# Patient Record
Sex: FEMALE | Race: BLACK OR AFRICAN AMERICAN | NOT HISPANIC OR LATINO | Employment: OTHER | RURAL
[De-identification: names, ages, dates, MRNs, and addresses within clinical notes are randomized per-mention and may not be internally consistent; named-entity substitution may affect disease eponyms.]

---

## 2017-07-10 ENCOUNTER — HISTORICAL (OUTPATIENT)
Dept: ADMINISTRATIVE | Facility: HOSPITAL | Age: 62
End: 2017-07-10

## 2017-07-12 LAB
LAB AP GENERAL CAT - HISTORICAL: NORMAL
LAB AP INTERPRETATION/RESULT - HISTORICAL: NEGATIVE
LAB AP SPECIMEN ADEQUACY - HISTORICAL: NORMAL
LAB AP SPECIMEN SUBMITTED - HISTORICAL: NORMAL

## 2021-05-20 RX ORDER — OMEPRAZOLE 40 MG/1
40 CAPSULE, DELAYED RELEASE ORAL DAILY
COMMUNITY
End: 2021-11-01

## 2021-05-20 RX ORDER — MONTELUKAST SODIUM 10 MG/1
10 TABLET ORAL NIGHTLY
COMMUNITY
End: 2021-10-26

## 2021-05-20 RX ORDER — FUROSEMIDE 40 MG/1
40 TABLET ORAL DAILY
COMMUNITY
End: 2021-11-01

## 2021-05-20 RX ORDER — SPIRONOLACTONE 25 MG/1
25 TABLET ORAL DAILY
COMMUNITY
End: 2021-11-01

## 2021-05-20 RX ORDER — PITAVASTATIN CALCIUM 4.18 MG/1
1 TABLET, FILM COATED ORAL DAILY
COMMUNITY
End: 2022-02-02 | Stop reason: SDUPTHER

## 2021-05-20 RX ORDER — NIFEDIPINE 90 MG/1
90 TABLET, FILM COATED, EXTENDED RELEASE ORAL DAILY
COMMUNITY
End: 2022-02-02 | Stop reason: SDUPTHER

## 2021-05-20 RX ORDER — LOSARTAN POTASSIUM AND HYDROCHLOROTHIAZIDE 25; 100 MG/1; MG/1
1 TABLET ORAL DAILY
COMMUNITY
End: 2022-02-02 | Stop reason: SDUPTHER

## 2021-05-20 RX ORDER — HYDROCODONE BITARTRATE AND ACETAMINOPHEN 10; 325 MG/1; MG/1
1 TABLET ORAL EVERY 8 HOURS PRN
COMMUNITY
End: 2021-12-07 | Stop reason: SDUPTHER

## 2021-05-20 RX ORDER — EZETIMIBE 10 MG/1
10 TABLET ORAL DAILY
COMMUNITY
End: 2022-01-03

## 2021-05-24 ENCOUNTER — OFFICE VISIT (OUTPATIENT)
Dept: PAIN MEDICINE | Facility: CLINIC | Age: 66
End: 2021-05-24
Payer: MEDICARE

## 2021-05-24 VITALS
RESPIRATION RATE: 18 BRPM | WEIGHT: 192 LBS | HEART RATE: 68 BPM | SYSTOLIC BLOOD PRESSURE: 135 MMHG | BODY MASS INDEX: 34.02 KG/M2 | HEIGHT: 63 IN | DIASTOLIC BLOOD PRESSURE: 75 MMHG

## 2021-05-24 DIAGNOSIS — M25.551 HIP PAIN, RIGHT: Chronic | ICD-10-CM

## 2021-05-24 DIAGNOSIS — Z79.899 ENCOUNTER FOR LONG-TERM (CURRENT) USE OF OTHER MEDICATIONS: Primary | ICD-10-CM

## 2021-05-24 DIAGNOSIS — M47.816 SPONDYLOSIS WITHOUT MYELOPATHY OR RADICULOPATHY, LUMBAR REGION: Chronic | ICD-10-CM

## 2021-05-24 LAB
CTP QC/QA: YES
POC (AMP) AMPHETAMINE: NEGATIVE
POC (BAR) BARBITURATES: NEGATIVE
POC (BUP) BUPRENORPHINE: NEGATIVE
POC (BZO) BENZODIAZEPINES: NEGATIVE
POC (COC) COCAINE: NEGATIVE
POC (MDMA) METHYLENEDIOXYMETHAMPHETAMINE 3,4: NEGATIVE
POC (MET) METHAMPHETAMINE: NEGATIVE
POC (MOP) OPIATES: ABNORMAL
POC (MTD) METHADONE: NEGATIVE
POC (OXY) OXYCODONE: NEGATIVE
POC (PCP) PHENCYCLIDINE: NEGATIVE
POC (TCA) TRICYCLIC ANTIDEPRESSANTS: NEGATIVE
POC TEMPERATURE (URINE): 92
POC THC: NEGATIVE

## 2021-05-24 PROCEDURE — 99214 PR OFFICE/OUTPT VISIT, EST, LEVL IV, 30-39 MIN: ICD-10-PCS | Mod: S$PBB,,, | Performed by: PHYSICIAN ASSISTANT

## 2021-05-24 PROCEDURE — 99214 OFFICE O/P EST MOD 30 MIN: CPT | Mod: PBBFAC | Performed by: PHYSICIAN ASSISTANT

## 2021-05-24 PROCEDURE — 99214 OFFICE O/P EST MOD 30 MIN: CPT | Mod: S$PBB,,, | Performed by: PHYSICIAN ASSISTANT

## 2021-05-24 PROCEDURE — 80305 DRUG TEST PRSMV DIR OPT OBS: CPT | Mod: PBBFAC | Performed by: PHYSICIAN ASSISTANT

## 2021-05-24 RX ORDER — HYDROCODONE BITARTRATE AND ACETAMINOPHEN 10; 325 MG/1; MG/1
1 TABLET ORAL EVERY 8 HOURS
Qty: 90 TABLET | Refills: 0 | Status: SHIPPED | OUTPATIENT
Start: 2021-06-17 | End: 2021-07-17

## 2021-05-24 RX ORDER — ASPIRIN 81 MG/1
81 TABLET ORAL DAILY
COMMUNITY

## 2021-05-24 RX ORDER — HYDROCODONE BITARTRATE AND ACETAMINOPHEN 10; 325 MG/1; MG/1
1 TABLET ORAL EVERY 8 HOURS
Qty: 90 TABLET | Refills: 0 | Status: SHIPPED | OUTPATIENT
Start: 2021-08-13 | End: 2021-07-28 | Stop reason: SDUPTHER

## 2021-05-24 RX ORDER — HYDROCODONE BITARTRATE AND ACETAMINOPHEN 10; 325 MG/1; MG/1
1 TABLET ORAL EVERY 8 HOURS
Qty: 90 TABLET | Refills: 0 | Status: SHIPPED | OUTPATIENT
Start: 2021-07-16 | End: 2021-07-28 | Stop reason: SDUPTHER

## 2021-07-28 ENCOUNTER — OFFICE VISIT (OUTPATIENT)
Dept: PRIMARY CARE CLINIC | Facility: CLINIC | Age: 66
End: 2021-07-28
Payer: MEDICARE

## 2021-07-28 VITALS
BODY MASS INDEX: 34.78 KG/M2 | HEART RATE: 62 BPM | TEMPERATURE: 98 F | OXYGEN SATURATION: 98 % | WEIGHT: 189 LBS | HEIGHT: 62 IN | RESPIRATION RATE: 20 BRPM | DIASTOLIC BLOOD PRESSURE: 80 MMHG | SYSTOLIC BLOOD PRESSURE: 110 MMHG

## 2021-07-28 DIAGNOSIS — E11.9 TYPE 2 DIABETES MELLITUS WITHOUT COMPLICATION, WITHOUT LONG-TERM CURRENT USE OF INSULIN: ICD-10-CM

## 2021-07-28 DIAGNOSIS — I10 ESSENTIAL HYPERTENSION: ICD-10-CM

## 2021-07-28 DIAGNOSIS — Z12.31 SCREENING MAMMOGRAM, ENCOUNTER FOR: ICD-10-CM

## 2021-07-28 DIAGNOSIS — Z00.00 ENCOUNTER FOR SUBSEQUENT ANNUAL WELLNESS VISIT (AWV) IN MEDICARE PATIENT: Primary | ICD-10-CM

## 2021-07-28 PROCEDURE — 3008F BODY MASS INDEX DOCD: CPT | Mod: ,,, | Performed by: NURSE PRACTITIONER

## 2021-07-28 PROCEDURE — 1159F PR MEDICATION LIST DOCUMENTED IN MEDICAL RECORD: ICD-10-PCS | Mod: ,,, | Performed by: NURSE PRACTITIONER

## 2021-07-28 PROCEDURE — 3008F PR BODY MASS INDEX (BMI) DOCUMENTED: ICD-10-PCS | Mod: ,,, | Performed by: NURSE PRACTITIONER

## 2021-07-28 PROCEDURE — 1101F PR PT FALLS ASSESS DOC 0-1 FALLS W/OUT INJ PAST YR: ICD-10-PCS | Mod: ,,, | Performed by: NURSE PRACTITIONER

## 2021-07-28 PROCEDURE — 3288F PR FALLS RISK ASSESSMENT DOCUMENTED: ICD-10-PCS | Mod: ,,, | Performed by: NURSE PRACTITIONER

## 2021-07-28 PROCEDURE — 1160F RVW MEDS BY RX/DR IN RCRD: CPT | Mod: ,,, | Performed by: NURSE PRACTITIONER

## 2021-07-28 PROCEDURE — 1125F PR PAIN SEVERITY QUANTIFIED, PAIN PRESENT: ICD-10-PCS | Mod: ,,, | Performed by: NURSE PRACTITIONER

## 2021-07-28 PROCEDURE — G0439 PPPS, SUBSEQ VISIT: HCPCS | Mod: ,,, | Performed by: NURSE PRACTITIONER

## 2021-07-28 PROCEDURE — 1125F AMNT PAIN NOTED PAIN PRSNT: CPT | Mod: ,,, | Performed by: NURSE PRACTITIONER

## 2021-07-28 PROCEDURE — 1159F MED LIST DOCD IN RCRD: CPT | Mod: ,,, | Performed by: NURSE PRACTITIONER

## 2021-07-28 PROCEDURE — 1160F PR REVIEW ALL MEDS BY PRESCRIBER/CLIN PHARMACIST DOCUMENTED: ICD-10-PCS | Mod: ,,, | Performed by: NURSE PRACTITIONER

## 2021-07-28 PROCEDURE — G0439 PR MEDICARE ANNUAL WELLNESS SUBSEQUENT VISIT: ICD-10-PCS | Mod: ,,, | Performed by: NURSE PRACTITIONER

## 2021-07-28 PROCEDURE — 3288F FALL RISK ASSESSMENT DOCD: CPT | Mod: ,,, | Performed by: NURSE PRACTITIONER

## 2021-07-28 PROCEDURE — 1101F PT FALLS ASSESS-DOCD LE1/YR: CPT | Mod: ,,, | Performed by: NURSE PRACTITIONER

## 2021-07-28 RX ORDER — POTASSIUM CHLORIDE 20 MEQ/15ML
20 SOLUTION ORAL DAILY
COMMUNITY
Start: 2021-05-27 | End: 2021-10-11

## 2021-08-03 ENCOUNTER — TELEPHONE (OUTPATIENT)
Dept: PRIMARY CARE CLINIC | Facility: CLINIC | Age: 66
End: 2021-08-03

## 2021-08-04 ENCOUNTER — OFFICE VISIT (OUTPATIENT)
Dept: PRIMARY CARE CLINIC | Facility: CLINIC | Age: 66
End: 2021-08-04
Payer: MEDICARE

## 2021-08-04 VITALS
TEMPERATURE: 98 F | DIASTOLIC BLOOD PRESSURE: 79 MMHG | BODY MASS INDEX: 35.33 KG/M2 | WEIGHT: 192 LBS | OXYGEN SATURATION: 98 % | HEART RATE: 59 BPM | RESPIRATION RATE: 20 BRPM | SYSTOLIC BLOOD PRESSURE: 135 MMHG | HEIGHT: 62 IN

## 2021-08-04 DIAGNOSIS — I10 ESSENTIAL HYPERTENSION: Primary | ICD-10-CM

## 2021-08-04 DIAGNOSIS — E11.9 TYPE 2 DIABETES MELLITUS WITHOUT COMPLICATION, WITHOUT LONG-TERM CURRENT USE OF INSULIN: ICD-10-CM

## 2021-08-04 DIAGNOSIS — T14.8XXA SKIN EXCORIATION: ICD-10-CM

## 2021-08-04 DIAGNOSIS — E78.5 HYPERLIPIDEMIA, UNSPECIFIED HYPERLIPIDEMIA TYPE: ICD-10-CM

## 2021-08-04 LAB
ALBUMIN SERPL BCP-MCNC: 3.5 G/DL (ref 3.5–5)
ALBUMIN/GLOB SERPL: 0.9 {RATIO}
ALP SERPL-CCNC: 57 U/L (ref 55–142)
ALT SERPL W P-5'-P-CCNC: 23 U/L (ref 13–56)
ANION GAP SERPL CALCULATED.3IONS-SCNC: 9 MMOL/L (ref 7–16)
AST SERPL W P-5'-P-CCNC: 17 U/L (ref 15–37)
BASOPHILS # BLD AUTO: 0.06 K/UL (ref 0–0.2)
BASOPHILS NFR BLD AUTO: 1 % (ref 0–1)
BILIRUB SERPL-MCNC: 0.4 MG/DL (ref 0–1.2)
BUN SERPL-MCNC: 16 MG/DL (ref 7–18)
BUN/CREAT SERPL: 16 (ref 6–20)
CALCIUM SERPL-MCNC: 8.6 MG/DL (ref 8.5–10.1)
CHLORIDE SERPL-SCNC: 105 MMOL/L (ref 98–107)
CHOLEST SERPL-MCNC: 151 MG/DL (ref 0–200)
CHOLEST/HDLC SERPL: 4.1 {RATIO}
CO2 SERPL-SCNC: 27 MMOL/L (ref 21–32)
CREAT SERPL-MCNC: 1.01 MG/DL (ref 0.55–1.02)
DIFFERENTIAL METHOD BLD: ABNORMAL
EOSINOPHIL # BLD AUTO: 0.14 K/UL (ref 0–0.5)
EOSINOPHIL NFR BLD AUTO: 2.4 % (ref 1–4)
ERYTHROCYTE [DISTWIDTH] IN BLOOD BY AUTOMATED COUNT: 13.9 % (ref 11.5–14.5)
EST. AVERAGE GLUCOSE BLD GHB EST-MCNC: 197 MG/DL
GLOBULIN SER-MCNC: 4.1 G/DL (ref 2–4)
GLUCOSE SERPL-MCNC: 185 MG/DL (ref 74–106)
HBA1C MFR BLD HPLC: 8.5 % (ref 4.5–6.6)
HCT VFR BLD AUTO: 37.2 % (ref 38–47)
HDLC SERPL-MCNC: 37 MG/DL (ref 40–60)
HGB BLD-MCNC: 12 G/DL (ref 12–16)
IMM GRANULOCYTES # BLD AUTO: 0.03 K/UL (ref 0–0.04)
IMM GRANULOCYTES NFR BLD: 0.5 % (ref 0–0.4)
LDLC SERPL CALC-MCNC: 69 MG/DL
LDLC/HDLC SERPL: 1.9 {RATIO}
LYMPHOCYTES # BLD AUTO: 1.99 K/UL (ref 1–4.8)
LYMPHOCYTES NFR BLD AUTO: 34.8 % (ref 27–41)
MCH RBC QN AUTO: 27.1 PG (ref 27–31)
MCHC RBC AUTO-ENTMCNC: 32.3 G/DL (ref 32–36)
MCV RBC AUTO: 84.2 FL (ref 80–96)
MONOCYTES # BLD AUTO: 0.56 K/UL (ref 0–0.8)
MONOCYTES NFR BLD AUTO: 9.8 % (ref 2–6)
MPC BLD CALC-MCNC: 11.9 FL (ref 9.4–12.4)
NEUTROPHILS # BLD AUTO: 2.94 K/UL (ref 1.8–7.7)
NEUTROPHILS NFR BLD AUTO: 51.5 % (ref 53–65)
NONHDLC SERPL-MCNC: 114 MG/DL
NRBC # BLD AUTO: 0 X10E3/UL
NRBC, AUTO (.00): 0 %
PLATELET # BLD AUTO: 230 K/UL (ref 150–400)
POTASSIUM SERPL-SCNC: 3.4 MMOL/L (ref 3.5–5.1)
PROT SERPL-MCNC: 7.6 G/DL (ref 6.4–8.2)
RBC # BLD AUTO: 4.42 M/UL (ref 4.2–5.4)
SODIUM SERPL-SCNC: 138 MMOL/L (ref 136–145)
TRIGL SERPL-MCNC: 226 MG/DL (ref 35–150)
VLDLC SERPL-MCNC: 45 MG/DL
WBC # BLD AUTO: 5.72 K/UL (ref 4.5–11)

## 2021-08-04 PROCEDURE — 85025 COMPLETE CBC W/AUTO DIFF WBC: CPT | Mod: ,,, | Performed by: CLINICAL MEDICAL LABORATORY

## 2021-08-04 PROCEDURE — 80061 LIPID PANEL: ICD-10-PCS | Mod: ,,, | Performed by: CLINICAL MEDICAL LABORATORY

## 2021-08-04 PROCEDURE — 1160F RVW MEDS BY RX/DR IN RCRD: CPT | Mod: ,,, | Performed by: NURSE PRACTITIONER

## 2021-08-04 PROCEDURE — 80053 COMPREHENSIVE METABOLIC PANEL: ICD-10-PCS | Mod: ,,, | Performed by: CLINICAL MEDICAL LABORATORY

## 2021-08-04 PROCEDURE — 1159F MED LIST DOCD IN RCRD: CPT | Mod: ,,, | Performed by: NURSE PRACTITIONER

## 2021-08-04 PROCEDURE — 99204 PR OFFICE/OUTPT VISIT, NEW, LEVL IV, 45-59 MIN: ICD-10-PCS | Mod: ,,, | Performed by: NURSE PRACTITIONER

## 2021-08-04 PROCEDURE — 80061 LIPID PANEL: CPT | Mod: ,,, | Performed by: CLINICAL MEDICAL LABORATORY

## 2021-08-04 PROCEDURE — 3078F DIAST BP <80 MM HG: CPT | Mod: ,,, | Performed by: NURSE PRACTITIONER

## 2021-08-04 PROCEDURE — 83036 HEMOGLOBIN GLYCOSYLATED A1C: CPT | Mod: ,,, | Performed by: CLINICAL MEDICAL LABORATORY

## 2021-08-04 PROCEDURE — 3075F SYST BP GE 130 - 139MM HG: CPT | Mod: ,,, | Performed by: NURSE PRACTITIONER

## 2021-08-04 PROCEDURE — 1160F PR REVIEW ALL MEDS BY PRESCRIBER/CLIN PHARMACIST DOCUMENTED: ICD-10-PCS | Mod: ,,, | Performed by: NURSE PRACTITIONER

## 2021-08-04 PROCEDURE — 80053 COMPREHEN METABOLIC PANEL: CPT | Mod: ,,, | Performed by: CLINICAL MEDICAL LABORATORY

## 2021-08-04 PROCEDURE — 3078F PR MOST RECENT DIASTOLIC BLOOD PRESSURE < 80 MM HG: ICD-10-PCS | Mod: ,,, | Performed by: NURSE PRACTITIONER

## 2021-08-04 PROCEDURE — 3008F BODY MASS INDEX DOCD: CPT | Mod: ,,, | Performed by: NURSE PRACTITIONER

## 2021-08-04 PROCEDURE — 99204 OFFICE O/P NEW MOD 45 MIN: CPT | Mod: ,,, | Performed by: NURSE PRACTITIONER

## 2021-08-04 PROCEDURE — 83036 HEMOGLOBIN A1C: ICD-10-PCS | Mod: ,,, | Performed by: CLINICAL MEDICAL LABORATORY

## 2021-08-04 PROCEDURE — 85025 CBC WITH DIFFERENTIAL: ICD-10-PCS | Mod: ,,, | Performed by: CLINICAL MEDICAL LABORATORY

## 2021-08-04 PROCEDURE — 1159F PR MEDICATION LIST DOCUMENTED IN MEDICAL RECORD: ICD-10-PCS | Mod: ,,, | Performed by: NURSE PRACTITIONER

## 2021-08-04 PROCEDURE — 3075F PR MOST RECENT SYSTOLIC BLOOD PRESS GE 130-139MM HG: ICD-10-PCS | Mod: ,,, | Performed by: NURSE PRACTITIONER

## 2021-08-04 PROCEDURE — 3008F PR BODY MASS INDEX (BMI) DOCUMENTED: ICD-10-PCS | Mod: ,,, | Performed by: NURSE PRACTITIONER

## 2021-08-04 RX ORDER — MUPIROCIN 20 MG/G
OINTMENT TOPICAL
COMMUNITY
Start: 2021-02-24 | End: 2021-08-04 | Stop reason: SDUPTHER

## 2021-08-04 RX ORDER — METOPROLOL SUCCINATE 100 MG/1
TABLET, EXTENDED RELEASE ORAL
COMMUNITY
Start: 2021-07-21 | End: 2022-02-02

## 2021-08-04 RX ORDER — LANCING DEVICE/LANCETS
KIT MISCELLANEOUS
COMMUNITY
Start: 2021-03-14 | End: 2022-02-23

## 2021-08-04 RX ORDER — NIFEDIPINE 90 MG/1
TABLET, EXTENDED RELEASE ORAL
COMMUNITY
Start: 2021-07-22 | End: 2022-01-31

## 2021-08-04 RX ORDER — MUPIROCIN 20 MG/G
OINTMENT TOPICAL 2 TIMES DAILY
Qty: 1 TUBE | Refills: 1 | Status: SHIPPED | OUTPATIENT
Start: 2021-08-04 | End: 2022-01-31

## 2021-08-04 RX ORDER — LANCETS
EACH MISCELLANEOUS
COMMUNITY
Start: 2021-06-09 | End: 2021-10-26

## 2021-08-04 RX ORDER — SAXAGLIPTIN AND METFORMIN HYDROCHLORIDE 2.5; 1 MG/1; MG/1
TABLET, FILM COATED, EXTENDED RELEASE ORAL
COMMUNITY
Start: 2021-07-21 | End: 2022-02-02 | Stop reason: SDUPTHER

## 2021-08-05 ENCOUNTER — TELEPHONE (OUTPATIENT)
Dept: PRIMARY CARE CLINIC | Facility: CLINIC | Age: 66
End: 2021-08-05

## 2021-08-09 ENCOUNTER — TELEPHONE (OUTPATIENT)
Dept: PRIMARY CARE CLINIC | Facility: CLINIC | Age: 66
End: 2021-08-09

## 2021-08-10 ENCOUNTER — TELEPHONE (OUTPATIENT)
Dept: PRIMARY CARE CLINIC | Facility: CLINIC | Age: 66
End: 2021-08-10

## 2021-08-23 ENCOUNTER — OFFICE VISIT (OUTPATIENT)
Dept: PAIN MEDICINE | Facility: CLINIC | Age: 66
End: 2021-08-23
Payer: MEDICARE

## 2021-08-23 VITALS
BODY MASS INDEX: 34.78 KG/M2 | HEART RATE: 62 BPM | SYSTOLIC BLOOD PRESSURE: 149 MMHG | HEIGHT: 62 IN | DIASTOLIC BLOOD PRESSURE: 80 MMHG | WEIGHT: 189 LBS

## 2021-08-23 DIAGNOSIS — M47.816 SPONDYLOSIS WITHOUT MYELOPATHY OR RADICULOPATHY, LUMBAR REGION: ICD-10-CM

## 2021-08-23 DIAGNOSIS — M25.559 HIP PAIN: Chronic | ICD-10-CM

## 2021-08-23 DIAGNOSIS — Z79.899 ENCOUNTER FOR LONG-TERM (CURRENT) USE OF OTHER MEDICATIONS: Primary | ICD-10-CM

## 2021-08-23 DIAGNOSIS — M25.551 HIP PAIN, RIGHT: ICD-10-CM

## 2021-08-23 PROCEDURE — 3079F PR MOST RECENT DIASTOLIC BLOOD PRESSURE 80-89 MM HG: ICD-10-PCS | Mod: CPTII,,, | Performed by: PAIN MEDICINE

## 2021-08-23 PROCEDURE — 1101F PR PT FALLS ASSESS DOC 0-1 FALLS W/OUT INJ PAST YR: ICD-10-PCS | Mod: CPTII,,, | Performed by: PAIN MEDICINE

## 2021-08-23 PROCEDURE — 80305 DRUG TEST PRSMV DIR OPT OBS: CPT | Mod: PBBFAC | Performed by: PAIN MEDICINE

## 2021-08-23 PROCEDURE — 3052F HG A1C>EQUAL 8.0%<EQUAL 9.0%: CPT | Mod: CPTII,,, | Performed by: PAIN MEDICINE

## 2021-08-23 PROCEDURE — 99213 PR OFFICE/OUTPT VISIT, EST, LEVL III, 20-29 MIN: ICD-10-PCS | Mod: S$PBB,,, | Performed by: PAIN MEDICINE

## 2021-08-23 PROCEDURE — 3008F BODY MASS INDEX DOCD: CPT | Mod: CPTII,,, | Performed by: PAIN MEDICINE

## 2021-08-23 PROCEDURE — 3008F PR BODY MASS INDEX (BMI) DOCUMENTED: ICD-10-PCS | Mod: CPTII,,, | Performed by: PAIN MEDICINE

## 2021-08-23 PROCEDURE — 1125F PR PAIN SEVERITY QUANTIFIED, PAIN PRESENT: ICD-10-PCS | Mod: CPTII,,, | Performed by: PAIN MEDICINE

## 2021-08-23 PROCEDURE — 3288F PR FALLS RISK ASSESSMENT DOCUMENTED: ICD-10-PCS | Mod: CPTII,,, | Performed by: PAIN MEDICINE

## 2021-08-23 PROCEDURE — 1125F AMNT PAIN NOTED PAIN PRSNT: CPT | Mod: CPTII,,, | Performed by: PAIN MEDICINE

## 2021-08-23 PROCEDURE — 3052F PR MOST RECENT HEMOGLOBIN A1C LEVEL 8.0 - < 9.0%: ICD-10-PCS | Mod: CPTII,,, | Performed by: PAIN MEDICINE

## 2021-08-23 PROCEDURE — 1101F PT FALLS ASSESS-DOCD LE1/YR: CPT | Mod: CPTII,,, | Performed by: PAIN MEDICINE

## 2021-08-23 PROCEDURE — 3077F SYST BP >= 140 MM HG: CPT | Mod: CPTII,,, | Performed by: PAIN MEDICINE

## 2021-08-23 PROCEDURE — 99213 OFFICE O/P EST LOW 20 MIN: CPT | Mod: S$PBB,,, | Performed by: PAIN MEDICINE

## 2021-08-23 PROCEDURE — 3288F FALL RISK ASSESSMENT DOCD: CPT | Mod: CPTII,,, | Performed by: PAIN MEDICINE

## 2021-08-23 PROCEDURE — 3079F DIAST BP 80-89 MM HG: CPT | Mod: CPTII,,, | Performed by: PAIN MEDICINE

## 2021-08-23 PROCEDURE — 3077F PR MOST RECENT SYSTOLIC BLOOD PRESSURE >= 140 MM HG: ICD-10-PCS | Mod: CPTII,,, | Performed by: PAIN MEDICINE

## 2021-08-23 PROCEDURE — 99213 OFFICE O/P EST LOW 20 MIN: CPT | Mod: PBBFAC | Performed by: PAIN MEDICINE

## 2021-08-23 RX ORDER — HYDROCODONE BITARTRATE AND ACETAMINOPHEN 10; 325 MG/1; MG/1
1 TABLET ORAL EVERY 8 HOURS PRN
Qty: 90 TABLET | Refills: 0 | Status: SHIPPED | OUTPATIENT
Start: 2021-11-10 | End: 2021-12-07 | Stop reason: SDUPTHER

## 2021-08-23 RX ORDER — HYDROCODONE BITARTRATE AND ACETAMINOPHEN 10; 325 MG/1; MG/1
1 TABLET ORAL EVERY 12 HOURS PRN
Qty: 60 TABLET | Refills: 0 | Status: CANCELLED | OUTPATIENT
Start: 2021-08-23 | End: 2021-09-22

## 2021-08-23 RX ORDER — HYDROCODONE BITARTRATE AND ACETAMINOPHEN 10; 325 MG/1; MG/1
1 TABLET ORAL EVERY 8 HOURS PRN
Qty: 90 TABLET | Refills: 0 | Status: SHIPPED | OUTPATIENT
Start: 2021-10-12 | End: 2021-11-11

## 2021-08-23 RX ORDER — HYDROCODONE BITARTRATE AND ACETAMINOPHEN 10; 325 MG/1; MG/1
1 TABLET ORAL EVERY 8 HOURS PRN
Qty: 90 TABLET | Refills: 0 | Status: SHIPPED | OUTPATIENT
Start: 2021-09-10 | End: 2021-10-10

## 2021-09-30 RX ORDER — LANCING DEVICE/LANCETS
KIT MISCELLANEOUS
Status: CANCELLED | OUTPATIENT
Start: 2021-09-30

## 2021-12-07 PROBLEM — M89.49 OSTEOARTHROSIS MULTIPLE SITES, NOT SPECIFIED AS GENERALIZED: Chronic | Status: ACTIVE | Noted: 2021-12-07

## 2021-12-08 ENCOUNTER — OFFICE VISIT (OUTPATIENT)
Dept: PAIN MEDICINE | Facility: CLINIC | Age: 66
End: 2021-12-08
Payer: MEDICARE

## 2021-12-08 VITALS
RESPIRATION RATE: 20 BRPM | HEART RATE: 60 BPM | WEIGHT: 189 LBS | HEIGHT: 63 IN | BODY MASS INDEX: 33.49 KG/M2 | SYSTOLIC BLOOD PRESSURE: 177 MMHG | DIASTOLIC BLOOD PRESSURE: 72 MMHG

## 2021-12-08 DIAGNOSIS — Z79.899 ENCOUNTER FOR LONG-TERM (CURRENT) USE OF OTHER MEDICATIONS: ICD-10-CM

## 2021-12-08 DIAGNOSIS — M89.49 OSTEOARTHROSIS MULTIPLE SITES, NOT SPECIFIED AS GENERALIZED: Chronic | ICD-10-CM

## 2021-12-08 DIAGNOSIS — M47.816 SPONDYLOSIS WITHOUT MYELOPATHY OR RADICULOPATHY, LUMBAR REGION: Primary | Chronic | ICD-10-CM

## 2021-12-08 DIAGNOSIS — M25.551 HIP PAIN, RIGHT: Chronic | ICD-10-CM

## 2021-12-08 LAB
CTP QC/QA: YES
POC (AMP) AMPHETAMINE: NEGATIVE
POC (BAR) BARBITURATES: NEGATIVE
POC (BUP) BUPRENORPHINE: NEGATIVE
POC (BZO) BENZODIAZEPINES: NEGATIVE
POC (COC) COCAINE: NEGATIVE
POC (MDMA) METHYLENEDIOXYMETHAMPHETAMINE 3,4: NEGATIVE
POC (MET) METHAMPHETAMINE: NEGATIVE
POC (MOP) OPIATES: ABNORMAL
POC (MTD) METHADONE: NEGATIVE
POC (OXY) OXYCODONE: NEGATIVE
POC (PCP) PHENCYCLIDINE: NEGATIVE
POC (TCA) TRICYCLIC ANTIDEPRESSANTS: NEGATIVE
POC TEMPERATURE (URINE): 90
POC THC: NEGATIVE

## 2021-12-08 PROCEDURE — 99215 OFFICE O/P EST HI 40 MIN: CPT | Mod: PBBFAC | Performed by: PHYSICIAN ASSISTANT

## 2021-12-08 PROCEDURE — 80305 DRUG TEST PRSMV DIR OPT OBS: CPT | Mod: PBBFAC | Performed by: PHYSICIAN ASSISTANT

## 2021-12-08 PROCEDURE — 99214 OFFICE O/P EST MOD 30 MIN: CPT | Mod: S$PBB,,, | Performed by: PHYSICIAN ASSISTANT

## 2021-12-08 PROCEDURE — 99214 PR OFFICE/OUTPT VISIT, EST, LEVL IV, 30-39 MIN: ICD-10-PCS | Mod: S$PBB,,, | Performed by: PHYSICIAN ASSISTANT

## 2021-12-08 RX ORDER — HYDROCODONE BITARTRATE AND ACETAMINOPHEN 10; 325 MG/1; MG/1
1 TABLET ORAL EVERY 8 HOURS
Qty: 90 TABLET | Refills: 0 | Status: SHIPPED | OUTPATIENT
Start: 2022-02-08 | End: 2022-03-04 | Stop reason: SDUPTHER

## 2021-12-08 RX ORDER — HYDROCODONE BITARTRATE AND ACETAMINOPHEN 10; 325 MG/1; MG/1
1 TABLET ORAL EVERY 8 HOURS
Qty: 90 TABLET | Refills: 0 | Status: SHIPPED | OUTPATIENT
Start: 2021-12-10 | End: 2022-01-09

## 2021-12-08 RX ORDER — HYDROCODONE BITARTRATE AND ACETAMINOPHEN 10; 325 MG/1; MG/1
1 TABLET ORAL EVERY 8 HOURS
Qty: 90 TABLET | Refills: 0 | Status: SHIPPED | OUTPATIENT
Start: 2022-01-08 | End: 2022-02-07

## 2022-02-02 ENCOUNTER — OFFICE VISIT (OUTPATIENT)
Dept: PRIMARY CARE CLINIC | Facility: CLINIC | Age: 67
End: 2022-02-02
Payer: MEDICARE

## 2022-02-02 VITALS
SYSTOLIC BLOOD PRESSURE: 126 MMHG | RESPIRATION RATE: 18 BRPM | OXYGEN SATURATION: 96 % | HEART RATE: 57 BPM | BODY MASS INDEX: 33.13 KG/M2 | TEMPERATURE: 97 F | HEIGHT: 63 IN | DIASTOLIC BLOOD PRESSURE: 71 MMHG | WEIGHT: 187 LBS

## 2022-02-02 DIAGNOSIS — I10 ESSENTIAL HYPERTENSION: ICD-10-CM

## 2022-02-02 DIAGNOSIS — Z23 ENCOUNTER FOR IMMUNIZATION: ICD-10-CM

## 2022-02-02 DIAGNOSIS — E11.9 TYPE 2 DIABETES MELLITUS WITHOUT COMPLICATION, WITHOUT LONG-TERM CURRENT USE OF INSULIN: Primary | ICD-10-CM

## 2022-02-02 DIAGNOSIS — E78.5 HYPERLIPIDEMIA, UNSPECIFIED HYPERLIPIDEMIA TYPE: ICD-10-CM

## 2022-02-02 DIAGNOSIS — E87.6 HYPOKALEMIA: ICD-10-CM

## 2022-02-02 LAB — GLUCOSE SERPL-MCNC: 137 MG/DL (ref 70–110)

## 2022-02-02 PROCEDURE — 1160F PR REVIEW ALL MEDS BY PRESCRIBER/CLIN PHARMACIST DOCUMENTED: ICD-10-PCS | Mod: ,,, | Performed by: NURSE PRACTITIONER

## 2022-02-02 PROCEDURE — 3078F DIAST BP <80 MM HG: CPT | Mod: ,,, | Performed by: NURSE PRACTITIONER

## 2022-02-02 PROCEDURE — 1159F MED LIST DOCD IN RCRD: CPT | Mod: ,,, | Performed by: NURSE PRACTITIONER

## 2022-02-02 PROCEDURE — G0008 ADMIN INFLUENZA VIRUS VAC: HCPCS | Mod: ,,, | Performed by: NURSE PRACTITIONER

## 2022-02-02 PROCEDURE — G0008 FLU VACCINE (QUAD) GREATER THAN OR EQUAL TO 3YO PRESERVATIVE FREE IM: ICD-10-PCS | Mod: ,,, | Performed by: NURSE PRACTITIONER

## 2022-02-02 PROCEDURE — 99214 PR OFFICE/OUTPT VISIT, EST, LEVL IV, 30-39 MIN: ICD-10-PCS | Mod: ,,, | Performed by: NURSE PRACTITIONER

## 2022-02-02 PROCEDURE — 90686 IIV4 VACC NO PRSV 0.5 ML IM: CPT | Mod: ,,, | Performed by: NURSE PRACTITIONER

## 2022-02-02 PROCEDURE — 3052F PR MOST RECENT HEMOGLOBIN A1C LEVEL 8.0 - < 9.0%: ICD-10-PCS | Mod: ,,, | Performed by: NURSE PRACTITIONER

## 2022-02-02 PROCEDURE — 3008F BODY MASS INDEX DOCD: CPT | Mod: ,,, | Performed by: NURSE PRACTITIONER

## 2022-02-02 PROCEDURE — 3074F SYST BP LT 130 MM HG: CPT | Mod: ,,, | Performed by: NURSE PRACTITIONER

## 2022-02-02 PROCEDURE — 99214 OFFICE O/P EST MOD 30 MIN: CPT | Mod: ,,, | Performed by: NURSE PRACTITIONER

## 2022-02-02 PROCEDURE — 1160F RVW MEDS BY RX/DR IN RCRD: CPT | Mod: ,,, | Performed by: NURSE PRACTITIONER

## 2022-02-02 PROCEDURE — 3008F PR BODY MASS INDEX (BMI) DOCUMENTED: ICD-10-PCS | Mod: ,,, | Performed by: NURSE PRACTITIONER

## 2022-02-02 PROCEDURE — 3052F HG A1C>EQUAL 8.0%<EQUAL 9.0%: CPT | Mod: ,,, | Performed by: NURSE PRACTITIONER

## 2022-02-02 PROCEDURE — 3078F PR MOST RECENT DIASTOLIC BLOOD PRESSURE < 80 MM HG: ICD-10-PCS | Mod: ,,, | Performed by: NURSE PRACTITIONER

## 2022-02-02 PROCEDURE — 3074F PR MOST RECENT SYSTOLIC BLOOD PRESSURE < 130 MM HG: ICD-10-PCS | Mod: ,,, | Performed by: NURSE PRACTITIONER

## 2022-02-02 PROCEDURE — 1159F PR MEDICATION LIST DOCUMENTED IN MEDICAL RECORD: ICD-10-PCS | Mod: ,,, | Performed by: NURSE PRACTITIONER

## 2022-02-02 PROCEDURE — 90686 FLU VACCINE (QUAD) GREATER THAN OR EQUAL TO 3YO PRESERVATIVE FREE IM: ICD-10-PCS | Mod: ,,, | Performed by: NURSE PRACTITIONER

## 2022-02-02 RX ORDER — EZETIMIBE 10 MG/1
10 TABLET ORAL DAILY
Qty: 90 TABLET | Refills: 2 | Status: SHIPPED | OUTPATIENT
Start: 2022-02-02 | End: 2022-02-09 | Stop reason: SDUPTHER

## 2022-02-02 RX ORDER — LOSARTAN POTASSIUM AND HYDROCHLOROTHIAZIDE 25; 100 MG/1; MG/1
1 TABLET ORAL DAILY
Qty: 90 TABLET | Refills: 2 | Status: SHIPPED | OUTPATIENT
Start: 2022-02-02 | End: 2022-02-09 | Stop reason: SDUPTHER

## 2022-02-02 RX ORDER — METOPROLOL SUCCINATE 100 MG/1
100 TABLET, EXTENDED RELEASE ORAL DAILY
Qty: 90 TABLET | Refills: 2 | Status: SHIPPED | OUTPATIENT
Start: 2022-02-02 | End: 2022-02-09 | Stop reason: SDUPTHER

## 2022-02-02 RX ORDER — PITAVASTATIN CALCIUM 4.18 MG/1
TABLET, FILM COATED ORAL
Qty: 90 TABLET | Refills: 2 | Status: SHIPPED | OUTPATIENT
Start: 2022-02-02 | End: 2022-02-09 | Stop reason: SDUPTHER

## 2022-02-02 RX ORDER — SITAGLIPTIN AND METFORMIN HYDROCHLORIDE 1000; 50 MG/1; MG/1
1 TABLET, FILM COATED, EXTENDED RELEASE ORAL
Qty: 90 TABLET | Refills: 2 | Status: SHIPPED | OUTPATIENT
Start: 2022-02-02 | End: 2022-02-08

## 2022-02-02 RX ORDER — POTASSIUM CHLORIDE 20 MEQ/15ML
SOLUTION ORAL
Qty: 720 ML | Refills: 2 | Status: SHIPPED | OUTPATIENT
Start: 2022-02-02 | End: 2022-02-09 | Stop reason: SDUPTHER

## 2022-02-02 NOTE — PATIENT INSTRUCTIONS
"Patient Education       Diabetes and Diet   The Basics   Written by the doctors and editors at [facility]   Why is diet important in diabetes? -- Diet is important because it is part of diabetes treatment. Many people need to change what they eat and how much they eat to help treat their diabetes. It is important for people to treat their diabetes so that they:  · Keep their blood sugar at or near a normal level  · Prevent long-term problems, such as heart or kidney problems, that can happen in people with diabetes  Changing your diet can also help treat obesity, high blood pressure, and high cholesterol. These conditions can affect people with diabetes and can lead to future problems, such as heart attacks or strokes.  Who will work with me to change my diet? -- Your doctor or nurse will work with you to make a food plan to change your diet. They might also recommend that you work with a "dietitian." A dietitian is an expert on food and eating.  Do I need to eat at the same times every day? -- When and how often you should eat depends, in part, on the diabetes medicines you take. For example:  · People who take about the same amount of insulin at the same time each day (called a "fixed regimen") should eat meals at the same times. This is also true for people who take pills that increase insulin levels, such as sulfonylureas. Eating meals at the same time every day helps prevent low blood sugar.  · People who adjust the dose and timing of their insulin each day (called a "flexible regimen") do not always have to eat meals at the same time. That's because they can time their insulin dose for before they plan to eat, and also adjust the dose for how much they plan to eat.  · People who take medicines that don't usually cause low blood sugar, such as metformin, don't have to eat meals at the same time every day.  What do I need to think about when planning what to eat? -- Our bodies break down the food we eat into small " "pieces called carbohydrates, proteins, and fats.  When planning what to eat, people with diabetes need to think about:  · Carbohydrates (or "carbs") - Carbohydrates, which are sugars that our bodies use for energy, can raise a person's blood sugar level. Your doctor, nurse, or dietitian will tell you how many carbohydrates you should eat at each meal or snack. Foods that have carbohydrates include:  ? Bread, pasta, and rice  ? Vegetables and fruits  ? Dairy foods  ? Foods and drinks with added sugar  It is best to get your carbohydrates from fruits, vegetables, whole grains, and low-fat milk. It is best to avoid drinks with added sugar, like soda, juices, and sports drinks.   · Protein - Your doctor, nurse, or dietitian will tell you how much protein you should eat each day. It is best to eat lean meats, fish, eggs, beans, peas, soy products, nuts, and seeds.  · Fats - The type of fat you eat is more important than the amount of fat. "Saturated" and "trans" fats can increase your risk for heart problems, like a heart attack.  ? Foods that have saturated fats include meat, butter, cheese, and ice cream.  ? Foods that have trans fats include processed food with "partially hydrogenated oils" on the ingredient list. This may include fried foods, store bought cookies, muffins, pies, and cakes.  "Monounsaturated" and "polyunsaturated" fats are better for you. Foods with these types of fat include fish, avocado, olive oil, and nuts.  · Calories - People need to eat a certain amount of calories each day to keep their weight the same. People who are overweight and want to lose weight need to eat fewer calories each day.  · Fiber - Eating foods with a lot of fiber can help control a person's blood sugar level. Foods that have a lot of fiber include apples, green beans, peas, beans, lentils, nuts, oatmeal, and whole grains.  · Salt - People who have high blood pressure should not eat foods that contain a lot of salt (also " called sodium). People with high blood pressure should also eat healthy foods, such as fruits, vegetables, and low-fat dairy foods.  · Alcohol - Having more than 1 drink (for women) or 2 drinks (for men) a day can raise blood sugar levels. Also, drinks that have fruit juice or soda in them can raise blood sugar levels.  What can I do if I need to lose weight? -- If you need to lose weight, you can:  · Exercise - Try to get at least 30 minutes of physical activity a day, most days of the week. Even gentle exercise, like walking, is good for your health. Some people with diabetes need to change their medicine dose before they exercise. They might also need to check their blood sugar levels before and after exercising.  · Eat fewer calories - Your doctor, nurse, or dietitian can tell you how many calories you should eat each day in order to lose weight.  If you are worried about your weight, size, or shape, talk with your doctor, nurse, or dietitian. They can help you make changes to improve your health.  Can I eat the same foods as my family? -- Yes. You do not need to eat special foods if you have diabetes. You and your family can eat the same foods. Changing your diet is mostly about eating healthy foods and not eating too much.  What are the other parts of diabetes treatment? -- Besides changing your diet, the other parts of diabetes treatment are:  · Exercise  · Medicines  Some people with diabetes need to learn how to match their diet and exercise with their medicine dose. For example, people who use insulin might need to choose the dose of insulin they give themselves. To choose their dose, they need to think about:  · What they plan to eat at the next meal  · How much exercise they plan to do  · What their blood sugar level is  If the diet and exercise do not match the medicine dose, a person's blood sugar level can get too low or too high. Blood sugar levels that are too low or too high can cause  problems.  All topics are updated as new evidence becomes available and our peer review process is complete.  This topic retrieved from "[employer] on: Sep 21, 2021.  Topic 45522 Version 7.0  Release: 29.4.2 - C29.263  © 2021 UpToDate, Inc. and/or its affiliates. All rights reserved.  Consumer Information Use and Disclaimer   This information is not specific medical advice and does not replace information you receive from your health care provider. This is only a brief summary of general information. It does NOT include all information about conditions, illnesses, injuries, tests, procedures, treatments, therapies, discharge instructions or life-style choices that may apply to you. You must talk with your health care provider for complete information about your health and treatment options. This information should not be used to decide whether or not to accept your health care provider's advice, instructions or recommendations. Only your health care provider has the knowledge and training to provide advice that is right for you. The use of this information is governed by the [employer] End User License Agreement, available at https://www.[employer]/en/solutions/[employer]/about/craig.The use of "[employer] content is governed by the "[employer] Terms of Use. ©2021 UpToDate, Inc. All rights reserved.  Copyright   © 2021 UpToDate, Inc. and/or its affiliates. All rights reserved.    Patient Education       Controlling Your Blood Pressure Through Lifestyle   The Basics   Written by the doctors and editors at [facility]   What does my lifestyle have to do with my blood pressure? -- The things you do and the foods you eat have a big effect on your blood pressure and your overall health. Following the right lifestyle can:  · Lower your blood pressure or keep you from getting high blood pressure in the first place  · Reduce your need for blood pressure medicines  · Make medicines for high blood pressure work better, if you do take  "them  · Lower the chances that you'll have a heart attack or stroke, or develop kidney disease  Which lifestyle choices will help lower my blood pressure? -- Here's what you can do:  · Lose weight (if you are overweight)  · Choose a diet rich in fruits, vegetables, and low-fat dairy products, and low in meats, sweets, and refined grains  · Eat less salt (sodium)  · Do something active for at least 30 minutes a day on most days of the week  · Limit the amount of alcohol you drink  If you have high blood pressure, it's also very important to quit smoking (if you smoke). Quitting smoking might not bring your blood pressure down. But it will lower the chances that you'll have a heart attack or stroke, and it will help you feel better and live longer.  Start low and go slow -- The changes listed above might sound like a lot, but don't worry. You don't have to change everything all at once. The key to improving your lifestyle is to "start low and go slow." Choose 1 small, specific thing to change and try doing it for a while. If it works for you, keep doing it until it becomes a habit. If it doesn't, don't give up. Choose something else to change and see how that goes.  Let's say, for example, that you would like to improve your diet. If you're the type of person who eats cheeseburgers and French fries all the time, you can't switch to eating just salads from one day to the next. When people try to make changes like that, they often fail. Then they feel frustrated and tend to give up. So instead of trying to change everything about your diet in 1 day, change 1 or 2 small things about your diet and give yourself time to get used to those changes. For instance, keep the cheeseburger but give up the French fries. Or eat the same things but cut your portions in half.  As you find things that you are able to change and stick with, keep adding new changes. In time, you will see that you can actually change a lot. You just have " "to get used to the changes slowly.  Lose weight -- When people think about losing weight, they sometimes make it more complicated than it really is. To lose weight, you have to either eat less or move more. If you do both of those things, it's even better. But there is no single weight-loss diet or activity that's better than any other. When it comes to weight loss, the most effective plan is the one that you'll stick with.  Improve your diet -- There is no single diet that is right for everyone. But in general, a healthy diet can include:  · Lots of fruits, vegetables, and whole grains  · Some beans, peas, lentils, chickpeas, and similar foods  · Some nuts, such as walnuts, almonds, and peanuts  · Fat-free or low-fat milk and milk products  · Some fish  To have a healthy diet, it's also important to limit or avoid sugar, sweets, meats, and refined grains. (Refined grains are found in white bread, white rice, most forms of pasta, and most packaged "snack" foods.)  Reduce salt -- Many people think that eating a low-sodium diet means avoiding the salt shaker and not adding salt when cooking. The truth is, not adding salt at the table or when you cook will only help a little. Almost all of the sodium you eat is already in the food you buy at the grocery store or at restaurants (figure 1).  The most important thing you can do to cut down on sodium is to eat less processed food. That means that you should avoid most foods that are sold in cans, boxes, jars, and bags. You should also eat in restaurants less often.  To reduce the amount of sodium you get, buy fresh or fresh-frozen fruits, vegetables, and meats. (Fresh-frozen foods have had nothing added to them before freezing.) Then you can make meals at home, from scratch, with these ingredients.  As with the other changes, don't try to cut out salt all at once. Instead, choose 1 or 2 foods that have a lot of sodium and try to replace them with low-sodium choices. When " "you get used to those low-sodium options, find another food or 2 to change. Then keep going, until all the foods you eat are sodium-free or low in sodium.  Become more active -- If you want to be more active, you don't have to go to the gym or get all sweaty. It is possible to increase your activity level while doing everyday things you enjoy. Walking, gardening, and dancing are just a few of the things that you might try. As with all the other changes, the key is not to do too much too fast. If you don't do any activity now, start by walking for just a few minutes every other day. Do that for a few weeks. If you stick with it, try doing it for longer. But if you find that you don't like walking, try a different activity.  Drink less alcohol -- If you are a woman, do not have more than 1 "standard drink" of alcohol a day. If you are a man, do not have more than 2. A "standard drink" is:  · A can or bottle that has 12 ounces of beer  · A glass that has 5 ounces of wine  · A shot that has 1.5 ounces of whiskey  Where should I start? -- If you want to improve your lifestyle, start by making the changes that you think would be easiest for you. If you used to exercise and just got out of the habit, maybe it would be easy for you to start exercising again. Or if you actually like cooking meals from scratch, maybe the first thing you should focus on is eating home-cooked meals that are low in sodium.  Whatever you tackle first, choose specific, realistic goals, and give yourself a deadline. For example, do not decide that you are going to "exercise more." Instead, decide that you are going to walk for 10 minutes on Monday, Wednesday, and Friday, and that you are going to do this for the next 2 weeks.  When lifestyle changes are too general, people have a hard time following through.  Now go. You can do it!  All topics are updated as new evidence becomes available and our peer review process is complete.  This topic " retrieved from [employer] on: Sep 21, 2021.  Topic 92592 Version 8.0  Release: 29.4.2 - C29.263  © 2021 UpToDate, Inc. and/or its affiliates. All rights reserved.  figure 1: Sources of sodium in your diet     Graphic 48589 Version 2.0    Consumer Information Use and Disclaimer   This information is not specific medical advice and does not replace information you receive from your health care provider. This is only a brief summary of general information. It does NOT include all information about conditions, illnesses, injuries, tests, procedures, treatments, therapies, discharge instructions or life-style choices that may apply to you. You must talk with your health care provider for complete information about your health and treatment options. This information should not be used to decide whether or not to accept your health care provider's advice, instructions or recommendations. Only your health care provider has the knowledge and training to provide advice that is right for you. The use of this information is governed by the [employer] End User License Agreement, available at https://www.[employer]/en/solutions/[employer]/about/craig.The use of [employer] content is governed by the [employer] Terms of Use. ©2021 UpToDate, Inc. All rights reserved.  Copyright   © 2021 UpToDate, Inc. and/or its affiliates. All rights reserved.    Patient Education       High Cholesterol   The Basics   Written by the doctors and editors at [employer]   What is cholesterol? -- Cholesterol is a substance that is found in the blood. Everyone has some. It is needed for good health. The problem is, people sometimes have too much cholesterol. Compared with people with normal cholesterol, people with high cholesterol have a higher risk of heart attacks, strokes, and other health problems. The higher your cholesterol, the higher your risk of these problems.  Are there different types of cholesterol? -- Yes, there are a few different types. If you  "get a cholesterol test, you might hear your doctor or nurse talk about:  · Total cholesterol  · LDL cholesterol - Some people call this the "bad" cholesterol. That's because having high LDL levels raises your risk of heart attacks, strokes, and other health problems.  · HDL cholesterol - Some people call this the "good" cholesterol. That's because people with high HDL levels tend to have a lower risk of heart attacks, strokes, and other health problems.   · Non-HDL cholesterol - Non-HDL cholesterol is your total cholesterol minus your HDL cholesterol.  · Triglycerides - Triglycerides are not cholesterol. They are another type of fat. But they often get measured when cholesterol is measured. (Having high triglycerides also seems to increase the risk of heart attacks and strokes.)  What should my numbers be? -- Ask your doctor or nurse what your numbers should be. Different people need different goals. (If you live outside the [place], see the table (table 1)). In general, people who do not already have heart disease should aim for:  · Total cholesterol below 200  · LDL cholesterol below 130 - or much lower, if they are at risk of heart attacks or strokes  · HDL cholesterol above 60  · Non-HDL cholesterol below 160 - or lower, if they are at risk of heart attacks or strokes  · Triglycerides below 150  Keep in mind, though, that many people who cannot meet these goals still have a low risk of heart attacks and strokes.  What should I do if my doctor tells me I have high cholesterol? -- Ask your doctor what your overall risk of heart attacks and strokes is. High cholesterol, by itself, is not always a reason to worry. Having high cholesterol is just one of many things that can increase your risk of heart attacks and strokes. Other factors that increase your risk include:  · Cigarette smoking  · High blood pressure  · Having a parent, sister, or brother who got heart disease at a young age - Young, in this case, " "means younger than 55 for men and younger than 65 for women.  · A diet that is not heart healthy - A "heart-healthy" diet includes lots of fruits and vegetables, fiber, and healthy fats (like those found in fish and certain oils). It also means limiting sugar and unhealthy fats.  · Older age  If you are at high risk of heart attacks and strokes, having high cholesterol is a problem. On the other hand, if you are at low risk, having high cholesterol might not lead to treatment.  Should I take medicine to lower cholesterol? -- Not everyone who has high cholesterol needs medicines. Your doctor or nurse will decide if you need them based on your age, family history, and other health concerns.  You should probably take a cholesterol-lowering medicine called a statin if you:  · Already had a heart attack or stroke  · Have known heart disease  · Have diabetes  · Have a condition called peripheral artery disease, which makes it painful to walk, and happens when the arteries in your legs get clogged with fatty deposits  · Have an abdominal aortic aneurysm, which is a widening of the main artery in the belly  Most people with any of the conditions listed above should take a statin no matter what their cholesterol level is. If your doctor or nurse puts you on a statin, stay on it. The medicine might not make you feel any different. But it can help prevent heart attacks, strokes, and death.  Can I lower my cholesterol without medicines? -- Yes, you can lower your cholesterol some by:  · Avoiding red meat, butter, fried foods, cheese, and other foods that have a lot of saturated fat  · Losing weight (if you are overweight)  · Being more active  Even if these steps do little to change your cholesterol, they can improve your health in many ways.  All topics are updated as new evidence becomes available and our peer review process is complete.  This topic retrieved from [employer] on: Sep 21, 2021.  Topic 06450 Version 19.0  Release: " 29.4.2 - C29.263  © 2021 UpToDate, Inc. and/or its affiliates. All rights reserved.  table 1: Cholesterol and triglyceride measurements in the [place] and elsewhere     Measurement used within the [place] Milligrams/deciliter (mg/dL)  Measurement used most places outside the [place] Millimoles/liter (mmol/Liter)     Level to aim for  Level to aim for    Total cholesterol  Below 200 Below 5.17   LDL cholesterol  Below 130 - or much lower if at risk of heart attack and stroke Below 3.36 - or much lower if at risk of heart attack and stroke   HDL cholesterol  Above 60 Above 1.55   Triglycerides  Below 150 Below 1.7   Cholesterol is measured differently in the [place] than it is in most other countries. This table shows values used within and outside the [place]. It includes the cholesterol and triglyceride levels that most people who do not have heart disease should aim for.  Graphic 19758 Version 3.0  Consumer Information Use and Disclaimer   This information is not specific medical advice and does not replace information you receive from your health care provider. This is only a brief summary of general information. It does NOT include all information about conditions, illnesses, injuries, tests, procedures, treatments, therapies, discharge instructions or life-style choices that may apply to you. You must talk with your health care provider for complete information about your health and treatment options. This information should not be used to decide whether or not to accept your health care provider's advice, instructions or recommendations. Only your health care provider has the knowledge and training to provide advice that is right for you. The use of this information is governed by the [employer] End User License Agreement, available at https://[email or web address]/en/solutions/[employer]/about/craig.The use of [employer] content is governed by the [employer] Terms of Use. ©2021 [employer]Inc[employer] All rights reserved.  Copyright   © 2021 UpToDate, Inc. and/or its affiliates. All rights reserved.  Patient Education       Flu Vaccine   The Basics   Written by the doctors and editors at [facility]   What is the flu vaccine? -- Vaccines can prevent certain serious or deadly infections. The flu vaccine can keep you from getting sick with the flu. Vaccines are also called "vaccinations" or "immunizations."  The flu is an infection that can cause fever, cough, body aches, and other symptoms. There are different forms of the flu, including the "seasonal" flu, the 9348-0821 pandemic H1N1 flu (also called the "swine" flu), and the bird flu (also called the "jami" flu). All forms of the flu are caused by viruses. The medical term for the flu is "influenza."  This article is about the flu vaccine that can protect you from the seasonal flu. The flu vaccines that are available now do not protect against bird flu.  Are there different forms of the flu vaccine? -- Yes. The flu vaccine comes in different forms, including:  · A shot that goes into muscle (usually in the upper part of the arm)  · A nasal spray  Your doctor can help you decide which vaccine is best for you.  Who should get the flu vaccine and when? -- All people age 6 months or older should get the flu vaccine every year. The vaccine is especially important for certain people at high risk (table 1).  The best time of year to get the flu vaccine is before the winter season begins. In the [place], it's best to get the vaccine by October. In southern countries, where winter happens in July and August, it's best to get the vaccine by May. No matter where you live, do your best to get the vaccine soon after it becomes available in your area. Depending on how many doses they have had in the past, children ages 6 months through 8 years might need 2 doses of the vaccine for it to work best.  Why should I get the flu vaccine? -- Getting vaccinated can help keep " "you from getting sick. Plus, being vaccinated can help protect those around you from getting sick. If you have been vaccinated but get the flu, the vaccine can also keep you from getting severely ill or even dying.  Some years the flu vaccine is more effective than others. That's because the people developing the vaccines can't predict exactly how the flu virus is going to change year to year, and it takes months to make a new vaccine.  Some people think the flu vaccine doesn't work because they have known people who got the vaccine and got the flu anyway. But that does not mean the vaccine does not work. Many people who get sick after getting the flu vaccine do not actually have the flu; they have a cold caused by a virus unrelated to the flu virus, so the flu vaccine can't help with that.  Even in years when the vaccine is less effective, it still helps prevent some cases of the flu and also helps to prevent serious illness and outbreaks of the flu.  Should I still get a flu vaccine during the COVID-19 pandemic? -- COVID-19 stands for "coronavirus disease 2019." It is caused by a virus called SARS-CoV-2. The virus first appeared in late 2019 and has since spread throughout the world.  It is extra important to get your flu vaccine during the pandemic. The flu vaccine does not prevent COVID-19. But if a lot of people get sick with the flu, it will be harder for doctors and hospitals to care for people who have COVID-19 or other illnesses.  What side effects does the flu vaccine cause? -- Often the vaccine causes no side effects. When it does cause side effects, it can cause:  · Redness, mild swelling, or soreness where you got the shot (if you got a shot)  · A mild fever  · A mild rash  · Headache or body aches  Vaccines also sometimes cause more serious side effects, such as severe allergic reactions. But serious side effects are rare.  Does the flu vaccine cause the flu? -- No, the flu vaccine does not cause the " flu. People sometimes feel sick after getting the vaccine, but this is often because they were already starting to get sick with the flu or another virus before they had the vaccine.  Does the flu vaccine cause autism? -- No. After doing many careful studies, scientists have not found any link between vaccines and autism. Many years ago, a study reported a link between autism and vaccines. But that study turned out to be false. It has been withdrawn.  What if I am pregnant? -- If you are pregnant, it is very important to get the flu vaccine. In pregnant women, flu symptoms can get worse quickly and be dangerous. The flu can even cause trouble breathing or lead to death of the woman or her baby. That is why it is so important to get the flu vaccine if you are or will be pregnant during flu season.  What if I have an egg allergy? -- People sometimes worry about this. That's because some forms of the flu vaccine contain small amounts of egg. But the amount is so small that it does not cause an allergic reaction. If you have an egg allergy, you should still get the flu vaccine.  What else can I do to prevent the flu? -- In addition to getting the flu vaccine every year, you can:  · Wash your hands often with soap and water, or use alcohol hand rubs  · Stay away from people you know are sick  If you are exposed to the flu, antiviral medicines can help protect you from the flu, but those medicines are not appropriate for everyone. Also, antiviral medicines work only if you start them very soon after being exposed or as soon as you show symptoms.  To protect others, you should also:  · Stay home if you get the flu. Do not go to work or school until your fever has been gone for at least 24 hours, without taking fever-reducing medicine, such as Tylenol. If you work in a health care setting taking care of patients, you might need to stay home longer if you are still coughing.  · Cover your mouth and nose with the inside of  your elbow when you cough or sneeze.  All topics are updated as new evidence becomes available and our peer review process is complete.  This topic retrieved from [employer] on: Sep 21, 2021.  Topic 34646 Version 16.0  Release: 29.4.2 - C29.263  © 2021 Up[employer]Date[employer] and/or its affiliates. All rights reserved.  table 1: People at high risk from the flu  The flu vaccine is especially important for:    Children age 6 months through 4 years   Adults age 50 or older   People with long-term health problems, such as:   Lung disease, including asthma or COPD   Heart disease   Diabetes   Severe obesity   People who have trouble fighting infections, for example because they:   Are being treated for cancer   Have HIV/AIDS   Had an organ transplant   Women who are or will be pregnant during the flu season   Children age 6 months through 18 years who take aspirin every day   People who live in nursing homes or long-term care facilities   Native Americans, including [place] Natives   People who live with or care for people at high risk (listed above)   Graphic 36958 Version 3.0  Consumer Information Use and Disclaimer   This information is not specific medical advice and does not replace information you receive from your health care provider. This is only a brief summary of general information. It does NOT include all information about conditions, illnesses, injuries, tests, procedures, treatments, therapies, discharge instructions or life-style choices that may apply to you. You must talk with your health care provider for complete information about your health and treatment options. This information should not be used to decide whether or not to accept your health care provider's advice, instructions or recommendations. Only your health care provider has the knowledge and training to provide advice that is right for you. The use of this information is governed by the [employer] End User License Agreement, available at  https://www.[employer]uwer.com/en/solutions/lexicomp/about/craig.The use of [employer] content is governed by the [employer] Terms of Use. ©2021 [employer] All rights reserved.  Copyright   © 2021 Up[employer]Date, Inc. and/or its affiliates. All rights reserved.

## 2022-02-02 NOTE — PROGRESS NOTES
[facility]  Primary Care       PATIENT NAME: Selena Proctor   : 1955    AGE: 66 y.o. DATE: 2022    MRN: [id number]        Reason for Visit / Chief Complaint:  Follow-up (Pt wants flu shot), Diabetes (Blood sugar 137), and Hypertension (Refill medications)     Subjective:     HPI: Patient here for routine check up for HTN and diabetes; patient denies any issues; states she feels fine today. Requesting med refills. Patient states she checks her blood sugar once a day; states it runs around 130.          Review of Systems: Review of Systems   Constitutional: Negative for chills, fatigue and fever.   Respiratory: Negative for cough, chest tightness and shortness of breath.    Cardiovascular: Negative for chest pain.   Gastrointestinal: Negative for abdominal pain, constipation, diarrhea, nausea and vomiting.   Genitourinary: Negative for dysuria.   Musculoskeletal: Negative for gait problem.   Skin: Negative for rash.   Neurological: Negative for headaches.          Review of patient's allergies indicates:   Allergen Reactions    Betadine [povidone-iodine] Itching        Med List:  Current Outpatient Medications on File Prior to Visit   Medication Sig Dispense Refill    ACCU-CHEK DONNA PLUS TEST STRP Strp CHECK BLOOD SUGAR ONE TIME DAILY (Patient taking differently: No sig reported) 100 strip 1    ACCU-CHEK SOFT DEV LANCETS Kit       ACCU-CHEK SOFTCLIX LANCETS Misc CHECK BLOOD SUGAR ONE TIME DAILY 100 each 1    aspirin (ECOTRIN) 81 MG EC tablet Take 81 mg by mouth once daily.       furosemide (LASIX) 40 MG tablet TAKE 1 TABLET EVERY DAY AS NEEDED 90 tablet 0    HYDROcodone-acetaminophen (NORCO)  mg per tablet Take 1 tablet by mouth every 8 (eight) hours. 90 tablet 0    [START ON 2022] HYDROcodone-acetaminophen (NORCO)  mg per tablet Take 1 tablet by mouth every 8 (eight) hours. 90 tablet 0    montelukast (SINGULAIR) 10 mg tablet TAKE 1 TABLET EVERY DAY AS NEEDED 90  tablet 2    mupirocin (BACTROBAN) 2 % ointment Apply topically once daily. 180 g 0    NIFEdipine (PROCARDIA-XL) 90 MG (OSM) 24 hr tablet TAKE 1 TABLET EVERY DAY 90 tablet 0    omeprazole (PRILOSEC) 40 MG capsule TAKE 1 CAPSULE EVERY DAY 90 capsule 0    spironolactone (ALDACTONE) 25 MG tablet TAKE 1 TABLET EVERY DAY 90 tablet 0    [DISCONTINUED] ezetimibe (ZETIA) 10 mg tablet TAKE 1 TABLET EVERY DAY 90 tablet 0    [DISCONTINUED] KOMBIGLYZE XR 2.5-1,000 mg TM24       [DISCONTINUED] losartan-hydrochlorothiazide 100-25 mg (HYZAAR) 100-25 mg per tablet Take 1 tablet by mouth once daily.       [DISCONTINUED] metoprolol succinate (TOPROL-XL) 100 MG 24 hr tablet       [DISCONTINUED] metoprolol succinate 100 mg CSpX Take 1 tablet by mouth once daily.       [DISCONTINUED] NIFEdipine (ADALAT CC) 90 MG TbSR Take 90 mg by mouth once daily.       [DISCONTINUED] pitavastatin calcium (LIVALO) 4 mg Tab Take 1 tablet by mouth once daily.       [DISCONTINUED] potassium chloride 10% (KAYCIEL) 20 mEq/15 mL oral solution MIX  15 ML (1 TABLESPOONFUL) IN 4 OUNCES OF LIQUID AND DRINK TWICE DAILY 720 mL 1    [DISCONTINUED] saxagliptin HCl/metformin HCl (KOMBIGLYZE XR ORAL) Take by mouth 2 (two) times a day.        No current facility-administered medications on file prior to visit.       Medical/Social/Family History:  Past Medical History:   Diagnosis Date    Acid reflux     Coronary arteriosclerosis     Diabetes     Diabetes mellitus, type 2     Hypertension     Spondylosis without myelopathy or radiculopathy, lumbar region       Social History     Tobacco Use   Smoking Status Never Smoker   Smokeless Tobacco Never Used      Social History     Substance and Sexual Activity   Alcohol Use Not Currently       Family History   Problem Relation Age of Onset    Hypertension Mother     Heart disease Mother     Diabetes Sister     Hypertension Sister     Hypertension Brother     Alcohol abuse Father     Cancer Father      "Hypertension Son     Mental illness Maternal Aunt       Past Surgical History:   Procedure Laterality Date    APPENDECTOMY      BLOCK, NERVE, OBTURATOR Right 2019    Right Femoral/Obturator Nerve Block  Dr Headley     SECTION      x2    CORONARY ARTERY BYPASS GRAFT      KNEE ARTHROPLASTY Right     KNEE ARTHROSCOPY Left     SKIN GRAFT      Left Thigh    TOTAL HIP ARTHROPLASTY Bilateral     TUBAL LIGATION        Immunization History   Administered Date(s) Administered    COVID-19, MRNA, LN-S, PF (MODERNA FULL 0.5 ML DOSE) 2021, 03/10/2021    Pneumococcal Conjugate - 13 Valent 2017          Objective:      Vitals:    22 0824   BP: 126/71   BP Location: Left arm   Patient Position: Sitting   BP Method: Large (Automatic)   Pulse: (!) 57   Resp: 18   Temp: 97.4 °F (36.3 °C)   TempSrc: Temporal   SpO2: 96%   Weight: 84.8 kg (187 lb)   Height: 5' 3" (1.6 m)     Body mass index is 33.13 kg/m².     Physical Exam: Physical Exam  Vitals and nursing note reviewed.   Constitutional:       Appearance: Normal appearance.   HENT:      Head: Normocephalic.      Mouth/Throat:      Mouth: Mucous membranes are moist.   Eyes:      Pupils: Pupils are equal, round, and reactive to light.   Cardiovascular:      Rate and Rhythm: Regular rhythm. Bradycardia present.      Heart sounds: Normal heart sounds.   Pulmonary:      Effort: Pulmonary effort is normal.      Breath sounds: Normal breath sounds.   Abdominal:      General: Bowel sounds are normal.      Palpations: Abdomen is soft.   Musculoskeletal:         General: Normal range of motion.      Cervical back: Normal range of motion.   Skin:     General: Skin is warm and dry.   Neurological:      General: No focal deficit present.      Mental Status: She is alert and oriented to person, place, and time.      Gait: Gait normal.   Psychiatric:         Mood and Affect: Mood normal.         Behavior: Behavior normal.                Assessment:          " ICD-10-CM ICD-9-CM   1. Type 2 diabetes mellitus without complication, without long-term current use of insulin  E11.9 250.00   2. Essential hypertension  I10 401.9   3. Encounter for immunization  Z23 V03.89   4. Hyperlipidemia, unspecified hyperlipidemia type  E78.5 272.4   5. Hypokalemia  E87.6 276.8        Plan:       Type 2 diabetes mellitus without complication, without long-term current use of insulin  -     POCT glucose  -     SITagliptan-metformin (JANUMET XR) 50-1,000 mg TM24; Take 1 tablet by mouth daily 2 hours after breakfast.  Dispense: 90 tablet; Refill: 2  -     Comprehensive Metabolic Panel; Future; Expected date: 02/02/2022  -     Lipid Panel; Standing  -     Hemoglobin A1C; Future; Expected date: 02/02/2022    Essential hypertension  -     losartan-hydrochlorothiazide 100-25 mg (HYZAAR) 100-25 mg per tablet; Take 1 tablet by mouth once daily.  Dispense: 90 tablet; Refill: 2  -     metoprolol succinate (TOPROL-XL) 100 MG 24 hr tablet; Take 1 tablet (100 mg total) by mouth once daily.  Dispense: 90 tablet; Refill: 2  -     CBC Auto Differential; Future; Expected date: 02/02/2022  -     Comprehensive Metabolic Panel; Future; Expected date: 02/02/2022    Encounter for immunization  -     Influenza - Quadrivalent *Preferred* (6 months+) (PF)    Hyperlipidemia, unspecified hyperlipidemia type  -     ezetimibe (ZETIA) 10 mg tablet; Take 1 tablet (10 mg total) by mouth once daily.  Dispense: 90 tablet; Refill: 2  -     pitavastatin calcium (LIVALO) 4 mg Tab; Take one tablet by mouth three times per week on Monday, Wednesday, Friday.  Dispense: 90 tablet; Refill: 2  -     Lipid Panel; Standing    Hypokalemia  -     potassium chloride 10% (KAYCIEL) 20 mEq/15 mL oral solution; MIX  15 ML (1 TABLESPOONFUL) IN 4 OUNCES OF LIQUID AND DRINK TWICE DAILY  Dispense: 720 mL; Refill: 2          New & refilled meds:  Requested Prescriptions     Signed Prescriptions Disp Refills    ezetimibe (ZETIA) 10 mg tablet 90  "tablet 2     Sig: Take 1 tablet (10 mg total) by mouth once daily.    losartan-hydrochlorothiazide 100-25 mg (HYZAAR) 100-25 mg per tablet 90 tablet 2     Sig: Take 1 tablet by mouth once daily.    SITagliptan-metformin (JANUMET XR) 50-1,000 mg TM24 90 tablet 2     Sig: Take 1 tablet by mouth daily 2 hours after breakfast.    metoprolol succinate (TOPROL-XL) 100 MG 24 hr tablet 90 tablet 2     Sig: Take 1 tablet (100 mg total) by mouth once daily.    pitavastatin calcium (LIVALO) 4 mg Tab 90 tablet 2     Sig: Take one tablet by mouth three times per week on Monday, Wednesday, Friday.    potassium chloride 10% (KAYCIEL) 20 mEq/15 mL oral solution 720 mL 2     Sig: MIX  15 ML (1 TABLESPOONFUL) IN 4 OUNCES OF LIQUID AND DRINK TWICE DAILY       Follow up in about 3 months (around 5/2/2022), or if symptoms worsen or fail to improve, for Hypertension, diabetes.     Patient to return to clinic on Monday, Feb. 7, 2022 for lab only.    Patient Instructions     Patient Education       Diabetes and Diet   The Basics   Written by the doctors and editors at [facility]   Why is diet important in diabetes? -- Diet is important because it is part of diabetes treatment. Many people need to change what they eat and how much they eat to help treat their diabetes. It is important for people to treat their diabetes so that they:  · Keep their blood sugar at or near a normal level  · Prevent long-term problems, such as heart or kidney problems, that can happen in people with diabetes  Changing your diet can also help treat obesity, high blood pressure, and high cholesterol. These conditions can affect people with diabetes and can lead to future problems, such as heart attacks or strokes.  Who will work with me to change my diet? -- Your doctor or nurse will work with you to make a food plan to change your diet. They might also recommend that you work with a "dietitian." A dietitian is an expert on food and eating.  Do I need to eat at " "the same times every day? -- When and how often you should eat depends, in part, on the diabetes medicines you take. For example:  · People who take about the same amount of insulin at the same time each day (called a "fixed regimen") should eat meals at the same times. This is also true for people who take pills that increase insulin levels, such as sulfonylureas. Eating meals at the same time every day helps prevent low blood sugar.  · People who adjust the dose and timing of their insulin each day (called a "flexible regimen") do not always have to eat meals at the same time. That's because they can time their insulin dose for before they plan to eat, and also adjust the dose for how much they plan to eat.  · People who take medicines that don't usually cause low blood sugar, such as metformin, don't have to eat meals at the same time every day.  What do I need to think about when planning what to eat? -- Our bodies break down the food we eat into small pieces called carbohydrates, proteins, and fats.  When planning what to eat, people with diabetes need to think about:  · Carbohydrates (or "carbs") - Carbohydrates, which are sugars that our bodies use for energy, can raise a person's blood sugar level. Your doctor, nurse, or dietitian will tell you how many carbohydrates you should eat at each meal or snack. Foods that have carbohydrates include:  ? Bread, pasta, and rice  ? Vegetables and fruits  ? Dairy foods  ? Foods and drinks with added sugar  It is best to get your carbohydrates from fruits, vegetables, whole grains, and low-fat milk. It is best to avoid drinks with added sugar, like soda, juices, and sports drinks.   · Protein - Your doctor, nurse, or dietitian will tell you how much protein you should eat each day. It is best to eat lean meats, fish, eggs, beans, peas, soy products, nuts, and seeds.  · Fats - The type of fat you eat is more important than the amount of fat. "Saturated" and "trans" fats " "can increase your risk for heart problems, like a heart attack.  ? Foods that have saturated fats include meat, butter, cheese, and ice cream.  ? Foods that have trans fats include processed food with "partially hydrogenated oils" on the ingredient list. This may include fried foods, store bought cookies, muffins, pies, and cakes.  "Monounsaturated" and "polyunsaturated" fats are better for you. Foods with these types of fat include fish, avocado, olive oil, and nuts.  · Calories - People need to eat a certain amount of calories each day to keep their weight the same. People who are overweight and want to lose weight need to eat fewer calories each day.  · Fiber - Eating foods with a lot of fiber can help control a person's blood sugar level. Foods that have a lot of fiber include apples, green beans, peas, beans, lentils, nuts, oatmeal, and whole grains.  · Salt - People who have high blood pressure should not eat foods that contain a lot of salt (also called sodium). People with high blood pressure should also eat healthy foods, such as fruits, vegetables, and low-fat dairy foods.  · Alcohol - Having more than 1 drink (for women) or 2 drinks (for men) a day can raise blood sugar levels. Also, drinks that have fruit juice or soda in them can raise blood sugar levels.  What can I do if I need to lose weight? -- If you need to lose weight, you can:  · Exercise - Try to get at least 30 minutes of physical activity a day, most days of the week. Even gentle exercise, like walking, is good for your health. Some people with diabetes need to change their medicine dose before they exercise. They might also need to check their blood sugar levels before and after exercising.  · Eat fewer calories - Your doctor, nurse, or dietitian can tell you how many calories you should eat each day in order to lose weight.  If you are worried about your weight, size, or shape, talk with your doctor, nurse, or dietitian. They can help you " make changes to improve your health.  Can I eat the same foods as my family? -- Yes. You do not need to eat special foods if you have diabetes. You and your family can eat the same foods. Changing your diet is mostly about eating healthy foods and not eating too much.  What are the other parts of diabetes treatment? -- Besides changing your diet, the other parts of diabetes treatment are:  · Exercise  · Medicines  Some people with diabetes need to learn how to match their diet and exercise with their medicine dose. For example, people who use insulin might need to choose the dose of insulin they give themselves. To choose their dose, they need to think about:  · What they plan to eat at the next meal  · How much exercise they plan to do  · What their blood sugar level is  If the diet and exercise do not match the medicine dose, a person's blood sugar level can get too low or too high. Blood sugar levels that are too low or too high can cause problems.  All topics are updated as new evidence becomes available and our peer review process is complete.  This topic retrieved from [email or web address] on: Sep 21, 2021.  Topic 11216 Version 7.0  Release: 29.4.2 - C29.263  © 2021 UpToDate, Inc. and/or its affiliates. All rights reserved.  Consumer Information Use and Disclaimer   This information is not specific medical advice and does not replace information you receive from your health care provider. This is only a brief summary of general information. It does NOT include all information about conditions, illnesses, injuries, tests, procedures, treatments, therapies, discharge instructions or life-style choices that may apply to you. You must talk with your health care provider for complete information about your health and treatment options. This information should not be used to decide whether or not to accept your health care provider's advice, instructions or recommendations. Only your health care provider has the knowledge and  "training to provide advice that is right for you. The use of this information is governed by the [employer] End User License Agreement, available at https://www.[employer]/en/solutions/[employer]/about/craig.The use of [employer] content is governed by the [employer] Terms of Use. ©2021 UpToDate, Inc. All rights reserved.  Copyright   © 2021 UpToDate, Inc. and/or its affiliates. All rights reserved.    Patient Education       Controlling Your Blood Pressure Through Lifestyle   The Basics   Written by the doctors and editors at [employer]   What does my lifestyle have to do with my blood pressure? -- The things you do and the foods you eat have a big effect on your blood pressure and your overall health. Following the right lifestyle can:  · Lower your blood pressure or keep you from getting high blood pressure in the first place  · Reduce your need for blood pressure medicines  · Make medicines for high blood pressure work better, if you do take them  · Lower the chances that you'll have a heart attack or stroke, or develop kidney disease  Which lifestyle choices will help lower my blood pressure? -- Here's what you can do:  · Lose weight (if you are overweight)  · Choose a diet rich in fruits, vegetables, and low-fat dairy products, and low in meats, sweets, and refined grains  · Eat less salt (sodium)  · Do something active for at least 30 minutes a day on most days of the week  · Limit the amount of alcohol you drink  If you have high blood pressure, it's also very important to quit smoking (if you smoke). Quitting smoking might not bring your blood pressure down. But it will lower the chances that you'll have a heart attack or stroke, and it will help you feel better and live longer.  Start low and go slow -- The changes listed above might sound like a lot, but don't worry. You don't have to change everything all at once. The key to improving your lifestyle is to "start low and go slow." Choose 1 small, specific " "thing to change and try doing it for a while. If it works for you, keep doing it until it becomes a habit. If it doesn't, don't give up. Choose something else to change and see how that goes.  Let's say, for example, that you would like to improve your diet. If you're the type of person who eats cheeseburgers and French fries all the time, you can't switch to eating just salads from one day to the next. When people try to make changes like that, they often fail. Then they feel frustrated and tend to give up. So instead of trying to change everything about your diet in 1 day, change 1 or 2 small things about your diet and give yourself time to get used to those changes. For instance, keep the cheeseburger but give up the French fries. Or eat the same things but cut your portions in half.  As you find things that you are able to change and stick with, keep adding new changes. In time, you will see that you can actually change a lot. You just have to get used to the changes slowly.  Lose weight -- When people think about losing weight, they sometimes make it more complicated than it really is. To lose weight, you have to either eat less or move more. If you do both of those things, it's even better. But there is no single weight-loss diet or activity that's better than any other. When it comes to weight loss, the most effective plan is the one that you'll stick with.  Improve your diet -- There is no single diet that is right for everyone. But in general, a healthy diet can include:  · Lots of fruits, vegetables, and whole grains  · Some beans, peas, lentils, chickpeas, and similar foods  · Some nuts, such as walnuts, almonds, and peanuts  · Fat-free or low-fat milk and milk products  · Some fish  To have a healthy diet, it's also important to limit or avoid sugar, sweets, meats, and refined grains. (Refined grains are found in white bread, white rice, most forms of pasta, and most packaged "snack" foods.)  Reduce " "salt -- Many people think that eating a low-sodium diet means avoiding the salt shaker and not adding salt when cooking. The truth is, not adding salt at the table or when you cook will only help a little. Almost all of the sodium you eat is already in the food you buy at the grocery store or at restaurants (figure 1).  The most important thing you can do to cut down on sodium is to eat less processed food. That means that you should avoid most foods that are sold in cans, boxes, jars, and bags. You should also eat in restaurants less often.  To reduce the amount of sodium you get, buy fresh or fresh-frozen fruits, vegetables, and meats. (Fresh-frozen foods have had nothing added to them before freezing.) Then you can make meals at home, from scratch, with these ingredients.  As with the other changes, don't try to cut out salt all at once. Instead, choose 1 or 2 foods that have a lot of sodium and try to replace them with low-sodium choices. When you get used to those low-sodium options, find another food or 2 to change. Then keep going, until all the foods you eat are sodium-free or low in sodium.  Become more active -- If you want to be more active, you don't have to go to the gym or get all sweaty. It is possible to increase your activity level while doing everyday things you enjoy. Walking, gardening, and dancing are just a few of the things that you might try. As with all the other changes, the key is not to do too much too fast. If you don't do any activity now, start by walking for just a few minutes every other day. Do that for a few weeks. If you stick with it, try doing it for longer. But if you find that you don't like walking, try a different activity.  Drink less alcohol -- If you are a woman, do not have more than 1 "standard drink" of alcohol a day. If you are a man, do not have more than 2. A "standard drink" is:  · A can or bottle that has 12 ounces of beer  · A glass that has 5 ounces of wine  · A " "shot that has 1.5 ounces of whiskey  Where should I start? -- If you want to improve your lifestyle, start by making the changes that you think would be easiest for you. If you used to exercise and just got out of the habit, maybe it would be easy for you to start exercising again. Or if you actually like cooking meals from scratch, maybe the first thing you should focus on is eating home-cooked meals that are low in sodium.  Whatever you tackle first, choose specific, realistic goals, and give yourself a deadline. For example, do not decide that you are going to "exercise more." Instead, decide that you are going to walk for 10 minutes on Monday, Wednesday, and Friday, and that you are going to do this for the next 2 weeks.  When lifestyle changes are too general, people have a hard time following through.  Now go. You can do it!  All topics are updated as new evidence becomes available and our peer review process is complete.  This topic retrieved from [employer] on: Sep 21, 2021.  Topic 26227 Version 8.0  Release: 29.4.2 - C29.263  © 2021 UpToDate, Inc. and/or its affiliates. All rights reserved.  figure 1: Sources of sodium in your diet     Graphic 91225 Version 2.0    Consumer Information Use and Disclaimer   This information is not specific medical advice and does not replace information you receive from your health care provider. This is only a brief summary of general information. It does NOT include all information about conditions, illnesses, injuries, tests, procedures, treatments, therapies, discharge instructions or life-style choices that may apply to you. You must talk with your health care provider for complete information about your health and treatment options. This information should not be used to decide whether or not to accept your health care provider's advice, instructions or recommendations. Only your health care provider has the knowledge and training to provide advice that is right for you. " "The use of this information is governed by the [employer] End User License Agreement, available at https://www.[employer]/en/solutions/[employer]/about/craig.The use of [employer] content is governed by the [employer] Terms of Use. ©2021 UpToDate, Inc. All rights reserved.  Copyright   © 2021 UpToDate, Inc. and/or its affiliates. All rights reserved.    Patient Education       High Cholesterol   The Basics   Written by the doctors and editors at [facility]   What is cholesterol? -- Cholesterol is a substance that is found in the blood. Everyone has some. It is needed for good health. The problem is, people sometimes have too much cholesterol. Compared with people with normal cholesterol, people with high cholesterol have a higher risk of heart attacks, strokes, and other health problems. The higher your cholesterol, the higher your risk of these problems.  Are there different types of cholesterol? -- Yes, there are a few different types. If you get a cholesterol test, you might hear your doctor or nurse talk about:  · Total cholesterol  · LDL cholesterol - Some people call this the "bad" cholesterol. That's because having high LDL levels raises your risk of heart attacks, strokes, and other health problems.  · HDL cholesterol - Some people call this the "good" cholesterol. That's because people with high HDL levels tend to have a lower risk of heart attacks, strokes, and other health problems.   · Non-HDL cholesterol - Non-HDL cholesterol is your total cholesterol minus your HDL cholesterol.  · Triglycerides - Triglycerides are not cholesterol. They are another type of fat. But they often get measured when cholesterol is measured. (Having high triglycerides also seems to increase the risk of heart attacks and strokes.)  What should my numbers be? -- Ask your doctor or nurse what your numbers should be. Different people need different goals. (If you live outside the [place], see the table (table 1)). In general, " "people who do not already have heart disease should aim for:  · Total cholesterol below 200  · LDL cholesterol below 130 - or much lower, if they are at risk of heart attacks or strokes  · HDL cholesterol above 60  · Non-HDL cholesterol below 160 - or lower, if they are at risk of heart attacks or strokes  · Triglycerides below 150  Keep in mind, though, that many people who cannot meet these goals still have a low risk of heart attacks and strokes.  What should I do if my doctor tells me I have high cholesterol? -- Ask your doctor what your overall risk of heart attacks and strokes is. High cholesterol, by itself, is not always a reason to worry. Having high cholesterol is just one of many things that can increase your risk of heart attacks and strokes. Other factors that increase your risk include:  · Cigarette smoking  · High blood pressure  · Having a parent, sister, or brother who got heart disease at a young age - Young, in this case, means younger than 55 for men and younger than 65 for women.  · A diet that is not heart healthy - A "heart-healthy" diet includes lots of fruits and vegetables, fiber, and healthy fats (like those found in fish and certain oils). It also means limiting sugar and unhealthy fats.  · Older age  If you are at high risk of heart attacks and strokes, having high cholesterol is a problem. On the other hand, if you are at low risk, having high cholesterol might not lead to treatment.  Should I take medicine to lower cholesterol? -- Not everyone who has high cholesterol needs medicines. Your doctor or nurse will decide if you need them based on your age, family history, and other health concerns.  You should probably take a cholesterol-lowering medicine called a statin if you:  · Already had a heart attack or stroke  · Have known heart disease  · Have diabetes  · Have a condition called peripheral artery disease, which makes it painful to walk, and happens when the arteries in your legs " get clogged with fatty deposits  · Have an abdominal aortic aneurysm, which is a widening of the main artery in the belly  Most people with any of the conditions listed above should take a statin no matter what their cholesterol level is. If your doctor or nurse puts you on a statin, stay on it. The medicine might not make you feel any different. But it can help prevent heart attacks, strokes, and death.  Can I lower my cholesterol without medicines? -- Yes, you can lower your cholesterol some by:  · Avoiding red meat, butter, fried foods, cheese, and other foods that have a lot of saturated fat  · Losing weight (if you are overweight)  · Being more active  Even if these steps do little to change your cholesterol, they can improve your health in many ways.  All topics are updated as new evidence becomes available and our peer review process is complete.  This topic retrieved from [employer] on: Sep 21, 2021.  Topic 56938 Version 19.0  Release: 29.4.2 - C29.263  © 2021 UpToDate, Inc. and/or its affiliates. All rights reserved.  table 1: Cholesterol and triglyceride measurements in the [place] and elsewhere     Measurement used within the [place] Milligrams/deciliter (mg/dL)  Measurement used most places outside the [place] Millimoles/liter (mmol/Liter)     Level to aim for  Level to aim for    Total cholesterol  Below 200 Below 5.17   LDL cholesterol  Below 130 - or much lower if at risk of heart attack and stroke Below 3.36 - or much lower if at risk of heart attack and stroke   HDL cholesterol  Above 60 Above 1.55   Triglycerides  Below 150 Below 1.7   Cholesterol is measured differently in the [place] than it is in most other countries. This table shows values used within and outside the [place]. It includes the cholesterol and triglyceride levels that most people who do not have heart disease should aim for.  Graphic 52557 Version 3.0  Consumer Information Use and Disclaimer   This  "information is not specific medical advice and does not replace information you receive from your health care provider. This is only a brief summary of general information. It does NOT include all information about conditions, illnesses, injuries, tests, procedures, treatments, therapies, discharge instructions or life-style choices that may apply to you. You must talk with your health care provider for complete information about your health and treatment options. This information should not be used to decide whether or not to accept your health care provider's advice, instructions or recommendations. Only your health care provider has the knowledge and training to provide advice that is right for you. The use of this information is governed by the [employer] End User License Agreement, available at https://www.Witel/en/solutions/[email or web address]/about/craig.The use of [employer] content is governed by the [employer] Terms of Use. ©2021 UpToDate, Inc. All rights reserved.  Copyright   © 2021 UpToDate, Inc. and/or its affiliates. All rights reserved.  Patient Education       Flu Vaccine   The Basics   Written by the doctors and editors at [employer]   What is the flu vaccine? -- Vaccines can prevent certain serious or deadly infections. The flu vaccine can keep you from getting sick with the flu. Vaccines are also called "vaccinations" or "immunizations."  The flu is an infection that can cause fever, cough, body aches, and other symptoms. There are different forms of the flu, including the "seasonal" flu, the 3319-8809 pandemic H1N1 flu (also called the "swine" flu), and the bird flu (also called the "jami" flu). All forms of the flu are caused by viruses. The medical term for the flu is "influenza."  This article is about the flu vaccine that can protect you from the seasonal flu. The flu vaccines that are available now do not protect against bird flu.  Are there different forms of the flu vaccine? -- Yes. The flu " vaccine comes in different forms, including:  · A shot that goes into muscle (usually in the upper part of the arm)  · A nasal spray  Your doctor can help you decide which vaccine is best for you.  Who should get the flu vaccine and when? -- All people age 6 months or older should get the flu vaccine every year. The vaccine is especially important for certain people at high risk (table 1).  The best time of year to get the flu vaccine is before the winter season begins. In the [place], it's best to get the vaccine by October. In southern countries, where winter happens in July and August, it's best to get the vaccine by May. No matter where you live, do your best to get the vaccine soon after it becomes available in your area. Depending on how many doses they have had in the past, children ages 6 months through 8 years might need 2 doses of the vaccine for it to work best.  Why should I get the flu vaccine? -- Getting vaccinated can help keep you from getting sick. Plus, being vaccinated can help protect those around you from getting sick. If you have been vaccinated but get the flu, the vaccine can also keep you from getting severely ill or even dying.  Some years the flu vaccine is more effective than others. That's because the people developing the vaccines can't predict exactly how the flu virus is going to change year to year, and it takes months to make a new vaccine.  Some people think the flu vaccine doesn't work because they have known people who got the vaccine and got the flu anyway. But that does not mean the vaccine does not work. Many people who get sick after getting the flu vaccine do not actually have the flu; they have a cold caused by a virus unrelated to the flu virus, so the flu vaccine can't help with that.  Even in years when the vaccine is less effective, it still helps prevent some cases of the flu and also helps to prevent serious illness and outbreaks of the flu.  Should I still get  "a flu vaccine during the COVID-19 pandemic? -- COVID-19 stands for "coronavirus disease 2019." It is caused by a virus called SARS-CoV-2. The virus first appeared in late 2019 and has since spread throughout the world.  It is extra important to get your flu vaccine during the pandemic. The flu vaccine does not prevent COVID-19. But if a lot of people get sick with the flu, it will be harder for doctors and hospitals to care for people who have COVID-19 or other illnesses.  What side effects does the flu vaccine cause? -- Often the vaccine causes no side effects. When it does cause side effects, it can cause:  · Redness, mild swelling, or soreness where you got the shot (if you got a shot)  · A mild fever  · A mild rash  · Headache or body aches  Vaccines also sometimes cause more serious side effects, such as severe allergic reactions. But serious side effects are rare.  Does the flu vaccine cause the flu? -- No, the flu vaccine does not cause the flu. People sometimes feel sick after getting the vaccine, but this is often because they were already starting to get sick with the flu or another virus before they had the vaccine.  Does the flu vaccine cause autism? -- No. After doing many careful studies, scientists have not found any link between vaccines and autism. Many years ago, a study reported a link between autism and vaccines. But that study turned out to be false. It has been withdrawn.  What if I am pregnant? -- If you are pregnant, it is very important to get the flu vaccine. In pregnant women, flu symptoms can get worse quickly and be dangerous. The flu can even cause trouble breathing or lead to death of the woman or her baby. That is why it is so important to get the flu vaccine if you are or will be pregnant during flu season.  What if I have an egg allergy? -- People sometimes worry about this. That's because some forms of the flu vaccine contain small amounts of egg. But the amount is so small that it " does not cause an allergic reaction. If you have an egg allergy, you should still get the flu vaccine.  What else can I do to prevent the flu? -- In addition to getting the flu vaccine every year, you can:  · Wash your hands often with soap and water, or use alcohol hand rubs  · Stay away from people you know are sick  If you are exposed to the flu, antiviral medicines can help protect you from the flu, but those medicines are not appropriate for everyone. Also, antiviral medicines work only if you start them very soon after being exposed or as soon as you show symptoms.  To protect others, you should also:  · Stay home if you get the flu. Do not go to work or school until your fever has been gone for at least 24 hours, without taking fever-reducing medicine, such as Tylenol. If you work in a health care setting taking care of patients, you might need to stay home longer if you are still coughing.  · Cover your mouth and nose with the inside of your elbow when you cough or sneeze.  All topics are updated as new evidence becomes available and our peer review process is complete.  This topic retrieved from [employer] on: Sep 21, 2021.  Topic 23651 Version 16.0  Release: 29.4.2 - C29.263  © 2021 UpToDate, Inc. and/or its affiliates. All rights reserved.  table 1: People at high risk from the flu  The flu vaccine is especially important for:    Children age 6 months through 4 years   Adults age 50 or older   People with long-term health problems, such as:   Lung disease, including asthma or COPD   Heart disease   Diabetes   Severe obesity   People who have trouble fighting infections, for example because they:   Are being treated for cancer   Have HIV/AIDS   Had an organ transplant   Women who are or will be pregnant during the flu season   Children age 6 months through 18 years who take aspirin every day   People who live in nursing homes or long-term care facilities   Native Americans, including [place] Natives   People  who live with or care for people at high risk (listed above)   Graphic 07454 Version 3.0  Consumer Information Use and Disclaimer   This information is not specific medical advice and does not replace information you receive from your health care provider. This is only a brief summary of general information. It does NOT include all information about conditions, illnesses, injuries, tests, procedures, treatments, therapies, discharge instructions or life-style choices that may apply to you. You must talk with your health care provider for complete information about your health and treatment options. This information should not be used to decide whether or not to accept your health care provider's advice, instructions or recommendations. Only your health care provider has the knowledge and training to provide advice that is right for you. The use of this information is governed by the [employer] End User License Agreement, available at https://www.Lumiy/en/solutions/[employer]/about/craig.The use of [employer] content is governed by the [employer] Terms of Use. ©2021 UpToDate, Inc. All rights reserved.  Copyright   © 2021 UpToDate, Inc. and/or its affiliates. All rights reserved.           Signature: Lizzette Meadows DNP, BANDARP-C

## 2022-02-08 DIAGNOSIS — E78.5 HYPERLIPIDEMIA, UNSPECIFIED HYPERLIPIDEMIA TYPE: ICD-10-CM

## 2022-02-08 DIAGNOSIS — I10 ESSENTIAL HYPERTENSION: ICD-10-CM

## 2022-02-08 DIAGNOSIS — E87.6 HYPOKALEMIA: ICD-10-CM

## 2022-02-08 RX ORDER — BLOOD-GLUCOSE METER
EACH MISCELLANEOUS
Qty: 1 EACH | Refills: 0 | Status: SHIPPED | OUTPATIENT
Start: 2022-02-08 | End: 2022-04-18

## 2022-02-08 RX ORDER — ISOPROPYL ALCOHOL 70 ML/100ML
SWAB TOPICAL
Qty: 100 EACH | Refills: 0 | Status: SHIPPED | OUTPATIENT
Start: 2022-02-08 | End: 2022-03-28

## 2022-02-08 NOTE — TELEPHONE ENCOUNTER
----- Message from Lizzette Meadows DNP, FNP-C sent at 2/8/2022  9:08 AM CST -----  Please notify patient of results; ask patient if she is following a diabetic diet and taking her medication as prescribed; her hgb a1c is elevated; she needs to be sure and follow a diabetic diet, check blood sugar at least twice a day, walking for exercise. Triglycerides are elevated a little; patient needs to be sure and limit fried foods, sugar, highly processed foods.

## 2022-02-09 RX ORDER — POTASSIUM CHLORIDE 20 MEQ/15ML
SOLUTION ORAL
Qty: 720 ML | Refills: 2 | Status: SHIPPED | OUTPATIENT
Start: 2022-02-09 | End: 2022-04-07

## 2022-02-09 RX ORDER — EZETIMIBE 10 MG/1
10 TABLET ORAL DAILY
Qty: 90 TABLET | Refills: 2 | Status: SHIPPED | OUTPATIENT
Start: 2022-02-09 | End: 2022-10-13

## 2022-02-09 RX ORDER — METOPROLOL SUCCINATE 100 MG/1
100 TABLET, EXTENDED RELEASE ORAL DAILY
Qty: 90 TABLET | Refills: 2 | Status: SHIPPED | OUTPATIENT
Start: 2022-02-09 | End: 2022-09-08

## 2022-02-09 RX ORDER — PITAVASTATIN CALCIUM 4.18 MG/1
TABLET, FILM COATED ORAL
Qty: 90 TABLET | Refills: 2 | Status: SHIPPED | OUTPATIENT
Start: 2022-02-09

## 2022-02-09 RX ORDER — LOSARTAN POTASSIUM AND HYDROCHLOROTHIAZIDE 25; 100 MG/1; MG/1
1 TABLET ORAL DAILY
Qty: 90 TABLET | Refills: 2 | Status: SHIPPED | OUTPATIENT
Start: 2022-02-09 | End: 2022-09-08

## 2022-02-09 NOTE — TELEPHONE ENCOUNTER
----- Message from Isabel Steven sent at 2/9/2022  2:45 PM CST -----  PT CALL SAID HER MEDICATION SHOULD HAVE WENT TO  [facility] NOT [facility] PLEASE CORRECT THIS

## 2022-02-11 ENCOUNTER — TELEPHONE (OUTPATIENT)
Dept: PRIMARY CARE CLINIC | Facility: CLINIC | Age: 67
End: 2022-02-11
Payer: MEDICARE

## 2022-02-11 DIAGNOSIS — E11.9 TYPE 2 DIABETES MELLITUS WITHOUT COMPLICATION, WITHOUT LONG-TERM CURRENT USE OF INSULIN: Primary | ICD-10-CM

## 2022-02-15 ENCOUNTER — TELEPHONE (OUTPATIENT)
Dept: PRIMARY CARE CLINIC | Facility: CLINIC | Age: 67
End: 2022-02-15
Payer: MEDICARE

## 2022-02-15 DIAGNOSIS — E78.5 HYPERLIPIDEMIA, UNSPECIFIED HYPERLIPIDEMIA TYPE: Primary | ICD-10-CM

## 2022-02-15 DIAGNOSIS — E11.9 TYPE 2 DIABETES MELLITUS WITHOUT COMPLICATION, WITHOUT LONG-TERM CURRENT USE OF INSULIN: Primary | ICD-10-CM

## 2022-02-15 RX ORDER — METFORMIN HYDROCHLORIDE 500 MG/1
500 TABLET ORAL 2 TIMES DAILY WITH MEALS
Qty: 180 TABLET | Refills: 3 | Status: SHIPPED | OUTPATIENT
Start: 2022-02-15 | End: 2022-02-25 | Stop reason: SDUPTHER

## 2022-02-15 RX ORDER — EMPAGLIFLOZIN 10 MG/1
10 TABLET, FILM COATED ORAL DAILY
Qty: 90 TABLET | Refills: 1 | Status: SHIPPED | OUTPATIENT
Start: 2022-02-15 | End: 2022-02-25 | Stop reason: SDUPTHER

## 2022-02-15 NOTE — TELEPHONE ENCOUNTER
Spoke with patient and she stated her cardiology started her on livalo because other statin were causing her legs to swell-PA was denied-insurance will pay for Zypitamag 2 mg or 4 mg.

## 2022-02-25 DIAGNOSIS — E11.9 TYPE 2 DIABETES MELLITUS WITHOUT COMPLICATION, WITHOUT LONG-TERM CURRENT USE OF INSULIN: Primary | ICD-10-CM

## 2022-02-25 RX ORDER — METFORMIN HYDROCHLORIDE 500 MG/1
500 TABLET ORAL 2 TIMES DAILY WITH MEALS
Qty: 180 TABLET | Refills: 3 | Status: SHIPPED | OUTPATIENT
Start: 2022-02-25 | End: 2023-03-16

## 2022-02-25 RX ORDER — EMPAGLIFLOZIN 10 MG/1
10 TABLET, FILM COATED ORAL DAILY
Qty: 90 TABLET | Refills: 1 | Status: SHIPPED | OUTPATIENT
Start: 2022-02-25 | End: 2022-08-03

## 2022-03-04 NOTE — PROGRESS NOTES
Disclaimer:  This note has been generated using voice recognition software.  There may be type of graft focal areas that have been missed during a proof reading      Subjective:      Patient ID: Selena Proctor is a 66 y.o. female.    Chief Complaint: Low-back Pain      Pain  This is a chronic problem. The current episode started more than 1 year ago. The problem occurs daily. The problem has been waxing and waning. Associated symptoms include arthralgias and neck pain. Pertinent negatives include no change in bowel habit, chest pain, coughing, diaphoresis, fever, rash, sore throat, vertigo or vomiting.     Review of Systems   Constitutional: Negative for activity change, appetite change, diaphoresis, fever and unexpected weight change.   HENT: Negative for drooling, ear discharge, ear pain, facial swelling, nosebleeds, sore throat, trouble swallowing, voice change and goiter.    Eyes: Negative for photophobia, pain, discharge, redness and visual disturbance.   Respiratory: Negative for apnea, cough, choking, chest tightness, shortness of breath, wheezing and stridor.    Cardiovascular: Negative for chest pain, palpitations and leg swelling.   Gastrointestinal: Negative for abdominal distention, change in bowel habit, diarrhea, rectal pain, vomiting, fecal incontinence and change in bowel habit.   Endocrine: Negative for cold intolerance, heat intolerance, polydipsia, polyphagia and polyuria.   Genitourinary: Negative for bladder incontinence, dysuria, flank pain, frequency and hot flashes.   Musculoskeletal: Positive for arthralgias, back pain, leg pain and neck pain.   Integumentary:  Negative for color change, pallor and rash.   Allergic/Immunologic: Negative for immunocompromised state.   Neurological: Negative for dizziness, vertigo, seizures, syncope, facial asymmetry, speech difficulty, light-headedness, disturbances in coordination, memory loss and coordination difficulties.   Hematological: Negative for  "adenopathy. Does not bruise/bleed easily.   Psychiatric/Behavioral: Negative for agitation, behavioral problems, confusion, decreased concentration, dysphoric mood, hallucinations, self-injury and suicidal ideas. The patient is not nervous/anxious and is not hyperactive.             Objective:  Vitals:    03/09/22 0909 03/09/22 0910   BP: 123/62    Pulse: (!) 55    Resp: 19    Weight: 85.3 kg (188 lb)    Height: 5' 3" (1.6 m)    PainSc:   9   9         Physical Exam  Vitals and nursing note reviewed. Exam conducted with a chaperone present.   Constitutional:       General: She is awake.      Appearance: Normal appearance. She is not ill-appearing, toxic-appearing or diaphoretic.   HENT:      Head: Normocephalic and atraumatic.      Nose: Nose normal.      Mouth/Throat:      Mouth: Mucous membranes are moist.      Pharynx: Oropharynx is clear.   Eyes:      Conjunctiva/sclera: Conjunctivae normal.      Pupils: Pupils are equal, round, and reactive to light.   Cardiovascular:      Rate and Rhythm: Normal rate.   Pulmonary:      Effort: Pulmonary effort is normal. No respiratory distress.   Abdominal:      Palpations: Abdomen is soft.      Tenderness: There is no guarding.   Musculoskeletal:         General: Normal range of motion.      Right shoulder: Tenderness present.      Left shoulder: Tenderness present.      Cervical back: Normal range of motion and neck supple. No rigidity.      Thoracic back: Tenderness present.      Lumbar back: Tenderness present.      Right hip: Tenderness present.      Left hip: Tenderness present.   Skin:     General: Skin is warm and dry.      Coloration: Skin is not jaundiced or pale.   Neurological:      General: No focal deficit present.      Mental Status: She is alert and oriented to person, place, and time. Mental status is at baseline.      Cranial Nerves: Cranial nerves are intact. No cranial nerve deficit (II-XII).   Psychiatric:         Mood and Affect: Mood normal.         " Behavior: Behavior normal. Behavior is cooperative.         Thought Content: Thought content normal.           No image results found.       Lab Visit on 02/07/2022   Component Date Value Ref Range Status    Sodium 02/07/2022 140  136 - 145 mmol/L Final    Potassium 02/07/2022 3.6  3.5 - 5.1 mmol/L Final    Chloride 02/07/2022 105  98 - 107 mmol/L Final    CO2 02/07/2022 29  21 - 32 mmol/L Final    Anion Gap 02/07/2022 10  7 - 16 mmol/L Final    Glucose 02/07/2022 192 (A) 74 - 106 mg/dL Final    BUN 02/07/2022 21 (A) 7 - 18 mg/dL Final    Creatinine 02/07/2022 0.99  0.55 - 1.02 mg/dL Final    BUN/Creatinine Ratio 02/07/2022 21 (A) 6 - 20 Final    Calcium 02/07/2022 9.0  8.5 - 10.1 mg/dL Final    Total Protein 02/07/2022 7.9  6.4 - 8.2 g/dL Final    Albumin 02/07/2022 3.5  3.5 - 5.0 g/dL Final    Globulin 02/07/2022 4.4 (A) 2.0 - 4.0 g/dL Final    A/G Ratio 02/07/2022 0.8   Final    Bilirubin, Total 02/07/2022 0.4  0.0 - 1.2 mg/dL Final    Alk Phos 02/07/2022 58  55 - 142 U/L Final    ALT 02/07/2022 21  13 - 56 U/L Final    AST 02/07/2022 10 (A) 15 - 37 U/L Final    eGFR 02/07/2022 59 (A) >=60 mL/min/1.73m² Final    Triglycerides 02/07/2022 162 (A) 35 - 150 mg/dL Final    Cholesterol 02/07/2022 176  0 - 200 mg/dL Final    HDL Cholesterol 02/07/2022 44  40 - 60 mg/dL Final    Cholesterol/HDL Ratio (Risk Factor) 02/07/2022 4.0   Final    Non-HDL 02/07/2022 132  mg/dL Final    LDL Calculated 02/07/2022 100  mg/dL Final    LDL/HDL 02/07/2022 2.3   Final    VLDL 02/07/2022 32  mg/dL Final    Hemoglobin A1C 02/07/2022 9.3 (A) 4.5 - 6.6 % Final    Estimated Average Glucose 02/07/2022 224  mg/dL Final    WBC 02/07/2022 6.08  4.50 - 11.00 K/uL Final    RBC 02/07/2022 4.63  4.20 - 5.40 M/uL Final    Hemoglobin 02/07/2022 12.7  12.0 - 16.0 g/dL Final    Hematocrit 02/07/2022 40.2  38.0 - 47.0 % Final    MCV 02/07/2022 86.8  80.0 - 96.0 fL Final    MCH 02/07/2022 27.4  27.0 - 31.0 pg Final     MCHC 02/07/2022 31.6 (A) 32.0 - 36.0 g/dL Final    RDW 02/07/2022 14.4  11.5 - 14.5 % Final    Platelet Count 02/07/2022 270  150 - 400 K/uL Final    MPV 02/07/2022 11.6  9.4 - 12.4 fL Final    Neutrophils % 02/07/2022 45.9 (A) 53.0 - 65.0 % Final    Lymphocytes % 02/07/2022 40.8  27.0 - 41.0 % Final    Monocytes % 02/07/2022 10.5 (A) 2.0 - 6.0 % Final    Eosinophils % 02/07/2022 1.8  1.0 - 4.0 % Final    Basophils % 02/07/2022 0.7  0.0 - 1.0 % Final    Immature Granulocytes % 02/07/2022 0.3  0.0 - 0.4 % Final    nRBC, Auto 02/07/2022 0.0  <=0.0 % Final    Neutrophils, Abs 02/07/2022 2.79  1.80 - 7.70 K/uL Final    Lymphocytes, Absolute 02/07/2022 2.48  1.00 - 4.80 K/uL Final    Monocytes, Absolute 02/07/2022 0.64  0.00 - 0.80 K/uL Final    Eosinophils, Absolute 02/07/2022 0.11  0.00 - 0.50 K/uL Final    Basophils, Absolute 02/07/2022 0.04  0.00 - 0.20 K/uL Final    Immature Granulocytes, Absolute 02/07/2022 0.02  0.00 - 0.04 K/uL Final    nRBC, Absolute 02/07/2022 0.00  <=0.00 x10e3/uL Final    Diff Type 02/07/2022 Auto   Final   Office Visit on 02/02/2022   Component Date Value Ref Range Status    POC Glucose 02/02/2022 137 (A) 70 - 110 MG/DL Final   Office Visit on 12/08/2021   Component Date Value Ref Range Status    POC Amphetamines 12/08/2021 Negative  Negative, Inconclusive Final    POC Barbiturates 12/08/2021 Negative  Negative, Inconclusive Final    POC Benzodiazepines 12/08/2021 Negative  Negative, Inconclusive Final    POC Cocaine 12/08/2021 Negative  Negative, Inconclusive Final    POC THC 12/08/2021 Negative  Negative, Inconclusive Final    POC Methadone 12/08/2021 Negative  Negative, Inconclusive Final    POC Methamphetamine 12/08/2021 Negative  Negative, Inconclusive Final    POC Opiates 12/08/2021 Presumptive Positive (A) Negative, Inconclusive Final    POC Oxycodone 12/08/2021 Negative  Negative, Inconclusive Final    POC Phencyclidine 12/08/2021 Negative  Negative,  Inconclusive Final    POC Methylenedioxymethamphetamine * 12/08/2021 Negative  Negative, Inconclusive Final    POC Tricyclic Antidepressants 12/08/2021 Negative  Negative, Inconclusive Final    POC Buprenorphine 12/08/2021 Negative   Final     Acceptable 12/08/2021 Yes   Final    POC Temperature (Urine) 12/08/2021 90   Final           Assessment:      1. Spondylosis without myelopathy or radiculopathy, lumbar region    2. Hip pain, right    3. Osteoarthrosis multiple sites, not specified as generalized    4. Encounter for long-term (current) use of other medications          Orders Placed This Encounter   Procedures    POCT Urine Drug Screen Presump     Interpretive Information:     Negative:  No drug detected at the cut off level.   Positive:  This result represents presumptive positive for the   tested drug, other substances may yield a positive response other   than the analyte of interest. This result should be utilized for   diagnostic purpose only. Confirmation testing will be performed upon physician request only.            A's of Opioid Risk Assessment  Activity:Patient can perform ADL.   Analgesia:Patients pain is partially controlled by current medication. Patient has tried OTC medications such as Tylenol and Ibuprofen with out relief.   Adverse Effects: Patient denies constipation or sedation.  Aberrant Behavior:  reviewed with no aberrant drug seeking/taking behavior.  Overdose reversal drug naloxone discussed    Drug screen reviewed      Requested Prescriptions     Pending Prescriptions Disp Refills    HYDROcodone-acetaminophen (NORCO)  mg per tablet 90 tablet 0     Sig: Take 1 tablet by mouth every 8 (eight) hours.    HYDROcodone-acetaminophen (NORCO)  mg per tablet 90 tablet 0     Sig: Take 1 tablet by mouth every 8 (eight) hours.    HYDROcodone-acetaminophen (NORCO)  mg per tablet 90 tablet 0     Sig: Take 1 tablet by mouth every 8 (eight) hours.          Plan:    Complaint of neck and back pain requesting Toradol injection    She states her pain is worse with rainy cool weather she denies fever chills nausea vomiting    After discussing options risks benefits she like to proceed with    Toradol 60 mg IM, tolerated well    Continue home exercise program as directed    Continue current medication    Follow-up 3 months    Dr. Bai, August 2022    Bring original prescription medication bottles/container/box with labels to each visit

## 2022-03-09 ENCOUNTER — OFFICE VISIT (OUTPATIENT)
Dept: PAIN MEDICINE | Facility: CLINIC | Age: 67
End: 2022-03-09
Payer: MEDICARE

## 2022-03-09 VITALS
WEIGHT: 188 LBS | BODY MASS INDEX: 33.31 KG/M2 | SYSTOLIC BLOOD PRESSURE: 123 MMHG | HEIGHT: 63 IN | HEART RATE: 55 BPM | RESPIRATION RATE: 19 BRPM | DIASTOLIC BLOOD PRESSURE: 62 MMHG

## 2022-03-09 DIAGNOSIS — M89.49 OSTEOARTHROSIS MULTIPLE SITES, NOT SPECIFIED AS GENERALIZED: Chronic | ICD-10-CM

## 2022-03-09 DIAGNOSIS — M47.816 SPONDYLOSIS WITHOUT MYELOPATHY OR RADICULOPATHY, LUMBAR REGION: Primary | Chronic | ICD-10-CM

## 2022-03-09 DIAGNOSIS — Z79.899 ENCOUNTER FOR LONG-TERM (CURRENT) USE OF OTHER MEDICATIONS: ICD-10-CM

## 2022-03-09 DIAGNOSIS — M25.551 HIP PAIN, RIGHT: Chronic | ICD-10-CM

## 2022-03-09 PROCEDURE — 99213 OFFICE O/P EST LOW 20 MIN: CPT | Mod: PBBFAC | Performed by: PHYSICIAN ASSISTANT

## 2022-03-09 PROCEDURE — 3008F PR BODY MASS INDEX (BMI) DOCUMENTED: ICD-10-PCS | Mod: CPTII,,, | Performed by: PHYSICIAN ASSISTANT

## 2022-03-09 PROCEDURE — 99214 OFFICE O/P EST MOD 30 MIN: CPT | Mod: S$PBB,25,, | Performed by: PHYSICIAN ASSISTANT

## 2022-03-09 PROCEDURE — 3288F PR FALLS RISK ASSESSMENT DOCUMENTED: ICD-10-PCS | Mod: CPTII,,, | Performed by: PHYSICIAN ASSISTANT

## 2022-03-09 PROCEDURE — 1101F PR PT FALLS ASSESS DOC 0-1 FALLS W/OUT INJ PAST YR: ICD-10-PCS | Mod: CPTII,,, | Performed by: PHYSICIAN ASSISTANT

## 2022-03-09 PROCEDURE — 3046F HEMOGLOBIN A1C LEVEL >9.0%: CPT | Mod: CPTII,,, | Performed by: PHYSICIAN ASSISTANT

## 2022-03-09 PROCEDURE — 3074F SYST BP LT 130 MM HG: CPT | Mod: CPTII,,, | Performed by: PHYSICIAN ASSISTANT

## 2022-03-09 PROCEDURE — 1101F PT FALLS ASSESS-DOCD LE1/YR: CPT | Mod: CPTII,,, | Performed by: PHYSICIAN ASSISTANT

## 2022-03-09 PROCEDURE — 3078F DIAST BP <80 MM HG: CPT | Mod: CPTII,,, | Performed by: PHYSICIAN ASSISTANT

## 2022-03-09 PROCEDURE — 1125F PR PAIN SEVERITY QUANTIFIED, PAIN PRESENT: ICD-10-PCS | Mod: CPTII,,, | Performed by: PHYSICIAN ASSISTANT

## 2022-03-09 PROCEDURE — 3078F PR MOST RECENT DIASTOLIC BLOOD PRESSURE < 80 MM HG: ICD-10-PCS | Mod: CPTII,,, | Performed by: PHYSICIAN ASSISTANT

## 2022-03-09 PROCEDURE — 3074F PR MOST RECENT SYSTOLIC BLOOD PRESSURE < 130 MM HG: ICD-10-PCS | Mod: CPTII,,, | Performed by: PHYSICIAN ASSISTANT

## 2022-03-09 PROCEDURE — 1125F AMNT PAIN NOTED PAIN PRSNT: CPT | Mod: CPTII,,, | Performed by: PHYSICIAN ASSISTANT

## 2022-03-09 PROCEDURE — 99214 PR OFFICE/OUTPT VISIT, EST, LEVL IV, 30-39 MIN: ICD-10-PCS | Mod: S$PBB,25,, | Performed by: PHYSICIAN ASSISTANT

## 2022-03-09 PROCEDURE — 80305 DRUG TEST PRSMV DIR OPT OBS: CPT | Mod: PBBFAC | Performed by: PHYSICIAN ASSISTANT

## 2022-03-09 PROCEDURE — 3288F FALL RISK ASSESSMENT DOCD: CPT | Mod: CPTII,,, | Performed by: PHYSICIAN ASSISTANT

## 2022-03-09 PROCEDURE — 96372 THER/PROPH/DIAG INJ SC/IM: CPT | Mod: PBBFAC | Performed by: PHYSICIAN ASSISTANT

## 2022-03-09 PROCEDURE — 3046F PR MOST RECENT HEMOGLOBIN A1C LEVEL > 9.0%: ICD-10-PCS | Mod: CPTII,,, | Performed by: PHYSICIAN ASSISTANT

## 2022-03-09 PROCEDURE — 3008F BODY MASS INDEX DOCD: CPT | Mod: CPTII,,, | Performed by: PHYSICIAN ASSISTANT

## 2022-03-09 RX ORDER — HYDROCODONE BITARTRATE AND ACETAMINOPHEN 10; 325 MG/1; MG/1
1 TABLET ORAL EVERY 8 HOURS
Qty: 90 TABLET | Refills: 0 | Status: SHIPPED | OUTPATIENT
Start: 2022-03-11 | End: 2022-04-10

## 2022-03-09 RX ORDER — HYDROCODONE BITARTRATE AND ACETAMINOPHEN 10; 325 MG/1; MG/1
1 TABLET ORAL EVERY 8 HOURS
Qty: 90 TABLET | Refills: 0 | Status: SHIPPED | OUTPATIENT
Start: 2022-04-09 | End: 2022-05-09

## 2022-03-09 RX ORDER — KETOROLAC TROMETHAMINE 30 MG/ML
60 INJECTION, SOLUTION INTRAMUSCULAR; INTRAVENOUS
Status: COMPLETED | OUTPATIENT
Start: 2022-03-09 | End: 2022-03-09

## 2022-03-09 RX ORDER — HYDROCODONE BITARTRATE AND ACETAMINOPHEN 10; 325 MG/1; MG/1
1 TABLET ORAL EVERY 8 HOURS
Qty: 90 TABLET | Refills: 0 | Status: SHIPPED | OUTPATIENT
Start: 2022-05-10 | End: 2022-06-07 | Stop reason: SDUPTHER

## 2022-03-09 RX ADMIN — KETOROLAC TROMETHAMINE 60 MG: 30 INJECTION, SOLUTION INTRAMUSCULAR; INTRAVENOUS at 09:03

## 2022-04-20 DIAGNOSIS — I10 ESSENTIAL HYPERTENSION: Primary | ICD-10-CM

## 2022-04-20 RX ORDER — NIFEDIPINE 90 MG/1
TABLET, EXTENDED RELEASE ORAL
Qty: 90 TABLET | Refills: 1 | Status: SHIPPED | OUTPATIENT
Start: 2022-04-20 | End: 2022-09-08

## 2022-04-27 ENCOUNTER — PATIENT OUTREACH (OUTPATIENT)
Dept: PRIMARY CARE CLINIC | Facility: CLINIC | Age: 67
End: 2022-04-27
Payer: MEDICARE

## 2022-05-09 ENCOUNTER — TELEPHONE (OUTPATIENT)
Dept: PRIMARY CARE CLINIC | Facility: CLINIC | Age: 67
End: 2022-05-09
Payer: MEDICARE

## 2022-05-09 NOTE — TELEPHONE ENCOUNTER
RN attempted to contact patient concerning missed A1C opportunity to schedule DM follow up with lab. No answer.

## 2022-06-07 NOTE — PROGRESS NOTES
Subjective:      Patient ID: Selena Proctor is a 66 y.o. female.    Chief Complaint: Hip Pain      Pain  This is a chronic problem. The current episode started more than 1 year ago. The problem occurs daily. The problem has been unchanged. Associated symptoms include arthralgias and neck pain. Pertinent negatives include no change in bowel habit, chest pain, coughing, diaphoresis, fever, rash, sore throat, vertigo or vomiting.     Review of Systems   Constitutional: Negative for activity change, appetite change, diaphoresis, fever and unexpected weight change.   HENT: Negative for drooling, ear discharge, ear pain, facial swelling, nosebleeds, sore throat, trouble swallowing, voice change and goiter.    Eyes: Negative for photophobia, pain, discharge, redness and visual disturbance.   Respiratory: Negative for apnea, cough, choking, chest tightness, shortness of breath, wheezing and stridor.    Cardiovascular: Negative for chest pain, palpitations and leg swelling.   Gastrointestinal: Negative for abdominal distention, change in bowel habit, diarrhea, rectal pain, vomiting, fecal incontinence and change in bowel habit.   Endocrine: Negative for cold intolerance, heat intolerance, polydipsia, polyphagia and polyuria.   Genitourinary: Negative for bladder incontinence, dysuria, flank pain, frequency and hot flashes.   Musculoskeletal: Positive for arthralgias, back pain, leg pain and neck pain.   Integumentary:  Negative for color change, pallor and rash.   Allergic/Immunologic: Negative for immunocompromised state.   Neurological: Negative for dizziness, vertigo, seizures, syncope, facial asymmetry, speech difficulty, light-headedness, disturbances in coordination, memory loss and coordination difficulties.   Hematological: Negative for adenopathy. Does not bruise/bleed easily.   Psychiatric/Behavioral: Negative for agitation, behavioral problems, confusion, decreased concentration, dysphoric mood, hallucinations,  "self-injury and suicidal ideas. The patient is not nervous/anxious and is not hyperactive.             Objective:  Vitals:    06/09/22 0805   BP: (!) 168/69   Pulse: 60   Resp: 18   SpO2: 98%   Weight: 81.2 kg (179 lb)   Height: 5' 4" (1.626 m)   PainSc:   7         Physical Exam  Vitals and nursing note reviewed. Exam conducted with a chaperone present.   Constitutional:       General: She is awake.      Appearance: Normal appearance. She is not ill-appearing, toxic-appearing or diaphoretic.   HENT:      Head: Normocephalic and atraumatic.      Nose: Nose normal.      Mouth/Throat:      Mouth: Mucous membranes are moist.      Pharynx: Oropharynx is clear.   Eyes:      Conjunctiva/sclera: Conjunctivae normal.      Pupils: Pupils are equal, round, and reactive to light.   Cardiovascular:      Rate and Rhythm: Normal rate.   Pulmonary:      Effort: Pulmonary effort is normal. No respiratory distress.   Abdominal:      Palpations: Abdomen is soft.      Tenderness: There is no guarding.   Musculoskeletal:         General: Normal range of motion.      Right shoulder: Tenderness present.      Left shoulder: Tenderness present.      Cervical back: Normal range of motion and neck supple. No rigidity.      Thoracic back: Tenderness present.      Lumbar back: Tenderness present.      Right hip: Tenderness present.      Left hip: Tenderness present.   Skin:     General: Skin is warm and dry.      Coloration: Skin is not jaundiced or pale.   Neurological:      General: No focal deficit present.      Mental Status: She is alert and oriented to person, place, and time. Mental status is at baseline.      Cranial Nerves: Cranial nerves are intact. No cranial nerve deficit (II-XII).   Psychiatric:         Mood and Affect: Mood normal.         Behavior: Behavior normal. Behavior is cooperative.         Thought Content: Thought content normal.           No image results found.       Office Visit on 03/09/2022   Component Date Value " Ref Range Status    POC Amphetamines 03/09/2022 Negative  Negative, Inconclusive Final    POC Barbiturates 03/09/2022 Negative  Negative, Inconclusive Final    POC Benzodiazepines 03/09/2022 Negative  Negative, Inconclusive Final    POC Cocaine 03/09/2022 Negative  Negative, Inconclusive Final    POC THC 03/09/2022 Negative  Negative, Inconclusive Final    POC Methadone 03/09/2022 Negative  Negative, Inconclusive Final    POC Methamphetamine 03/09/2022 Negative  Negative, Inconclusive Final    POC Opiates 03/09/2022 Presumptive Positive (A) Negative, Inconclusive Final    POC Oxycodone 03/09/2022 Negative  Negative, Inconclusive Final    POC Phencyclidine 03/09/2022 Negative  Negative, Inconclusive Final    POC Methylenedioxymethamphetamine * 03/09/2022 Negative  Negative, Inconclusive Final    POC Tricyclic Antidepressants 03/09/2022 Negative  Negative, Inconclusive Final    POC Buprenorphine 03/09/2022 Negative   Final     Acceptable 03/09/2022 Yes   Final    POC Temperature (Urine) 03/09/2022 92   Final   Lab Visit on 02/07/2022   Component Date Value Ref Range Status    Sodium 02/07/2022 140  136 - 145 mmol/L Final    Potassium 02/07/2022 3.6  3.5 - 5.1 mmol/L Final    Chloride 02/07/2022 105  98 - 107 mmol/L Final    CO2 02/07/2022 29  21 - 32 mmol/L Final    Anion Gap 02/07/2022 10  7 - 16 mmol/L Final    Glucose 02/07/2022 192 (A) 74 - 106 mg/dL Final    BUN 02/07/2022 21 (A) 7 - 18 mg/dL Final    Creatinine 02/07/2022 0.99  0.55 - 1.02 mg/dL Final    BUN/Creatinine Ratio 02/07/2022 21 (A) 6 - 20 Final    Calcium 02/07/2022 9.0  8.5 - 10.1 mg/dL Final    Total Protein 02/07/2022 7.9  6.4 - 8.2 g/dL Final    Albumin 02/07/2022 3.5  3.5 - 5.0 g/dL Final    Globulin 02/07/2022 4.4 (A) 2.0 - 4.0 g/dL Final    A/G Ratio 02/07/2022 0.8   Final    Bilirubin, Total 02/07/2022 0.4  0.0 - 1.2 mg/dL Final    Alk Phos 02/07/2022 58  55 - 142 U/L Final    ALT 02/07/2022 21   13 - 56 U/L Final    AST 02/07/2022 10 (A) 15 - 37 U/L Final    eGFR 02/07/2022 59 (A) >=60 mL/min/1.73m² Final    Triglycerides 02/07/2022 162 (A) 35 - 150 mg/dL Final    Cholesterol 02/07/2022 176  0 - 200 mg/dL Final    HDL Cholesterol 02/07/2022 44  40 - 60 mg/dL Final    Cholesterol/HDL Ratio (Risk Factor) 02/07/2022 4.0   Final    Non-HDL 02/07/2022 132  mg/dL Final    LDL Calculated 02/07/2022 100  mg/dL Final    LDL/HDL 02/07/2022 2.3   Final    VLDL 02/07/2022 32  mg/dL Final    Hemoglobin A1C 02/07/2022 9.3 (A) 4.5 - 6.6 % Final    Estimated Average Glucose 02/07/2022 224  mg/dL Final    WBC 02/07/2022 6.08  4.50 - 11.00 K/uL Final    RBC 02/07/2022 4.63  4.20 - 5.40 M/uL Final    Hemoglobin 02/07/2022 12.7  12.0 - 16.0 g/dL Final    Hematocrit 02/07/2022 40.2  38.0 - 47.0 % Final    MCV 02/07/2022 86.8  80.0 - 96.0 fL Final    MCH 02/07/2022 27.4  27.0 - 31.0 pg Final    MCHC 02/07/2022 31.6 (A) 32.0 - 36.0 g/dL Final    RDW 02/07/2022 14.4  11.5 - 14.5 % Final    Platelet Count 02/07/2022 270  150 - 400 K/uL Final    MPV 02/07/2022 11.6  9.4 - 12.4 fL Final    Neutrophils % 02/07/2022 45.9 (A) 53.0 - 65.0 % Final    Lymphocytes % 02/07/2022 40.8  27.0 - 41.0 % Final    Monocytes % 02/07/2022 10.5 (A) 2.0 - 6.0 % Final    Eosinophils % 02/07/2022 1.8  1.0 - 4.0 % Final    Basophils % 02/07/2022 0.7  0.0 - 1.0 % Final    Immature Granulocytes % 02/07/2022 0.3  0.0 - 0.4 % Final    nRBC, Auto 02/07/2022 0.0  <=0.0 % Final    Neutrophils, Abs 02/07/2022 2.79  1.80 - 7.70 K/uL Final    Lymphocytes, Absolute 02/07/2022 2.48  1.00 - 4.80 K/uL Final    Monocytes, Absolute 02/07/2022 0.64  0.00 - 0.80 K/uL Final    Eosinophils, Absolute 02/07/2022 0.11  0.00 - 0.50 K/uL Final    Basophils, Absolute 02/07/2022 0.04  0.00 - 0.20 K/uL Final    Immature Granulocytes, Absolute 02/07/2022 0.02  0.00 - 0.04 K/uL Final    nRBC, Absolute 02/07/2022 0.00  <=0.00 x10e3/uL Final    Diff  Type 02/07/2022 Auto   Final   Office Visit on 02/02/2022   Component Date Value Ref Range Status    POC Glucose 02/02/2022 137 (A) 70 - 110 MG/DL Final           Assessment:      1. Spondylosis without myelopathy or radiculopathy, lumbar region    2. Hip pain, right    3. Osteoarthrosis multiple sites, not specified as generalized    4. Encounter for long-term (current) use of other medications          Orders Placed This Encounter   Procedures    Drug Screen Definitive 14, Urine     Standing Status:   Future     Number of Occurrences:   1     Standing Expiration Date:   8/8/2023     Order Specific Question:   Specimen Source     Answer:   Urine    POCT Urine Drug Screen Presump     Interpretive Information:     Negative:  No drug detected at the cut off level.   Positive:  This result represents presumptive positive for the   tested drug, other substances may yield a positive response other   than the analyte of interest. This result should be utilized for   diagnostic purpose only. Confirmation testing will be performed upon physician request only.            A's of Opioid Risk Assessment  Activity:Patient can perform ADL.   Analgesia:Patients pain is partially controlled by current medication. Patient has tried OTC medications such as Tylenol and Ibuprofen with out relief.   Adverse Effects: Patient denies constipation or sedation.  Aberrant Behavior:  reviewed with no aberrant drug seeking/taking behavior.  Overdose reversal drug naloxone discussed    Drug screen reviewed      Requested Prescriptions     Pending Prescriptions Disp Refills    HYDROcodone-acetaminophen (NORCO)  mg per tablet 90 tablet 0     Sig: Take 1 tablet by mouth every 8 (eight) hours.    HYDROcodone-acetaminophen (NORCO)  mg per tablet 90 tablet 0     Sig: Take 1 tablet by mouth every 8 (eight) hours.         Plan:    Presumptive drug screen negative patient states she is taking medication as directed, Will order  definitive drug screen for clarification    No new complaints today    She states current medications helping control her chronic discomfort    She would like to continue with conservative management    Continue current medication    Continue home exercise program as directed    Follow-up 3 months    Dr. Bai, August 2022    Bring original prescription medication bottles/container/box with labels to each visit

## 2022-06-09 ENCOUNTER — OFFICE VISIT (OUTPATIENT)
Dept: PAIN MEDICINE | Facility: CLINIC | Age: 67
End: 2022-06-09
Payer: MEDICARE

## 2022-06-09 VITALS
SYSTOLIC BLOOD PRESSURE: 168 MMHG | OXYGEN SATURATION: 98 % | RESPIRATION RATE: 18 BRPM | DIASTOLIC BLOOD PRESSURE: 69 MMHG | HEART RATE: 60 BPM | HEIGHT: 64 IN | BODY MASS INDEX: 30.56 KG/M2 | WEIGHT: 179 LBS

## 2022-06-09 DIAGNOSIS — M89.49 OSTEOARTHROSIS MULTIPLE SITES, NOT SPECIFIED AS GENERALIZED: Chronic | ICD-10-CM

## 2022-06-09 DIAGNOSIS — Z79.899 ENCOUNTER FOR LONG-TERM (CURRENT) USE OF OTHER MEDICATIONS: ICD-10-CM

## 2022-06-09 DIAGNOSIS — M47.816 SPONDYLOSIS WITHOUT MYELOPATHY OR RADICULOPATHY, LUMBAR REGION: Primary | Chronic | ICD-10-CM

## 2022-06-09 DIAGNOSIS — M25.551 HIP PAIN, RIGHT: Chronic | ICD-10-CM

## 2022-06-09 LAB

## 2022-06-09 PROCEDURE — 3008F PR BODY MASS INDEX (BMI) DOCUMENTED: ICD-10-PCS | Mod: CPTII,,, | Performed by: PHYSICIAN ASSISTANT

## 2022-06-09 PROCEDURE — 3078F PR MOST RECENT DIASTOLIC BLOOD PRESSURE < 80 MM HG: ICD-10-PCS | Mod: CPTII,,, | Performed by: PHYSICIAN ASSISTANT

## 2022-06-09 PROCEDURE — 3046F PR MOST RECENT HEMOGLOBIN A1C LEVEL > 9.0%: ICD-10-PCS | Mod: CPTII,,, | Performed by: PHYSICIAN ASSISTANT

## 2022-06-09 PROCEDURE — 1159F MED LIST DOCD IN RCRD: CPT | Mod: CPTII,,, | Performed by: PHYSICIAN ASSISTANT

## 2022-06-09 PROCEDURE — 1101F PT FALLS ASSESS-DOCD LE1/YR: CPT | Mod: CPTII,,, | Performed by: PHYSICIAN ASSISTANT

## 2022-06-09 PROCEDURE — 99214 PR OFFICE/OUTPT VISIT, EST, LEVL IV, 30-39 MIN: ICD-10-PCS | Mod: S$PBB,,, | Performed by: PHYSICIAN ASSISTANT

## 2022-06-09 PROCEDURE — G0481 DRUG TEST DEF 8-14 CLASSES: HCPCS | Mod: ,,, | Performed by: CLINICAL MEDICAL LABORATORY

## 2022-06-09 PROCEDURE — 3288F FALL RISK ASSESSMENT DOCD: CPT | Mod: CPTII,,, | Performed by: PHYSICIAN ASSISTANT

## 2022-06-09 PROCEDURE — 1159F PR MEDICATION LIST DOCUMENTED IN MEDICAL RECORD: ICD-10-PCS | Mod: CPTII,,, | Performed by: PHYSICIAN ASSISTANT

## 2022-06-09 PROCEDURE — 99215 OFFICE O/P EST HI 40 MIN: CPT | Mod: PBBFAC | Performed by: PHYSICIAN ASSISTANT

## 2022-06-09 PROCEDURE — 1101F PR PT FALLS ASSESS DOC 0-1 FALLS W/OUT INJ PAST YR: ICD-10-PCS | Mod: CPTII,,, | Performed by: PHYSICIAN ASSISTANT

## 2022-06-09 PROCEDURE — 3046F HEMOGLOBIN A1C LEVEL >9.0%: CPT | Mod: CPTII,,, | Performed by: PHYSICIAN ASSISTANT

## 2022-06-09 PROCEDURE — 80305 DRUG TEST PRSMV DIR OPT OBS: CPT | Mod: PBBFAC | Performed by: PHYSICIAN ASSISTANT

## 2022-06-09 PROCEDURE — 3077F SYST BP >= 140 MM HG: CPT | Mod: CPTII,,, | Performed by: PHYSICIAN ASSISTANT

## 2022-06-09 PROCEDURE — 99214 OFFICE O/P EST MOD 30 MIN: CPT | Mod: S$PBB,,, | Performed by: PHYSICIAN ASSISTANT

## 2022-06-09 PROCEDURE — 3008F BODY MASS INDEX DOCD: CPT | Mod: CPTII,,, | Performed by: PHYSICIAN ASSISTANT

## 2022-06-09 PROCEDURE — 3078F DIAST BP <80 MM HG: CPT | Mod: CPTII,,, | Performed by: PHYSICIAN ASSISTANT

## 2022-06-09 PROCEDURE — 1125F PR PAIN SEVERITY QUANTIFIED, PAIN PRESENT: ICD-10-PCS | Mod: CPTII,,, | Performed by: PHYSICIAN ASSISTANT

## 2022-06-09 PROCEDURE — G0481 PR DRUG TEST DEF 8-14 CLASSES: ICD-10-PCS | Mod: ,,, | Performed by: CLINICAL MEDICAL LABORATORY

## 2022-06-09 PROCEDURE — 1125F AMNT PAIN NOTED PAIN PRSNT: CPT | Mod: CPTII,,, | Performed by: PHYSICIAN ASSISTANT

## 2022-06-09 PROCEDURE — 3288F PR FALLS RISK ASSESSMENT DOCUMENTED: ICD-10-PCS | Mod: CPTII,,, | Performed by: PHYSICIAN ASSISTANT

## 2022-06-09 PROCEDURE — 3077F PR MOST RECENT SYSTOLIC BLOOD PRESSURE >= 140 MM HG: ICD-10-PCS | Mod: CPTII,,, | Performed by: PHYSICIAN ASSISTANT

## 2022-06-09 RX ORDER — HYDROCODONE BITARTRATE AND ACETAMINOPHEN 10; 325 MG/1; MG/1
1 TABLET ORAL EVERY 8 HOURS
Qty: 90 TABLET | Refills: 0 | Status: SHIPPED | OUTPATIENT
Start: 2022-07-09 | End: 2022-08-08

## 2022-06-09 RX ORDER — HYDROCODONE BITARTRATE AND ACETAMINOPHEN 10; 325 MG/1; MG/1
1 TABLET ORAL EVERY 8 HOURS
Qty: 90 TABLET | Refills: 0 | Status: SHIPPED | OUTPATIENT
Start: 2022-08-08 | End: 2022-09-06 | Stop reason: SDUPTHER

## 2022-06-09 RX ORDER — HYDROCODONE BITARTRATE AND ACETAMINOPHEN 10; 325 MG/1; MG/1
1 TABLET ORAL EVERY 8 HOURS
Qty: 90 TABLET | Refills: 0 | Status: SHIPPED | OUTPATIENT
Start: 2022-06-09 | End: 2022-07-09

## 2022-06-14 LAB
6-ACETYLMORPHINE, URINE (RUSH): NEGATIVE 10 NG/ML
7-AMINOCLONAZEPAM, URINE (RUSH): NEGATIVE 25 NG/ML
A-HYDROXYALPRAZOLAM, URINE (RUSH): NEGATIVE 25 NG/ML
ACETYL FENTANYL, URINE (RUSH): NEGATIVE 2.5 NG/ML
ACETYL NORFENTANYL OXALATE, URINE (RUSH): NEGATIVE 5 NG/ML
AMPHET UR QL SCN: NEGATIVE
BENZOYLECGONINE, URINE (RUSH): NEGATIVE 100 NG/ML
BUPRENORPHINE UR QL SCN: NEGATIVE 25 NG/ML
CODEINE, URINE (RUSH): NEGATIVE 25 NG/ML
CREAT UR-MCNC: 48 MG/DL (ref 28–219)
EDDP, URINE (RUSH): NEGATIVE 25 NG/ML
FENTANYL, URINE (RUSH): NEGATIVE 2.5 NG/ML
HYDROCODONE, URINE (RUSH): 127.2 25 NG/ML
HYDROMORPHONE, URINE (RUSH): 72.5 25 NG/ML
LORAZEPAM, URINE (RUSH): NEGATIVE 25 NG/ML
METHADONE UR QL SCN: NEGATIVE 25 NG/ML
METHAMPHET UR QL SCN: NEGATIVE
MORPHINE, URINE (RUSH): NEGATIVE 25 NG/ML
NORBUPRENORPHINE, URINE (RUSH): NEGATIVE 25 NG/ML
NORDIAZEPAM, URINE (RUSH): NEGATIVE 25 NG/ML
NORFENTANYL OXALATE, URINE (RUSH): NEGATIVE 5 NG/ML
NORHYDROCODONE, URINE (RUSH): 215.6 50 NG/ML
NOROXYCODONE HCL, URINE (RUSH): NEGATIVE 50 NG/ML
OXAZEPAM, URINE (RUSH): NEGATIVE 25 NG/ML
OXYCODONE UR QL SCN: NEGATIVE 25 NG/ML
OXYMORPHONE, URINE (RUSH): NEGATIVE 25 NG/ML
PH UR STRIP: 6 PH UNITS
SP GR UR STRIP: 1.01
TAPENTADOL, URINE (RUSH): NEGATIVE 25 NG/ML
TEMAZEPAM, URINE (RUSH): NEGATIVE 25 NG/ML
THC-COOH, URINE (RUSH): NEGATIVE 25 NG/ML
TRAMADOL, URINE (RUSH): NEGATIVE 100 NG/ML

## 2022-06-14 NOTE — TELEPHONE ENCOUNTER
Please ask patient to verify the potassium she is taking. She has a liquid on her med list, but this is asking about a capsule without a dosage.

## 2022-06-23 ENCOUNTER — OFFICE VISIT (OUTPATIENT)
Dept: PRIMARY CARE CLINIC | Facility: CLINIC | Age: 67
End: 2022-06-23
Payer: MEDICARE

## 2022-06-23 VITALS
TEMPERATURE: 98 F | HEIGHT: 64 IN | RESPIRATION RATE: 18 BRPM | OXYGEN SATURATION: 95 % | SYSTOLIC BLOOD PRESSURE: 136 MMHG | WEIGHT: 179.63 LBS | DIASTOLIC BLOOD PRESSURE: 60 MMHG | BODY MASS INDEX: 30.67 KG/M2 | HEART RATE: 55 BPM

## 2022-06-23 DIAGNOSIS — B37.31 VAGINAL YEAST INFECTION: ICD-10-CM

## 2022-06-23 DIAGNOSIS — Z23 ENCOUNTER FOR IMMUNIZATION: ICD-10-CM

## 2022-06-23 DIAGNOSIS — S71.102S OPEN WOUND OF LEFT THIGH, SEQUELA: ICD-10-CM

## 2022-06-23 DIAGNOSIS — I10 ESSENTIAL HYPERTENSION: ICD-10-CM

## 2022-06-23 DIAGNOSIS — Z11.59 ENCOUNTER FOR HEPATITIS C SCREENING TEST FOR LOW RISK PATIENT: ICD-10-CM

## 2022-06-23 DIAGNOSIS — E11.9 TYPE 2 DIABETES MELLITUS WITHOUT COMPLICATION, WITHOUT LONG-TERM CURRENT USE OF INSULIN: Primary | ICD-10-CM

## 2022-06-23 LAB
ALBUMIN SERPL BCP-MCNC: 3.6 G/DL (ref 3.5–5)
ALBUMIN/GLOB SERPL: 0.9 {RATIO}
ALP SERPL-CCNC: 55 U/L (ref 55–142)
ALT SERPL W P-5'-P-CCNC: 15 U/L (ref 13–56)
ANION GAP SERPL CALCULATED.3IONS-SCNC: 11 MMOL/L (ref 7–16)
AST SERPL W P-5'-P-CCNC: 10 U/L (ref 15–37)
BASOPHILS # BLD AUTO: 0.03 K/UL (ref 0–0.2)
BASOPHILS NFR BLD AUTO: 0.5 % (ref 0–1)
BILIRUB SERPL-MCNC: 0.5 MG/DL (ref 0–1.2)
BUN SERPL-MCNC: 16 MG/DL (ref 7–18)
BUN/CREAT SERPL: 18 (ref 6–20)
CALCIUM SERPL-MCNC: 9.3 MG/DL (ref 8.5–10.1)
CHLORIDE SERPL-SCNC: 104 MMOL/L (ref 98–107)
CO2 SERPL-SCNC: 28 MMOL/L (ref 21–32)
CREAT SERPL-MCNC: 0.9 MG/DL (ref 0.55–1.02)
CREAT UR-MCNC: 51 MG/DL (ref 28–219)
DIFFERENTIAL METHOD BLD: ABNORMAL
EOSINOPHIL # BLD AUTO: 0.14 K/UL (ref 0–0.5)
EOSINOPHIL NFR BLD AUTO: 2.5 % (ref 1–4)
ERYTHROCYTE [DISTWIDTH] IN BLOOD BY AUTOMATED COUNT: 15.6 % (ref 11.5–14.5)
EST. AVERAGE GLUCOSE BLD GHB EST-MCNC: 190 MG/DL
GLOBULIN SER-MCNC: 4 G/DL (ref 2–4)
GLUCOSE SERPL-MCNC: 139 MG/DL (ref 70–110)
GLUCOSE SERPL-MCNC: 139 MG/DL (ref 74–106)
HBA1C MFR BLD HPLC: 8.3 % (ref 4.5–6.6)
HCT VFR BLD AUTO: 41.7 % (ref 38–47)
HCV AB SER QL: NORMAL
HGB BLD-MCNC: 13.3 G/DL (ref 12–16)
IMM GRANULOCYTES # BLD AUTO: 0.03 K/UL (ref 0–0.04)
IMM GRANULOCYTES NFR BLD: 0.5 % (ref 0–0.4)
LYMPHOCYTES # BLD AUTO: 2.03 K/UL (ref 1–4.8)
LYMPHOCYTES NFR BLD AUTO: 35.8 % (ref 27–41)
MCH RBC QN AUTO: 27.4 PG (ref 27–31)
MCHC RBC AUTO-ENTMCNC: 31.9 G/DL (ref 32–36)
MCV RBC AUTO: 85.8 FL (ref 80–96)
MICROALBUMIN UR-MCNC: 0.7 MG/DL (ref 0–2.8)
MICROALBUMIN/CREAT RATIO PNL UR: 13.7 MG/G (ref 0–30)
MONOCYTES # BLD AUTO: 0.58 K/UL (ref 0–0.8)
MONOCYTES NFR BLD AUTO: 10.2 % (ref 2–6)
MPC BLD CALC-MCNC: 11.5 FL (ref 9.4–12.4)
NEUTROPHILS # BLD AUTO: 2.86 K/UL (ref 1.8–7.7)
NEUTROPHILS NFR BLD AUTO: 50.5 % (ref 53–65)
NRBC # BLD AUTO: 0 X10E3/UL
NRBC, AUTO (.00): 0 %
PLATELET # BLD AUTO: 248 K/UL (ref 150–400)
POTASSIUM SERPL-SCNC: 3.7 MMOL/L (ref 3.5–5.1)
PROT SERPL-MCNC: 7.6 G/DL (ref 6.4–8.2)
RBC # BLD AUTO: 4.86 M/UL (ref 4.2–5.4)
SODIUM SERPL-SCNC: 139 MMOL/L (ref 136–145)
WBC # BLD AUTO: 5.67 K/UL (ref 4.5–11)

## 2022-06-23 PROCEDURE — 82570 ASSAY OF URINE CREATININE: CPT | Mod: ,,, | Performed by: CLINICAL MEDICAL LABORATORY

## 2022-06-23 PROCEDURE — 99213 PR OFFICE/OUTPT VISIT, EST, LEVL III, 20-29 MIN: ICD-10-PCS | Mod: 25,,, | Performed by: NURSE PRACTITIONER

## 2022-06-23 PROCEDURE — 90677 PCV20 VACCINE IM: CPT | Mod: ,,, | Performed by: NURSE PRACTITIONER

## 2022-06-23 PROCEDURE — 90677 PNEUMOCOCCAL CONJUGATE VACCINE 20-VALENT: ICD-10-PCS | Mod: ,,, | Performed by: NURSE PRACTITIONER

## 2022-06-23 PROCEDURE — 85025 CBC WITH DIFFERENTIAL: ICD-10-PCS | Mod: ,,, | Performed by: CLINICAL MEDICAL LABORATORY

## 2022-06-23 PROCEDURE — 82570 MICROALBUMIN / CREATININE RATIO URINE: ICD-10-PCS | Mod: ,,, | Performed by: CLINICAL MEDICAL LABORATORY

## 2022-06-23 PROCEDURE — 86803 HEPATITIS C AB TEST: CPT | Mod: ,,, | Performed by: CLINICAL MEDICAL LABORATORY

## 2022-06-23 PROCEDURE — G0009 ADMIN PNEUMOCOCCAL VACCINE: HCPCS | Mod: ,,, | Performed by: NURSE PRACTITIONER

## 2022-06-23 PROCEDURE — 80053 COMPREHENSIVE METABOLIC PANEL: ICD-10-PCS | Mod: ,,, | Performed by: CLINICAL MEDICAL LABORATORY

## 2022-06-23 PROCEDURE — 83036 HEMOGLOBIN GLYCOSYLATED A1C: CPT | Mod: ,,, | Performed by: CLINICAL MEDICAL LABORATORY

## 2022-06-23 PROCEDURE — 99213 OFFICE O/P EST LOW 20 MIN: CPT | Mod: 25,,, | Performed by: NURSE PRACTITIONER

## 2022-06-23 PROCEDURE — 80053 COMPREHEN METABOLIC PANEL: CPT | Mod: ,,, | Performed by: CLINICAL MEDICAL LABORATORY

## 2022-06-23 PROCEDURE — 86803 HEPATITIS C ANTIBODY: ICD-10-PCS | Mod: ,,, | Performed by: CLINICAL MEDICAL LABORATORY

## 2022-06-23 PROCEDURE — 85025 COMPLETE CBC W/AUTO DIFF WBC: CPT | Mod: ,,, | Performed by: CLINICAL MEDICAL LABORATORY

## 2022-06-23 PROCEDURE — G0009 PNEUMOCOCCAL CONJUGATE VACCINE 20-VALENT: ICD-10-PCS | Mod: ,,, | Performed by: NURSE PRACTITIONER

## 2022-06-23 PROCEDURE — 82043 UR ALBUMIN QUANTITATIVE: CPT | Mod: ,,, | Performed by: CLINICAL MEDICAL LABORATORY

## 2022-06-23 PROCEDURE — 83036 HEMOGLOBIN A1C: ICD-10-PCS | Mod: ,,, | Performed by: CLINICAL MEDICAL LABORATORY

## 2022-06-23 PROCEDURE — 82043 MICROALBUMIN / CREATININE RATIO URINE: ICD-10-PCS | Mod: ,,, | Performed by: CLINICAL MEDICAL LABORATORY

## 2022-06-23 RX ORDER — CHLORHEXIDINE GLUCONATE 40 MG/ML
SOLUTION TOPICAL DAILY PRN
Qty: 120 ML | Refills: 0 | Status: SHIPPED | OUTPATIENT
Start: 2022-06-23 | End: 2023-07-27

## 2022-06-23 RX ORDER — MICONAZOLE NITRATE 2 %
CREAM WITH APPLICATOR VAGINAL
Qty: 1 KIT | Refills: 0 | Status: SHIPPED | OUTPATIENT
Start: 2022-06-23 | End: 2023-07-27

## 2022-06-23 NOTE — PATIENT INSTRUCTIONS
"Patient Education       Diabetes and Diet   The Basics   Written by the doctors and editors at [facility]   Why is diet important in diabetes? -- Diet is important because it is part of diabetes treatment. Many people need to change what they eat and how much they eat to help treat their diabetes. It is important for people to treat their diabetes so that they:  Keep their blood sugar at or near a normal level  Prevent long-term problems, such as heart or kidney problems, that can happen in people with diabetes  Changing your diet can also help treat obesity, high blood pressure, and high cholesterol. These conditions can affect people with diabetes and can lead to future problems, such as heart attacks or strokes.  Who will work with me to change my diet? -- Your doctor or nurse will work with you to make a food plan to change your diet. They might also recommend that you work with a "dietitian." A dietitian is an expert on food and eating.  Do I need to eat at the same times every day? -- When and how often you should eat depends, in part, on the diabetes medicines you take. For example:  People who take about the same amount of insulin at the same time each day (called a "fixed regimen") should eat meals at the same times. This is also true for people who take pills that increase insulin levels, such as sulfonylureas. Eating meals at the same time every day helps prevent low blood sugar.  People who adjust the dose and timing of their insulin each day (called a "flexible regimen") do not always have to eat meals at the same time. That's because they can time their insulin dose for before they plan to eat, and also adjust the dose for how much they plan to eat.  People who take medicines that don't usually cause low blood sugar, such as metformin, don't have to eat meals at the same time every day.  What do I need to think about when planning what to eat? -- Our bodies break down the food we eat into small pieces " "called carbohydrates, proteins, and fats.  When planning what to eat, people with diabetes need to think about:  Carbohydrates (or "carbs") - Carbohydrates, which are sugars that our bodies use for energy, can raise a person's blood sugar level. Your doctor, nurse, or dietitian will tell you how many carbohydrates you should eat at each meal or snack. Foods that have carbohydrates include:  Bread, pasta, and rice  Vegetables and fruits  Dairy foods  Foods and drinks with added sugar  It is best to get your carbohydrates from fruits, vegetables, whole grains, and low-fat milk. It is best to avoid drinks with added sugar, like soda, juices, and sports drinks.   Protein - Your doctor, nurse, or dietitian will tell you how much protein you should eat each day. It is best to eat lean meats, fish, eggs, beans, peas, soy products, nuts, and seeds.  Fats - The type of fat you eat is more important than the amount of fat. "Saturated" and "trans" fats can increase your risk for heart problems, like a heart attack.  Foods that have saturated fats include meat, butter, cheese, and ice cream.  Foods that have trans fats include processed food with "partially hydrogenated oils" on the ingredient list. This may include fried foods, store bought cookies, muffins, pies, and cakes.  "Monounsaturated" and "polyunsaturated" fats are better for you. Foods with these types of fat include fish, avocado, olive oil, and nuts.  Calories - People need to eat a certain amount of calories each day to keep their weight the same. People who are overweight and want to lose weight need to eat fewer calories each day.  Fiber - Eating foods with a lot of fiber can help control a person's blood sugar level. Foods that have a lot of fiber include apples, green beans, peas, beans, lentils, nuts, oatmeal, and whole grains.  Salt - People who have high blood pressure should not eat foods that contain a lot of salt (also called sodium). People with high " blood pressure should also eat healthy foods, such as fruits, vegetables, and low-fat dairy foods.  Alcohol - Having more than 1 drink (for women) or 2 drinks (for men) a day can raise blood sugar levels. Also, drinks that have fruit juice or soda in them can raise blood sugar levels.  What can I do if I need to lose weight? -- If you need to lose weight, you can:  Exercise - Try to get at least 30 minutes of physical activity a day, most days of the week. Even gentle exercise, like walking, is good for your health. Some people with diabetes need to change their medicine dose before they exercise. They might also need to check their blood sugar levels before and after exercising.  Eat fewer calories - Your doctor, nurse, or dietitian can tell you how many calories you should eat each day in order to lose weight.  If you are worried about your weight, size, or shape, talk with your doctor, nurse, or dietitian. They can help you make changes to improve your health.  Can I eat the same foods as my family? -- Yes. You do not need to eat special foods if you have diabetes. You and your family can eat the same foods. Changing your diet is mostly about eating healthy foods and not eating too much.  What are the other parts of diabetes treatment? -- Besides changing your diet, the other parts of diabetes treatment are:  Exercise  Medicines  Some people with diabetes need to learn how to match their diet and exercise with their medicine dose. For example, people who use insulin might need to choose the dose of insulin they give themselves. To choose their dose, they need to think about:  What they plan to eat at the next meal  How much exercise they plan to do  What their blood sugar level is  If the diet and exercise do not match the medicine dose, a person's blood sugar level can get too low or too high. Blood sugar levels that are too low or too high can cause problems.  All topics are updated as new evidence becomes  available and our peer review process is complete.  This topic retrieved from [employer] on: Sep 21, 2021.  Topic 57302 Version 7.0  Release: 29.4.2 - C29.263  © 2021 UpToDate, Inc. and/or its affiliates. All rights reserved.  Consumer Information Use and Disclaimer   This information is not specific medical advice and does not replace information you receive from your health care provider. This is only a brief summary of general information. It does NOT include all information about conditions, illnesses, injuries, tests, procedures, treatments, therapies, discharge instructions or life-style choices that may apply to you. You must talk with your health care provider for complete information about your health and treatment options. This information should not be used to decide whether or not to accept your health care provider's advice, instructions or recommendations. Only your health care provider has the knowledge and training to provide advice that is right for you. The use of this information is governed by the [employer] End User License Agreement, available at [email or web address] use of [employer] content is governed by the [employer] Terms of Use. ©2021 UpToDate, Inc. All rights reserved.  Copyright   © 2021 UpToDate, Inc. and/or its affiliates. All rights reserved.  Patient Education       Controlling Your Blood Pressure Through Lifestyle   The Basics   Written by the doctors and editors at [employer]   What does my lifestyle have to do with my blood pressure? -- The things you do and the foods you eat have a big effect on your blood pressure and your overall health. Following the right lifestyle can:  Lower your blood pressure or keep you from getting high blood pressure in the first place  Reduce your need for blood pressure medicines  Make medicines for high blood pressure work better, if you do take them  Lower the chances that you'll have a heart attack or stroke,  "or develop kidney disease  Which lifestyle choices will help lower my blood pressure? -- Here's what you can do:  Lose weight (if you are overweight)  Choose a diet rich in fruits, vegetables, and low-fat dairy products, and low in meats, sweets, and refined grains  Eat less salt (sodium)  Do something active for at least 30 minutes a day on most days of the week  Limit the amount of alcohol you drink  If you have high blood pressure, it's also very important to quit smoking (if you smoke). Quitting smoking might not bring your blood pressure down. But it will lower the chances that you'll have a heart attack or stroke, and it will help you feel better and live longer.  Start low and go slow -- The changes listed above might sound like a lot, but don't worry. You don't have to change everything all at once. The key to improving your lifestyle is to "start low and go slow." Choose 1 small, specific thing to change and try doing it for a while. If it works for you, keep doing it until it becomes a habit. If it doesn't, don't give up. Choose something else to change and see how that goes.  Let's say, for example, that you would like to improve your diet. If you're the type of person who eats cheeseburgers and French fries all the time, you can't switch to eating just salads from one day to the next. When people try to make changes like that, they often fail. Then they feel frustrated and tend to give up. So instead of trying to change everything about your diet in 1 day, change 1 or 2 small things about your diet and give yourself time to get used to those changes. For instance, keep the cheeseburger but give up the French fries. Or eat the same things but cut your portions in half.  As you find things that you are able to change and stick with, keep adding new changes. In time, you will see that you can actually change a lot. You just have to get used to the changes slowly.  Lose weight -- When people think about " "losing weight, they sometimes make it more complicated than it really is. To lose weight, you have to either eat less or move more. If you do both of those things, it's even better. But there is no single weight-loss diet or activity that's better than any other. When it comes to weight loss, the most effective plan is the one that you'll stick with.  Improve your diet -- There is no single diet that is right for everyone. But in general, a healthy diet can include:  Lots of fruits, vegetables, and whole grains  Some beans, peas, lentils, chickpeas, and similar foods  Some nuts, such as walnuts, almonds, and peanuts  Fat-free or low-fat milk and milk products  Some fish  To have a healthy diet, it's also important to limit or avoid sugar, sweets, meats, and refined grains. (Refined grains are found in white bread, white rice, most forms of pasta, and most packaged "snack" foods.)  Reduce salt -- Many people think that eating a low-sodium diet means avoiding the salt shaker and not adding salt when cooking. The truth is, not adding salt at the table or when you cook will only help a little. Almost all of the sodium you eat is already in the food you buy at the grocery store or at restaurants (figure 1).  The most important thing you can do to cut down on sodium is to eat less processed food. That means that you should avoid most foods that are sold in cans, boxes, jars, and bags. You should also eat in restaurants less often.  To reduce the amount of sodium you get, buy fresh or fresh-frozen fruits, vegetables, and meats. (Fresh-frozen foods have had nothing added to them before freezing.) Then you can make meals at home, from scratch, with these ingredients.  As with the other changes, don't try to cut out salt all at once. Instead, choose 1 or 2 foods that have a lot of sodium and try to replace them with low-sodium choices. When you get used to those low-sodium options, find another food or 2 to change. Then keep " "going, until all the foods you eat are sodium-free or low in sodium.  Become more active -- If you want to be more active, you don't have to go to the gym or get all sweaty. It is possible to increase your activity level while doing everyday things you enjoy. Walking, gardening, and dancing are just a few of the things that you might try. As with all the other changes, the key is not to do too much too fast. If you don't do any activity now, start by walking for just a few minutes every other day. Do that for a few weeks. If you stick with it, try doing it for longer. But if you find that you don't like walking, try a different activity.  Drink less alcohol -- If you are a woman, do not have more than 1 "standard drink" of alcohol a day. If you are a man, do not have more than 2. A "standard drink" is:  A can or bottle that has 12 ounces of beer  A glass that has 5 ounces of wine  A shot that has 1.5 ounces of whiskey  Where should I start? -- If you want to improve your lifestyle, start by making the changes that you think would be easiest for you. If you used to exercise and just got out of the habit, maybe it would be easy for you to start exercising again. Or if you actually like cooking meals from scratch, maybe the first thing you should focus on is eating home-cooked meals that are low in sodium.  Whatever you tackle first, choose specific, realistic goals, and give yourself a deadline. For example, do not decide that you are going to "exercise more." Instead, decide that you are going to walk for 10 minutes on Monday, Wednesday, and Friday, and that you are going to do this for the next 2 weeks.  When lifestyle changes are too general, people have a hard time following through.  Now go. You can do it!  All topics are updated as new evidence becomes available and our peer review process is complete.  This topic retrieved from [employer] on: Sep 21, 2021.  Topic 67408 Version 8.0  Release: 29.4.2 - C29.263  © " 2021 UpToDate, Inc. and/or its affiliates. All rights reserved.  figure 1: Sources of sodium in your diet     Graphic 63934 Version 2.0    Consumer Information Use and Disclaimer   This information is not specific medical advice and does not replace information you receive from your health care provider. This is only a brief summary of general information. It does NOT include all information about conditions, illnesses, injuries, tests, procedures, treatments, therapies, discharge instructions or life-style choices that may apply to you. You must talk with your health care provider for complete information about your health and treatment options. This information should not be used to decide whether or not to accept your health care provider's advice, instructions or recommendations. Only your health care provider has the knowledge and training to provide advice that is right for you. The use of this information is governed by the [employer] End User License Agreement, available at [email or web address] use of [employer] content is governed by the [employer] Terms of Use. ©2021 UpToDate, Inc. All rights reserved.  Copyright   © 2021 UpToDate, Inc. and/or its affiliates. All rights reserved.

## 2022-06-23 NOTE — PROGRESS NOTES
[facility]  Primary Care       PATIENT NAME: Selena Proctor   : 1955    AGE: 66 y.o. DATE: 2022    MRN: [id number]        Reason for Visit / Chief Complaint:  Follow-up (Due for lab work. Pt reports having symptoms of a vaginal yeast infection. ) and encounter for immunization (Interested in pneumonia vaccine/)     Subjective:     HPI: Patient states she thinks she has a vaginal yeast infection. States she has vaginal itching without any vaginal discharge. States she has tried OTC vagisil without any relief.     Patient states she has an open area to left upper inner thigh; states the area heals and opens again off and on.          Review of Systems: Review of Systems   Constitutional: Negative for chills, fatigue and fever.   Respiratory: Negative for cough, chest tightness and shortness of breath.    Cardiovascular: Negative for chest pain.   Gastrointestinal: Negative for abdominal pain, constipation, diarrhea, nausea and vomiting.   Genitourinary: Negative for dysuria, vaginal discharge and vaginal pain.        Vaginal itching   Musculoskeletal: Negative for gait problem.   Skin: Positive for wound (wound to left thigh). Negative for rash.   Neurological: Negative for headaches.          Review of patient's allergies indicates:   Allergen Reactions    Betadine [povidone-iodine] Itching        Med List:  Current Outpatient Medications on File Prior to Visit   Medication Sig Dispense Refill    ACCU-CHEK DONNA PLUS TEST STRP Strp CHECK BLOOD SUGAR ONE TIME DAILY 100 strip 1    ACCU-CHEK SOFT DEV LANCETS Kit USE AS DIRECTED 100 each 1    ACCU-CHEK SOFTCLIX LANCETS Misc CHECK BLOOD SUGAR ONE TIME DAILY 100 each 1    alcohol swabs (BD ALCOHOL SWABS) PadM USE AS DIRECTED 180 each 1    aspirin (ECOTRIN) 81 MG EC tablet Take 81 mg by mouth once daily.       blood glucose control, low (TRUE METRIX LEVEL 1) Soln USE AS DIRECTED WITH GLUCOSE METER 1 each 0    empagliflozin (JARDIANCE)  10 mg tablet Take 1 tablet (10 mg total) by mouth once daily. 90 tablet 1    ezetimibe (ZETIA) 10 mg tablet Take 1 tablet (10 mg total) by mouth once daily. 90 tablet 2    furosemide (LASIX) 40 MG tablet TAKE 1 TABLET EVERY DAY AS NEEDED 90 tablet 0    HYDROcodone-acetaminophen (NORCO)  mg per tablet Take 1 tablet by mouth every 8 (eight) hours. 90 tablet 0    [START ON 7/9/2022] HYDROcodone-acetaminophen (NORCO)  mg per tablet Take 1 tablet by mouth every 8 (eight) hours. 90 tablet 0    [START ON 8/8/2022] HYDROcodone-acetaminophen (NORCO)  mg per tablet Take 1 tablet by mouth every 8 (eight) hours. 90 tablet 0    losartan-hydrochlorothiazide 100-25 mg (HYZAAR) 100-25 mg per tablet Take 1 tablet by mouth once daily. 90 tablet 2    metFORMIN (GLUCOPHAGE) 500 MG tablet Take 1 tablet (500 mg total) by mouth 2 (two) times daily with meals. 180 tablet 3    metoprolol succinate (TOPROL-XL) 100 MG 24 hr tablet Take 1 tablet (100 mg total) by mouth once daily. 90 tablet 2    montelukast (SINGULAIR) 10 mg tablet TAKE 1 TABLET EVERY DAY AS NEEDED 90 tablet 2    mupirocin (BACTROBAN) 2 % ointment Apply topically once daily. 180 g 0    NIFEdipine (PROCARDIA-XL) 90 MG (OSM) 24 hr tablet TAKE 1 TABLET EVERY DAY 90 tablet 1    omeprazole (PRILOSEC) 40 MG capsule TAKE 1 CAPSULE EVERY DAY 90 capsule 0    pitavastatin calcium (LIVALO) 4 mg Tab Take one tablet by mouth three times per week on Monday, Wednesday, Friday. 90 tablet 2    potassium chloride 10% (KAYCIEL) 20 mEq/15 mL oral solution MIX  15 ML (1 TABLESPOONFUL) IN 4 OUNCES OF LIQUID AND DRINK TWICE DAILY 840 mL 2    spironolactone (ALDACTONE) 25 MG tablet TAKE 1 TABLET EVERY DAY 90 tablet 0    TRUE METRIX GLUCOSE METER kit USE AS DIRECTED 180 each 2    TRUEPLUS LANCETS 33 gauge Misc USE AS DIRECTED 200 each 0     No current facility-administered medications on file prior to visit.       Medical/Social/Family History:  Past Medical History:  "  Diagnosis Date    Acid reflux     Coronary arteriosclerosis     Diabetes     Diabetes mellitus, type 2     Hypertension     Spondylosis without myelopathy or radiculopathy, lumbar region       Social History     Tobacco Use   Smoking Status Never Smoker   Smokeless Tobacco Never Used      Social History     Substance and Sexual Activity   Alcohol Use Not Currently       Family History   Problem Relation Age of Onset    Hypertension Mother     Heart disease Mother     Diabetes Sister     Hypertension Sister     Hypertension Brother     Alcohol abuse Father     Cancer Father     Hypertension Son     Mental illness Maternal Aunt       Past Surgical History:   Procedure Laterality Date    APPENDECTOMY      BLOCK, NERVE, OBTURATOR Right 2019    Right Femoral/Obturator Nerve Block  Dr Headley     SECTION      x2    CORONARY ARTERY BYPASS GRAFT      KNEE ARTHROPLASTY Right     KNEE ARTHROSCOPY Left     SKIN GRAFT      Left Thigh    TOTAL HIP ARTHROPLASTY Bilateral     TUBAL LIGATION        Immunization History   Administered Date(s) Administered    COVID-19, MRNA, LN-S, PF (MODERNA FULL 0.5 ML DOSE) 2021, 03/10/2021    Influenza - Quadrivalent - PF *Preferred* (6 months and older) 2022    Pneumococcal Conjugate - 13 Valent 2017          Objective:      Vitals:    22 0919   BP: 136/60   BP Location: Left arm   Patient Position: Sitting   BP Method: Large (Automatic)   Pulse: (!) 55   Resp: 18   Temp: 97.7 °F (36.5 °C)   TempSrc: Oral   SpO2: 95%   Weight: 81.5 kg (179 lb 9.6 oz)   Height: 5' 4" (1.626 m)     Body mass index is 30.83 kg/m².     Physical Exam: Physical Exam  Vitals and nursing note reviewed.   Constitutional:       General: She is not in acute distress.     Appearance: Normal appearance. She is not ill-appearing.   HENT:      Head: Normocephalic.      Mouth/Throat:      Mouth: Mucous membranes are moist.   Eyes:      Pupils: Pupils are equal, " round, and reactive to light.   Cardiovascular:      Rate and Rhythm: Regular rhythm. Bradycardia present.      Heart sounds: Normal heart sounds.   Pulmonary:      Effort: Pulmonary effort is normal.      Breath sounds: Normal breath sounds.   Musculoskeletal:         General: Normal range of motion.      Cervical back: Normal range of motion.   Skin:     General: Skin is warm and dry.      Comments: Patient has small open area to left upper inner thigh; wound bed white with some redness; no odor, erythema, or drainage to area.    Neurological:      Mental Status: She is alert and oriented to person, place, and time.      Gait: Gait normal.   Psychiatric:         Mood and Affect: Mood normal.         Behavior: Behavior normal.                Assessment:          ICD-10-CM ICD-9-CM   1. Type 2 diabetes mellitus without complication, without long-term current use of insulin  E11.9 250.00   2. Encounter for hepatitis C screening test for low risk patient  Z11.59 V73.89   3. Essential hypertension  I10 401.9   4. Vaginal yeast infection  B37.3 112.1   5. Encounter for immunization  Z23 V03.89   6. Open wound of left thigh, sequela  S71.102S 906.1        Plan:       Type 2 diabetes mellitus without complication, without long-term current use of insulin  -     CBC Auto Differential; Future; Expected date: 06/23/2022  -     Comprehensive Metabolic Panel; Future; Expected date: 06/23/2022  -     Cancel: Lipid Panel; Future; Expected date: 06/23/2022  -     Hemoglobin A1C; Future; Expected date: 06/23/2022  -     POCT glucose  -     Microalbumin/Creatinine Ratio, Urine    Encounter for hepatitis C screening test for low risk patient  -     Hepatitis C Antibody; Future; Expected date: 06/23/2022    Essential hypertension    Vaginal yeast infection  -     miconazole (MONISTAT 7) 2 % vaginal cream; Apply cream to external vaginal area for vaginal itching.  Dispense: 1 kit; Refill: 0    Encounter for immunization  -     (In  "Office Administered) Pneumococcal Conjugate Vaccine (20 Valent) (IM)    Open wound of left thigh, sequela  -     Ambulatory referral/consult to Wound Clinic; Future; Expected date: 06/30/2022  -     chlorhexidine (HIBICLENS) 4 % external liquid; Apply topically daily as needed (thigh wound).  Dispense: 120 mL; Refill: 0          New & refilled meds:  Requested Prescriptions     Signed Prescriptions Disp Refills    miconazole (MONISTAT 7) 2 % vaginal cream 1 kit 0     Sig: Apply cream to external vaginal area for vaginal itching.    chlorhexidine (HIBICLENS) 4 % external liquid 120 mL 0     Sig: Apply topically daily as needed (thigh wound).       Follow up in about 3 months (around 9/23/2022), or if symptoms worsen or fail to improve, for Hypertension, diabetes.     Patient Instructions   Patient Education       Diabetes and Diet   The Basics   Written by the doctors and editors at [facility]   Why is diet important in diabetes? -- Diet is important because it is part of diabetes treatment. Many people need to change what they eat and how much they eat to help treat their diabetes. It is important for people to treat their diabetes so that they:  · Keep their blood sugar at or near a normal level  · Prevent long-term problems, such as heart or kidney problems, that can happen in people with diabetes  Changing your diet can also help treat obesity, high blood pressure, and high cholesterol. These conditions can affect people with diabetes and can lead to future problems, such as heart attacks or strokes.  Who will work with me to change my diet? -- Your doctor or nurse will work with you to make a food plan to change your diet. They might also recommend that you work with a "dietitian." A dietitian is an expert on food and eating.  Do I need to eat at the same times every day? -- When and how often you should eat depends, in part, on the diabetes medicines you take. For example:  · People who take about the same " "amount of insulin at the same time each day (called a "fixed regimen") should eat meals at the same times. This is also true for people who take pills that increase insulin levels, such as sulfonylureas. Eating meals at the same time every day helps prevent low blood sugar.  · People who adjust the dose and timing of their insulin each day (called a "flexible regimen") do not always have to eat meals at the same time. That's because they can time their insulin dose for before they plan to eat, and also adjust the dose for how much they plan to eat.  · People who take medicines that don't usually cause low blood sugar, such as metformin, don't have to eat meals at the same time every day.  What do I need to think about when planning what to eat? -- Our bodies break down the food we eat into small pieces called carbohydrates, proteins, and fats.  When planning what to eat, people with diabetes need to think about:  1. Carbohydrates (or "carbs") - Carbohydrates, which are sugars that our bodies use for energy, can raise a person's blood sugar level. Your doctor, nurse, or dietitian will tell you how many carbohydrates you should eat at each meal or snack. Foods that have carbohydrates include:  ? Bread, pasta, and rice  ? Vegetables and fruits  ? Dairy foods  ? Foods and drinks with added sugar  It is best to get your carbohydrates from fruits, vegetables, whole grains, and low-fat milk. It is best to avoid drinks with added sugar, like soda, juices, and sports drinks.   1. Protein - Your doctor, nurse, or dietitian will tell you how much protein you should eat each day. It is best to eat lean meats, fish, eggs, beans, peas, soy products, nuts, and seeds.  2. Fats - The type of fat you eat is more important than the amount of fat. "Saturated" and "trans" fats can increase your risk for heart problems, like a heart attack.  ? Foods that have saturated fats include meat, butter, cheese, and ice cream.  ? Foods that have " "trans fats include processed food with "partially hydrogenated oils" on the ingredient list. This may include fried foods, store bought cookies, muffins, pies, and cakes.  "Monounsaturated" and "polyunsaturated" fats are better for you. Foods with these types of fat include fish, avocado, olive oil, and nuts.  · Calories - People need to eat a certain amount of calories each day to keep their weight the same. People who are overweight and want to lose weight need to eat fewer calories each day.  · Fiber - Eating foods with a lot of fiber can help control a person's blood sugar level. Foods that have a lot of fiber include apples, green beans, peas, beans, lentils, nuts, oatmeal, and whole grains.  · Salt - People who have high blood pressure should not eat foods that contain a lot of salt (also called sodium). People with high blood pressure should also eat healthy foods, such as fruits, vegetables, and low-fat dairy foods.  · Alcohol - Having more than 1 drink (for women) or 2 drinks (for men) a day can raise blood sugar levels. Also, drinks that have fruit juice or soda in them can raise blood sugar levels.  What can I do if I need to lose weight? -- If you need to lose weight, you can:  · Exercise - Try to get at least 30 minutes of physical activity a day, most days of the week. Even gentle exercise, like walking, is good for your health. Some people with diabetes need to change their medicine dose before they exercise. They might also need to check their blood sugar levels before and after exercising.  · Eat fewer calories - Your doctor, nurse, or dietitian can tell you how many calories you should eat each day in order to lose weight.  If you are worried about your weight, size, or shape, talk with your doctor, nurse, or dietitian. They can help you make changes to improve your health.  Can I eat the same foods as my family? -- Yes. You do not need to eat special foods if you have diabetes. You and your family " can eat the same foods. Changing your diet is mostly about eating healthy foods and not eating too much.  What are the other parts of diabetes treatment? -- Besides changing your diet, the other parts of diabetes treatment are:  · Exercise  · Medicines  Some people with diabetes need to learn how to match their diet and exercise with their medicine dose. For example, people who use insulin might need to choose the dose of insulin they give themselves. To choose their dose, they need to think about:  · What they plan to eat at the next meal  · How much exercise they plan to do  · What their blood sugar level is  If the diet and exercise do not match the medicine dose, a person's blood sugar level can get too low or too high. Blood sugar levels that are too low or too high can cause problems.  All topics are updated as new evidence becomes available and our peer review process is complete.  This topic retrieved from [employer] on: Sep 21, 2021.  Topic 62217 Version 7.0  Release: 29.4.2 - C29.263  © 2021 UpToDate, Inc. and/or its affiliates. All rights reserved.  Consumer Information Use and Disclaimer   This information is not specific medical advice and does not replace information you receive from your health care provider. This is only a brief summary of general information. It does NOT include all information about conditions, illnesses, injuries, tests, procedures, treatments, therapies, discharge instructions or life-style choices that may apply to you. You must talk with your health care provider for complete information about your health and treatment options. This information should not be used to decide whether or not to accept your health care provider's advice, instructions or recommendations. Only your health care provider has the knowledge and training to provide advice that is right for you. The use of this information is governed by the [employer] End User License Agreement, available at  "https://www.ioGeneticser.com/en/solutions/lexicomp/about/craig.The use of [employer] content is governed by the [employer] Terms of Use. ©2021 UpToDate, Inc. All rights reserved.  Copyright   © 2021 UpToDate, Inc. and/or its affiliates. All rights reserved.  Patient Education       Controlling Your Blood Pressure Through Lifestyle   The Basics   Written by the doctors and editors at [employer]   What does my lifestyle have to do with my blood pressure? -- The things you do and the foods you eat have a big effect on your blood pressure and your overall health. Following the right lifestyle can:  · Lower your blood pressure or keep you from getting high blood pressure in the first place  · Reduce your need for blood pressure medicines  · Make medicines for high blood pressure work better, if you do take them  · Lower the chances that you'll have a heart attack or stroke, or develop kidney disease  Which lifestyle choices will help lower my blood pressure? -- Here's what you can do:  · Lose weight (if you are overweight)  · Choose a diet rich in fruits, vegetables, and low-fat dairy products, and low in meats, sweets, and refined grains  · Eat less salt (sodium)  · Do something active for at least 30 minutes a day on most days of the week  · Limit the amount of alcohol you drink  If you have high blood pressure, it's also very important to quit smoking (if you smoke). Quitting smoking might not bring your blood pressure down. But it will lower the chances that you'll have a heart attack or stroke, and it will help you feel better and live longer.  Start low and go slow -- The changes listed above might sound like a lot, but don't worry. You don't have to change everything all at once. The key to improving your lifestyle is to "start low and go slow." Choose 1 small, specific thing to change and try doing it for a while. If it works for you, keep doing it until it becomes a habit. If it doesn't, don't give up. Choose " "something else to change and see how that goes.  Let's say, for example, that you would like to improve your diet. If you're the type of person who eats cheeseburgers and French fries all the time, you can't switch to eating just salads from one day to the next. When people try to make changes like that, they often fail. Then they feel frustrated and tend to give up. So instead of trying to change everything about your diet in 1 day, change 1 or 2 small things about your diet and give yourself time to get used to those changes. For instance, keep the cheeseburger but give up the French fries. Or eat the same things but cut your portions in half.  As you find things that you are able to change and stick with, keep adding new changes. In time, you will see that you can actually change a lot. You just have to get used to the changes slowly.  Lose weight -- When people think about losing weight, they sometimes make it more complicated than it really is. To lose weight, you have to either eat less or move more. If you do both of those things, it's even better. But there is no single weight-loss diet or activity that's better than any other. When it comes to weight loss, the most effective plan is the one that you'll stick with.  Improve your diet -- There is no single diet that is right for everyone. But in general, a healthy diet can include:  · Lots of fruits, vegetables, and whole grains  · Some beans, peas, lentils, chickpeas, and similar foods  · Some nuts, such as walnuts, almonds, and peanuts  · Fat-free or low-fat milk and milk products  · Some fish  To have a healthy diet, it's also important to limit or avoid sugar, sweets, meats, and refined grains. (Refined grains are found in white bread, white rice, most forms of pasta, and most packaged "snack" foods.)  Reduce salt -- Many people think that eating a low-sodium diet means avoiding the salt shaker and not adding salt when cooking. The truth is, not adding " "salt at the table or when you cook will only help a little. Almost all of the sodium you eat is already in the food you buy at the grocery store or at restaurants (figure 1).  The most important thing you can do to cut down on sodium is to eat less processed food. That means that you should avoid most foods that are sold in cans, boxes, jars, and bags. You should also eat in restaurants less often.  To reduce the amount of sodium you get, buy fresh or fresh-frozen fruits, vegetables, and meats. (Fresh-frozen foods have had nothing added to them before freezing.) Then you can make meals at home, from scratch, with these ingredients.  As with the other changes, don't try to cut out salt all at once. Instead, choose 1 or 2 foods that have a lot of sodium and try to replace them with low-sodium choices. When you get used to those low-sodium options, find another food or 2 to change. Then keep going, until all the foods you eat are sodium-free or low in sodium.  Become more active -- If you want to be more active, you don't have to go to the gym or get all sweaty. It is possible to increase your activity level while doing everyday things you enjoy. Walking, gardening, and dancing are just a few of the things that you might try. As with all the other changes, the key is not to do too much too fast. If you don't do any activity now, start by walking for just a few minutes every other day. Do that for a few weeks. If you stick with it, try doing it for longer. But if you find that you don't like walking, try a different activity.  Drink less alcohol -- If you are a woman, do not have more than 1 "standard drink" of alcohol a day. If you are a man, do not have more than 2. A "standard drink" is:  · A can or bottle that has 12 ounces of beer  · A glass that has 5 ounces of wine  · A shot that has 1.5 ounces of whiskey  Where should I start? -- If you want to improve your lifestyle, start by making the changes that you think " "would be easiest for you. If you used to exercise and just got out of the habit, maybe it would be easy for you to start exercising again. Or if you actually like cooking meals from scratch, maybe the first thing you should focus on is eating home-cooked meals that are low in sodium.  Whatever you tackle first, choose specific, realistic goals, and give yourself a deadline. For example, do not decide that you are going to "exercise more." Instead, decide that you are going to walk for 10 minutes on Monday, Wednesday, and Friday, and that you are going to do this for the next 2 weeks.  When lifestyle changes are too general, people have a hard time following through.  Now go. You can do it!  All topics are updated as new evidence becomes available and our peer review process is complete.  This topic retrieved from [employer] on: Sep 21, 2021.  Topic 03585 Version 8.0  Release: 29.4.2 - C29.263  © 2021 UpToDate, Inc. and/or its affiliates. All rights reserved.  figure 1: Sources of sodium in your diet     Graphic 47901 Version 2.0    Consumer Information Use and Disclaimer   This information is not specific medical advice and does not replace information you receive from your health care provider. This is only a brief summary of general information. It does NOT include all information about conditions, illnesses, injuries, tests, procedures, treatments, therapies, discharge instructions or life-style choices that may apply to you. You must talk with your health care provider for complete information about your health and treatment options. This information should not be used to decide whether or not to accept your health care provider's advice, instructions or recommendations. Only your health care provider has the knowledge and training to provide advice that is right for you. The use of this information is governed by the [employer] End User License Agreement, available at " https://www.[employer].com/en/solutions/lexicomp/about/craig.The use of [employer] content is governed by the [employer] Terms of Use. ©2021 UpToDate, Inc. All rights reserved.  Copyright   © 2021 UpToDate, Inc. and/or its affiliates. All rights reserved.           Signature: Lizzette Meadows DNP, [email or web address]-C

## 2022-06-27 ENCOUNTER — OFFICE VISIT (OUTPATIENT)
Dept: WOUND CARE | Facility: CLINIC | Age: 67
End: 2022-06-27
Attending: FAMILY MEDICINE
Payer: MEDICARE

## 2022-06-27 VITALS
RESPIRATION RATE: 18 BRPM | TEMPERATURE: 98 F | DIASTOLIC BLOOD PRESSURE: 68 MMHG | SYSTOLIC BLOOD PRESSURE: 130 MMHG | HEART RATE: 54 BPM

## 2022-06-27 DIAGNOSIS — S71.102S OPEN WOUND OF LEFT THIGH, SEQUELA: ICD-10-CM

## 2022-06-27 DIAGNOSIS — M79.652 PAIN IN LEFT THIGH: ICD-10-CM

## 2022-06-27 PROBLEM — S71.102A OPEN WOUND OF LEFT THIGH: Status: ACTIVE | Noted: 2022-06-27

## 2022-06-27 PROCEDURE — 3061F NEG MICROALBUMINURIA REV: CPT | Mod: CPTII,,, | Performed by: NURSE PRACTITIONER

## 2022-06-27 PROCEDURE — 3288F PR FALLS RISK ASSESSMENT DOCUMENTED: ICD-10-PCS | Mod: CPTII,,, | Performed by: NURSE PRACTITIONER

## 2022-06-27 PROCEDURE — 3075F PR MOST RECENT SYSTOLIC BLOOD PRESS GE 130-139MM HG: ICD-10-PCS | Mod: CPTII,,, | Performed by: NURSE PRACTITIONER

## 2022-06-27 PROCEDURE — 1160F RVW MEDS BY RX/DR IN RCRD: CPT | Mod: CPTII,,, | Performed by: NURSE PRACTITIONER

## 2022-06-27 PROCEDURE — 3061F PR NEG MICROALBUMINURIA RESULT DOCUMENTED/REVIEW: ICD-10-PCS | Mod: CPTII,,, | Performed by: NURSE PRACTITIONER

## 2022-06-27 PROCEDURE — 1101F PR PT FALLS ASSESS DOC 0-1 FALLS W/OUT INJ PAST YR: ICD-10-PCS | Mod: CPTII,,, | Performed by: NURSE PRACTITIONER

## 2022-06-27 PROCEDURE — 99213 OFFICE O/P EST LOW 20 MIN: CPT | Mod: S$PBB,,, | Performed by: NURSE PRACTITIONER

## 2022-06-27 PROCEDURE — 99213 PR OFFICE/OUTPT VISIT, EST, LEVL III, 20-29 MIN: ICD-10-PCS | Mod: S$PBB,,, | Performed by: NURSE PRACTITIONER

## 2022-06-27 PROCEDURE — 3052F HG A1C>EQUAL 8.0%<EQUAL 9.0%: CPT | Mod: CPTII,,, | Performed by: NURSE PRACTITIONER

## 2022-06-27 PROCEDURE — 3288F FALL RISK ASSESSMENT DOCD: CPT | Mod: CPTII,,, | Performed by: NURSE PRACTITIONER

## 2022-06-27 PROCEDURE — 3075F SYST BP GE 130 - 139MM HG: CPT | Mod: CPTII,,, | Performed by: NURSE PRACTITIONER

## 2022-06-27 PROCEDURE — 3066F NEPHROPATHY DOC TX: CPT | Mod: CPTII,,, | Performed by: NURSE PRACTITIONER

## 2022-06-27 PROCEDURE — 1159F PR MEDICATION LIST DOCUMENTED IN MEDICAL RECORD: ICD-10-PCS | Mod: CPTII,,, | Performed by: NURSE PRACTITIONER

## 2022-06-27 PROCEDURE — 3078F PR MOST RECENT DIASTOLIC BLOOD PRESSURE < 80 MM HG: ICD-10-PCS | Mod: CPTII,,, | Performed by: NURSE PRACTITIONER

## 2022-06-27 PROCEDURE — 1159F MED LIST DOCD IN RCRD: CPT | Mod: CPTII,,, | Performed by: NURSE PRACTITIONER

## 2022-06-27 PROCEDURE — 3052F PR MOST RECENT HEMOGLOBIN A1C LEVEL 8.0 - < 9.0%: ICD-10-PCS | Mod: CPTII,,, | Performed by: NURSE PRACTITIONER

## 2022-06-27 PROCEDURE — 1101F PT FALLS ASSESS-DOCD LE1/YR: CPT | Mod: CPTII,,, | Performed by: NURSE PRACTITIONER

## 2022-06-27 PROCEDURE — 1160F PR REVIEW ALL MEDS BY PRESCRIBER/CLIN PHARMACIST DOCUMENTED: ICD-10-PCS | Mod: CPTII,,, | Performed by: NURSE PRACTITIONER

## 2022-06-27 PROCEDURE — 99214 OFFICE O/P EST MOD 30 MIN: CPT | Mod: PBBFAC | Performed by: NURSE PRACTITIONER

## 2022-06-27 PROCEDURE — 3066F PR DOCUMENTATION OF TREATMENT FOR NEPHROPATHY: ICD-10-PCS | Mod: CPTII,,, | Performed by: NURSE PRACTITIONER

## 2022-06-27 PROCEDURE — 3078F DIAST BP <80 MM HG: CPT | Mod: CPTII,,, | Performed by: NURSE PRACTITIONER
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
## 2022-06-27 NOTE — PATIENT INSTRUCTIONS
Wash with baby shampoo and water or vashe.    Apply aquacel ag to wound, cover with dry gauze and border foam until home health is available.    Home health to order and apply silver polymem to wound bed (cut slightly larger than wound). Cover with border gauze.    Change every other day and as needed for drainage.    Monitor closely for s/s of infection including fever, chills, increase in pain, odor from wound, and increased redness from foot. Go to ER if any complications develop.   Keep leg elevated and avoid pressure on wound.     Diabetes:  Monitor glucose closely. Check fasting glucose and 2 hours after meals. HgA1C goal <7, fasting glucose , and 2 hours after meals <180  Hypertension:  Check blood pressure twice daily, goal <120/80  Diet:   Increase protein intake, avoid fried, fatty foods and foods high in simple carbs.   Vitamins:  Take vitamin C 1000 mg, zinc 50mg, vitamin d 5000 units, and a daily multivitamin. Anant is a good source of protein and nutrients to aid in wound healing.      US left leg scheduled for 6/30/2022 @9:00 at [facility].     F/u with wound care in one week.    Set up with Arabella QUEZADA

## 2022-06-29 ENCOUNTER — TELEPHONE (OUTPATIENT)
Dept: PRIMARY CARE CLINIC | Facility: CLINIC | Age: 67
End: 2022-06-29
Payer: MEDICARE

## 2022-06-29 ENCOUNTER — TELEPHONE (OUTPATIENT)
Dept: WOUND CARE | Facility: CLINIC | Age: 67
End: 2022-06-29
Payer: MEDICARE

## 2022-06-29 NOTE — TELEPHONE ENCOUNTER
----- Message from Lizzette Meadows DNP, FNP-C sent at 6/24/2022  8:35 AM CDT -----  Please notify patient of results; needs to adhere to a diabetic diet.

## 2022-06-29 NOTE — TELEPHONE ENCOUNTER
Pt voiced she has no aquacel ag to place on wound but she has mupirocin cream. Instructed pat that [facility] Home health will see her tomorrow and will supply her with aquacel ag,gauze and tape. Told pat to apply antibiotic cream until home health arrives

## 2022-06-30 ENCOUNTER — HOSPITAL ENCOUNTER (OUTPATIENT)
Dept: RADIOLOGY | Facility: HOSPITAL | Age: 67
Discharge: HOME | End: 2022-06-30
Attending: NURSE PRACTITIONER
Payer: MEDICARE

## 2022-06-30 DIAGNOSIS — M79.652 PAIN IN LEFT THIGH: ICD-10-CM

## 2022-06-30 DIAGNOSIS — S71.102S OPEN WOUND OF LEFT THIGH, SEQUELA: ICD-10-CM

## 2022-06-30 PROCEDURE — 76881 US COMPL JOINT R-T W/IMG: CPT | Mod: TC,LT

## 2022-07-01 NOTE — PATIENT INSTRUCTIONS
Diabetic Eye exam received from [facility]    Exam date: 9/17/2019    No diabetic Retinopathy detected Wash with baby shampoo and water or vashe.     Apply silver polymem to wound bed (cut slightly larger than wound). Cover with border gauze.     Change every other day and as needed for drainage.     Monitor closely for s/s of infection including fever, chills, increase in pain, odor from wound, and increased redness from foot. Go to ER if any complications develop.   Keep leg elevated and avoid pressure on wound.      Diabetes:  Monitor glucose closely. Check fasting glucose and 2 hours after meals. HgA1C goal <7, fasting glucose , and 2 hours after meals <180  Hypertension:  Check blood pressure twice daily, goal <120/80  Diet:   Increase protein intake, avoid fried, fatty foods and foods high in simple carbs.   Vitamins:  Take vitamin C 1000 mg, zinc 50mg, vitamin d 5000 units, and a daily multivitamin. Anant is a good source of protein and nutrients to aid in wound healing.

## 2022-07-01 NOTE — PROGRESS NOTES
MORA Shen   [facility] - WOUND CARE  [address]  [place] [address]  [phone]      PATIENT NAME: Selena Proctor  : 1955  DATE: 22  MRN: [id number]      Billing Provider: MORA Shen  Level of Service:   Patient PCP Information     Provider PCP Type    Lizzette Meadows DNP, BANDARP-C General          Reason for Visit / Chief Complaint: Non-healing Wound Follow Up (Left inner thigh)       History of Present Illness / Problem Focused Workflow     Selena Proctor is a 67 y.o female presents to clinic for follow up on wound to left inner thigh. She has HH and reports they have ordered Polymem dressing. Wound remains tender to palpation, ultrasound is WNL. Pertinent PMH includes DM, HTN, CAD with CABG x 10 years ago. Last A1C was 8.3 drawn last week. Denies fever or chills.       Review of Systems     Review of Systems   Constitutional: Positive for activity change. Negative for chills and fever.   HENT: Negative.    Eyes: Negative.    Respiratory: Negative.  Negative for apnea and chest tightness.    Cardiovascular: Negative.  Negative for chest pain and leg swelling.   Gastrointestinal: Negative.    Endocrine: Negative.    Genitourinary: Negative.    Musculoskeletal: Positive for arthralgias and myalgias.   Skin: Positive for color change and wound.   Allergic/Immunologic: Negative.    Neurological: Negative.    Hematological: Negative.    Psychiatric/Behavioral: Negative for behavioral problems.       Medical / Social / Family History     Past Medical History:   Diagnosis Date    Acid reflux     Coronary arteriosclerosis     Diabetes     Diabetes mellitus, type 2     Hypertension     Spondylosis without myelopathy or radiculopathy, lumbar region        Past Surgical History:   Procedure Laterality Date    APPENDECTOMY      BLOCK, NERVE, OBTURATOR Right 2019    Right Femoral/Obturator Nerve Block  Dr Headley     SECTION      x2     CORONARY ARTERY BYPASS GRAFT      KNEE ARTHROPLASTY Right     KNEE ARTHROSCOPY Left     SKIN GRAFT      Left Thigh    TOTAL HIP ARTHROPLASTY Bilateral     TUBAL LIGATION         Social History  Ms. Selena Prcotor  reports that she has never smoked. She has never used smokeless tobacco. She reports previous alcohol use. She reports current drug use. Drug: Hydrocodone.    Family History  Ms.'s Selena Proctor family history includes Alcohol abuse in her father; Cancer in her father; Diabetes in her sister; Heart disease in her mother; Hypertension in her brother, mother, sister, and son; Mental illness in her maternal aunt.    Medications and Allergies     Medications  Outpatient Medications Marked as Taking for the 7/5/22 encounter (Office Visit) with MORA Shen   Medication Sig Dispense Refill    ACCU-CHEK DONNA PLUS TEST STRP Strp CHECK BLOOD SUGAR ONE TIME DAILY 100 strip 1    ACCU-CHEK SOFT DEV LANCETS Kit USE AS DIRECTED 100 each 1    ACCU-CHEK SOFTCLIX LANCETS Misc CHECK BLOOD SUGAR ONE TIME DAILY 100 each 1    aspirin (ECOTRIN) 81 MG EC tablet Take 81 mg by mouth once daily.       blood glucose control, low (TRUE METRIX LEVEL 1) Soln USE AS DIRECTED WITH GLUCOSE METER 1 each 0    chlorhexidine (HIBICLENS) 4 % external liquid Apply topically daily as needed (thigh wound). 120 mL 0    empagliflozin (JARDIANCE) 10 mg tablet Take 1 tablet (10 mg total) by mouth once daily. 90 tablet 1    ezetimibe (ZETIA) 10 mg tablet Take 1 tablet (10 mg total) by mouth once daily. 90 tablet 2    furosemide (LASIX) 40 MG tablet TAKE 1 TABLET EVERY DAY AS NEEDED 90 tablet 0    HYDROcodone-acetaminophen (NORCO)  mg per tablet Take 1 tablet by mouth every 8 (eight) hours. 90 tablet 0    [START ON 7/9/2022] HYDROcodone-acetaminophen (NORCO)  mg per tablet Take 1 tablet by mouth every 8 (eight) hours. 90 tablet 0    [START ON 8/8/2022] HYDROcodone-acetaminophen (NORCO)  mg per tablet Take 1  tablet by mouth every 8 (eight) hours. 90 tablet 0    losartan-hydrochlorothiazide 100-25 mg (HYZAAR) 100-25 mg per tablet Take 1 tablet by mouth once daily. 90 tablet 2    metFORMIN (GLUCOPHAGE) 500 MG tablet Take 1 tablet (500 mg total) by mouth 2 (two) times daily with meals. 180 tablet 3    metoprolol succinate (TOPROL-XL) 100 MG 24 hr tablet Take 1 tablet (100 mg total) by mouth once daily. 90 tablet 2    miconazole (MONISTAT 7) 2 % vaginal cream Apply cream to external vaginal area for vaginal itching. 1 kit 0    montelukast (SINGULAIR) 10 mg tablet TAKE 1 TABLET EVERY DAY AS NEEDED 90 tablet 2    NIFEdipine (PROCARDIA-XL) 90 MG (OSM) 24 hr tablet TAKE 1 TABLET EVERY DAY 90 tablet 1    omeprazole (PRILOSEC) 40 MG capsule TAKE 1 CAPSULE EVERY DAY 90 capsule 0    pitavastatin calcium (LIVALO) 4 mg Tab Take one tablet by mouth three times per week on Monday, Wednesday, Friday. 90 tablet 2    potassium chloride 10% (KAYCIEL) 20 mEq/15 mL oral solution MIX  15 ML (1 TABLESPOONFUL) IN 4 OUNCES OF LIQUID AND DRINK TWICE DAILY 840 mL 2    spironolactone (ALDACTONE) 25 MG tablet TAKE 1 TABLET EVERY DAY 90 tablet 0    TRUE METRIX GLUCOSE METER kit USE AS DIRECTED 180 each 2    TRUEPLUS LANCETS 33 gauge Misc USE AS DIRECTED 200 each 0    [DISCONTINUED] alcohol swabs (BD ALCOHOL SWABS) PadM USE AS DIRECTED 180 each 1       Allergies  Review of patient's allergies indicates:   Allergen Reactions    Betadine [povidone-iodine] Itching       Physical Examination     Vitals:    07/05/22 1014   Resp: 20   Temp: 97.4 °F (36.3 °C)     Physical Exam  Vitals and nursing note reviewed.   Constitutional:       Appearance: Normal appearance.   HENT:      Head: Normocephalic.   Cardiovascular:      Rate and Rhythm: Normal rate and regular rhythm.      Pulses: Normal pulses.      Heart sounds: Normal heart sounds.   Pulmonary:      Effort: Pulmonary effort is normal. No respiratory distress.   Chest:      Chest wall: No  tenderness.   Musculoskeletal:         General: Swelling and tenderness present.   Skin:     General: Skin is warm and dry.      Findings: Erythema present.      Comments: See LDA for measurements and picture   Neurological:      Mental Status: She is alert and oriented to person, place, and time. Mental status is at baseline.   Psychiatric:         Mood and Affect: Mood normal.         Behavior: Behavior normal.         Thought Content: Thought content normal.         Judgment: Judgment normal.         Assessment and Plan      1. Open wound of left thigh, sequela           Altered Skin Integrity 06/27/22 0929 Left anterior Thigh #1 (Active)   06/27/22 0929   Altered Skin Integrity Present on Admission:    Side: Left   Orientation: anterior   Location: Thigh   Wound Number: #1   Is this injury device related?:    Primary Wound Type:    Description of Altered Skin Integrity:    Removal Indication and Assessment:    Wound Outcome:    (Retired) Wound Length (cm):    (Retired) Wound Width (cm):    (Retired) Depth (cm):    Wound Description (Comments):    Removal Indications:    Wound Image    07/05/22 0927   Dressing Appearance Dry;Intact;Clean 07/05/22 0927   Drainage Amount Moderate 07/05/22 0927   Drainage Characteristics/Odor Yellow 07/05/22 0927   Appearance Pink;Intact;Moist;Granulating;Epithelialization 07/05/22 0927   Tissue loss description Full thickness 07/05/22 0927   Black (%), Wound Tissue Color 0 % 07/05/22 0927   Red (%), Wound Tissue Color 100 % 07/05/22 0927   Yellow (%), Wound Tissue Color 0 % 07/05/22 0927   Periwound Area Intact 07/05/22 0927   Wound Edges Approximated 07/05/22 0927   Wound Length (cm) 1.7 cm 07/05/22 0927   Wound Width (cm) 0.3 cm 07/05/22 0927   Wound Depth (cm) 0.2 cm 07/05/22 0927   Wound Volume (cm^3) 0.102 cm^3 07/05/22 0927   Wound Surface Area (cm^2) 0.51 cm^2 07/05/22 0927   Care Cleansed with: 07/05/22 0927   Dressing Applied;Silver;Calcium alginate;Gauze 07/05/22 0927          Active Problem List with Overview Notes    Diagnosis Date Noted    Open wound of left thigh 06/27/2022    Pain in left thigh  06/27/2022    Osteoarthrosis multiple sites, not specified as generalized 12/07/2021    Essential hypertension 07/28/2021    Type 2 diabetes mellitus without complication, without long-term current use of insulin 07/28/2021    Hip pain, right 05/24/2021    Spondylosis without myelopathy or radiculopathy, lumbar region       Plan:   Wash with baby shampoo and water or vashe.     Apply silver polymem to wound bed (cut slightly larger than wound). Cover with border gauze.     Change every other day and as needed for drainage.     Monitor closely for s/s of infection including fever, chills, increase in pain, odor from wound, and increased redness from foot. Go to ER if any complications develop.   Keep leg elevated and avoid pressure on wound.      Diabetes:  Monitor glucose closely. Check fasting glucose and 2 hours after meals. HgA1C goal <7, fasting glucose , and 2 hours after meals <180  Hypertension:  Check blood pressure twice daily, goal <120/80  Diet:   Increase protein intake, avoid fried, fatty foods and foods high in simple carbs.   Vitamins:  Take vitamin C 1000 mg, zinc 50mg, vitamin d 5000 units, and a daily multivitamin. Anant is a good source of protein and nutrients to aid in wound healing.    Problem List Items Addressed This Visit        Orthopedic    Open wound of left thigh - Primary          Future Appointments   Date Time Provider Department Center   7/26/2022 10:00 AM MORA Shen [facility]   8/1/2022 10:00 AM AWV NURSE, [facility] PRIMARY CARE [facility] DEE [place]    9/7/2022  8:30 [facility]            Signature:  MORA Shen  [facility] - WOUND CARE  [address]  [place] [address]  [phone]    Date of encounter: 7/5/22

## 2022-07-05 ENCOUNTER — OFFICE VISIT (OUTPATIENT)
Dept: WOUND CARE | Facility: CLINIC | Age: 67
End: 2022-07-05
Attending: FAMILY MEDICINE
Payer: MEDICARE

## 2022-07-05 VITALS — RESPIRATION RATE: 20 BRPM | TEMPERATURE: 97 F

## 2022-07-05 DIAGNOSIS — S71.102S OPEN WOUND OF LEFT THIGH, SEQUELA: Primary | ICD-10-CM

## 2022-07-05 PROCEDURE — 3066F PR DOCUMENTATION OF TREATMENT FOR NEPHROPATHY: ICD-10-PCS | Mod: CPTII,,, | Performed by: NURSE PRACTITIONER

## 2022-07-05 PROCEDURE — 3052F HG A1C>EQUAL 8.0%<EQUAL 9.0%: CPT | Mod: CPTII,,, | Performed by: NURSE PRACTITIONER

## 2022-07-05 PROCEDURE — 3066F NEPHROPATHY DOC TX: CPT | Mod: CPTII,,, | Performed by: NURSE PRACTITIONER

## 2022-07-05 PROCEDURE — 3061F PR NEG MICROALBUMINURIA RESULT DOCUMENTED/REVIEW: ICD-10-PCS | Mod: CPTII,,, | Performed by: NURSE PRACTITIONER

## 2022-07-05 PROCEDURE — 1160F PR REVIEW ALL MEDS BY PRESCRIBER/CLIN PHARMACIST DOCUMENTED: ICD-10-PCS | Mod: CPTII,,, | Performed by: NURSE PRACTITIONER

## 2022-07-05 PROCEDURE — 3052F PR MOST RECENT HEMOGLOBIN A1C LEVEL 8.0 - < 9.0%: ICD-10-PCS | Mod: CPTII,,, | Performed by: NURSE PRACTITIONER

## 2022-07-05 PROCEDURE — 3061F NEG MICROALBUMINURIA REV: CPT | Mod: CPTII,,, | Performed by: NURSE PRACTITIONER

## 2022-07-05 PROCEDURE — 99213 OFFICE O/P EST LOW 20 MIN: CPT | Mod: S$PBB,,, | Performed by: NURSE PRACTITIONER

## 2022-07-05 PROCEDURE — 1126F PR PAIN SEVERITY QUANTIFIED, NO PAIN PRESENT: ICD-10-PCS | Mod: CPTII,,, | Performed by: NURSE PRACTITIONER

## 2022-07-05 PROCEDURE — 99213 PR OFFICE/OUTPT VISIT, EST, LEVL III, 20-29 MIN: ICD-10-PCS | Mod: S$PBB,,, | Performed by: NURSE PRACTITIONER

## 2022-07-05 PROCEDURE — 99215 OFFICE O/P EST HI 40 MIN: CPT | Mod: PBBFAC | Performed by: NURSE PRACTITIONER

## 2022-07-05 PROCEDURE — 1160F RVW MEDS BY RX/DR IN RCRD: CPT | Mod: CPTII,,, | Performed by: NURSE PRACTITIONER

## 2022-07-05 PROCEDURE — 1126F AMNT PAIN NOTED NONE PRSNT: CPT | Mod: CPTII,,, | Performed by: NURSE PRACTITIONER

## 2022-07-05 PROCEDURE — 1159F PR MEDICATION LIST DOCUMENTED IN MEDICAL RECORD: ICD-10-PCS | Mod: CPTII,,, | Performed by: NURSE PRACTITIONER

## 2022-07-05 PROCEDURE — 1159F MED LIST DOCD IN RCRD: CPT | Mod: CPTII,,, | Performed by: NURSE PRACTITIONER

## 2022-07-25 NOTE — PATIENT INSTRUCTIONS
Wash with baby shampoo and water or vashe.     Apply silvadene to wound bed    Cover with border gauze. Secure with paper tape if needed    Change daily      Monitor closely for s/s of infection including fever, chills, increase in pain, odor from wound, and increased redness from foot. Go to ER if any complications develop.   Keep leg elevated and avoid pressure on wound.      Diabetes:  Monitor glucose closely. Check fasting glucose and 2 hours after meals. HgA1C goal <7, fasting glucose , and 2 hours after meals <180  Hypertension:  Check blood pressure twice daily, goal <120/80  Diet:   Increase protein intake, avoid fried, fatty foods and foods high in simple carbs.   Vitamins:  Take vitamin C 1000 mg, zinc 50mg, vitamin d 5000 units, and a daily multivitamin. Anant is a good source of protein and nutrients to aid in wound healing.

## 2022-07-26 ENCOUNTER — OFFICE VISIT (OUTPATIENT)
Dept: WOUND CARE | Facility: CLINIC | Age: 67
End: 2022-07-26
Attending: FAMILY MEDICINE
Payer: MEDICARE

## 2022-07-26 VITALS
HEART RATE: 90 BPM | SYSTOLIC BLOOD PRESSURE: 144 MMHG | TEMPERATURE: 98 F | DIASTOLIC BLOOD PRESSURE: 80 MMHG | RESPIRATION RATE: 18 BRPM

## 2022-07-26 DIAGNOSIS — S71.102S OPEN WOUND OF LEFT THIGH, SEQUELA: Primary | ICD-10-CM

## 2022-07-26 PROCEDURE — 99213 PR OFFICE/OUTPT VISIT, EST, LEVL III, 20-29 MIN: ICD-10-PCS | Mod: S$PBB,,, | Performed by: NURSE PRACTITIONER

## 2022-07-26 PROCEDURE — 3052F PR MOST RECENT HEMOGLOBIN A1C LEVEL 8.0 - < 9.0%: ICD-10-PCS | Mod: CPTII,,, | Performed by: NURSE PRACTITIONER

## 2022-07-26 PROCEDURE — 3066F PR DOCUMENTATION OF TREATMENT FOR NEPHROPATHY: ICD-10-PCS | Mod: CPTII,,, | Performed by: NURSE PRACTITIONER

## 2022-07-26 PROCEDURE — 3079F PR MOST RECENT DIASTOLIC BLOOD PRESSURE 80-89 MM HG: ICD-10-PCS | Mod: CPTII,,, | Performed by: NURSE PRACTITIONER

## 2022-07-26 PROCEDURE — 3079F DIAST BP 80-89 MM HG: CPT | Mod: CPTII,,, | Performed by: NURSE PRACTITIONER

## 2022-07-26 PROCEDURE — 1160F RVW MEDS BY RX/DR IN RCRD: CPT | Mod: CPTII,,, | Performed by: NURSE PRACTITIONER

## 2022-07-26 PROCEDURE — 3061F PR NEG MICROALBUMINURIA RESULT DOCUMENTED/REVIEW: ICD-10-PCS | Mod: CPTII,,, | Performed by: NURSE PRACTITIONER

## 2022-07-26 PROCEDURE — 3077F SYST BP >= 140 MM HG: CPT | Mod: CPTII,,, | Performed by: NURSE PRACTITIONER

## 2022-07-26 PROCEDURE — 3066F NEPHROPATHY DOC TX: CPT | Mod: CPTII,,, | Performed by: NURSE PRACTITIONER

## 2022-07-26 PROCEDURE — 1159F MED LIST DOCD IN RCRD: CPT | Mod: CPTII,,, | Performed by: NURSE PRACTITIONER

## 2022-07-26 PROCEDURE — 99213 OFFICE O/P EST LOW 20 MIN: CPT | Mod: S$PBB,,, | Performed by: NURSE PRACTITIONER

## 2022-07-26 PROCEDURE — 99215 OFFICE O/P EST HI 40 MIN: CPT | Mod: PBBFAC | Performed by: NURSE PRACTITIONER

## 2022-07-26 PROCEDURE — 1159F PR MEDICATION LIST DOCUMENTED IN MEDICAL RECORD: ICD-10-PCS | Mod: CPTII,,, | Performed by: NURSE PRACTITIONER

## 2022-07-26 PROCEDURE — 3061F NEG MICROALBUMINURIA REV: CPT | Mod: CPTII,,, | Performed by: NURSE PRACTITIONER

## 2022-07-26 PROCEDURE — 3052F HG A1C>EQUAL 8.0%<EQUAL 9.0%: CPT | Mod: CPTII,,, | Performed by: NURSE PRACTITIONER

## 2022-07-26 PROCEDURE — 1101F PT FALLS ASSESS-DOCD LE1/YR: CPT | Mod: CPTII,,, | Performed by: NURSE PRACTITIONER

## 2022-07-26 PROCEDURE — 1101F PR PT FALLS ASSESS DOC 0-1 FALLS W/OUT INJ PAST YR: ICD-10-PCS | Mod: CPTII,,, | Performed by: NURSE PRACTITIONER

## 2022-07-26 PROCEDURE — 3288F PR FALLS RISK ASSESSMENT DOCUMENTED: ICD-10-PCS | Mod: CPTII,,, | Performed by: NURSE PRACTITIONER

## 2022-07-26 PROCEDURE — 3077F PR MOST RECENT SYSTOLIC BLOOD PRESSURE >= 140 MM HG: ICD-10-PCS | Mod: CPTII,,, | Performed by: NURSE PRACTITIONER

## 2022-07-26 PROCEDURE — 3288F FALL RISK ASSESSMENT DOCD: CPT | Mod: CPTII,,, | Performed by: NURSE PRACTITIONER

## 2022-07-26 PROCEDURE — 1160F PR REVIEW ALL MEDS BY PRESCRIBER/CLIN PHARMACIST DOCUMENTED: ICD-10-PCS | Mod: CPTII,,, | Performed by: NURSE PRACTITIONER

## 2022-07-26 RX ORDER — SILVER SULFADIAZINE 10 G/1000G
CREAM TOPICAL DAILY
Qty: 50 G | Refills: 2 | Status: SHIPPED | OUTPATIENT
Start: 2022-07-26 | End: 2023-05-08 | Stop reason: SDUPTHER

## 2022-07-27 RX ORDER — POTASSIUM CHLORIDE 750 MG/1
CAPSULE, EXTENDED RELEASE ORAL
Qty: 180 CAPSULE | Refills: 0 | OUTPATIENT
Start: 2022-07-27

## 2022-07-28 ENCOUNTER — PATIENT OUTREACH (OUTPATIENT)
Dept: PRIMARY CARE CLINIC | Facility: CLINIC | Age: 67
End: 2022-07-28
Payer: MEDICARE

## 2022-07-28 LAB — CRC RECOMMENDATION EXT: NORMAL

## 2022-08-01 ENCOUNTER — OFFICE VISIT (OUTPATIENT)
Dept: PRIMARY CARE CLINIC | Facility: CLINIC | Age: 67
End: 2022-08-01
Payer: MEDICARE

## 2022-08-01 VITALS
DIASTOLIC BLOOD PRESSURE: 60 MMHG | WEIGHT: 179 LBS | RESPIRATION RATE: 20 BRPM | SYSTOLIC BLOOD PRESSURE: 110 MMHG | HEART RATE: 50 BPM | TEMPERATURE: 98 F | BODY MASS INDEX: 30.56 KG/M2 | HEIGHT: 64 IN | OXYGEN SATURATION: 97 %

## 2022-08-01 DIAGNOSIS — I10 ESSENTIAL HYPERTENSION: ICD-10-CM

## 2022-08-01 DIAGNOSIS — M85.89 OTHER SPECIFIED DISORDERS OF BONE DENSITY AND STRUCTURE, MULTIPLE SITES: ICD-10-CM

## 2022-08-01 DIAGNOSIS — M47.816 SPONDYLOSIS WITHOUT MYELOPATHY OR RADICULOPATHY, LUMBAR REGION: ICD-10-CM

## 2022-08-01 DIAGNOSIS — Z00.00 ENCOUNTER FOR PREVENTIVE HEALTH EXAMINATION: ICD-10-CM

## 2022-08-01 DIAGNOSIS — Z00.00 ENCOUNTER FOR SUBSEQUENT ANNUAL WELLNESS VISIT (AWV) IN MEDICARE PATIENT: Primary | ICD-10-CM

## 2022-08-01 DIAGNOSIS — Z23 ENCOUNTER FOR IMMUNIZATION: ICD-10-CM

## 2022-08-01 DIAGNOSIS — M89.49 OSTEOARTHROSIS MULTIPLE SITES, NOT SPECIFIED AS GENERALIZED: ICD-10-CM

## 2022-08-01 DIAGNOSIS — Z12.31 SCREENING MAMMOGRAM, ENCOUNTER FOR: ICD-10-CM

## 2022-08-01 DIAGNOSIS — E11.9 TYPE 2 DIABETES MELLITUS WITHOUT COMPLICATION, WITHOUT LONG-TERM CURRENT USE OF INSULIN: ICD-10-CM

## 2022-08-01 PROCEDURE — 3061F NEG MICROALBUMINURIA REV: CPT | Mod: ,,, | Performed by: NURSE PRACTITIONER

## 2022-08-01 PROCEDURE — 1160F PR REVIEW ALL MEDS BY PRESCRIBER/CLIN PHARMACIST DOCUMENTED: ICD-10-PCS | Mod: ,,, | Performed by: NURSE PRACTITIONER

## 2022-08-01 PROCEDURE — 3288F PR FALLS RISK ASSESSMENT DOCUMENTED: ICD-10-PCS | Mod: ,,, | Performed by: NURSE PRACTITIONER

## 2022-08-01 PROCEDURE — 1101F PR PT FALLS ASSESS DOC 0-1 FALLS W/OUT INJ PAST YR: ICD-10-PCS | Mod: ,,, | Performed by: NURSE PRACTITIONER

## 2022-08-01 PROCEDURE — 3008F BODY MASS INDEX DOCD: CPT | Mod: ,,, | Performed by: NURSE PRACTITIONER

## 2022-08-01 PROCEDURE — 1159F MED LIST DOCD IN RCRD: CPT | Mod: ,,, | Performed by: NURSE PRACTITIONER

## 2022-08-01 PROCEDURE — 3052F PR MOST RECENT HEMOGLOBIN A1C LEVEL 8.0 - < 9.0%: ICD-10-PCS | Mod: ,,, | Performed by: NURSE PRACTITIONER

## 2022-08-01 PROCEDURE — 1160F RVW MEDS BY RX/DR IN RCRD: CPT | Mod: ,,, | Performed by: NURSE PRACTITIONER

## 2022-08-01 PROCEDURE — 3074F PR MOST RECENT SYSTOLIC BLOOD PRESSURE < 130 MM HG: ICD-10-PCS | Mod: ,,, | Performed by: NURSE PRACTITIONER

## 2022-08-01 PROCEDURE — G0439 PR MEDICARE ANNUAL WELLNESS SUBSEQUENT VISIT: ICD-10-PCS | Mod: ,,, | Performed by: NURSE PRACTITIONER

## 2022-08-01 PROCEDURE — 3061F PR NEG MICROALBUMINURIA RESULT DOCUMENTED/REVIEW: ICD-10-PCS | Mod: ,,, | Performed by: NURSE PRACTITIONER

## 2022-08-01 PROCEDURE — 3078F PR MOST RECENT DIASTOLIC BLOOD PRESSURE < 80 MM HG: ICD-10-PCS | Mod: ,,, | Performed by: NURSE PRACTITIONER

## 2022-08-01 PROCEDURE — 3078F DIAST BP <80 MM HG: CPT | Mod: ,,, | Performed by: NURSE PRACTITIONER

## 2022-08-01 PROCEDURE — 3288F FALL RISK ASSESSMENT DOCD: CPT | Mod: ,,, | Performed by: NURSE PRACTITIONER

## 2022-08-01 PROCEDURE — 3066F NEPHROPATHY DOC TX: CPT | Mod: ,,, | Performed by: NURSE PRACTITIONER

## 2022-08-01 PROCEDURE — 1126F PR PAIN SEVERITY QUANTIFIED, NO PAIN PRESENT: ICD-10-PCS | Mod: ,,, | Performed by: NURSE PRACTITIONER

## 2022-08-01 PROCEDURE — 1101F PT FALLS ASSESS-DOCD LE1/YR: CPT | Mod: ,,, | Performed by: NURSE PRACTITIONER

## 2022-08-01 PROCEDURE — G0439 PPPS, SUBSEQ VISIT: HCPCS | Mod: ,,, | Performed by: NURSE PRACTITIONER

## 2022-08-01 PROCEDURE — 1126F AMNT PAIN NOTED NONE PRSNT: CPT | Mod: ,,, | Performed by: NURSE PRACTITIONER

## 2022-08-01 PROCEDURE — 1159F PR MEDICATION LIST DOCUMENTED IN MEDICAL RECORD: ICD-10-PCS | Mod: ,,, | Performed by: NURSE PRACTITIONER

## 2022-08-01 PROCEDURE — 3074F SYST BP LT 130 MM HG: CPT | Mod: ,,, | Performed by: NURSE PRACTITIONER

## 2022-08-01 PROCEDURE — 3052F HG A1C>EQUAL 8.0%<EQUAL 9.0%: CPT | Mod: ,,, | Performed by: NURSE PRACTITIONER

## 2022-08-01 PROCEDURE — 3066F PR DOCUMENTATION OF TREATMENT FOR NEPHROPATHY: ICD-10-PCS | Mod: ,,, | Performed by: NURSE PRACTITIONER

## 2022-08-01 PROCEDURE — 3008F PR BODY MASS INDEX (BMI) DOCUMENTED: ICD-10-PCS | Mod: ,,, | Performed by: NURSE PRACTITIONER

## 2022-08-01 RX ORDER — ZOSTER VACCINE RECOMBINANT, ADJUVANTED 50 MCG/0.5
0.5 KIT INTRAMUSCULAR ONCE
Qty: 1 EACH | Refills: 1 | Status: SHIPPED | OUTPATIENT
Start: 2022-08-01 | End: 2022-08-01

## 2022-08-01 NOTE — PATIENT INSTRUCTIONS
Counseling and Referral of Other Preventative  (Italic type indicates deductible and co-insurance are waived)    Patient Name: Selena Proctor  Today's Date: 8/1/2022    Health Maintenance       Date Due Completion Date    Foot Exam Never done ---    TETANUS VACCINE Never done ---    Sign Pain Contract Never done ---    DEXA Scan Never done ---    Shingles Vaccine (1 of 2) Never done ---    COVID-19 Vaccine (4 - Booster for Moderna series) 02/28/2022 10/29/2021    Eye Exam 06/21/2022 6/21/2021    Mammogram 08/11/2022 8/11/2021    Influenza Vaccine (1) 09/01/2022 2/2/2022    Hemoglobin A1c 09/23/2022 6/23/2022    Lipid Panel 02/07/2023 2/7/2022    Override on 2/24/2021: Done    Diabetes Urine Screening 06/23/2023 6/23/2022    Low Dose Statin 08/01/2023 8/1/2022    Colorectal Cancer Screening 05/13/2029 5/13/2019 (Done)    Override on 5/13/2019: Done        No orders of the defined types were placed in this encounter.    The following information is provided to all patients.  This information is to help you find resources for any of the problems found today that may be affecting your health:                Living healthy guide: www.[facility].[place].gov      Understanding Diabetes: www.diabetes.org      Eating healthy: www.cdc.gov/healthyweight      CDC home safety checklist: www.cdc.gov/steadi/patient.html      Agency on Aging: www.goea.[place].gov      Alcoholics anonymous (AA): www.aa.org      Physical Activity: www.ammon.nih.gov/jq9kroj      Tobacco use: www.quitwithusla.org

## 2022-08-01 NOTE — PROGRESS NOTES
[facility]       PATIENT NAME: Selena Proctor   : 1955    AGE: 66 y.o. DATE: 2022   MRN: [id number]        Reason for Visit / Chief Complaint: Medicare AWV Follow Up ([employer] Subsequent)        Selena Proctor presents for an [employer]  AWV today.     The following components were reviewed and updated:    Medical/Social/Family History:  Past Medical History:   Diagnosis Date    Acid reflux     Coronary arteriosclerosis     Diabetes     Diabetes mellitus, type 2     Hypertension     Spondylosis without myelopathy or radiculopathy, lumbar region         Family History   Problem Relation Age of Onset    Hypertension Mother     Heart disease Mother     Diabetes Sister     Hypertension Sister     Hypertension Brother     Alcohol abuse Father     Cancer Father     Hypertension Son     Mental illness Maternal Aunt         Social History     Tobacco Use   Smoking Status Never Smoker   Smokeless Tobacco Never Used      Social History     Substance and Sexual Activity   Alcohol Use Not Currently       Family History   Problem Relation Age of Onset    Hypertension Mother     Heart disease Mother     Diabetes Sister     Hypertension Sister     Hypertension Brother     Alcohol abuse Father     Cancer Father     Hypertension Son     Mental illness Maternal Aunt        Past Surgical History:   Procedure Laterality Date    APPENDECTOMY      BLOCK, NERVE, OBTURATOR Right 2019    Right Femoral/Obturator Nerve Block  Dr Headley     SECTION      x2    CORONARY ARTERY BYPASS GRAFT      KNEE ARTHROPLASTY Right     KNEE ARTHROSCOPY Left     SKIN GRAFT      Left Thigh    TOTAL HIP ARTHROPLASTY Bilateral     TUBAL LIGATION           · Allergies and Current Medications     Review of patient's allergies indicates:   Allergen Reactions    Betadine [povidone-iodine] Itching       Current Outpatient Medications:     ACCU-CHEK DONNA PLUS TEST STRP Strp,  CHECK BLOOD SUGAR ONE TIME DAILY, Disp: 100 strip, Rfl: 1    ACCU-CHEK SOFT DEV LANCETS Kit, USE AS DIRECTED, Disp: 100 each, Rfl: 1    ACCU-CHEK SOFTCLIX LANCETS Misc, CHECK BLOOD SUGAR ONE TIME DAILY, Disp: 100 each, Rfl: 1    alcohol swabs (BD ALCOHOL SWABS) PadM, USE AS DIRECTED, Disp: 90 each, Rfl: 2    aspirin (ECOTRIN) 81 MG EC tablet, Take 81 mg by mouth once daily. , Disp: , Rfl:     blood glucose control, low (TRUE METRIX LEVEL 1) Soln, USE AS DIRECTED WITH GLUCOSE METER, Disp: 1 each, Rfl: 0    empagliflozin (JARDIANCE) 10 mg tablet, Take 1 tablet (10 mg total) by mouth once daily., Disp: 90 tablet, Rfl: 1    ezetimibe (ZETIA) 10 mg tablet, Take 1 tablet (10 mg total) by mouth once daily., Disp: 90 tablet, Rfl: 2    furosemide (LASIX) 40 MG tablet, TAKE 1 TABLET EVERY DAY AS NEEDED, Disp: 90 tablet, Rfl: 0    HYDROcodone-acetaminophen (NORCO)  mg per tablet, Take 1 tablet by mouth every 8 (eight) hours., Disp: 90 tablet, Rfl: 0    [START ON 8/8/2022] HYDROcodone-acetaminophen (NORCO)  mg per tablet, Take 1 tablet by mouth every 8 (eight) hours., Disp: 90 tablet, Rfl: 0    losartan-hydrochlorothiazide 100-25 mg (HYZAAR) 100-25 mg per tablet, Take 1 tablet by mouth once daily., Disp: 90 tablet, Rfl: 2    metFORMIN (GLUCOPHAGE) 500 MG tablet, Take 1 tablet (500 mg total) by mouth 2 (two) times daily with meals., Disp: 180 tablet, Rfl: 3    metoprolol succinate (TOPROL-XL) 100 MG 24 hr tablet, Take 1 tablet (100 mg total) by mouth once daily., Disp: 90 tablet, Rfl: 2    miconazole (MONISTAT 7) 2 % vaginal cream, Apply cream to external vaginal area for vaginal itching., Disp: 1 kit, Rfl: 0    montelukast (SINGULAIR) 10 mg tablet, TAKE 1 TABLET EVERY DAY AS NEEDED, Disp: 90 tablet, Rfl: 2    NIFEdipine (PROCARDIA-XL) 90 MG (OSM) 24 hr tablet, TAKE 1 TABLET EVERY DAY, Disp: 90 tablet, Rfl: 1    omeprazole (PRILOSEC) 40 MG capsule, TAKE 1 CAPSULE EVERY DAY, Disp: 90 capsule, Rfl:  0    pitavastatin calcium (LIVALO) 4 mg Tab, Take one tablet by mouth three times per week on Monday, Wednesday, Friday., Disp: 90 tablet, Rfl: 2    potassium chloride 10% (KAYCIEL) 20 mEq/15 mL oral solution, MIX 15ML (1 TABLESPOONFUL) IN 4 OUNCES OF LIQUID AND DRINK TWICE DAILY, Disp: 2520 mL, Rfl: 1    silver sulfADIAZINE 1% (SILVADENE) 1 % cream, Apply topically once daily., Disp: 50 g, Rfl: 2    spironolactone (ALDACTONE) 25 MG tablet, TAKE 1 TABLET EVERY DAY, Disp: 90 tablet, Rfl: 0    TRUE METRIX GLUCOSE METER kit, USE AS DIRECTED, Disp: 180 each, Rfl: 2    TRUEPLUS LANCETS 33 gauge Misc, USE AS DIRECTED, Disp: 200 each, Rfl: 0    chlorhexidine (HIBICLENS) 4 % external liquid, Apply topically daily as needed (thigh wound). (Patient not taking: Reported on 8/1/2022), Disp: 120 mL, Rfl: 0    diphth,pertus,acell,,tetanus (BOOSTRIX) 2.5-8-5 Lf-mcg-Lf/0.5mL Susp, Inject 0.5 mLs into the muscle once. for 1 dose, Disp: 0.5 mL, Rfl: 0    varicella-zoster gE-AS01B, PF, (SHINGRIX, PF,) 50 mcg/0.5 mL injection, Inject 0.5 mLs into the muscle once. for 1 dose, Disp: 1 each, Rfl: 1    · Health Risk Assessment   Fall Risk: yes   Obesity: BMI 30.73        I offered to discuss advanced care planning, including how to pick a person who would make decisions for you if you were unable to make them for yourself, called a health care power of  , and what kind of decisions you might make such as use of life sustaining treatments such as ventilators and tube feeding when faced with a life limiting illness recorded on a  living will that they will need to know. (How you want to be cared for as you near the end of your natural life)       Patient is interested in learning more about how to make advanced directives.  I provided them paperwork and offered to discuss this with them.workman Directive: no- Does not have an  advanced directive. Verbal education and written education included in today's AVS.      Depression:  PHQ-9-0    HTN: yes-110/60   T2DM: yes   Tobacco use:n/a  STI: n/a    Alcohol misuse: n/a   Statin Use: yes      · Health Risk Assessment  What is your age?: 65-69  Are you male or female?: Female  During the past four weeks, how much have you been bothered by emotional problems such as feeling anxious, depressed, irritable, sad, or downhearted and blue?: Not at all  During the past five weeks, has your physical and/or emotional health limited your social activities with family, friends, neighbors, or groups?: Not at all  During the past four weeks, how much bodily pain have you generally had?: Severe pain (hip and thigh)  During the past four weeks, was someone available to help if you needed and wanted help?: Yes, as much as I wanted  During the past four weeks, what was the hardest physical activity you could do for at least two minutes?: Moderate  Can you get to places out of walking distance without help?  (For example, can you travel alone on buses or taxis, or drive your own car?): Yes  Can you go shopping for groceries or clothes without someone's help?: Yes  Can you prepare your own meals?: Yes  Can you do your own housework without help?: Yes  Because of any health problems, do you need the help of another person with your personal care needs such as eating, bathing, dressing, or getting around the house?: No  Can you handle your own money without help?: Yes  During the past four weeks, how would you rate your health in general?: Fair  How have things been going for you during the past four weeks?: Very well  Are you having difficulties driving your car?: No  Do you always fasten your seat belt when you are in a car?: Yes, usually  How often in the past four weeks have you been bothered by falling or dizzy when standing up?: Never  How often in the past four weeks have you been bothered by sexual problems?: Never  How often in the past four weeks have you been bothered by trouble eating well?: Never  How  often in the past four weeks have you been bothered by teeth or denture problems?: Never  How often in the past four weeks have you been bothered with problems using the telephone?: Never  How often in the past four weeks have you been bothered by tiredness or fatigue?: Never  Have you fallen two or more times in the past year?: No  Are you afraid of falling?: No  Are you a smoker?: No  During the past four weeks, how many drinks of wine, beer, or other alcoholic beverages did you have?: One drink or less per week  Do you exercise for about 20 minutes three or more days a week?: Yes, some of the time  Have you been given any information to help you with hazards in your house that might hurt you?: No  Have you been given any information to help you with keeping track of your medications?: No  How often do you have trouble taking medicines the way you've been told to take them?: I always take them as prescribed  How confident are you that you can control and manage most of your health problems?: Very confident  What is your race? (Check all that apply.):     · Health Maintenance   Last eye exam: 06/21/2021-ordered   Last CV screen with lipids: 02/07/2022   Diabetes screening with fasting glucose or A1c: 06/23/2022-8.3   Colonoscopy: 05/19/2019-repeat in 5 years   Flu Vaccine: 02/02/2022   Pneumonia vaccines: 06/23/2022   COVID vaccine: 3 doses   Hep B vaccine: n/a   DEXA: ordered   Last pap/pelvic: n/a   Last Mammogram: 08/11/2021-ordered   Last PSA screen: n/a   AAA screening: n/a (once in lifetime for males 65-75 who have smoked > 100 cigarettes in lifetime)  HIV Screeing: n/a  Hepatitis C Screen: 06/23/2022  Low Dose CT Scan: n/a    Health Maintenance Topics with due status: Not Due       Topic Last Completion Date    Colorectal Cancer Screening 05/13/2019    Influenza Vaccine 02/02/2022    Lipid Panel 02/07/2022    Diabetes Urine Screening 06/23/2022    Hemoglobin A1c 06/23/2022    Low Dose Statin  08/01/2022     Health Maintenance Due   Topic Date Due    Foot Exam  Never done    TETANUS VACCINE  Never done    Sign Pain Contract  Never done    DEXA Scan  Never done    Shingles Vaccine (1 of 2) Never done    COVID-19 Vaccine (4 - Booster for Moderna series) 02/28/2022    Eye Exam  06/21/2022    Mammogram  08/11/2022        Incontinence  Bowel: no  Bladder: no    Lab results available in Epic or see dates from [facility] above:   Lab Results   Component Value Date    CHOL 176 02/07/2022    CHOL 151 08/04/2021     Lab Results   Component Value Date    HDL 44 02/07/2022    HDL 37 (L) 08/04/2021     Lab Results   Component Value Date    LDLCALC 100 02/07/2022    LDLCALC 69 08/04/2021     Lab Results   Component Value Date    TRIG 162 (H) 02/07/2022    TRIG 226 (H) 08/04/2021     Lab Results   Component Value Date    CHOLHDL 4.0 02/07/2022    CHOLHDL 4.1 08/04/2021       Lab Results   Component Value Date    HGBA1C 8.3 (H) 06/23/2022       Sodium   Date Value Ref Range Status   06/23/2022 139 136 - 145 mmol/L Final     Potassium   Date Value Ref Range Status   06/23/2022 3.7 3.5 - 5.1 mmol/L Final     Chloride   Date Value Ref Range Status   06/23/2022 104 98 - 107 mmol/L Final     CO2   Date Value Ref Range Status   06/23/2022 28 21 - 32 mmol/L Final     Glucose   Date Value Ref Range Status   06/23/2022 139 (H) 74 - 106 mg/dL Final     BUN   Date Value Ref Range Status   06/23/2022 16 7 - 18 mg/dL Final     Creatinine   Date Value Ref Range Status   06/23/2022 0.90 0.55 - 1.02 mg/dL Final     Calcium   Date Value Ref Range Status   06/23/2022 9.3 8.5 - 10.1 mg/dL Final     Total Protein   Date Value Ref Range Status   06/23/2022 7.6 6.4 - 8.2 g/dL Final     Albumin   Date Value Ref Range Status   06/23/2022 3.6 3.5 - 5.0 g/dL Final     Bilirubin, Total   Date Value Ref Range Status   06/23/2022 0.5 0.0 - 1.2 mg/dL Final     Alk Phos   Date Value Ref Range Status   06/23/2022 55 55 - 142 U/L Final     AST   Date  "Value Ref Range Status   06/23/2022 10 (L) 15 - 37 U/L Final     ALT   Date Value Ref Range Status   06/23/2022 15 13 - 56 U/L Final     Anion Gap   Date Value Ref Range Status   06/23/2022 11 7 - 16 mmol/L Final     eGFR    Date Value Ref Range Status   08/04/2021 70 >=60 mL/min/1.73m² Final     eGFR   Date Value Ref Range Status   06/23/2022 66 >=60 mL/min/1.73m² Final                 · Care Team   PCP: Lizzette Meadows    Eye specialist: Andrew Dai  Pain specialist Renny Michelle   Cardiologist: Дмитрий Parnell        **See Completed Assessments for Annual Wellness visit within the encounter summary    The following assessments were completed & reviewed:  · Depression Screening  · Cognitive function Screening  · Timed Get Up Test  · Whisper Test  · Vision Screen  · Health Risk Assessment  · Checklist of ADLs and IADLs      Objective  Vitals:    08/01/22 1001   BP: 110/60   Pulse: (!) 50   Resp: 20   Temp: 98.2 °F (36.8 °C)   TempSrc: Oral   SpO2: 97%   Weight: 81.2 kg (179 lb)   Height: 5' 4" (1.626 m)   PainSc: 0-No pain      Body mass index is 30.73 kg/m².  Ideal body weight: 54.7 kg (120 lb 9.5 oz)       Physical Exam  Vitals and nursing note reviewed.   Constitutional:       Appearance: Normal appearance.   HENT:      Head: Normocephalic.      Mouth/Throat:      Mouth: Mucous membranes are moist.   Eyes:      Pupils: Pupils are equal, round, and reactive to light.   Cardiovascular:      Rate and Rhythm: Regular rhythm. Bradycardia present.      Heart sounds: Normal heart sounds.   Pulmonary:      Effort: Pulmonary effort is normal. No respiratory distress.      Breath sounds: Normal breath sounds. No wheezing or rhonchi.   Abdominal:      General: Bowel sounds are normal.      Palpations: Abdomen is soft.   Musculoskeletal:         General: Normal range of motion.      Cervical back: Normal range of motion.   Skin:     General: Skin is warm and dry.   Neurological:      General: No focal deficit " present.      Mental Status: She is alert and oriented to person, place, and time.      Gait: Gait normal.   Psychiatric:         Mood and Affect: Mood normal.         Behavior: Behavior normal.           Assessment:     1. Encounter for subsequent annual wellness visit (AWV) in Medicare patient    2. Essential hypertension    3. Type 2 diabetes mellitus without complication, without long-term current use of insulin  - Ambulatory referral/consult to Ophthalmology; Future    4. Osteoarthrosis multiple sites, not specified as generalized    5. Encounter for immunization  - varicella-zoster gE-AS01B, PF, (SHINGRIX, PF,) 50 mcg/0.5 mL injection; Inject 0.5 mLs into the muscle once. for 1 dose  Dispense: 1 each; Refill: 1  - diphth,pertus,acell,,tetanus (BOOSTRIX) 2.5-8-5 Lf-mcg-Lf/0.5mL Susp; Inject 0.5 mLs into the muscle once. for 1 dose  Dispense: 0.5 mL; Refill: 0    6. Other specified disorders of bone density and structure, multiple sites  - DXA Bone Density Spine And Hip; Future  - DXA Bone Density Spine And Hip    7. Screening mammogram, encounter for  - Mammo Digital Screening Bilat; Future  - Mammo Digital Screening Bilat    8. BMI 30.0-30.9,adult    9. Encounter for preventive health examination    10. Spondylosis without myelopathy or radiculopathy, lumbar region         Plan:    Referrals:   Mammogram  Eye exam   Bone density    Advised to call office if does not hear from anyone with referral appt within 2-3 weeks to check on status of referral. Voiced understanding.    Advised patient to continue medications as prescribed, to monitor glucose as directed ,exercise as directed, continue heart health diabetic diet, educational material provided and discusse with patient.      Discussed and provided with a screening schedule and personal prevention plan in accordance with USPSTF age appropriate recommendations and Medicare screening guidelines.   Education, counseling, and referrals were provided as needed.   After Visit Summary printed and given to patient which includes written education and a list of any referrals if indicated.     F/u plan for yearly AWV.    Signature: Lizzette Meadows DNP, BANDARP-C

## 2022-08-08 NOTE — PATIENT INSTRUCTIONS
Wash with baby shampoo and water or vashe.     Apply Polymem to wound bed     Cover with border gauze. Secure with paper tape if needed     Change daily      Monitor closely for s/s of infection including fever, chills, increase in pain, odor from wound, and increased redness from foot. Go to ER if any complications develop.   Keep leg elevated and avoid pressure on wound.      Diabetes:  Monitor glucose closely. Check fasting glucose and 2 hours after meals. HgA1C goal <7, fasting glucose , and 2 hours after meals <180  Hypertension:  Check blood pressure twice daily, goal <120/80  Diet:   Increase protein intake, avoid fried, fatty foods and foods high in simple carbs.   Vitamins:  Take vitamin C 1000 mg, zinc 50mg, vitamin d 5000 units, and a daily multivitamin. Anant is a good source of protein and nutrients to aid in wound healing.

## 2022-08-08 NOTE — PROGRESS NOTES
MORA Shen   [facility]  [address] [facility] [place] [address]  [phone]      PATIENT NAME: Selena Proctor  : 1955  DATE: 22  MRN: [id number]      Billing Provider: MORA Shen  Level of Service:   Patient PCP Information     Provider PCP Type    Lizzette Meadows DNP, FNP-C General          Reason for Visit / Chief Complaint: Open wound of left thigh, sequela       History of Present Illness / Problem Focused Workflow     Selena Proctor is a 67 y.o female presents to clinic for follow up on wound to left inner thigh. Compliant with current POC including silvadine. Wound remains tender to palpation.  Discussed using polymem silver to wic away drainage and improve wound healing. Patient is going to use polymem for next two weeks and d/c silvadene.  Pertinent PMH includes DM, HTN, CAD with CABG x 10 years ago.  Denies fever or chills.       Review of Systems     Review of Systems   Constitutional: Positive for activity change. Negative for chills and fever.   HENT: Negative.    Eyes: Negative.    Respiratory: Negative.  Negative for apnea and chest tightness.    Cardiovascular: Negative.  Negative for chest pain and leg swelling.   Gastrointestinal: Negative.    Endocrine: Negative.    Genitourinary: Negative.    Musculoskeletal: Positive for arthralgias and myalgias.   Skin: Positive for color change and wound.   Allergic/Immunologic: Negative.    Neurological: Negative.    Hematological: Negative.    Psychiatric/Behavioral: Negative for behavioral problems.       Medical / Social / Family History     Past Medical History:   Diagnosis Date    Acid reflux     Coronary arteriosclerosis     Diabetes     Diabetes mellitus, type 2     Hypertension     Spondylosis without myelopathy or radiculopathy, lumbar region        Past Surgical History:   Procedure Laterality Date    APPENDECTOMY      BLOCK, NERVE, OBTURATOR Right 2019     Right Femoral/Obturator Nerve Block  Dr Headley     SECTION      x2    CORONARY ARTERY BYPASS GRAFT      KNEE ARTHROPLASTY Right     KNEE ARTHROSCOPY Left     SKIN GRAFT      Left Thigh    TOTAL HIP ARTHROPLASTY Bilateral     TUBAL LIGATION         Social History  Ms. Selena Proctor  reports that she has never smoked. She has never used smokeless tobacco. She reports previous alcohol use. She reports current drug use. Drug: Hydrocodone.    Family History  Ms.'patricia Proctor family history includes Alcohol abuse in her father; Cancer in her father; Diabetes in her sister; Heart disease in her mother; Hypertension in her brother, mother, sister, and son; Mental illness in her maternal aunt.    Medications and Allergies     Medications  Outpatient Medications Marked as Taking for the 22 encounter (Office Visit) with MORA Shen   Medication Sig Dispense Refill    aspirin (ECOTRIN) 81 MG EC tablet Take 81 mg by mouth once daily.       blood glucose control, low (TRUE METRIX LEVEL 1) Soln USE AS DIRECTED WITH GLUCOSE METER 1 each 0    ezetimibe (ZETIA) 10 mg tablet Take 1 tablet (10 mg total) by mouth once daily. 90 tablet 2    furosemide (LASIX) 40 MG tablet TAKE 1 TABLET EVERY DAY AS NEEDED 90 tablet 0    HYDROcodone-acetaminophen (NORCO)  mg per tablet Take 1 tablet by mouth every 8 (eight) hours. 90 tablet 0    JARDIANCE 10 mg tablet TAKE 1 TABLET EVERY DAY 90 tablet 1    losartan-hydrochlorothiazide 100-25 mg (HYZAAR) 100-25 mg per tablet Take 1 tablet by mouth once daily. 90 tablet 2    metFORMIN (GLUCOPHAGE) 500 MG tablet Take 1 tablet (500 mg total) by mouth 2 (two) times daily with meals. 180 tablet 3    metoprolol succinate (TOPROL-XL) 100 MG 24 hr tablet Take 1 tablet (100 mg total) by mouth once daily. 90 tablet 2    miconazole (MONISTAT 7) 2 % vaginal cream Apply cream to external vaginal area for vaginal itching. 1 kit 0    montelukast (SINGULAIR) 10 mg tablet  TAKE 1 TABLET EVERY DAY AS NEEDED 90 tablet 2    NIFEdipine (PROCARDIA-XL) 90 MG (OSM) 24 hr tablet TAKE 1 TABLET EVERY DAY 90 tablet 1    omeprazole (PRILOSEC) 40 MG capsule TAKE 1 CAPSULE EVERY DAY 90 capsule 0    pitavastatin calcium (LIVALO) 4 mg Tab Take one tablet by mouth three times per week on Monday, Wednesday, Friday. 90 tablet 2    potassium chloride 10% (KAYCIEL) 20 mEq/15 mL oral solution MIX 15ML (1 TABLESPOONFUL) IN 4 OUNCES OF LIQUID AND DRINK TWICE DAILY 2520 mL 1    silver sulfADIAZINE 1% (SILVADENE) 1 % cream Apply topically once daily. 50 g 2    spironolactone (ALDACTONE) 25 MG tablet TAKE 1 TABLET EVERY DAY 90 tablet 0    TRUE METRIX GLUCOSE METER kit USE AS DIRECTED 180 each 2    TRUEPLUS LANCETS 33 gauge Misc USE AS DIRECTED 200 each 0       Allergies  Review of patient's allergies indicates:   Allergen Reactions    Betadine [povidone-iodine] Itching       Physical Examination     Vitals:    08/09/22 0838   BP: (!) 164/83   Pulse: (!) 52   Resp: 18   Temp: 98.4 °F (36.9 °C)     Physical Exam  Vitals and nursing note reviewed.   Constitutional:       Appearance: Normal appearance.   HENT:      Head: Normocephalic.   Cardiovascular:      Rate and Rhythm: Normal rate and regular rhythm.      Pulses: Normal pulses.      Heart sounds: Normal heart sounds.   Pulmonary:      Effort: Pulmonary effort is normal. No respiratory distress.   Chest:      Chest wall: No tenderness.   Musculoskeletal:         General: Swelling and tenderness present.   Skin:     General: Skin is warm and dry.      Findings: Erythema present.      Comments: See LDA for measurements and picture   Neurological:      Mental Status: She is alert and oriented to person, place, and time. Mental status is at baseline.   Psychiatric:         Mood and Affect: Mood normal.         Behavior: Behavior normal.         Thought Content: Thought content normal.         Judgment: Judgment normal.         Assessment and Plan      1.  Open wound of left thigh, sequela           Altered Skin Integrity 06/27/22 0929 Left anterior Thigh #1 (Active)   06/27/22 0929   Altered Skin Integrity Present on Admission:    Side: Left   Orientation: anterior   Location: Thigh   Wound Number: #1   Is this injury device related?:    Primary Wound Type:    Description of Altered Skin Integrity:    Removal Indication and Assessment:    Wound Outcome:    (Retired) Wound Length (cm):    (Retired) Wound Width (cm):    (Retired) Depth (cm):    Wound Description (Comments):    Removal Indications:    Wound Image    08/09/22 0841   Description of Altered Skin Integrity Full thickness tissue loss. Subcutaneous fat may be visible but bone, tendon or muscle are not exposed 08/09/22 0841   Dressing Appearance Dried drainage 08/09/22 0841   Drainage Amount Small 08/09/22 0841   Drainage Characteristics/Odor Serosanguineous 08/09/22 0841   Appearance Pink;Red 08/09/22 0841   Tissue loss description Full thickness 08/09/22 0841   Black (%), Wound Tissue Color 0 % 08/09/22 0841   Red (%), Wound Tissue Color 100 % 08/09/22 0841   Yellow (%), Wound Tissue Color 0 % 08/09/22 0841   Periwound Area Macerated;Pale white 08/09/22 0841   Wound Edges Open 08/09/22 0841   Wound Length (cm) 0.3 cm 08/09/22 0841   Wound Width (cm) 1.8 cm 08/09/22 0841   Wound Depth (cm) 0.3 cm 08/09/22 0841   Wound Volume (cm^3) 0.162 cm^3 08/09/22 0841   Wound Surface Area (cm^2) 0.54 cm^2 08/09/22 0841   Care Cleansed with:;Soap and water;Applied:;Skin Barrier 08/09/22 0841   Dressing Silver;Gauze 08/09/22 0841   Periwound Care Moisture barrier applied 08/09/22 0841         Active Problem List with Overview Notes    Diagnosis Date Noted    Open wound of left thigh 06/27/2022    Pain in left thigh  06/27/2022    Osteoarthrosis multiple sites, not specified as generalized 12/07/2021    Essential hypertension 07/28/2021    Type 2 diabetes mellitus without complication, without long-term current use of  insulin 07/28/2021    Hip pain, right 05/24/2021    Spondylosis without myelopathy or radiculopathy, lumbar region       Plan:   Wash with baby shampoo and water or vashe.  Apply Polymem to wound bed  Cover with border gauze. Secure with paper tape if needed  Change daily   Monitor closely for s/s of infection including fever, chills, increase in pain, odor from wound, and increased redness from foot. Go to ER if any complications develop.   Keep leg elevated and avoid pressure on wound.   Diabetes:  Monitor glucose closely. Check fasting glucose and 2 hours after meals. HgA1C goal <7, fasting glucose , and 2 hours after meals <180  Hypertension:  Check blood pressure twice daily, goal <120/80  Diet:   Increase protein intake, avoid fried, fatty foods and foods high in simple carbs.   Vitamins:  Take vitamin C 1000 mg, zinc 50mg, vitamin d 5000 units, and a daily multivitamin. Anant is a good source of protein and nutrients to aid in wound healing.    Problem List Items Addressed This Visit        Orthopedic    Open wound of left thigh - Primary          Future Appointments   Date Time Provider Department Center   8/23/2022  9:00 AM MORA Shen NDC [facility]   9/7/2022  8:30 AM ZACHARIAH Houser [facility]   9/27/2022  8:00 AM Lizzette Meadows DNP, FNP-C [facility] DEE [facility]    8/3/2023 10:00 AM AWV NURSE, [facility] PRIMARY CARE [facility] DEE [facility]             Signature:  MORA Shen  [facility]  [address]  [place] [address]  [phone]    Date of encounter: 8/9/22

## 2022-08-09 ENCOUNTER — OFFICE VISIT (OUTPATIENT)
Dept: WOUND CARE | Facility: CLINIC | Age: 67
End: 2022-08-09
Attending: FAMILY MEDICINE
Payer: MEDICARE

## 2022-08-09 VITALS
RESPIRATION RATE: 18 BRPM | TEMPERATURE: 98 F | HEART RATE: 52 BPM | SYSTOLIC BLOOD PRESSURE: 164 MMHG | DIASTOLIC BLOOD PRESSURE: 83 MMHG

## 2022-08-09 DIAGNOSIS — S71.102S OPEN WOUND OF LEFT THIGH, SEQUELA: Primary | ICD-10-CM

## 2022-08-09 PROCEDURE — 3079F PR MOST RECENT DIASTOLIC BLOOD PRESSURE 80-89 MM HG: ICD-10-PCS | Mod: CPTII,,, | Performed by: NURSE PRACTITIONER

## 2022-08-09 PROCEDURE — 3061F NEG MICROALBUMINURIA REV: CPT | Mod: CPTII,,, | Performed by: NURSE PRACTITIONER

## 2022-08-09 PROCEDURE — 1159F MED LIST DOCD IN RCRD: CPT | Mod: CPTII,,, | Performed by: NURSE PRACTITIONER

## 2022-08-09 PROCEDURE — 1160F RVW MEDS BY RX/DR IN RCRD: CPT | Mod: CPTII,,, | Performed by: NURSE PRACTITIONER

## 2022-08-09 PROCEDURE — 99215 OFFICE O/P EST HI 40 MIN: CPT | Mod: PBBFAC | Performed by: NURSE PRACTITIONER

## 2022-08-09 PROCEDURE — 3066F PR DOCUMENTATION OF TREATMENT FOR NEPHROPATHY: ICD-10-PCS | Mod: CPTII,,, | Performed by: NURSE PRACTITIONER

## 2022-08-09 PROCEDURE — 3052F HG A1C>EQUAL 8.0%<EQUAL 9.0%: CPT | Mod: CPTII,,, | Performed by: NURSE PRACTITIONER

## 2022-08-09 PROCEDURE — 99213 OFFICE O/P EST LOW 20 MIN: CPT | Mod: S$PBB,,, | Performed by: NURSE PRACTITIONER

## 2022-08-09 PROCEDURE — 1101F PT FALLS ASSESS-DOCD LE1/YR: CPT | Mod: CPTII,,, | Performed by: NURSE PRACTITIONER

## 2022-08-09 PROCEDURE — 3288F PR FALLS RISK ASSESSMENT DOCUMENTED: ICD-10-PCS | Mod: CPTII,,, | Performed by: NURSE PRACTITIONER

## 2022-08-09 PROCEDURE — 1101F PR PT FALLS ASSESS DOC 0-1 FALLS W/OUT INJ PAST YR: ICD-10-PCS | Mod: CPTII,,, | Performed by: NURSE PRACTITIONER

## 2022-08-09 PROCEDURE — 3061F PR NEG MICROALBUMINURIA RESULT DOCUMENTED/REVIEW: ICD-10-PCS | Mod: CPTII,,, | Performed by: NURSE PRACTITIONER

## 2022-08-09 PROCEDURE — 1159F PR MEDICATION LIST DOCUMENTED IN MEDICAL RECORD: ICD-10-PCS | Mod: CPTII,,, | Performed by: NURSE PRACTITIONER

## 2022-08-09 PROCEDURE — 3077F SYST BP >= 140 MM HG: CPT | Mod: CPTII,,, | Performed by: NURSE PRACTITIONER

## 2022-08-09 PROCEDURE — 3079F DIAST BP 80-89 MM HG: CPT | Mod: CPTII,,, | Performed by: NURSE PRACTITIONER

## 2022-08-09 PROCEDURE — 3066F NEPHROPATHY DOC TX: CPT | Mod: CPTII,,, | Performed by: NURSE PRACTITIONER

## 2022-08-09 PROCEDURE — 3077F PR MOST RECENT SYSTOLIC BLOOD PRESSURE >= 140 MM HG: ICD-10-PCS | Mod: CPTII,,, | Performed by: NURSE PRACTITIONER

## 2022-08-09 PROCEDURE — 99213 PR OFFICE/OUTPT VISIT, EST, LEVL III, 20-29 MIN: ICD-10-PCS | Mod: S$PBB,,, | Performed by: NURSE PRACTITIONER

## 2022-08-09 PROCEDURE — 3288F FALL RISK ASSESSMENT DOCD: CPT | Mod: CPTII,,, | Performed by: NURSE PRACTITIONER

## 2022-08-09 PROCEDURE — 1160F PR REVIEW ALL MEDS BY PRESCRIBER/CLIN PHARMACIST DOCUMENTED: ICD-10-PCS | Mod: CPTII,,, | Performed by: NURSE PRACTITIONER

## 2022-08-09 PROCEDURE — 3052F PR MOST RECENT HEMOGLOBIN A1C LEVEL 8.0 - < 9.0%: ICD-10-PCS | Mod: CPTII,,, | Performed by: NURSE PRACTITIONER

## 2022-08-23 ENCOUNTER — OFFICE VISIT (OUTPATIENT)
Dept: WOUND CARE | Facility: CLINIC | Age: 67
End: 2022-08-23
Attending: FAMILY MEDICINE
Payer: MEDICARE

## 2022-08-23 VITALS
SYSTOLIC BLOOD PRESSURE: 108 MMHG | HEART RATE: 53 BPM | DIASTOLIC BLOOD PRESSURE: 62 MMHG | TEMPERATURE: 97 F | RESPIRATION RATE: 20 BRPM

## 2022-08-23 DIAGNOSIS — M25.551 HIP PAIN, RIGHT: Chronic | ICD-10-CM

## 2022-08-23 DIAGNOSIS — S71.102S OPEN WOUND OF LEFT THIGH, SEQUELA: Primary | ICD-10-CM

## 2022-08-23 DIAGNOSIS — E11.9 TYPE 2 DIABETES MELLITUS WITHOUT COMPLICATION, WITHOUT LONG-TERM CURRENT USE OF INSULIN: ICD-10-CM

## 2022-08-23 DIAGNOSIS — M89.49 OSTEOARTHROSIS MULTIPLE SITES, NOT SPECIFIED AS GENERALIZED: Chronic | ICD-10-CM

## 2022-08-23 DIAGNOSIS — M79.652 PAIN IN LEFT THIGH: ICD-10-CM

## 2022-08-23 PROCEDURE — 1101F PR PT FALLS ASSESS DOC 0-1 FALLS W/OUT INJ PAST YR: ICD-10-PCS | Mod: CPTII,,, | Performed by: FAMILY MEDICINE

## 2022-08-23 PROCEDURE — 3052F HG A1C>EQUAL 8.0%<EQUAL 9.0%: CPT | Mod: CPTII,,, | Performed by: FAMILY MEDICINE

## 2022-08-23 PROCEDURE — 99213 OFFICE O/P EST LOW 20 MIN: CPT | Mod: PBBFAC | Performed by: FAMILY MEDICINE

## 2022-08-23 PROCEDURE — 3052F PR MOST RECENT HEMOGLOBIN A1C LEVEL 8.0 - < 9.0%: ICD-10-PCS | Mod: CPTII,,, | Performed by: FAMILY MEDICINE

## 2022-08-23 PROCEDURE — 3066F PR DOCUMENTATION OF TREATMENT FOR NEPHROPATHY: ICD-10-PCS | Mod: CPTII,,, | Performed by: FAMILY MEDICINE

## 2022-08-23 PROCEDURE — 1126F AMNT PAIN NOTED NONE PRSNT: CPT | Mod: CPTII,,, | Performed by: FAMILY MEDICINE

## 2022-08-23 PROCEDURE — 3288F PR FALLS RISK ASSESSMENT DOCUMENTED: ICD-10-PCS | Mod: CPTII,,, | Performed by: FAMILY MEDICINE

## 2022-08-23 PROCEDURE — 3061F NEG MICROALBUMINURIA REV: CPT | Mod: CPTII,,, | Performed by: FAMILY MEDICINE

## 2022-08-23 PROCEDURE — 3061F PR NEG MICROALBUMINURIA RESULT DOCUMENTED/REVIEW: ICD-10-PCS | Mod: CPTII,,, | Performed by: FAMILY MEDICINE

## 2022-08-23 PROCEDURE — 3078F PR MOST RECENT DIASTOLIC BLOOD PRESSURE < 80 MM HG: ICD-10-PCS | Mod: CPTII,,, | Performed by: FAMILY MEDICINE

## 2022-08-23 PROCEDURE — 3078F DIAST BP <80 MM HG: CPT | Mod: CPTII,,, | Performed by: FAMILY MEDICINE

## 2022-08-23 PROCEDURE — 1101F PT FALLS ASSESS-DOCD LE1/YR: CPT | Mod: CPTII,,, | Performed by: FAMILY MEDICINE

## 2022-08-23 PROCEDURE — 1126F PR PAIN SEVERITY QUANTIFIED, NO PAIN PRESENT: ICD-10-PCS | Mod: CPTII,,, | Performed by: FAMILY MEDICINE

## 2022-08-23 PROCEDURE — 3066F NEPHROPATHY DOC TX: CPT | Mod: CPTII,,, | Performed by: FAMILY MEDICINE

## 2022-08-23 PROCEDURE — 3074F PR MOST RECENT SYSTOLIC BLOOD PRESSURE < 130 MM HG: ICD-10-PCS | Mod: CPTII,,, | Performed by: FAMILY MEDICINE

## 2022-08-23 PROCEDURE — 3074F SYST BP LT 130 MM HG: CPT | Mod: CPTII,,, | Performed by: FAMILY MEDICINE

## 2022-08-23 PROCEDURE — 99214 OFFICE O/P EST MOD 30 MIN: CPT | Mod: S$PBB,,, | Performed by: FAMILY MEDICINE

## 2022-08-23 PROCEDURE — 3288F FALL RISK ASSESSMENT DOCD: CPT | Mod: CPTII,,, | Performed by: FAMILY MEDICINE

## 2022-08-23 PROCEDURE — 99214 PR OFFICE/OUTPT VISIT, EST, LEVL IV, 30-39 MIN: ICD-10-PCS | Mod: S$PBB,,, | Performed by: FAMILY MEDICINE

## 2022-08-23 NOTE — PROGRESS NOTES
Erik Duffy III, DO   [facility]  [address]  [place] [address]  [phone]      PATIENT NAME: Selena Proctor  : 1955  DATE: 22  MRN: [id number]      Billing Provider: Erik Duffy III, DO  Level of Service:   Patient PCP Information     Provider PCP Type    Lizzette Meadows, MARQUISE, FNP-C General          Reason for Visit / Chief Complaint: Non-healing Wound (Left thigh)       History of Present Illness / Problem Focused Workflow     Selena Proctor is a HPI    Review of Systems     Review of Systems    Medical / Social / Family History     Past Medical History:   Diagnosis Date    Acid reflux     Coronary arteriosclerosis     Diabetes     Diabetes mellitus, type 2     Hypertension     Spondylosis without myelopathy or radiculopathy, lumbar region        Past Surgical History:   Procedure Laterality Date    APPENDECTOMY      BLOCK, NERVE, OBTURATOR Right 2019    Right Femoral/Obturator Nerve Block  Dr Headley     SECTION      x2    CORONARY ARTERY BYPASS GRAFT      KNEE ARTHROPLASTY Right     KNEE ARTHROSCOPY Left     SKIN GRAFT      Left Thigh    TOTAL HIP ARTHROPLASTY Bilateral     TUBAL LIGATION         Social History  Ms. Selena Proctor  reports that she has never smoked. She has never used smokeless tobacco. She reports previous alcohol use. She reports current drug use. Drug: Hydrocodone.    Family History  Ms.'s Selena Proctor family history includes Alcohol abuse in her father; Cancer in her father; Diabetes in her sister; Heart disease in her mother; Hypertension in her brother, mother, sister, and son; Mental illness in her maternal aunt.    Medications and Allergies     Medications  No outpatient medications have been marked as taking for the 22 encounter (Office Visit) with Erik Duffy III, DO.       Allergies  Review of patient's allergies indicates:   Allergen Reactions    Betadine  [povidone-iodine] Itching       Physical Examination     Vitals:    08/23/22 0834   BP: 108/62   Pulse: (!) 53   Resp: 20   Temp: 97.1 °F (36.2 °C)     Physical Exam    Assessment and Plan             Altered Skin Integrity 06/27/22 0929 Left anterior Thigh #1 (Active)   06/27/22 0929   Altered Skin Integrity Present on Admission:    Side: Left   Orientation: anterior   Location: Thigh   Wound Number: #1   Is this injury device related?:    Primary Wound Type:    Description of Altered Skin Integrity:    Removal Indication and Assessment:    Wound Outcome:    (Retired) Wound Length (cm):    (Retired) Wound Width (cm):    (Retired) Depth (cm):    Wound Description (Comments):    Removal Indications:    Wound Image    08/23/22 0855   Drainage Amount Moderate 08/23/22 0855   Drainage Characteristics/Odor Serosanguineous 08/23/22 0855   Appearance Intact;Pink;Red;Moist;Granulating;Epithelialization 08/23/22 0855   Tissue loss description Full thickness 08/23/22 0855   Black (%), Wound Tissue Color 0 % 08/23/22 0855   Red (%), Wound Tissue Color 100 % 08/23/22 0855   Yellow (%), Wound Tissue Color 0 % 08/23/22 0855   Periwound Area Intact 08/23/22 0855   Wound Edges Approximated 08/23/22 0855   Wound Length (cm) 0.8 cm 08/23/22 0855   Wound Width (cm) 0.3 cm 08/23/22 0855   Wound Depth (cm) 0.2 cm 08/23/22 0855   Wound Volume (cm^3) 0.048 cm^3 08/23/22 0855   Wound Surface Area (cm^2) 0.24 cm^2 08/23/22 0855   Care Cleansed with:;Antimicrobial agent 08/23/22 0855   Dressing Applied;Silver;Foam 08/23/22 0855   Periwound Care Moisture barrier applied 08/23/22 0855     Problem List Items Addressed This Visit        Orthopedic    Open wound of left thigh - Primary    Overview                    Pain in left thigh           Future Appointments   Date Time Provider Department Center   9/7/2022  8:30 AM ZACHARIAH Houser [facility]   9/7/2022  9:00 AM MORA Shen [facility]    9/27/2022  8:00  AM Lizzette Meadows DNP, FNP-C [facility]    8/3/2023 10:00 AM AWKORINA NURSE, [facility] PRIMARY CARE [facility]            Signature:  JHON MARTINEZ LPN  [facility]  [address]  [place] [address]  [phone]    Date of encounter: 8/23/22

## 2022-08-23 NOTE — PROGRESS NOTES
Subjective:      Patient ID: Selena Proctor is a 66 y.o. female.    Chief Complaint: Non-healing Wound (Left thigh)    Selena Proctor a 66 y.o. female presents for follow up on all regular problems which are reviewed and discussed.   Not healing  Problem List Items Addressed This Visit        Endocrine    Type 2 diabetes mellitus without complication, without long-term current use of insulin       Orthopedic    Hip pain, right (Chronic)    Osteoarthrosis multiple sites, not specified as generalized (Chronic)    Open wound of left thigh - Primary    Overview                    Pain in left thigh           Past Medical History:  Past Medical History:   Diagnosis Date    Acid reflux     Coronary arteriosclerosis     Diabetes     Diabetes mellitus, type 2     Hypertension     Spondylosis without myelopathy or radiculopathy, lumbar region      Past Surgical History:   Procedure Laterality Date    APPENDECTOMY      BLOCK, NERVE, OBTURATOR Right 2019    Right Femoral/Obturator Nerve Block  Dr Headley     SECTION      x2    CORONARY ARTERY BYPASS GRAFT      KNEE ARTHROPLASTY Right     KNEE ARTHROSCOPY Left     SKIN GRAFT      Left Thigh    TOTAL HIP ARTHROPLASTY Bilateral     TUBAL LIGATION       Review of patient's allergies indicates:   Allergen Reactions    Betadine [povidone-iodine] Itching     Current Outpatient Medications on File Prior to Visit   Medication Sig Dispense Refill    ACCU-CHEK DONNA PLUS TEST STRP Strp CHECK BLOOD SUGAR ONE TIME DAILY 100 strip 1    ACCU-CHEK SOFT DEV LANCETS Kit USE AS DIRECTED 100 each 1    ACCU-CHEK SOFTCLIX LANCETS Misc CHECK BLOOD SUGAR ONE TIME DAILY 100 each 1    alcohol swabs (BD ALCOHOL SWABS) PadM USE AS DIRECTED 90 each 2    aspirin (ECOTRIN) 81 MG EC tablet Take 81 mg by mouth once daily.       blood glucose control, low (TRUE METRIX LEVEL 1) Soln USE AS DIRECTED WITH GLUCOSE METER 1 each 0    chlorhexidine (HIBICLENS) 4 % external liquid Apply  topically daily as needed (thigh wound). (Patient not taking: Reported on 8/1/2022) 120 mL 0    ezetimibe (ZETIA) 10 mg tablet Take 1 tablet (10 mg total) by mouth once daily. 90 tablet 2    furosemide (LASIX) 40 MG tablet TAKE 1 TABLET EVERY DAY AS NEEDED 90 tablet 0    HYDROcodone-acetaminophen (NORCO)  mg per tablet Take 1 tablet by mouth every 8 (eight) hours. 90 tablet 0    JARDIANCE 10 mg tablet TAKE 1 TABLET EVERY DAY 90 tablet 1    losartan-hydrochlorothiazide 100-25 mg (HYZAAR) 100-25 mg per tablet Take 1 tablet by mouth once daily. 90 tablet 2    metFORMIN (GLUCOPHAGE) 500 MG tablet Take 1 tablet (500 mg total) by mouth 2 (two) times daily with meals. 180 tablet 3    metoprolol succinate (TOPROL-XL) 100 MG 24 hr tablet Take 1 tablet (100 mg total) by mouth once daily. 90 tablet 2    miconazole (MONISTAT 7) 2 % vaginal cream Apply cream to external vaginal area for vaginal itching. 1 kit 0    montelukast (SINGULAIR) 10 mg tablet TAKE 1 TABLET EVERY DAY AS NEEDED 90 tablet 2    NIFEdipine (PROCARDIA-XL) 90 MG (OSM) 24 hr tablet TAKE 1 TABLET EVERY DAY 90 tablet 1    omeprazole (PRILOSEC) 40 MG capsule TAKE 1 CAPSULE EVERY DAY 90 capsule 0    pitavastatin calcium (LIVALO) 4 mg Tab Take one tablet by mouth three times per week on Monday, Wednesday, Friday. 90 tablet 2    potassium chloride 10% (KAYCIEL) 20 mEq/15 mL oral solution MIX 15ML (1 TABLESPOONFUL) IN 4 OUNCES OF LIQUID AND DRINK TWICE DAILY 2520 mL 1    silver sulfADIAZINE 1% (SILVADENE) 1 % cream Apply topically once daily. 50 g 2    spironolactone (ALDACTONE) 25 MG tablet TAKE 1 TABLET EVERY DAY 90 tablet 0    TRUE METRIX GLUCOSE METER kit USE AS DIRECTED 180 each 2    TRUEPLUS LANCETS 33 gauge Misc USE AS DIRECTED 200 each 0     No current facility-administered medications on file prior to visit.     Social History     Socioeconomic History    Marital status: Legally     Number of children: 6   Occupational History     Occupation: retired   Tobacco Use    Smoking status: Never Smoker    Smokeless tobacco: Never Used   Substance and Sexual Activity    Alcohol use: Not Currently    Drug use: Yes     Types: Hydrocodone    Sexual activity: Not Currently     Family History   Problem Relation Age of Onset    Hypertension Mother     Heart disease Mother     Diabetes Sister     Hypertension Sister     Hypertension Brother     Alcohol abuse Father     Cancer Father     Hypertension Son     Mental illness Maternal Aunt        Review of Systems   Constitutional: Negative.  Negative for activity change, appetite change, chills and diaphoresis.   HENT: Negative.  Negative for congestion, ear pain, hearing loss and postnasal drip.    Eyes: Negative for itching.   Respiratory: Negative for chest tightness and shortness of breath.    Cardiovascular: Negative for chest pain.   Gastrointestinal: Negative for abdominal pain.   Endocrine: Negative for polydipsia.   Genitourinary: Negative for frequency.   Musculoskeletal: Negative for back pain.   Skin: Positive for wound.   Neurological: Negative for headaches.       Objective:     /62   Pulse (!) 53   Temp 97.1 °F (36.2 °C)   Resp 20     Physical Exam  Constitutional:       Appearance: Normal appearance. She is obese.   HENT:      Head: Normocephalic and atraumatic.      Right Ear: External ear normal.      Left Ear: External ear normal.      Nose: Nose normal.      Mouth/Throat:      Mouth: Mucous membranes are moist.      Pharynx: Oropharynx is clear.   Eyes:      Pupils: Pupils are equal, round, and reactive to light.   Cardiovascular:      Rate and Rhythm: Normal rate and regular rhythm.      Heart sounds: Normal heart sounds.   Pulmonary:      Effort: Pulmonary effort is normal.      Breath sounds: Normal breath sounds.   Abdominal:      Palpations: Abdomen is soft.   Musculoskeletal:      Cervical back: Normal range of motion and neck supple.   Skin:     General:  Skin is warm and dry.      Findings: Lesion present.   Neurological:      General: No focal deficit present.      Mental Status: She is alert and oriented to person, place, and time. Mental status is at baseline.   Psychiatric:         Mood and Affect: Mood normal.         Behavior: Behavior normal.         Thought Content: Thought content normal.         Judgment: Judgment normal.       Assessment:     1. Open wound of left thigh, sequela    2. Pain in left thigh     3. Osteoarthrosis multiple sites, not specified as generalized    4. Hip pain, right    5. Type 2 diabetes mellitus without complication, without long-term current use of insulin        Plan:     Problem List Items Addressed This Visit        Endocrine    Type 2 diabetes mellitus without complication, without long-term current use of insulin       Orthopedic    Hip pain, right (Chronic)    Osteoarthrosis multiple sites, not specified as generalized (Chronic)    Open wound of left thigh - Primary    Pain in left thigh         No follow-ups on file.  Change tx see orders  Ed. Given  Control sugar    I am having Selena Proctor maintain her aspirin, montelukast, ACCU-CHEK SOFTCLIX LANCETS, ezetimibe, losartan-hydrochlorothiazide 100-25 mg, metoprolol succinate, pitavastatin calcium, metFORMIN, spironolactone, TRUE METRIX GLUCOSE METER, TRUE METRIX LEVEL 1, TRUEPLUS LANCETS, NIFEdipine, ACCU-CHEK DONNA PLUS TEST STRP, ACCU-CHEK SOFT DEV LANCETS, HYDROcodone-acetaminophen, omeprazole, miconazole, chlorhexidine, alcohol swabs, potassium chloride 10%, silver sulfADIAZINE 1%, JARDIANCE, and furosemide.    Selena was seen today for non-healing wound.    Diagnoses and all orders for this visit:    Open wound of left thigh, sequela    Pain in left thigh     Osteoarthrosis multiple sites, not specified as generalized    Hip pain, right    Type 2 diabetes mellitus without complication, without long-term current use of insulin         [unfilled]  No orders of the  defined types were placed in this encounter.

## 2022-08-23 NOTE — PATIENT INSTRUCTIONS
Wash with baby shampoo and water or vashe.     Apply Polymem to wound bed     Cover with border gauze. Secure with paper tape if needed     Change daily      Monitor closely for s/s of infection including fever, chills, increase in pain, odor from wound, and increased redness from foot. Go to ER if any complications develop.   Keep leg elevated and avoid pressure on wound.      Diabetes:  Monitor glucose closely. Check fasting glucose and 2 hours after meals. HgA1C goal <7, fasting glucose , and 2 hours after meals <180  Hypertension:  Check blood pressure twice daily, goal <120/80  Diet:   Increase protein intake, avoid fried, fatty foods and foods high in simple carbs.   Vitamins:  Take vitamin C 1000 mg, zinc 50mg, vitamin d 5000 units, and a daily multivitamin. Anant is a good source of protein and nutrients to aid in wound healing.     Cleocin 300mg by mouth three times a day  Cipro 500mg by mouth three times a day

## 2022-09-06 NOTE — PATIENT INSTRUCTIONS
Wash with baby shampoo and water or vashe.     Pack wound with vashe moistened drawtex. Cut to fit in wound bed. Place slightly larger piece of drawtex over wet to dry drawtex to wick.      Cover with border gauze. Secure with paper tape if needed     Change daily      Monitor closely for s/s of infection including fever, chills, increase in pain, odor from wound, and increased redness from foot. Go to ER if any complications develop.   Keep leg elevated and avoid pressure on wound.      Diabetes:  Monitor glucose closely. Check fasting glucose and 2 hours after meals. HgA1C goal <7, fasting glucose , and 2 hours after meals <180  Hypertension:  Check blood pressure twice daily, goal <120/80  Diet:   Increase protein intake, avoid fried, fatty foods and foods high in simple carbs.   Vitamins:  Take vitamin C 1000 mg, zinc 50mg, vitamin d 5000 units, and a daily multivitamin. Anant is a good source of protein and nutrients to aid in wound healing.      Continue Cleocin 300mg by mouth three times a day  Continue Cipro 500mg by mouth three times a day

## 2022-09-06 NOTE — PROGRESS NOTES
Subjective:      Patient ID: Selena Proctor is a 66 y.o. female.    Chief Complaint: Open wound of left thigh, sequela    Selena Proctor a 66 y.o. female presents for follow up on all regular problems which are reviewed and discussed.     Problem List Items Addressed This Visit          Endocrine    Type 2 diabetes mellitus without complication, without long-term current use of insulin       Orthopedic    Hip pain, right (Chronic)    Osteoarthrosis multiple sites, not specified as generalized (Chronic)    Open wound of left thigh - Primary    Overview                  Pain in left thigh        Past Medical History:  Past Medical History:   Diagnosis Date    Acid reflux     Coronary arteriosclerosis     Diabetes     Diabetes mellitus, type 2     Hypertension     Spondylosis without myelopathy or radiculopathy, lumbar region      Past Surgical History:   Procedure Laterality Date    APPENDECTOMY      BLOCK, NERVE, OBTURATOR Right 2019    Right Femoral/Obturator Nerve Block  Dr Headley     SECTION      x2    CORONARY ARTERY BYPASS GRAFT      KNEE ARTHROPLASTY Right     KNEE ARTHROSCOPY Left     SKIN GRAFT      Left Thigh    TOTAL HIP ARTHROPLASTY Bilateral     TUBAL LIGATION       Review of patient's allergies indicates:   Allergen Reactions    Betadine [povidone-iodine] Itching     Current Outpatient Medications on File Prior to Visit   Medication Sig Dispense Refill    ACCU-CHEK DONNA PLUS TEST STRP Strp CHECK BLOOD SUGAR ONE TIME DAILY 100 strip 1    ACCU-CHEK SOFT DEV LANCETS Kit USE AS DIRECTED 100 each 1    ACCU-CHEK SOFTCLIX LANCETS Misc CHECK BLOOD SUGAR ONE TIME DAILY 100 each 1    alcohol swabs (BD ALCOHOL SWABS) PadM USE AS DIRECTED 90 each 2    aspirin (ECOTRIN) 81 MG EC tablet Take 81 mg by mouth once daily.       blood glucose control, low (TRUE METRIX LEVEL 1) Soln USE AS DIRECTED WITH GLUCOSE METER 1 each 0    chlorhexidine (HIBICLENS) 4 % external liquid Apply topically  daily as needed (thigh wound). (Patient not taking: No sig reported) 120 mL 0    diclofenac sodium (VOLTAREN ARTHRITIS PAIN) 1 % Gel Apply 2 g topically 4 (four) times daily. Apply to knees, elbows, joints as needed 10 each 2    ezetimibe (ZETIA) 10 mg tablet Take 1 tablet (10 mg total) by mouth once daily. 90 tablet 2    furosemide (LASIX) 40 MG tablet TAKE 1 TABLET EVERY DAY AS NEEDED 90 tablet 0    HYDROcodone-acetaminophen (NORCO)  mg per tablet Take 1 tablet by mouth every 8 (eight) hours. 90 tablet 0    [START ON 10/7/2022] HYDROcodone-acetaminophen (NORCO)  mg per tablet Take 1 tablet by mouth every 8 (eight) hours. 90 tablet 0    [START ON 11/5/2022] HYDROcodone-acetaminophen (NORCO)  mg per tablet Take 1 tablet by mouth every 8 (eight) hours. 90 tablet 0    JARDIANCE 10 mg tablet TAKE 1 TABLET EVERY DAY 90 tablet 1    losartan-hydrochlorothiazide 100-25 mg (HYZAAR) 100-25 mg per tablet Take 1 tablet by mouth once daily. 90 tablet 2    metFORMIN (GLUCOPHAGE) 500 MG tablet Take 1 tablet (500 mg total) by mouth 2 (two) times daily with meals. 180 tablet 3    metoprolol succinate (TOPROL-XL) 100 MG 24 hr tablet Take 1 tablet (100 mg total) by mouth once daily. 90 tablet 2    miconazole (MONISTAT 7) 2 % vaginal cream Apply cream to external vaginal area for vaginal itching. 1 kit 0    montelukast (SINGULAIR) 10 mg tablet TAKE 1 TABLET EVERY DAY AS NEEDED 90 tablet 2    NIFEdipine (PROCARDIA-XL) 90 MG (OSM) 24 hr tablet TAKE 1 TABLET EVERY DAY 90 tablet 1    omeprazole (PRILOSEC) 40 MG capsule TAKE 1 CAPSULE EVERY DAY 90 capsule 0    pitavastatin calcium (LIVALO) 4 mg Tab Take one tablet by mouth three times per week on Monday, Wednesday, Friday. 90 tablet 2    potassium chloride 10% (KAYCIEL) 20 mEq/15 mL oral solution MIX 15ML (1 TABLESPOONFUL) IN 4 OUNCES OF LIQUID AND DRINK TWICE DAILY 2520 mL 1    silver sulfADIAZINE 1% (SILVADENE) 1 % cream Apply topically once daily. 50 g  2    spironolactone (ALDACTONE) 25 MG tablet TAKE 1 TABLET EVERY DAY 90 tablet 0    TRUE METRIX GLUCOSE METER kit USE AS DIRECTED 180 each 2    TRUEPLUS LANCETS 33 gauge Misc USE AS DIRECTED 200 each 0     No current facility-administered medications on file prior to visit.     Social History     Socioeconomic History    Marital status: Legally     Number of children: 6   Occupational History    Occupation: retired   Tobacco Use    Smoking status: Never    Smokeless tobacco: Never   Substance and Sexual Activity    Alcohol use: Not Currently    Drug use: Yes     Types: Hydrocodone    Sexual activity: Not Currently     Family History   Problem Relation Age of Onset    Hypertension Mother     Heart disease Mother     Diabetes Sister     Hypertension Sister     Hypertension Brother     Alcohol abuse Father     Cancer Father     Hypertension Son     Mental illness Maternal Aunt        Review of Systems   Constitutional: Negative.  Negative for activity change, appetite change, chills and diaphoresis.   HENT: Negative.  Negative for congestion, ear pain, hearing loss and postnasal drip.    Eyes:  Negative for itching.   Respiratory:  Negative for chest tightness and shortness of breath.    Cardiovascular:  Negative for chest pain.   Gastrointestinal:  Negative for abdominal pain.   Endocrine: Negative for polydipsia.   Genitourinary:  Negative for frequency.   Musculoskeletal:  Negative for back pain.   Neurological:  Negative for headaches.     Objective:     /80   Pulse (!) 52   Temp 97.7 °F (36.5 °C)   Resp 18     Physical Exam  Assessment:     1. Open wound of left thigh, sequela    2. Pain in left thigh     3. Osteoarthrosis multiple sites, not specified as generalized    4. Hip pain, right    5. Type 2 diabetes mellitus without complication, without long-term current use of insulin        Plan:     Problem List Items Addressed This Visit          Endocrine    Type 2 diabetes  mellitus without complication, without long-term current use of insulin       Orthopedic    Hip pain, right (Chronic)    Osteoarthrosis multiple sites, not specified as generalized (Chronic)    Open wound of left thigh - Primary    Pain in left thigh      No follow-ups on file.      I am having Selena Proctor maintain her aspirin, montelukast, ACCU-CHEK SOFTCLIX LANCETS, ezetimibe, losartan-hydrochlorothiazide 100-25 mg, metoprolol succinate, pitavastatin calcium, metFORMIN, spironolactone, TRUE METRIX GLUCOSE METER, TRUE METRIX LEVEL 1, TRUEPLUS LANCETS, NIFEdipine, ACCU-CHEK DONNA PLUS TEST STRP, ACCU-CHEK SOFT DEV LANCETS, omeprazole, miconazole, chlorhexidine, alcohol swabs, potassium chloride 10%, silver sulfADIAZINE 1%, JARDIANCE, furosemide, HYDROcodone-acetaminophen, HYDROcodone-acetaminophen, HYDROcodone-acetaminophen, and diclofenac sodium.    Selena was seen today for open wound of left thigh, sequela.    Diagnoses and all orders for this visit:    Open wound of left thigh, sequela    Pain in left thigh     Osteoarthrosis multiple sites, not specified as generalized    Hip pain, right    Type 2 diabetes mellitus without complication, without long-term current use of insulin         [unfilled]  No orders of the defined types were placed in this encounter.      See wound assessment.

## 2022-09-06 NOTE — PROGRESS NOTES
Subjective:         Patient ID: Selena Proctor is a 66 y.o. female.    Chief Complaint: Hip Pain (right) and Back Pain      Pain  This is a chronic problem. The current episode started more than 1 year ago. The problem occurs daily. The problem has been waxing and waning. Associated symptoms include arthralgias and neck pain. Pertinent negatives include no change in bowel habit, chest pain, coughing, diaphoresis, fever, rash, sore throat, vertigo or vomiting.   Review of Systems   Constitutional:  Negative for activity change, appetite change, diaphoresis, fever and unexpected weight change.   HENT:  Negative for drooling, ear discharge, ear pain, facial swelling, nosebleeds, sore throat, trouble swallowing, voice change and goiter.    Eyes:  Negative for photophobia, pain, discharge, redness and visual disturbance.   Respiratory:  Negative for apnea, cough, choking, chest tightness, shortness of breath, wheezing and stridor.    Cardiovascular:  Negative for chest pain, palpitations and leg swelling.   Gastrointestinal:  Negative for abdominal distention, change in bowel habit, diarrhea, rectal pain, vomiting, fecal incontinence and change in bowel habit.   Endocrine: Negative for cold intolerance, heat intolerance, polydipsia, polyphagia and polyuria.   Genitourinary:  Negative for bladder incontinence, dysuria, flank pain, frequency and hot flashes.   Musculoskeletal:  Positive for arthralgias, back pain, leg pain and neck pain.   Integumentary:  Negative for color change, pallor and rash.   Allergic/Immunologic: Negative for immunocompromised state.   Neurological:  Negative for dizziness, vertigo, seizures, syncope, facial asymmetry, speech difficulty, light-headedness, coordination difficulties, memory loss and coordination difficulties.   Hematological:  Negative for adenopathy. Does not bruise/bleed easily.   Psychiatric/Behavioral:  Negative for agitation, behavioral problems, confusion, decreased  "concentration, dysphoric mood, hallucinations, self-injury and suicidal ideas. The patient is not nervous/anxious and is not hyperactive.          Past Medical History:   Diagnosis Date    Acid reflux     Coronary arteriosclerosis     Diabetes     Diabetes mellitus, type 2     Hypertension     Spondylosis without myelopathy or radiculopathy, lumbar region      Past Surgical History:   Procedure Laterality Date    APPENDECTOMY      BLOCK, NERVE, OBTURATOR Right 2019    Right Femoral/Obturator Nerve Block  Dr Headley     SECTION      x2    CORONARY ARTERY BYPASS GRAFT      KNEE ARTHROPLASTY Right     KNEE ARTHROSCOPY Left     SKIN GRAFT      Left Thigh    TOTAL HIP ARTHROPLASTY Bilateral     TUBAL LIGATION       Social History     Socioeconomic History    Marital status: Legally     Number of children: 6   Occupational History    Occupation: retired   Tobacco Use    Smoking status: Never    Smokeless tobacco: Never   Substance and Sexual Activity    Alcohol use: Not Currently    Drug use: Yes     Types: Hydrocodone    Sexual activity: Not Currently     Family History   Problem Relation Age of Onset    Hypertension Mother     Heart disease Mother     Diabetes Sister     Hypertension Sister     Hypertension Brother     Alcohol abuse Father     Cancer Father     Hypertension Son     Mental illness Maternal Aunt      Review of patient's allergies indicates:   Allergen Reactions    Betadine [povidone-iodine] Itching        Objective:  Vitals:    22 0803 22 0804   BP: 125/80    Pulse: (!) 54    Resp: 18    SpO2: 98%    Weight: 80.7 kg (178 lb)    Height: 5' 5" (1.651 m)    PainSc:   8   8         Physical Exam  Vitals and nursing note reviewed. Exam conducted with a chaperone present.   Constitutional:       General: She is awake.      Appearance: Normal appearance. She is not ill-appearing, toxic-appearing or diaphoretic.   HENT:      Head: Normocephalic and atraumatic.      Nose: Nose " normal.      Mouth/Throat:      Mouth: Mucous membranes are moist.      Pharynx: Oropharynx is clear.   Eyes:      Conjunctiva/sclera: Conjunctivae normal.      Pupils: Pupils are equal, round, and reactive to light.   Cardiovascular:      Rate and Rhythm: Normal rate.   Pulmonary:      Effort: Pulmonary effort is normal. No respiratory distress.   Abdominal:      Palpations: Abdomen is soft.      Tenderness: There is no guarding.   Musculoskeletal:         General: Normal range of motion.      Right shoulder: Tenderness present.      Left shoulder: Tenderness present.      Cervical back: Normal range of motion and neck supple. No rigidity.      Thoracic back: Tenderness present.      Lumbar back: Tenderness present.      Right hip: Tenderness present.      Left hip: Tenderness present.   Skin:     General: Skin is warm and dry.      Coloration: Skin is not jaundiced or pale.   Neurological:      General: No focal deficit present.      Mental Status: She is alert and oriented to person, place, and time. Mental status is at baseline.      Cranial Nerves: No cranial nerve deficit (II-XII).   Psychiatric:         Mood and Affect: Mood normal.         Behavior: Behavior normal. Behavior is cooperative.         Thought Content: Thought content normal.         US Extremity Non Vascular Complete Left  Narrative: EXAMINATION:  The US EXTREMITY NON VASCULAR COMPLETE LEFT    CLINICAL HISTORY:  non healing wound left inner thigh;Unspecified open wound, left thigh, sequela    TECHNIQUE:  US EXTREMITY NON VASCULAR COMPLETE LEFT    COMPARISON:  2018    FINDINGS:  No mass or fluid collection  Impression: No abnormality identified    Electronically signed by: Garo Torres  Date:    06/30/2022  Time:    08:38       Patient Outreach on 08/23/2022   Component Date Value Ref Range Status    Left Eye DM Retinopathy 08/18/2022 Negative   Final    Right Eye DM Retinopathy 08/18/2022 Negative   Final   Patient Outreach on 07/28/2022    Component Date Value Ref Range Status    CRC Recommendation External 05/13/2019 Repeat colonoscopy in 5 years   Final   Office Visit on 06/23/2022   Component Date Value Ref Range Status    POC Glucose 06/23/2022 139 (A)  70 - 110 MG/DL Final    Sodium 06/23/2022 139  136 - 145 mmol/L Final    Potassium 06/23/2022 3.7  3.5 - 5.1 mmol/L Final    Chloride 06/23/2022 104  98 - 107 mmol/L Final    CO2 06/23/2022 28  21 - 32 mmol/L Final    Anion Gap 06/23/2022 11  7 - 16 mmol/L Final    Glucose 06/23/2022 139 (H)  74 - 106 mg/dL Final    BUN 06/23/2022 16  7 - 18 mg/dL Final    Creatinine 06/23/2022 0.90  0.55 - 1.02 mg/dL Final    BUN/Creatinine Ratio 06/23/2022 18  6 - 20 Final    Calcium 06/23/2022 9.3  8.5 - 10.1 mg/dL Final    Total Protein 06/23/2022 7.6  6.4 - 8.2 g/dL Final    Albumin 06/23/2022 3.6  3.5 - 5.0 g/dL Final    Globulin 06/23/2022 4.0  2.0 - 4.0 g/dL Final    A/G Ratio 06/23/2022 0.9   Final    Bilirubin, Total 06/23/2022 0.5  0.0 - 1.2 mg/dL Final    Alk Phos 06/23/2022 55  55 - 142 U/L Final    ALT 06/23/2022 15  13 - 56 U/L Final    AST 06/23/2022 10 (L)  15 - 37 U/L Final    eGFR 06/23/2022 66  >=60 mL/min/1.73m² Final    Hemoglobin A1C 06/23/2022 8.3 (H)  4.5 - 6.6 % Final    Estimated Average Glucose 06/23/2022 190  mg/dL Final    Hepatitis C Ab 06/23/2022 Non-Reactive  Non-Reactive Final    WBC 06/23/2022 5.67  4.50 - 11.00 K/uL Final    RBC 06/23/2022 4.86  4.20 - 5.40 M/uL Final    Hemoglobin 06/23/2022 13.3  12.0 - 16.0 g/dL Final    Hematocrit 06/23/2022 41.7  38.0 - 47.0 % Final    MCV 06/23/2022 85.8  80.0 - 96.0 fL Final    MCH 06/23/2022 27.4  27.0 - 31.0 pg Final    MCHC 06/23/2022 31.9 (L)  32.0 - 36.0 g/dL Final    RDW 06/23/2022 15.6 (H)  11.5 - 14.5 % Final    Platelet Count 06/23/2022 248  150 - 400 K/uL Final    MPV 06/23/2022 11.5  9.4 - 12.4 fL Final    Neutrophils % 06/23/2022 50.5 (L)  53.0 - 65.0 % Final    Lymphocytes % 06/23/2022 35.8  27.0 - 41.0 % Final    Monocytes  % 06/23/2022 10.2 (H)  2.0 - 6.0 % Final    Eosinophils % 06/23/2022 2.5  1.0 - 4.0 % Final    Basophils % 06/23/2022 0.5  0.0 - 1.0 % Final    Immature Granulocytes % 06/23/2022 0.5 (H)  0.0 - 0.4 % Final    nRBC, Auto 06/23/2022 0.0  <=0.0 % Final    Neutrophils, Abs 06/23/2022 2.86  1.80 - 7.70 K/uL Final    Lymphocytes, Absolute 06/23/2022 2.03  1.00 - 4.80 K/uL Final    Monocytes, Absolute 06/23/2022 0.58  0.00 - 0.80 K/uL Final    Eosinophils, Absolute 06/23/2022 0.14  0.00 - 0.50 K/uL Final    Basophils, Absolute 06/23/2022 0.03  0.00 - 0.20 K/uL Final    Immature Granulocytes, Absolute 06/23/2022 0.03  0.00 - 0.04 K/uL Final    nRBC, Absolute 06/23/2022 0.00  <=0.00 x10e3/uL Final    Diff Type 06/23/2022 Auto   Final    Creatinine, Urine 06/23/2022 51  28 - 219 mg/dL Final    Microalbumin 06/23/2022 0.7  0.0 - 2.8 mg/dL Final    Microalbumin/Creatinine Ratio 06/23/2022 13.7  0.0 - 30.0 mg/g Final   Office Visit on 06/09/2022   Component Date Value Ref Range Status    POC Amphetamines 06/09/2022 Negative  Negative, Inconclusive Final    POC Barbiturates 06/09/2022 Negative  Negative, Inconclusive Final    POC Benzodiazepines 06/09/2022 Negative  Negative, Inconclusive Final    POC Cocaine 06/09/2022 Negative  Negative, Inconclusive Final    POC THC 06/09/2022 Negative  Negative, Inconclusive Final    POC Methadone 06/09/2022 Negative  Negative, Inconclusive Final    POC Methamphetamine 06/09/2022 Negative  Negative, Inconclusive Final    POC Opiates 06/09/2022 Negative  Negative, Inconclusive Final    POC Oxycodone 06/09/2022 Negative  Negative, Inconclusive Final    POC Phencyclidine 06/09/2022 Negative  Negative, Inconclusive Final    POC Methylenedioxymethamphetamine * 06/09/2022 Negative  Negative, Inconclusive Final    POC Tricyclic Antidepressants 06/09/2022 Negative  Negative, Inconclusive Final    POC Buprenorphine 06/09/2022 Negative   Final     Acceptable 06/09/2022 Yes   Final    POC  Temperature (Urine) 06/09/2022 92   Final    pH, UA 06/09/2022 6.0  5.0, 5.5, 6.0, 6.5, 7.0, 7.5, 8.0 pH Units Final    Specific Gravity, UA 06/09/2022 1.015  <=1.005, 1.010, 1.015, 1.020, 1.025, 1.030 Final    Creatinine, Urine 06/09/2022 48  28 - 219 mg/dL Final    6-Acetylmorphine 06/09/2022 Negative  10 ng/mL Final    7-Aminoclonazepam 06/09/2022 Negative  Negative 25 ng/mL Final    a-Hydroxyalprazolam 06/09/2022 Negative  25 ng/mL Final    Acetyl Fentanyl 06/09/2022 Negative  2.5 ng/mL Final    Acetyl Norfentanyl Oxalate 06/09/2022 Negative  5 ng/mL Final    Benzoylecgonine 06/09/2022 Negative  100 ng/mL Final    Buprenorphine 06/09/2022 Negative  25 ng/mL Final    Codeine 06/09/2022 Negative  25 ng/mL Final    EDDP 06/09/2022 Negative  25 ng/mL Final    Fentanyl 06/09/2022 Negative  2.5 ng/mL Final    Hydrocodone 06/09/2022 127.2 (H)  <25.0 25 ng/mL Final    Hydromorphone 06/09/2022 72.5 (H)  <25.0 25 ng/mL Final    Lorazepam 06/09/2022 Negative  25 ng/mL Final    Morphine 06/09/2022 Negative  25 ng/mL Final    Norbuprenorphine 06/09/2022 Negative  25 ng/mL Final    Nordiazepam 06/09/2022 Negative  25 ng/mL Final    Norfentanyl Oxalate 06/09/2022 Negative  5 ng/mL Final    Norhydrocodone 06/09/2022 215.6 (H)  <50.0 50 ng/mL Final    Noroxycodone HCL 06/09/2022 Negative  50 ng/mL Final    Oxazepam 06/09/2022 Negative  25 ng/mL Final    Oxymorphone 06/09/2022 Negative  25 ng/mL Final    Tapentadol 06/09/2022 Negative  25 ng/mL Final    Temazepam 06/09/2022 Negative  25 ng/mL Final    THC-COOH 06/09/2022 Negative  25 ng/mL Final    Tramadol 06/09/2022 Negative  100 ng/mL Final    Amphetamine, Urine 06/09/2022 Negative  Negative Final    Methamphetamines, Urine 06/09/2022 Negative  Negative Final    Methadone, Urine 06/09/2022 Negative  Negative 25 ng/mL Final    Oxycodone, Urine 06/09/2022 Negative  Negative 25 ng/mL Final   Office Visit on 03/09/2022   Component Date Value Ref Range Status    POC Amphetamines  03/09/2022 Negative  Negative, Inconclusive Final    POC Barbiturates 03/09/2022 Negative  Negative, Inconclusive Final    POC Benzodiazepines 03/09/2022 Negative  Negative, Inconclusive Final    POC Cocaine 03/09/2022 Negative  Negative, Inconclusive Final    POC THC 03/09/2022 Negative  Negative, Inconclusive Final    POC Methadone 03/09/2022 Negative  Negative, Inconclusive Final    POC Methamphetamine 03/09/2022 Negative  Negative, Inconclusive Final    POC Opiates 03/09/2022 Presumptive Positive (A)  Negative, Inconclusive Final    POC Oxycodone 03/09/2022 Negative  Negative, Inconclusive Final    POC Phencyclidine 03/09/2022 Negative  Negative, Inconclusive Final    POC Methylenedioxymethamphetamine * 03/09/2022 Negative  Negative, Inconclusive Final    POC Tricyclic Antidepressants 03/09/2022 Negative  Negative, Inconclusive Final    POC Buprenorphine 03/09/2022 Negative   Final     Acceptable 03/09/2022 Yes   Final    POC Temperature (Urine) 03/09/2022 92   Final         Orders Placed This Encounter   Procedures    POCT Urine Drug Screen Presump     Interpretive Information:     Negative:  No drug detected at the cut off level.   Positive:  This result represents presumptive positive for the   tested drug, other substances may yield a positive response other   than the analyte of interest. This result should be utilized for   diagnostic purpose only. Confirmation testing will be performed upon physician request only.          Requested Prescriptions     Signed Prescriptions Disp Refills    HYDROcodone-acetaminophen (NORCO)  mg per tablet 90 tablet 0     Sig: Take 1 tablet by mouth every 8 (eight) hours.    HYDROcodone-acetaminophen (NORCO)  mg per tablet 90 tablet 0     Sig: Take 1 tablet by mouth every 8 (eight) hours.    HYDROcodone-acetaminophen (NORCO)  mg per tablet 90 tablet 0     Sig: Take 1 tablet by mouth every 8 (eight) hours.    diclofenac sodium (VOLTAREN  ARTHRITIS PAIN) 1 % Gel 10 each 2     Sig: Apply 2 g topically 4 (four) times daily. Apply to knees, elbows, joints as needed       Assessment:     1. Spondylosis without myelopathy or radiculopathy, lumbar region    2. Hip pain, right    3. Osteoarthrosis multiple sites, not specified as generalized    4. Encounter for long-term (current) use of other medications         A's of Opioid Risk Assessment  Activity:Patient can perform ADL.   Analgesia:Patients pain is partially controlled by current medication. Patient has tried OTC medications such as Tylenol and Ibuprofen with out relief.   Adverse Effects: Patient denies constipation or sedation.  Aberrant Behavior:  reviewed with no aberrant drug seeking/taking behavior.  Overdose reversal drug naloxone discussed    Drug screen reviewed      Plan:    Definitive drug screen June 9, 2022 consistent with hydrocodone and metabolite    Complaint right lateral thigh pain pointing to her right trochanteric area worse with sitting or lying on her side     She states she would like to continue conservative management current medications helping control her discomfort     Continue current medication    Continue home exercise program as directed    Follow-up 3 months    Dr. Bai, August 2022    Bring original prescription medication bottles/container/box with labels to each visit    Pill count    Physical therapy

## 2022-09-07 ENCOUNTER — OFFICE VISIT (OUTPATIENT)
Dept: PAIN MEDICINE | Facility: CLINIC | Age: 67
End: 2022-09-07
Payer: MEDICARE

## 2022-09-07 ENCOUNTER — OFFICE VISIT (OUTPATIENT)
Dept: WOUND CARE | Facility: CLINIC | Age: 67
End: 2022-09-07
Attending: FAMILY MEDICINE
Payer: MEDICARE

## 2022-09-07 VITALS
OXYGEN SATURATION: 98 % | RESPIRATION RATE: 18 BRPM | WEIGHT: 178 LBS | DIASTOLIC BLOOD PRESSURE: 80 MMHG | HEART RATE: 54 BPM | SYSTOLIC BLOOD PRESSURE: 125 MMHG | HEIGHT: 65 IN | BODY MASS INDEX: 29.66 KG/M2

## 2022-09-07 VITALS
DIASTOLIC BLOOD PRESSURE: 80 MMHG | HEART RATE: 52 BPM | TEMPERATURE: 98 F | RESPIRATION RATE: 18 BRPM | SYSTOLIC BLOOD PRESSURE: 128 MMHG

## 2022-09-07 DIAGNOSIS — S71.102S OPEN WOUND OF LEFT THIGH, SEQUELA: Primary | ICD-10-CM

## 2022-09-07 DIAGNOSIS — M79.652 PAIN IN LEFT THIGH: ICD-10-CM

## 2022-09-07 DIAGNOSIS — E11.9 TYPE 2 DIABETES MELLITUS WITHOUT COMPLICATION, WITHOUT LONG-TERM CURRENT USE OF INSULIN: ICD-10-CM

## 2022-09-07 DIAGNOSIS — Z79.899 ENCOUNTER FOR LONG-TERM (CURRENT) USE OF OTHER MEDICATIONS: ICD-10-CM

## 2022-09-07 DIAGNOSIS — M89.49 OSTEOARTHROSIS MULTIPLE SITES, NOT SPECIFIED AS GENERALIZED: Chronic | ICD-10-CM

## 2022-09-07 DIAGNOSIS — M25.551 HIP PAIN, RIGHT: Chronic | ICD-10-CM

## 2022-09-07 DIAGNOSIS — M47.816 SPONDYLOSIS WITHOUT MYELOPATHY OR RADICULOPATHY, LUMBAR REGION: Primary | Chronic | ICD-10-CM

## 2022-09-07 PROCEDURE — 3074F PR MOST RECENT SYSTOLIC BLOOD PRESSURE < 130 MM HG: ICD-10-PCS | Mod: CPTII,,, | Performed by: FAMILY MEDICINE

## 2022-09-07 PROCEDURE — 3066F NEPHROPATHY DOC TX: CPT | Mod: CPTII,,, | Performed by: FAMILY MEDICINE

## 2022-09-07 PROCEDURE — 3052F HG A1C>EQUAL 8.0%<EQUAL 9.0%: CPT | Mod: CPTII,,, | Performed by: FAMILY MEDICINE

## 2022-09-07 PROCEDURE — 1159F MED LIST DOCD IN RCRD: CPT | Mod: CPTII,,, | Performed by: FAMILY MEDICINE

## 2022-09-07 PROCEDURE — 3061F PR NEG MICROALBUMINURIA RESULT DOCUMENTED/REVIEW: ICD-10-PCS | Mod: CPTII,,, | Performed by: PHYSICIAN ASSISTANT

## 2022-09-07 PROCEDURE — 99215 OFFICE O/P EST HI 40 MIN: CPT | Mod: PBBFAC | Performed by: PHYSICIAN ASSISTANT

## 2022-09-07 PROCEDURE — 3079F DIAST BP 80-89 MM HG: CPT | Mod: CPTII,,, | Performed by: FAMILY MEDICINE

## 2022-09-07 PROCEDURE — 80305 DRUG TEST PRSMV DIR OPT OBS: CPT | Mod: PBBFAC | Performed by: PHYSICIAN ASSISTANT

## 2022-09-07 PROCEDURE — 1101F PR PT FALLS ASSESS DOC 0-1 FALLS W/OUT INJ PAST YR: ICD-10-PCS | Mod: CPTII,,, | Performed by: PHYSICIAN ASSISTANT

## 2022-09-07 PROCEDURE — 1101F PR PT FALLS ASSESS DOC 0-1 FALLS W/OUT INJ PAST YR: ICD-10-PCS | Mod: CPTII,,, | Performed by: FAMILY MEDICINE

## 2022-09-07 PROCEDURE — 99214 PR OFFICE/OUTPT VISIT, EST, LEVL IV, 30-39 MIN: ICD-10-PCS | Mod: S$PBB,,, | Performed by: PHYSICIAN ASSISTANT

## 2022-09-07 PROCEDURE — 1159F PR MEDICATION LIST DOCUMENTED IN MEDICAL RECORD: ICD-10-PCS | Mod: CPTII,,, | Performed by: PHYSICIAN ASSISTANT

## 2022-09-07 PROCEDURE — 99214 PR OFFICE/OUTPT VISIT, EST, LEVL IV, 30-39 MIN: ICD-10-PCS | Mod: S$PBB,,, | Performed by: FAMILY MEDICINE

## 2022-09-07 PROCEDURE — 3288F FALL RISK ASSESSMENT DOCD: CPT | Mod: CPTII,,, | Performed by: PHYSICIAN ASSISTANT

## 2022-09-07 PROCEDURE — 1101F PT FALLS ASSESS-DOCD LE1/YR: CPT | Mod: CPTII,,, | Performed by: FAMILY MEDICINE

## 2022-09-07 PROCEDURE — 3288F FALL RISK ASSESSMENT DOCD: CPT | Mod: CPTII,,, | Performed by: FAMILY MEDICINE

## 2022-09-07 PROCEDURE — 3061F NEG MICROALBUMINURIA REV: CPT | Mod: CPTII,,, | Performed by: FAMILY MEDICINE

## 2022-09-07 PROCEDURE — 3074F SYST BP LT 130 MM HG: CPT | Mod: CPTII,,, | Performed by: FAMILY MEDICINE

## 2022-09-07 PROCEDURE — 99215 OFFICE O/P EST HI 40 MIN: CPT | Mod: PBBFAC | Performed by: FAMILY MEDICINE

## 2022-09-07 PROCEDURE — 1159F MED LIST DOCD IN RCRD: CPT | Mod: CPTII,,, | Performed by: PHYSICIAN ASSISTANT

## 2022-09-07 PROCEDURE — 1125F PR PAIN SEVERITY QUANTIFIED, PAIN PRESENT: ICD-10-PCS | Mod: CPTII,,, | Performed by: PHYSICIAN ASSISTANT

## 2022-09-07 PROCEDURE — 3074F PR MOST RECENT SYSTOLIC BLOOD PRESSURE < 130 MM HG: ICD-10-PCS | Mod: CPTII,,, | Performed by: PHYSICIAN ASSISTANT

## 2022-09-07 PROCEDURE — 3079F DIAST BP 80-89 MM HG: CPT | Mod: CPTII,,, | Performed by: PHYSICIAN ASSISTANT

## 2022-09-07 PROCEDURE — 3061F PR NEG MICROALBUMINURIA RESULT DOCUMENTED/REVIEW: ICD-10-PCS | Mod: CPTII,,, | Performed by: FAMILY MEDICINE

## 2022-09-07 PROCEDURE — 3066F PR DOCUMENTATION OF TREATMENT FOR NEPHROPATHY: ICD-10-PCS | Mod: CPTII,,, | Performed by: FAMILY MEDICINE

## 2022-09-07 PROCEDURE — 1125F AMNT PAIN NOTED PAIN PRSNT: CPT | Mod: CPTII,,, | Performed by: PHYSICIAN ASSISTANT

## 2022-09-07 PROCEDURE — 3066F PR DOCUMENTATION OF TREATMENT FOR NEPHROPATHY: ICD-10-PCS | Mod: CPTII,,, | Performed by: PHYSICIAN ASSISTANT

## 2022-09-07 PROCEDURE — 3066F NEPHROPATHY DOC TX: CPT | Mod: CPTII,,, | Performed by: PHYSICIAN ASSISTANT

## 2022-09-07 PROCEDURE — 1101F PT FALLS ASSESS-DOCD LE1/YR: CPT | Mod: CPTII,,, | Performed by: PHYSICIAN ASSISTANT

## 2022-09-07 PROCEDURE — 3008F BODY MASS INDEX DOCD: CPT | Mod: CPTII,,, | Performed by: PHYSICIAN ASSISTANT

## 2022-09-07 PROCEDURE — 3288F PR FALLS RISK ASSESSMENT DOCUMENTED: ICD-10-PCS | Mod: CPTII,,, | Performed by: FAMILY MEDICINE

## 2022-09-07 PROCEDURE — 3061F NEG MICROALBUMINURIA REV: CPT | Mod: CPTII,,, | Performed by: PHYSICIAN ASSISTANT

## 2022-09-07 PROCEDURE — 3052F PR MOST RECENT HEMOGLOBIN A1C LEVEL 8.0 - < 9.0%: ICD-10-PCS | Mod: CPTII,,, | Performed by: FAMILY MEDICINE

## 2022-09-07 PROCEDURE — 3052F PR MOST RECENT HEMOGLOBIN A1C LEVEL 8.0 - < 9.0%: ICD-10-PCS | Mod: CPTII,,, | Performed by: PHYSICIAN ASSISTANT

## 2022-09-07 PROCEDURE — 3008F PR BODY MASS INDEX (BMI) DOCUMENTED: ICD-10-PCS | Mod: CPTII,,, | Performed by: PHYSICIAN ASSISTANT

## 2022-09-07 PROCEDURE — 1159F PR MEDICATION LIST DOCUMENTED IN MEDICAL RECORD: ICD-10-PCS | Mod: CPTII,,, | Performed by: FAMILY MEDICINE

## 2022-09-07 PROCEDURE — 99214 OFFICE O/P EST MOD 30 MIN: CPT | Mod: S$PBB,,, | Performed by: FAMILY MEDICINE

## 2022-09-07 PROCEDURE — 3079F PR MOST RECENT DIASTOLIC BLOOD PRESSURE 80-89 MM HG: ICD-10-PCS | Mod: CPTII,,, | Performed by: FAMILY MEDICINE

## 2022-09-07 PROCEDURE — 3052F HG A1C>EQUAL 8.0%<EQUAL 9.0%: CPT | Mod: CPTII,,, | Performed by: PHYSICIAN ASSISTANT

## 2022-09-07 PROCEDURE — 3079F PR MOST RECENT DIASTOLIC BLOOD PRESSURE 80-89 MM HG: ICD-10-PCS | Mod: CPTII,,, | Performed by: PHYSICIAN ASSISTANT

## 2022-09-07 PROCEDURE — 3074F SYST BP LT 130 MM HG: CPT | Mod: CPTII,,, | Performed by: PHYSICIAN ASSISTANT

## 2022-09-07 PROCEDURE — 3288F PR FALLS RISK ASSESSMENT DOCUMENTED: ICD-10-PCS | Mod: CPTII,,, | Performed by: PHYSICIAN ASSISTANT

## 2022-09-07 PROCEDURE — 99214 OFFICE O/P EST MOD 30 MIN: CPT | Mod: S$PBB,,, | Performed by: PHYSICIAN ASSISTANT

## 2022-09-07 RX ORDER — HYDROCODONE BITARTRATE AND ACETAMINOPHEN 10; 325 MG/1; MG/1
1 TABLET ORAL EVERY 8 HOURS
Qty: 90 TABLET | Refills: 0 | Status: SHIPPED | OUTPATIENT
Start: 2022-09-07 | End: 2022-10-07

## 2022-09-07 RX ORDER — HYDROCODONE BITARTRATE AND ACETAMINOPHEN 10; 325 MG/1; MG/1
1 TABLET ORAL EVERY 8 HOURS
Qty: 90 TABLET | Refills: 0 | Status: SHIPPED | OUTPATIENT
Start: 2022-10-07 | End: 2022-11-06

## 2022-09-07 RX ORDER — DICLOFENAC SODIUM 10 MG/G
2 GEL TOPICAL 4 TIMES DAILY
Qty: 10 EACH | Refills: 2 | Status: SHIPPED | OUTPATIENT
Start: 2022-09-07 | End: 2022-11-30 | Stop reason: SDUPTHER

## 2022-09-07 RX ORDER — HYDROCODONE BITARTRATE AND ACETAMINOPHEN 10; 325 MG/1; MG/1
1 TABLET ORAL EVERY 8 HOURS
Qty: 90 TABLET | Refills: 0 | Status: SHIPPED | OUTPATIENT
Start: 2022-11-05 | End: 2022-11-30 | Stop reason: SDUPTHER

## 2022-09-20 ENCOUNTER — HOSPITAL ENCOUNTER (EMERGENCY)
Facility: HOSPITAL | Age: 67
Discharge: HOME | End: 2022-09-20
Attending: EMERGENCY MEDICINE
Payer: MEDICARE

## 2022-09-20 VITALS
WEIGHT: 181 LBS | SYSTOLIC BLOOD PRESSURE: 133 MMHG | RESPIRATION RATE: 20 BRPM | BODY MASS INDEX: 30.9 KG/M2 | DIASTOLIC BLOOD PRESSURE: 73 MMHG | HEIGHT: 64 IN | TEMPERATURE: 98 F | HEART RATE: 60 BPM | OXYGEN SATURATION: 96 %

## 2022-09-20 DIAGNOSIS — S70.12XA CONTUSION OF LEFT THIGH, INITIAL ENCOUNTER: Primary | ICD-10-CM

## 2022-09-20 DIAGNOSIS — T14.90XA INJURY: ICD-10-CM

## 2022-09-20 PROCEDURE — 96375 TX/PRO/DX INJ NEW DRUG ADDON: CPT

## 2022-09-20 PROCEDURE — 99284 EMERGENCY DEPT VISIT MOD MDM: CPT | Mod: 25

## 2022-09-20 PROCEDURE — 63600175 PHARM REV CODE 636 W HCPCS: Performed by: EMERGENCY MEDICINE

## 2022-09-20 PROCEDURE — 99283 EMERGENCY DEPT VISIT LOW MDM: CPT | Performed by: EMERGENCY MEDICINE

## 2022-09-20 PROCEDURE — 96374 THER/PROPH/DIAG INJ IV PUSH: CPT

## 2022-09-20 RX ORDER — MORPHINE SULFATE 2 MG/ML
2 INJECTION, SOLUTION INTRAMUSCULAR; INTRAVENOUS
Status: COMPLETED | OUTPATIENT
Start: 2022-09-20 | End: 2022-09-20

## 2022-09-20 RX ORDER — ONDANSETRON 2 MG/ML
4 INJECTION INTRAMUSCULAR; INTRAVENOUS
Status: COMPLETED | OUTPATIENT
Start: 2022-09-20 | End: 2022-09-20

## 2022-09-20 RX ADMIN — MORPHINE SULFATE 2 MG: 2 INJECTION, SOLUTION INTRAMUSCULAR; INTRAVENOUS at 03:09

## 2022-09-20 RX ADMIN — ONDANSETRON 4 MG: 2 INJECTION INTRAMUSCULAR; INTRAVENOUS at 03:09
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
## 2022-09-23 ENCOUNTER — OFFICE VISIT (OUTPATIENT)
Dept: PRIMARY CARE CLINIC | Facility: CLINIC | Age: 67
End: 2022-09-23
Payer: MEDICARE

## 2022-09-23 VITALS
HEART RATE: 61 BPM | SYSTOLIC BLOOD PRESSURE: 120 MMHG | WEIGHT: 181 LBS | TEMPERATURE: 98 F | HEIGHT: 64 IN | BODY MASS INDEX: 30.9 KG/M2 | RESPIRATION RATE: 20 BRPM | DIASTOLIC BLOOD PRESSURE: 61 MMHG | OXYGEN SATURATION: 97 %

## 2022-09-23 DIAGNOSIS — Z91.81 STATUS POST FALL: ICD-10-CM

## 2022-09-23 DIAGNOSIS — E78.5 HYPERLIPIDEMIA, UNSPECIFIED HYPERLIPIDEMIA TYPE: ICD-10-CM

## 2022-09-23 DIAGNOSIS — E11.9 TYPE 2 DIABETES MELLITUS WITHOUT COMPLICATION, WITHOUT LONG-TERM CURRENT USE OF INSULIN: ICD-10-CM

## 2022-09-23 DIAGNOSIS — I10 ESSENTIAL HYPERTENSION: Primary | ICD-10-CM

## 2022-09-23 DIAGNOSIS — E87.6 HYPOKALEMIA: ICD-10-CM

## 2022-09-23 LAB
ALBUMIN SERPL BCP-MCNC: 3.6 G/DL (ref 3.5–5)
ALBUMIN/GLOB SERPL: 0.9 {RATIO}
ALP SERPL-CCNC: 50 U/L (ref 55–142)
ALT SERPL W P-5'-P-CCNC: 20 U/L (ref 13–56)
ANION GAP SERPL CALCULATED.3IONS-SCNC: 11 MMOL/L (ref 7–16)
AST SERPL W P-5'-P-CCNC: 13 U/L (ref 15–37)
BASOPHILS # BLD AUTO: 0.05 K/UL (ref 0–0.2)
BASOPHILS NFR BLD AUTO: 1 % (ref 0–1)
BILIRUB SERPL-MCNC: 0.5 MG/DL (ref ?–1.2)
BUN SERPL-MCNC: 20 MG/DL (ref 7–18)
BUN/CREAT SERPL: 17 (ref 6–20)
CALCIUM SERPL-MCNC: 9.2 MG/DL (ref 8.5–10.1)
CHLORIDE SERPL-SCNC: 108 MMOL/L (ref 98–107)
CHOLEST SERPL-MCNC: 158 MG/DL (ref 0–200)
CHOLEST/HDLC SERPL: 3.1 {RATIO}
CO2 SERPL-SCNC: 26 MMOL/L (ref 21–32)
CREAT SERPL-MCNC: 1.15 MG/DL (ref 0.55–1.02)
DIFFERENTIAL METHOD BLD: ABNORMAL
EGFR (NO RACE VARIABLE) (RUSH/TITUS): 52 ML/MIN/1.73M²
EOSINOPHIL # BLD AUTO: 0.17 K/UL (ref 0–0.5)
EOSINOPHIL NFR BLD AUTO: 3.2 % (ref 1–4)
ERYTHROCYTE [DISTWIDTH] IN BLOOD BY AUTOMATED COUNT: 16.3 % (ref 11.5–14.5)
EST. AVERAGE GLUCOSE BLD GHB EST-MCNC: 224 MG/DL
GLOBULIN SER-MCNC: 4.1 G/DL (ref 2–4)
GLUCOSE SERPL-MCNC: 169 MG/DL (ref 74–106)
HBA1C MFR BLD HPLC: 9.3 % (ref 4.5–6.6)
HCT VFR BLD AUTO: 40.7 % (ref 38–47)
HDLC SERPL-MCNC: 51 MG/DL (ref 40–60)
HGB BLD-MCNC: 12.5 G/DL (ref 12–16)
IMM GRANULOCYTES # BLD AUTO: 0.03 K/UL (ref 0–0.04)
IMM GRANULOCYTES NFR BLD: 0.6 % (ref 0–0.4)
LDLC SERPL CALC-MCNC: 79 MG/DL
LDLC/HDLC SERPL: 1.5 {RATIO}
LYMPHOCYTES # BLD AUTO: 1.78 K/UL (ref 1–4.8)
LYMPHOCYTES NFR BLD AUTO: 33.8 % (ref 27–41)
MCH RBC QN AUTO: 26.5 PG (ref 27–31)
MCHC RBC AUTO-ENTMCNC: 30.7 G/DL (ref 32–36)
MCV RBC AUTO: 86.4 FL (ref 80–96)
MONOCYTES # BLD AUTO: 0.63 K/UL (ref 0–0.8)
MONOCYTES NFR BLD AUTO: 12 % (ref 2–6)
MPC BLD CALC-MCNC: 11.6 FL (ref 9.4–12.4)
NEUTROPHILS # BLD AUTO: 2.6 K/UL (ref 1.8–7.7)
NEUTROPHILS NFR BLD AUTO: 49.4 % (ref 53–65)
NONHDLC SERPL-MCNC: 107 MG/DL
NRBC # BLD AUTO: 0 X10E3/UL
NRBC, AUTO (.00): 0 %
PLATELET # BLD AUTO: 253 K/UL (ref 150–400)
POTASSIUM SERPL-SCNC: 3.6 MMOL/L (ref 3.5–5.1)
PROT SERPL-MCNC: 7.7 G/DL (ref 6.4–8.2)
RBC # BLD AUTO: 4.71 M/UL (ref 4.2–5.4)
SODIUM SERPL-SCNC: 141 MMOL/L (ref 136–145)
TRIGL SERPL-MCNC: 139 MG/DL (ref 35–150)
VLDLC SERPL-MCNC: 28 MG/DL
WBC # BLD AUTO: 5.26 K/UL (ref 4.5–11)

## 2022-09-23 PROCEDURE — 85025 CBC WITH DIFFERENTIAL: ICD-10-PCS | Mod: ,,, | Performed by: CLINICAL MEDICAL LABORATORY

## 2022-09-23 PROCEDURE — 80053 COMPREHENSIVE METABOLIC PANEL: ICD-10-PCS | Mod: ,,, | Performed by: CLINICAL MEDICAL LABORATORY

## 2022-09-23 PROCEDURE — 3052F HG A1C>EQUAL 8.0%<EQUAL 9.0%: CPT | Mod: ,,, | Performed by: NURSE PRACTITIONER

## 2022-09-23 PROCEDURE — 1100F PR PT FALLS ASSESS DOC 2+ FALLS/FALL W/INJURY/YR: ICD-10-PCS | Mod: ,,, | Performed by: NURSE PRACTITIONER

## 2022-09-23 PROCEDURE — 3078F PR MOST RECENT DIASTOLIC BLOOD PRESSURE < 80 MM HG: ICD-10-PCS | Mod: ,,, | Performed by: NURSE PRACTITIONER

## 2022-09-23 PROCEDURE — 3066F PR DOCUMENTATION OF TREATMENT FOR NEPHROPATHY: ICD-10-PCS | Mod: ,,, | Performed by: NURSE PRACTITIONER

## 2022-09-23 PROCEDURE — 3074F PR MOST RECENT SYSTOLIC BLOOD PRESSURE < 130 MM HG: ICD-10-PCS | Mod: ,,, | Performed by: NURSE PRACTITIONER

## 2022-09-23 PROCEDURE — 3061F PR NEG MICROALBUMINURIA RESULT DOCUMENTED/REVIEW: ICD-10-PCS | Mod: ,,, | Performed by: NURSE PRACTITIONER

## 2022-09-23 PROCEDURE — 3008F BODY MASS INDEX DOCD: CPT | Mod: ,,, | Performed by: NURSE PRACTITIONER

## 2022-09-23 PROCEDURE — 99214 OFFICE O/P EST MOD 30 MIN: CPT | Mod: ,,, | Performed by: NURSE PRACTITIONER

## 2022-09-23 PROCEDURE — 83036 HEMOGLOBIN A1C: ICD-10-PCS | Mod: ,,, | Performed by: CLINICAL MEDICAL LABORATORY

## 2022-09-23 PROCEDURE — 3288F FALL RISK ASSESSMENT DOCD: CPT | Mod: ,,, | Performed by: NURSE PRACTITIONER

## 2022-09-23 PROCEDURE — 1100F PTFALLS ASSESS-DOCD GE2>/YR: CPT | Mod: ,,, | Performed by: NURSE PRACTITIONER

## 2022-09-23 PROCEDURE — 1160F RVW MEDS BY RX/DR IN RCRD: CPT | Mod: ,,, | Performed by: NURSE PRACTITIONER

## 2022-09-23 PROCEDURE — 80061 LIPID PANEL: CPT | Mod: ,,, | Performed by: CLINICAL MEDICAL LABORATORY

## 2022-09-23 PROCEDURE — 3078F DIAST BP <80 MM HG: CPT | Mod: ,,, | Performed by: NURSE PRACTITIONER

## 2022-09-23 PROCEDURE — 83036 HEMOGLOBIN GLYCOSYLATED A1C: CPT | Mod: ,,, | Performed by: CLINICAL MEDICAL LABORATORY

## 2022-09-23 PROCEDURE — 3288F PR FALLS RISK ASSESSMENT DOCUMENTED: ICD-10-PCS | Mod: ,,, | Performed by: NURSE PRACTITIONER

## 2022-09-23 PROCEDURE — 3052F PR MOST RECENT HEMOGLOBIN A1C LEVEL 8.0 - < 9.0%: ICD-10-PCS | Mod: ,,, | Performed by: NURSE PRACTITIONER

## 2022-09-23 PROCEDURE — 99214 PR OFFICE/OUTPT VISIT, EST, LEVL IV, 30-39 MIN: ICD-10-PCS | Mod: ,,, | Performed by: NURSE PRACTITIONER

## 2022-09-23 PROCEDURE — 3074F SYST BP LT 130 MM HG: CPT | Mod: ,,, | Performed by: NURSE PRACTITIONER

## 2022-09-23 PROCEDURE — 1159F MED LIST DOCD IN RCRD: CPT | Mod: ,,, | Performed by: NURSE PRACTITIONER

## 2022-09-23 PROCEDURE — 1160F PR REVIEW ALL MEDS BY PRESCRIBER/CLIN PHARMACIST DOCUMENTED: ICD-10-PCS | Mod: ,,, | Performed by: NURSE PRACTITIONER

## 2022-09-23 PROCEDURE — 3066F NEPHROPATHY DOC TX: CPT | Mod: ,,, | Performed by: NURSE PRACTITIONER

## 2022-09-23 PROCEDURE — 1159F PR MEDICATION LIST DOCUMENTED IN MEDICAL RECORD: ICD-10-PCS | Mod: ,,, | Performed by: NURSE PRACTITIONER

## 2022-09-23 PROCEDURE — 3061F NEG MICROALBUMINURIA REV: CPT | Mod: ,,, | Performed by: NURSE PRACTITIONER

## 2022-09-23 PROCEDURE — 85025 COMPLETE CBC W/AUTO DIFF WBC: CPT | Mod: ,,, | Performed by: CLINICAL MEDICAL LABORATORY

## 2022-09-23 PROCEDURE — 3008F PR BODY MASS INDEX (BMI) DOCUMENTED: ICD-10-PCS | Mod: ,,, | Performed by: NURSE PRACTITIONER

## 2022-09-23 PROCEDURE — 80053 COMPREHEN METABOLIC PANEL: CPT | Mod: ,,, | Performed by: CLINICAL MEDICAL LABORATORY

## 2022-09-23 PROCEDURE — 80061 LIPID PANEL: ICD-10-PCS | Mod: ,,, | Performed by: CLINICAL MEDICAL LABORATORY

## 2022-09-23 RX ORDER — POTASSIUM CHLORIDE 750 MG/1
20 CAPSULE, EXTENDED RELEASE ORAL 2 TIMES DAILY
Qty: 180 CAPSULE | Refills: 3 | Status: SHIPPED | OUTPATIENT
Start: 2022-09-23 | End: 2023-03-16

## 2022-09-23 NOTE — PROGRESS NOTES
[facility]  Primary Care       PATIENT NAME: Selena Proctor   : 1955    AGE: 67 y.o. DATE: 2022    MRN: [id number]        Reason for Visit / Chief Complaint:  ER follow up (Seen in ER for fall last week. Was told to follow up with PCP in 3 days./Visit scheduled next week for lab work, would like to get labs done today./Needs potassium changed to pill form instead of oral suspension )     Subjective:     HPI: Patient here for follow from ER on 2022 for fall; patient states she fell getting out of the shower/tub, hitting left thigh; x-ray of hip a femur showed no fractures. Patient states she is just sore when moving. States she has norco at home for pain.     Patient states she wanted to have her labs drawn today instead of next week.     Patient requesting to have potassium liquid changed to pill form; states she has taken the pills in the past without any issues.             EXAMINATION:  XR HIP WITH PELVIS WHEN PERFORMED, 2 OR 3 VIEWS LEFT; XR FEMUR 2 VIEW LEFT     CLINICAL HISTORY:  Injury, unspecified, initial encounter     COMPARISON:  3 January 2018     FINDINGS:  No evidence of fracture seen.  The alignment of the joints appears normal.  Bilateral hip arthroplasties appear within normal limits.  Severe knee degenerative change is present.  No soft tissue abnormality is seen.     Impression:     No evidence of acute injury demonstrated        Electronically signed by: Turner Garrison  Date:                                            2022  Time:                                           16:16        Exam Ended: 22 16:13 Last Resulted: 22 16:16                      EXAMINATION:  XR HIP WITH PELVIS WHEN PERFORMED, 2 OR 3 VIEWS LEFT; XR FEMUR 2 VIEW LEFT     CLINICAL HISTORY:  Injury, unspecified, initial encounter     COMPARISON:  3 January 2018     FINDINGS:  No evidence of fracture seen.  The alignment of the joints appears normal.  Bilateral hip  arthroplasties appear within normal limits.  Severe knee degenerative change is present.  No soft tissue abnormality is seen.     Impression:     No evidence of acute injury demonstrated        Electronically signed by: Turner Garrison  Date:                                            09/20/2022  Time:                                           16:16        Exam Ended: 09/20/22 16:12 Last Resulted: 09/20/22 16:16                 Review of Systems: Review of Systems   Constitutional:  Negative for chills, fatigue and fever.   Respiratory:  Negative for cough, chest tightness and shortness of breath.    Cardiovascular:  Negative for chest pain.   Gastrointestinal:  Negative for abdominal pain, constipation, diarrhea, nausea and vomiting.   Genitourinary:  Negative for dysuria.   Musculoskeletal:  Positive for arthralgias. Negative for gait problem.   Skin:  Negative for rash.   Neurological:  Negative for headaches.        Review of patient's allergies indicates:   Allergen Reactions    Betadine [povidone-iodine] Itching        Med List:  Current Outpatient Medications on File Prior to Visit   Medication Sig Dispense Refill    ACCU-CHEK DONNA PLUS TEST STRP Strp CHECK BLOOD SUGAR ONE TIME DAILY 100 strip 1    ACCU-CHEK SOFTCLIX LANCETS Misc CHECK BLOOD SUGAR ONE TIME DAILY 100 each 1    alcohol swabs (BD ALCOHOL SWABS) PadM USE AS DIRECTED 90 each 2    aspirin (ECOTRIN) 81 MG EC tablet Take 81 mg by mouth once daily.       blood glucose control, low (TRUE METRIX LEVEL 1) Soln USE AS DIRECTED WITH GLUCOSE METER 1 each 0    diclofenac sodium (VOLTAREN ARTHRITIS PAIN) 1 % Gel Apply 2 g topically 4 (four) times daily. Apply to knees, elbows, joints as needed 10 each 2    ezetimibe (ZETIA) 10 mg tablet Take 1 tablet (10 mg total) by mouth once daily. 90 tablet 2    furosemide (LASIX) 40 MG tablet TAKE 1 TABLET EVERY DAY AS NEEDED 90 tablet 0    HYDROcodone-acetaminophen (NORCO)  mg per tablet Take 1 tablet by mouth  every 8 (eight) hours. 90 tablet 0    JARDIANCE 10 mg tablet TAKE 1 TABLET EVERY DAY 90 tablet 1    losartan-hydrochlorothiazide 100-25 mg (HYZAAR) 100-25 mg per tablet TAKE 1 TABLET EVERY DAY 90 tablet 2    metFORMIN (GLUCOPHAGE) 500 MG tablet Take 1 tablet (500 mg total) by mouth 2 (two) times daily with meals. 180 tablet 3    metoprolol succinate (TOPROL-XL) 100 MG 24 hr tablet TAKE 1 TABLET EVERY DAY 90 tablet 2    miconazole (MONISTAT 7) 2 % vaginal cream Apply cream to external vaginal area for vaginal itching. 1 kit 0    montelukast (SINGULAIR) 10 mg tablet TAKE 1 TABLET EVERY DAY AS NEEDED 90 tablet 2    NIFEdipine (PROCARDIA-XL) 90 MG (OSM) 24 hr tablet TAKE 1 TABLET EVERY DAY 90 tablet 1    omeprazole (PRILOSEC) 40 MG capsule TAKE 1 CAPSULE EVERY DAY 90 capsule 0    pitavastatin calcium (LIVALO) 4 mg Tab Take one tablet by mouth three times per week on Monday, Wednesday, Friday. 90 tablet 2    spironolactone (ALDACTONE) 25 MG tablet TAKE 1 TABLET EVERY DAY 90 tablet 0    TRUE METRIX GLUCOSE METER kit USE AS DIRECTED 180 each 2    TRUEPLUS LANCETS 33 gauge Misc USE AS DIRECTED 200 each 0    [DISCONTINUED] potassium chloride 10% (KAYCIEL) 20 mEq/15 mL oral solution MIX 15ML (1 TABLESPOONFUL) IN 4 OUNCES OF LIQUID AND DRINK TWICE DAILY 2520 mL 1    ACCU-CHEK SOFT DEV LANCETS Kit USE AS DIRECTED 100 each 1    chlorhexidine (HIBICLENS) 4 % external liquid Apply topically daily as needed (thigh wound). (Patient not taking: No sig reported) 120 mL 0    [START ON 10/7/2022] HYDROcodone-acetaminophen (NORCO)  mg per tablet Take 1 tablet by mouth every 8 (eight) hours. 90 tablet 0    [START ON 11/5/2022] HYDROcodone-acetaminophen (NORCO)  mg per tablet Take 1 tablet by mouth every 8 (eight) hours. 90 tablet 0    silver sulfADIAZINE 1% (SILVADENE) 1 % cream Apply topically once daily. (Patient not taking: Reported on 9/23/2022) 50 g 2     No current facility-administered medications on file prior to  "visit.       Medical/Social/Family History:  Past Medical History:   Diagnosis Date    Acid reflux     Coronary arteriosclerosis     Diabetes     Diabetes mellitus, type 2     Hypertension     Spondylosis without myelopathy or radiculopathy, lumbar region       Social History     Tobacco Use   Smoking Status Never   Smokeless Tobacco Never      Social History     Substance and Sexual Activity   Alcohol Use Not Currently       Family History   Problem Relation Age of Onset    Hypertension Mother     Heart disease Mother     Diabetes Sister     Hypertension Sister     Hypertension Brother     Alcohol abuse Father     Cancer Father     Hypertension Son     Mental illness Maternal Aunt       Past Surgical History:   Procedure Laterality Date    APPENDECTOMY      BLOCK, NERVE, OBTURATOR Right 2019    Right Femoral/Obturator Nerve Block  Dr Headley     SECTION      x2    CORONARY ARTERY BYPASS GRAFT      KNEE ARTHROPLASTY Right     KNEE ARTHROSCOPY Left     SKIN GRAFT      Left Thigh    TOTAL HIP ARTHROPLASTY Bilateral     TUBAL LIGATION        Immunization History   Administered Date(s) Administered    COVID-19 MRNA, LN-S PF (MODERNA HALF 0.25 ML DOSE) 10/29/2021    COVID-19, MRNA, LN-S, PF (MODERNA FULL 0.5 ML DOSE) 2021, 03/10/2021    Influenza - Quadrivalent - PF *Preferred* (6 months and older) 2022    Pneumococcal Conjugate - 13 Valent 2017    Pneumococcal Conjugate - 20 Valent 2022          Objective:      Vitals:    22 0848   BP: 120/61   BP Location: Left arm   Patient Position: Sitting   BP Method: Large (Automatic)   Pulse: 61   Resp: 20   Temp: 98.3 °F (36.8 °C)   TempSrc: Oral   SpO2: 97%   Weight: 82.1 kg (181 lb)   Height: 5' 4" (1.626 m)     Body mass index is 31.07 kg/m².     Physical Exam: Physical Exam  Vitals and nursing note reviewed.   Constitutional:       Appearance: Normal appearance.   HENT:      Head: Normocephalic.      Mouth/Throat:      Mouth: Mucous " membranes are moist.   Eyes:      Extraocular Movements: Extraocular movements intact.      Conjunctiva/sclera: Conjunctivae normal.      Pupils: Pupils are equal, round, and reactive to light.   Cardiovascular:      Rate and Rhythm: Normal rate and regular rhythm.      Heart sounds: Normal heart sounds.   Pulmonary:      Effort: Pulmonary effort is normal. No respiratory distress.      Breath sounds: Normal breath sounds. No wheezing or rhonchi.   Abdominal:      General: Bowel sounds are normal. There is no distension.      Palpations: Abdomen is soft.      Tenderness: There is no abdominal tenderness.   Musculoskeletal:         General: Tenderness (patient has tenderness to left posterior thigh) present. Normal range of motion.      Cervical back: Normal range of motion.   Skin:     General: Skin is warm and dry.   Neurological:      General: No focal deficit present.      Mental Status: She is alert and oriented to person, place, and time.      Gait: Gait normal.   Psychiatric:         Mood and Affect: Mood normal.         Behavior: Behavior normal.              Assessment:          ICD-10-CM ICD-9-CM   1. Essential hypertension  I10 401.9   2. Type 2 diabetes mellitus without complication, without long-term current use of insulin  E11.9 250.00   3. Hyperlipidemia, unspecified hyperlipidemia type  E78.5 272.4   4. Hypokalemia  E87.6 276.8   5. Status post fall  Z91.81 V15.88        Plan:       Essential hypertension  -     CBC Auto Differential; Future; Expected date: 09/23/2022  -     Lipid Panel; Future; Expected date: 09/23/2022  -     Comprehensive Metabolic Panel; Future; Expected date: 09/23/2022    Type 2 diabetes mellitus without complication, without long-term current use of insulin  -     Hemoglobin A1C; Future; Expected date: 09/23/2022    Hyperlipidemia, unspecified hyperlipidemia type  -     Lipid Panel; Future; Expected date: 09/23/2022    Hypokalemia  -     potassium chloride (MICRO-K) 10 MEQ  CpSR; Take 2 capsules (20 mEq total) by mouth 2 (two) times daily. for 90 doses  Dispense: 180 capsule; Refill: 3    Status post fall        New & refilled meds:  Requested Prescriptions     Signed Prescriptions Disp Refills    potassium chloride (MICRO-K) 10 MEQ CpSR 180 capsule 3     Sig: Take 2 capsules (20 mEq total) by mouth 2 (two) times daily. for 90 doses       Follow up in about 3 months (around 12/23/2022) for Hypertension, diabetes.     Patient Instructions   Patient Education       Controlling Your Blood Pressure Through Lifestyle   The Basics   Written by the doctors and editors at [facility]   What does my lifestyle have to do with my blood pressure? -- The things you do and the foods you eat have a big effect on your blood pressure and your overall health. Following the right lifestyle can:  Lower your blood pressure or keep you from getting high blood pressure in the first place  Reduce your need for blood pressure medicines  Make medicines for high blood pressure work better, if you do take them  Lower the chances that you'll have a heart attack or stroke, or develop kidney disease  Which lifestyle choices will help lower my blood pressure? -- Here's what you can do:  Lose weight (if you are overweight)  Choose a diet rich in fruits, vegetables, and low-fat dairy products, and low in meats, sweets, and refined grains  Eat less salt (sodium)  Do something active for at least 30 minutes a day on most days of the week  Limit the amount of alcohol you drink  If you have high blood pressure, it's also very important to quit smoking (if you smoke). Quitting smoking might not bring your blood pressure down. But it will lower the chances that you'll have a heart attack or stroke, and it will help you feel better and live longer.  Start low and go slow -- The changes listed above might sound like a lot, but don't worry. You don't have to change everything all at once. The key to improving your lifestyle is to  ""start low and go slow." Choose 1 small, specific thing to change and try doing it for a while. If it works for you, keep doing it until it becomes a habit. If it doesn't, don't give up. Choose something else to change and see how that goes.  Let's say, for example, that you would like to improve your diet. If you're the type of person who eats cheeseburgers and French fries all the time, you can't switch to eating just salads from one day to the next. When people try to make changes like that, they often fail. Then they feel frustrated and tend to give up. So instead of trying to change everything about your diet in 1 day, change 1 or 2 small things about your diet and give yourself time to get used to those changes. For instance, keep the cheeseburger but give up the French fries. Or eat the same things but cut your portions in half.  As you find things that you are able to change and stick with, keep adding new changes. In time, you will see that you can actually change a lot. You just have to get used to the changes slowly.  Lose weight -- When people think about losing weight, they sometimes make it more complicated than it really is. To lose weight, you have to either eat less or move more. If you do both of those things, it's even better. But there is no single weight-loss diet or activity that's better than any other. When it comes to weight loss, the most effective plan is the one that you'll stick with.  Improve your diet -- There is no single diet that is right for everyone. But in general, a healthy diet can include:  Lots of fruits, vegetables, and whole grains  Some beans, peas, lentils, chickpeas, and similar foods  Some nuts, such as walnuts, almonds, and peanuts  Fat-free or low-fat milk and milk products  Some fish  To have a healthy diet, it's also important to limit or avoid sugar, sweets, meats, and refined grains. (Refined grains are found in white bread, white rice, most forms of pasta, and " "most packaged "snack" foods.)  Reduce salt -- Many people think that eating a low-sodium diet means avoiding the salt shaker and not adding salt when cooking. The truth is, not adding salt at the table or when you cook will only help a little. Almost all of the sodium you eat is already in the food you buy at the grocery store or at restaurants (figure 1).  The most important thing you can do to cut down on sodium is to eat less processed food. That means that you should avoid most foods that are sold in cans, boxes, jars, and bags. You should also eat in restaurants less often.  To reduce the amount of sodium you get, buy fresh or fresh-frozen fruits, vegetables, and meats. (Fresh-frozen foods have had nothing added to them before freezing.) Then you can make meals at home, from scratch, with these ingredients.  As with the other changes, don't try to cut out salt all at once. Instead, choose 1 or 2 foods that have a lot of sodium and try to replace them with low-sodium choices. When you get used to those low-sodium options, find another food or 2 to change. Then keep going, until all the foods you eat are sodium-free or low in sodium.  Become more active -- If you want to be more active, you don't have to go to the gym or get all sweaty. It is possible to increase your activity level while doing everyday things you enjoy. Walking, gardening, and dancing are just a few of the things that you might try. As with all the other changes, the key is not to do too much too fast. If you don't do any activity now, start by walking for just a few minutes every other day. Do that for a few weeks. If you stick with it, try doing it for longer. But if you find that you don't like walking, try a different activity.  Drink less alcohol -- If you are a woman, do not have more than 1 "standard drink" of alcohol a day. If you are a man, do not have more than 2. A "standard drink" is:  A can or bottle that has 12 ounces of beer  A " "glass that has 5 ounces of wine  A shot that has 1.5 ounces of whiskey  Where should I start? -- If you want to improve your lifestyle, start by making the changes that you think would be easiest for you. If you used to exercise and just got out of the habit, maybe it would be easy for you to start exercising again. Or if you actually like cooking meals from scratch, maybe the first thing you should focus on is eating home-cooked meals that are low in sodium.  Whatever you tackle first, choose specific, realistic goals, and give yourself a deadline. For example, do not decide that you are going to "exercise more." Instead, decide that you are going to walk for 10 minutes on Monday, Wednesday, and Friday, and that you are going to do this for the next 2 weeks.  When lifestyle changes are too general, people have a hard time following through.  Now go. You can do it!  All topics are updated as new evidence becomes available and our peer review process is complete.  This topic retrieved from [employer] on: Sep 21, 2021.  Topic 08892 Version 8.0  Release: 29.4.2 - C29.263  © 2021 UpToDate, Inc. and/or its affiliates. All rights reserved.  figure 1: Sources of sodium in your diet     Graphic 62050 Version 2.0    Consumer Information Use and Disclaimer   This information is not specific medical advice and does not replace information you receive from your health care provider. This is only a brief summary of general information. It does NOT include all information about conditions, illnesses, injuries, tests, procedures, treatments, therapies, discharge instructions or life-style choices that may apply to you. You must talk with your health care provider for complete information about your health and treatment options. This information should not be used to decide whether or not to accept your health care provider's advice, instructions or recommendations. Only your health care provider has the knowledge and training to " provide advice that is right for you. The use of this information is governed by the [employer] End User License Agreement, available at https://[email or web address].com.vArmour/en/solutions/[email or web address]/craig.The use of [employer] content is governed by the [employer] Terms of Use. ©2021 UpToDate, Inc. All rights reserved.  Copyright   © 2021 UpToDate, Inc. and/or its affiliates. All rights reserved.       Signature: Lizzette Meadows DNP, FNP-C

## 2022-09-23 NOTE — PATIENT INSTRUCTIONS
"Patient Education       Controlling Your Blood Pressure Through Lifestyle   The Basics   Written by the doctors and editors at [facility]   What does my lifestyle have to do with my blood pressure? -- The things you do and the foods you eat have a big effect on your blood pressure and your overall health. Following the right lifestyle can:  Lower your blood pressure or keep you from getting high blood pressure in the first place  Reduce your need for blood pressure medicines  Make medicines for high blood pressure work better, if you do take them  Lower the chances that you'll have a heart attack or stroke, or develop kidney disease  Which lifestyle choices will help lower my blood pressure? -- Here's what you can do:  Lose weight (if you are overweight)  Choose a diet rich in fruits, vegetables, and low-fat dairy products, and low in meats, sweets, and refined grains  Eat less salt (sodium)  Do something active for at least 30 minutes a day on most days of the week  Limit the amount of alcohol you drink  If you have high blood pressure, it's also very important to quit smoking (if you smoke). Quitting smoking might not bring your blood pressure down. But it will lower the chances that you'll have a heart attack or stroke, and it will help you feel better and live longer.  Start low and go slow -- The changes listed above might sound like a lot, but don't worry. You don't have to change everything all at once. The key to improving your lifestyle is to "start low and go slow." Choose 1 small, specific thing to change and try doing it for a while. If it works for you, keep doing it until it becomes a habit. If it doesn't, don't give up. Choose something else to change and see how that goes.  Let's say, for example, that you would like to improve your diet. If you're the type of person who eats cheeseburgers and French fries all the time, you can't switch to eating just salads from one day to the next. When people try to " "make changes like that, they often fail. Then they feel frustrated and tend to give up. So instead of trying to change everything about your diet in 1 day, change 1 or 2 small things about your diet and give yourself time to get used to those changes. For instance, keep the cheeseburger but give up the French fries. Or eat the same things but cut your portions in half.  As you find things that you are able to change and stick with, keep adding new changes. In time, you will see that you can actually change a lot. You just have to get used to the changes slowly.  Lose weight -- When people think about losing weight, they sometimes make it more complicated than it really is. To lose weight, you have to either eat less or move more. If you do both of those things, it's even better. But there is no single weight-loss diet or activity that's better than any other. When it comes to weight loss, the most effective plan is the one that you'll stick with.  Improve your diet -- There is no single diet that is right for everyone. But in general, a healthy diet can include:  Lots of fruits, vegetables, and whole grains  Some beans, peas, lentils, chickpeas, and similar foods  Some nuts, such as walnuts, almonds, and peanuts  Fat-free or low-fat milk and milk products  Some fish  To have a healthy diet, it's also important to limit or avoid sugar, sweets, meats, and refined grains. (Refined grains are found in white bread, white rice, most forms of pasta, and most packaged "snack" foods.)  Reduce salt -- Many people think that eating a low-sodium diet means avoiding the salt shaker and not adding salt when cooking. The truth is, not adding salt at the table or when you cook will only help a little. Almost all of the sodium you eat is already in the food you buy at the grocery store or at restaurants (figure 1).  The most important thing you can do to cut down on sodium is to eat less processed food. That means that you should " "avoid most foods that are sold in cans, boxes, jars, and bags. You should also eat in restaurants less often.  To reduce the amount of sodium you get, buy fresh or fresh-frozen fruits, vegetables, and meats. (Fresh-frozen foods have had nothing added to them before freezing.) Then you can make meals at home, from scratch, with these ingredients.  As with the other changes, don't try to cut out salt all at once. Instead, choose 1 or 2 foods that have a lot of sodium and try to replace them with low-sodium choices. When you get used to those low-sodium options, find another food or 2 to change. Then keep going, until all the foods you eat are sodium-free or low in sodium.  Become more active -- If you want to be more active, you don't have to go to the gym or get all sweaty. It is possible to increase your activity level while doing everyday things you enjoy. Walking, gardening, and dancing are just a few of the things that you might try. As with all the other changes, the key is not to do too much too fast. If you don't do any activity now, start by walking for just a few minutes every other day. Do that for a few weeks. If you stick with it, try doing it for longer. But if you find that you don't like walking, try a different activity.  Drink less alcohol -- If you are a woman, do not have more than 1 "standard drink" of alcohol a day. If you are a man, do not have more than 2. A "standard drink" is:  A can or bottle that has 12 ounces of beer  A glass that has 5 ounces of wine  A shot that has 1.5 ounces of whiskey  Where should I start? -- If you want to improve your lifestyle, start by making the changes that you think would be easiest for you. If you used to exercise and just got out of the habit, maybe it would be easy for you to start exercising again. Or if you actually like cooking meals from scratch, maybe the first thing you should focus on is eating home-cooked meals that are low in sodium.  Whatever you " "tackle first, choose specific, realistic goals, and give yourself a deadline. For example, do not decide that you are going to "exercise more." Instead, decide that you are going to walk for 10 minutes on Monday, Wednesday, and Friday, and that you are going to do this for the next 2 weeks.  When lifestyle changes are too general, people have a hard time following through.  Now go. You can do it!  All topics are updated as new evidence becomes available and our peer review process is complete.  This topic retrieved from [employer] on: Sep 21, 2021.  Topic 81130 Version 8.0  Release: 29.4.2 - C29.263  © 2021 UpToDate, Inc. and/or its affiliates. All rights reserved.  figure 1: Sources of sodium in your diet     Graphic 08873 Version 2.0    Consumer Information Use and Disclaimer   This information is not specific medical advice and does not replace information you receive from your health care provider. This is only a brief summary of general information. It does NOT include all information about conditions, illnesses, injuries, tests, procedures, treatments, therapies, discharge instructions or life-style choices that may apply to you. You must talk with your health care provider for complete information about your health and treatment options. This information should not be used to decide whether or not to accept your health care provider's advice, instructions or recommendations. Only your health care provider has the knowledge and training to provide advice that is right for you. The use of this information is governed by the [employer] End User License Agreement, available at https://www.[employer].Cle[employer] Machine/en/solutions/Wefunder/about/craig.The use of [employer] content is governed by the [employer] Terms of Use. ©2021 UpToDate, Inc. All rights reserved.  Copyright   © 2021 UpToDate, Inc. and/or its affiliates. All rights reserved.    "

## 2022-09-26 ENCOUNTER — TELEPHONE (OUTPATIENT)
Dept: PRIMARY CARE CLINIC | Facility: CLINIC | Age: 67
End: 2022-09-26
Payer: MEDICARE

## 2022-09-26 NOTE — TELEPHONE ENCOUNTER
----- Message from Lizzette Meadows DNP, BANDARP-C sent at 9/26/2022  4:26 PM CDT -----  Please notify patient of results. Ask patient if she is taking her diabetic medication as prescribed. She needs to follow a diabetic diet and incorporate exercise.

## 2022-09-27 ENCOUNTER — TELEPHONE (OUTPATIENT)
Dept: PRIMARY CARE CLINIC | Facility: CLINIC | Age: 67
End: 2022-09-27
Payer: MEDICARE

## 2022-09-28 ENCOUNTER — OFFICE VISIT (OUTPATIENT)
Dept: WOUND CARE | Facility: CLINIC | Age: 67
End: 2022-09-28
Attending: FAMILY MEDICINE
Payer: MEDICARE

## 2022-09-28 VITALS
SYSTOLIC BLOOD PRESSURE: 115 MMHG | RESPIRATION RATE: 18 BRPM | HEART RATE: 53 BPM | TEMPERATURE: 97 F | DIASTOLIC BLOOD PRESSURE: 68 MMHG

## 2022-09-28 DIAGNOSIS — S71.102S OPEN WOUND OF LEFT THIGH, SEQUELA: Primary | ICD-10-CM

## 2022-09-28 PROCEDURE — 3061F PR NEG MICROALBUMINURIA RESULT DOCUMENTED/REVIEW: ICD-10-PCS | Mod: CPTII,,, | Performed by: NURSE PRACTITIONER

## 2022-09-28 PROCEDURE — 1159F PR MEDICATION LIST DOCUMENTED IN MEDICAL RECORD: ICD-10-PCS | Mod: CPTII,,, | Performed by: NURSE PRACTITIONER

## 2022-09-28 PROCEDURE — 1159F MED LIST DOCD IN RCRD: CPT | Mod: CPTII,,, | Performed by: NURSE PRACTITIONER

## 2022-09-28 PROCEDURE — 3066F PR DOCUMENTATION OF TREATMENT FOR NEPHROPATHY: ICD-10-PCS | Mod: CPTII,,, | Performed by: NURSE PRACTITIONER

## 2022-09-28 PROCEDURE — 99213 OFFICE O/P EST LOW 20 MIN: CPT | Mod: S$PBB,,, | Performed by: NURSE PRACTITIONER

## 2022-09-28 PROCEDURE — 3066F NEPHROPATHY DOC TX: CPT | Mod: CPTII,,, | Performed by: NURSE PRACTITIONER

## 2022-09-28 PROCEDURE — 3078F PR MOST RECENT DIASTOLIC BLOOD PRESSURE < 80 MM HG: ICD-10-PCS | Mod: CPTII,,, | Performed by: NURSE PRACTITIONER

## 2022-09-28 PROCEDURE — 3288F FALL RISK ASSESSMENT DOCD: CPT | Mod: CPTII,,, | Performed by: NURSE PRACTITIONER

## 2022-09-28 PROCEDURE — 1101F PR PT FALLS ASSESS DOC 0-1 FALLS W/OUT INJ PAST YR: ICD-10-PCS | Mod: CPTII,,, | Performed by: NURSE PRACTITIONER

## 2022-09-28 PROCEDURE — 3046F PR MOST RECENT HEMOGLOBIN A1C LEVEL > 9.0%: ICD-10-PCS | Mod: CPTII,,, | Performed by: NURSE PRACTITIONER

## 2022-09-28 PROCEDURE — 99215 OFFICE O/P EST HI 40 MIN: CPT | Mod: PBBFAC | Performed by: NURSE PRACTITIONER

## 2022-09-28 PROCEDURE — 1160F PR REVIEW ALL MEDS BY PRESCRIBER/CLIN PHARMACIST DOCUMENTED: ICD-10-PCS | Mod: CPTII,,, | Performed by: NURSE PRACTITIONER

## 2022-09-28 PROCEDURE — 3288F PR FALLS RISK ASSESSMENT DOCUMENTED: ICD-10-PCS | Mod: CPTII,,, | Performed by: NURSE PRACTITIONER

## 2022-09-28 PROCEDURE — 3078F DIAST BP <80 MM HG: CPT | Mod: CPTII,,, | Performed by: NURSE PRACTITIONER

## 2022-09-28 PROCEDURE — 1160F RVW MEDS BY RX/DR IN RCRD: CPT | Mod: CPTII,,, | Performed by: NURSE PRACTITIONER

## 2022-09-28 PROCEDURE — 3074F PR MOST RECENT SYSTOLIC BLOOD PRESSURE < 130 MM HG: ICD-10-PCS | Mod: CPTII,,, | Performed by: NURSE PRACTITIONER

## 2022-09-28 PROCEDURE — 3046F HEMOGLOBIN A1C LEVEL >9.0%: CPT | Mod: CPTII,,, | Performed by: NURSE PRACTITIONER

## 2022-09-28 PROCEDURE — 99213 PR OFFICE/OUTPT VISIT, EST, LEVL III, 20-29 MIN: ICD-10-PCS | Mod: S$PBB,,, | Performed by: NURSE PRACTITIONER

## 2022-09-28 PROCEDURE — 1101F PT FALLS ASSESS-DOCD LE1/YR: CPT | Mod: CPTII,,, | Performed by: NURSE PRACTITIONER

## 2022-09-28 PROCEDURE — 3074F SYST BP LT 130 MM HG: CPT | Mod: CPTII,,, | Performed by: NURSE PRACTITIONER

## 2022-09-28 PROCEDURE — 3061F NEG MICROALBUMINURIA REV: CPT | Mod: CPTII,,, | Performed by: NURSE PRACTITIONER

## 2022-09-28 NOTE — PROGRESS NOTES
MORA Shen   [facility]  [address] [facility] [place] [address]  [phone]      PATIENT NAME: Selena Proctor  : 1955  DATE: 22  MRN: [id number]      Billing Provider: MORA Shen  Level of Service:   Patient PCP Information       Provider PCP Type    Lizzette Meadows DNP, BANDARP-C General            Reason for Visit / Chief Complaint: Open wound of left thigh, sequela       History of Present Illness / Problem Focused Workflow     Selena Proctor is a 67 y.o female presents to clinic for follow up on wound to left inner thigh. Compliant with current POC.  She is currently on oral antibiotics prescribed at last appointment. She reports fall since last visit. She was seen in ER and f/u'd with her PCP regarding fall. Pertinent PMH includes DM, HTN, CAD with CABG x 10 years ago.  Denies fever or chills.     Review of Systems     Review of Systems   Constitutional:  Negative for activity change, chills and fever.   Respiratory:  Negative for chest tightness and shortness of breath.    Cardiovascular:  Positive for leg swelling. Negative for chest pain and palpitations.   Musculoskeletal:  Positive for arthralgias, back pain, gait problem and joint swelling.   Skin:  Positive for wound.        wound   Neurological:  Positive for weakness and numbness.   Psychiatric/Behavioral:  Negative for agitation, behavioral problems, confusion and self-injury.      Medical / Social / Family History     Past Medical History:   Diagnosis Date    Acid reflux     Coronary arteriosclerosis     Diabetes     Diabetes mellitus, type 2     Hypertension     Spondylosis without myelopathy or radiculopathy, lumbar region        Past Surgical History:   Procedure Laterality Date    APPENDECTOMY      BLOCK, NERVE, OBTURATOR Right 2019    Right Femoral/Obturator Nerve Block  Dr Headley     SECTION      x2    CORONARY ARTERY BYPASS GRAFT      KNEE ARTHROPLASTY  Right     KNEE ARTHROSCOPY Left     SKIN GRAFT      Left Thigh    TOTAL HIP ARTHROPLASTY Bilateral     TUBAL LIGATION         Social History  Ms. Selena Proctor  reports that she has never smoked. She has never used smokeless tobacco. She reports that she does not currently use alcohol. She reports current drug use. Drug: Hydrocodone.    Family History  Ms.'s Selena Proctor family history includes Alcohol abuse in her father; Cancer in her father; Diabetes in her sister; Heart disease in her mother; Hypertension in her brother, mother, sister, and son; Mental illness in her maternal aunt.    Medications and Allergies     Medications  No outpatient medications have been marked as taking for the 9/28/22 encounter (Office Visit) with MORA Shen.       Allergies  Review of patient's allergies indicates:   Allergen Reactions    Betadine [povidone-iodine] Itching       Physical Examination     Vitals:    09/28/22 1321   BP: 115/68   Pulse: (!) 53   Resp: 18   Temp: 97 °F (36.1 °C)     Physical Exam  Vitals and nursing note reviewed.   Constitutional:       Appearance: Normal appearance.   HENT:      Head: Normocephalic.   Cardiovascular:      Rate and Rhythm: Normal rate and regular rhythm.      Pulses: Normal pulses.      Heart sounds: Normal heart sounds.   Pulmonary:      Effort: Pulmonary effort is normal. No respiratory distress.   Chest:      Chest wall: No tenderness.   Musculoskeletal:         General: Swelling and tenderness present.      Left lower leg: Edema present.   Skin:     General: Skin is warm and dry.      Findings: Erythema present.      Comments: See LDA for measurements and picture   Neurological:      Mental Status: She is alert and oriented to person, place, and time. Mental status is at baseline.   Psychiatric:         Mood and Affect: Mood normal.         Behavior: Behavior normal.         Thought Content: Thought content normal.         Judgment: Judgment normal.     Assessment and  Plan             Altered Skin Integrity 06/27/22 0929 Left anterior Thigh #1 (Active)   06/27/22 0929   Altered Skin Integrity Present on Admission:    Side: Left   Orientation: anterior   Location: Thigh   Wound Number: #1   Is this injury device related?:    Primary Wound Type:    Description of Altered Skin Integrity:    Ankle-Brachial Index:    Pulses:    Removal Indication and Assessment:    Wound Outcome:    (Retired) Wound Length (cm):    (Retired) Wound Width (cm):    (Retired) Depth (cm):    Wound Description (Comments):    Removal Indications:    Wound Image    09/28/22 1322   Description of Altered Skin Integrity Full thickness tissue loss. Subcutaneous fat may be visible but bone, tendon or muscle are not exposed 09/28/22 1322   Dressing Appearance Moist drainage 09/28/22 1322   Drainage Amount Scant 09/28/22 1322   Drainage Characteristics/Odor Serosanguineous 09/28/22 1322   Appearance Pink;Red;Moist;Granulating 09/28/22 1322   Tissue loss description Full thickness 09/28/22 1322   Black (%), Wound Tissue Color 0 % 09/28/22 1322   Red (%), Wound Tissue Color 100 % 09/28/22 1322   Yellow (%), Wound Tissue Color 0 % 09/28/22 1322   Periwound Area Macerated;Moist;Pale white;Pink;Redness 09/28/22 1322   Wound Edges Callused;Open 09/28/22 1322   Wound Length (cm) 0.4 cm 09/28/22 1322   Wound Width (cm) 2.2 cm 09/28/22 1322   Wound Depth (cm) 0.2 cm 09/28/22 1322   Wound Volume (cm^3) 0.176 cm^3 09/28/22 1322   Wound Surface Area (cm^2) 0.88 cm^2 09/28/22 1322   Care Cleansed with:;Antimicrobial agent;Applied:;Skin Barrier 09/28/22 1322   Dressing Applied;Silver;Foam;Island/border 09/28/22 1322   Periwound Care Moisture barrier applied 09/28/22 1322   Dressing Change Due 09/29/22 09/28/22 1322     Problem List Items Addressed This Visit          Orthopedic    Open wound of left thigh - Primary    Overview                      Current Assessment & Plan     Wash with vashe.  Apply sensicare lightly to wound  edges  Pack wound with dry drawtex. Cut to fit in wound bed.    Cover with border gauze. Secure with paper tape if needed  Change daily and as needed for soilage     Monitor closely for s/s of infection including fever, chills, increase in pain, odor from wound, and increased redness from foot. Go to ER if any complications develop.   Keep leg elevated and avoid pressure on wound.      Diabetes:  Monitor glucose closely. Check fasting glucose and 2 hours after meals. HgA1C goal <7, fasting glucose , and 2 hours after meals <180  Hypertension:  Check blood pressure twice daily, goal <120/80  Diet:   Increase protein intake, avoid fried, fatty foods and foods high in simple carbs.   Vitamins:  Take vitamin C 1000 mg, zinc 50mg, vitamin d 5000 units, and a daily multivitamin. Anant is a good source of protein and nutrients to aid in wound healing.      Continue Cleocin 300mg by mouth three times a day  Continue Cipro 500mg by mouth three times a day            Future Appointments   Date Time Provider Department Center   11/9/2022 10:00 AM [facility]ORA Shen [facility]   12/6/2022  8:30 AM ZACHARIAH Houser [facility]   12/27/2022  8:00 AM Lizzette Meadows DNP, FNP-C [facility]    8/3/2023 10:00 AM AWV NURSE, [facility]             Signature:  MORA Shen  [facility]  [address] [facility] [place] [address]  [phone]    Date of encounter: 9/28/22

## 2022-09-28 NOTE — PATIENT INSTRUCTIONS
Wash with vashe.  Apply sensicare lightly to wound edges  Pack wound with dry drawtex. Cut to fit in wound bed.    Cover with border gauze. Secure with paper tape if needed  Change daily and as needed for soilage     Monitor closely for s/s of infection including fever, chills, increase in pain, odor from wound, and increased redness from foot. Go to ER if any complications develop.   Keep leg elevated and avoid pressure on wound.      Diabetes:  Monitor glucose closely. Check fasting glucose and 2 hours after meals. HgA1C goal <7, fasting glucose , and 2 hours after meals <180  Hypertension:  Check blood pressure twice daily, goal <120/80  Diet:   Increase protein intake, avoid fried, fatty foods and foods high in simple carbs.   Vitamins:  Take vitamin C 1000 mg, zinc 50mg, vitamin d 5000 units, and a daily multivitamin. Anant is a good source of protein and nutrients to aid in wound healing.      Continue Cleocin 300mg by mouth three times a day  Continue Cipro 500mg by mouth three times a day

## 2022-10-11 NOTE — PROGRESS NOTES
MORA Shen   [facility]  [address] [facility] [place] [address]  [phone]      PATIENT NAME: Selena Proctor  : 1955  DATE: 10/12/22  MRN: [id number]      Billing Provider: MORA Shen  Level of Service:   Patient PCP Information       Provider PCP Type    Lizzette Meadows DNP, BANDARP-C General            Reason for Visit / Chief Complaint: Open wound of left thigh, sequela       History of Present Illness / Problem Focused Workflow     Selena Proctor is a 67 y.o female presents to clinic for follow up on wound to left inner thigh. Wound has improved since initiating treatment with drawtex. Patient reports she had a fall on  and has since had swelling and tenderness to left thigh. Reports she went to ER for evaluation and had x-rays. Discussed that she likely has a hematoma as a result of the fall and that it should resolve with time. Recommended rotating tylenol and motrin for pain as needed. She is compliant with current POC including drawtex and border foam. Last HgA1C was 9.3 in September,diabetes is managed by PCP. Pertinent PMH includes DM, HTN, CAD with CABG x 10 years ago.  Denies fever or chills.     Review of Systems     Review of Systems   Constitutional:  Negative for activity change, chills and fever.   Respiratory:  Negative for chest tightness and shortness of breath.    Cardiovascular:  Positive for leg swelling. Negative for chest pain and palpitations.   Musculoskeletal:  Positive for arthralgias and joint swelling.   Skin:  Positive for wound.        wound   Neurological:  Positive for weakness.   Psychiatric/Behavioral:  Negative for agitation, behavioral problems, confusion and self-injury.      Medical / Social / Family History     Past Medical History:   Diagnosis Date    Acid reflux     Coronary arteriosclerosis     Diabetes     Diabetes mellitus, type 2     Hypertension     Spondylosis without myelopathy or  radiculopathy, lumbar region        Past Surgical History:   Procedure Laterality Date    APPENDECTOMY      BLOCK, NERVE, OBTURATOR Right 2019    Right Femoral/Obturator Nerve Block  Dr Headley     SECTION      x2    CORONARY ARTERY BYPASS GRAFT      KNEE ARTHROPLASTY Right     KNEE ARTHROSCOPY Left     SKIN GRAFT      Left Thigh    TOTAL HIP ARTHROPLASTY Bilateral     TUBAL LIGATION         Social History  Ms. Selena Proctor  reports that she has never smoked. She has never used smokeless tobacco. She reports that she does not currently use alcohol. She reports current drug use. Drug: Hydrocodone.    Family History  Ms.'patricia Proctor family history includes Alcohol abuse in her father; Cancer in her father; Diabetes in her sister; Heart disease in her mother; Hypertension in her brother, mother, sister, and son; Mental illness in her maternal aunt.    Medications and Allergies     Medications  No outpatient medications have been marked as taking for the 10/12/22 encounter (Office Visit) with MORA Shen.       Allergies  Review of patient's allergies indicates:   Allergen Reactions    Betadine [povidone-iodine] Itching       Physical Examination     Vitals:    10/12/22 0940   BP: 134/80   Pulse: 66   Resp: 18   Temp: 98 °F (36.7 °C)     Physical Exam  Vitals and nursing note reviewed.   Constitutional:       Appearance: Normal appearance.   HENT:      Head: Normocephalic.   Cardiovascular:      Rate and Rhythm: Normal rate and regular rhythm.      Pulses: Normal pulses.      Heart sounds: Normal heart sounds.   Pulmonary:      Effort: Pulmonary effort is normal. No respiratory distress.   Chest:      Chest wall: No tenderness.   Musculoskeletal:         General: Swelling, tenderness and signs of injury present.      Left lower leg: Edema present.      Comments: Tenderness and edema superior to wound   Skin:     General: Skin is warm and dry.      Comments: See LDA for measurements and  picture   Neurological:      Mental Status: She is alert and oriented to person, place, and time. Mental status is at baseline.   Psychiatric:         Mood and Affect: Mood normal.         Behavior: Behavior normal.         Thought Content: Thought content normal.         Judgment: Judgment normal.     Assessment and Plan             Altered Skin Integrity 06/27/22 0929 Left anterior Thigh #1 (Active)   06/27/22 0929   Altered Skin Integrity Present on Admission:    Side: Left   Orientation: anterior   Location: Thigh   Wound Number: #1   Is this injury device related?:    Primary Wound Type:    Description of Altered Skin Integrity:    Ankle-Brachial Index:    Pulses:    Removal Indication and Assessment:    Wound Outcome:    (Retired) Wound Length (cm):    (Retired) Wound Width (cm):    (Retired) Depth (cm):    Wound Description (Comments):    Removal Indications:    Wound Image    10/12/22 0945   Description of Altered Skin Integrity Full thickness tissue loss. Subcutaneous fat may be visible but bone, tendon or muscle are not exposed 10/12/22 0945   Dressing Appearance Dried drainage 10/12/22 0945   Drainage Amount Moderate 10/12/22 0945   Drainage Characteristics/Odor Serosanguineous;No odor 10/12/22 0945   Appearance Pink;Red;Fibrin;Moist;Granulating 10/12/22 0945   Tissue loss description Full thickness 10/12/22 0945   Black (%), Wound Tissue Color 0 % 10/12/22 0945   Red (%), Wound Tissue Color 100 % 10/12/22 0945   Yellow (%), Wound Tissue Color 0 % 10/12/22 0945   Periwound Area Bassett;Redness 10/12/22 0945   Wound Edges Open 10/12/22 0945   Wound Length (cm) 0.5 cm 10/12/22 0945   Wound Width (cm) 2 cm 10/12/22 0945   Wound Depth (cm) 0.3 cm 10/12/22 0945   Wound Volume (cm^3) 0.3 cm^3 10/12/22 0945   Wound Surface Area (cm^2) 1 cm^2 10/12/22 0945   Care Cleansed with:;Antimicrobial agent;Applied:;Skin Barrier;Debrided 10/12/22 0945   Dressing Applied;Gauze;Foam;Island/border 10/12/22 0945   Periwound Care  Skin barrier film applied 10/12/22 0945   Dressing Change Due 10/13/22 10/12/22 0945     Problem List Items Addressed This Visit          Orthopedic    Open wound of left thigh - Primary    Overview                      Current Assessment & Plan     Wash with vashe.  Apply sensicare lightly to wound edges  Pack wound with dry drawtex. Cut to fit in wound bed.    Cover with border gauze. Secure with paper tape if needed  Change daily and as needed for soilage     Monitor closely for s/s of infection including fever, chills, increase in pain, odor from wound, and increased redness from foot. Go to ER if any complications develop.   Keep leg elevated and avoid pressure on wound.      Diabetes:  Monitor glucose closely. Check fasting glucose and 2 hours after meals. HgA1C goal <7, fasting glucose , and 2 hours after meals <180  Hypertension:  Check blood pressure twice daily, goal <120/80  Diet:   Increase protein intake, avoid fried, fatty foods and foods high in simple carbs.   Vitamins:  Take vitamin C 1000 mg, zinc 50mg, vitamin d 5000 units, and a daily multivitamin. Anant is a good source of protein and nutrients to aid in wound healing.                  Future Appointments   Date Time Provider Department Center   11/9/2022 10:00 AM [facility]ORA Shen [facility]   12/6/2022  8:30 AM ZACHARIAH Houser [facility]   12/27/2022  8:00 AM Lizzette Meadows DNP, FNP-C [facility]    8/3/2023 10:00 AM AWV NURSE, [facility] DEE [facility]             Signature:  MORA Shen  [facility]  [address]  [place] [address]  [phone]    Date of encounter: 10/12/22

## 2022-10-12 ENCOUNTER — OFFICE VISIT (OUTPATIENT)
Dept: WOUND CARE | Facility: CLINIC | Age: 67
End: 2022-10-12
Attending: FAMILY MEDICINE
Payer: MEDICARE

## 2022-10-12 VITALS
DIASTOLIC BLOOD PRESSURE: 80 MMHG | SYSTOLIC BLOOD PRESSURE: 134 MMHG | HEART RATE: 66 BPM | RESPIRATION RATE: 18 BRPM | TEMPERATURE: 98 F

## 2022-10-12 DIAGNOSIS — S71.102S OPEN WOUND OF LEFT THIGH, SEQUELA: Primary | ICD-10-CM

## 2022-10-12 PROCEDURE — 3079F PR MOST RECENT DIASTOLIC BLOOD PRESSURE 80-89 MM HG: ICD-10-PCS | Mod: CPTII,,, | Performed by: NURSE PRACTITIONER

## 2022-10-12 PROCEDURE — 3075F PR MOST RECENT SYSTOLIC BLOOD PRESS GE 130-139MM HG: ICD-10-PCS | Mod: CPTII,,, | Performed by: NURSE PRACTITIONER

## 2022-10-12 PROCEDURE — 3066F NEPHROPATHY DOC TX: CPT | Mod: CPTII,,, | Performed by: NURSE PRACTITIONER

## 2022-10-12 PROCEDURE — 3061F PR NEG MICROALBUMINURIA RESULT DOCUMENTED/REVIEW: ICD-10-PCS | Mod: CPTII,,, | Performed by: NURSE PRACTITIONER

## 2022-10-12 PROCEDURE — 3061F NEG MICROALBUMINURIA REV: CPT | Mod: CPTII,,, | Performed by: NURSE PRACTITIONER

## 2022-10-12 PROCEDURE — 99213 PR OFFICE/OUTPT VISIT, EST, LEVL III, 20-29 MIN: ICD-10-PCS | Mod: S$PBB,,, | Performed by: NURSE PRACTITIONER

## 2022-10-12 PROCEDURE — 3079F DIAST BP 80-89 MM HG: CPT | Mod: CPTII,,, | Performed by: NURSE PRACTITIONER

## 2022-10-12 PROCEDURE — 1160F PR REVIEW ALL MEDS BY PRESCRIBER/CLIN PHARMACIST DOCUMENTED: ICD-10-PCS | Mod: CPTII,,, | Performed by: NURSE PRACTITIONER

## 2022-10-12 PROCEDURE — 1160F RVW MEDS BY RX/DR IN RCRD: CPT | Mod: CPTII,,, | Performed by: NURSE PRACTITIONER

## 2022-10-12 PROCEDURE — 3075F SYST BP GE 130 - 139MM HG: CPT | Mod: CPTII,,, | Performed by: NURSE PRACTITIONER

## 2022-10-12 PROCEDURE — 99213 OFFICE O/P EST LOW 20 MIN: CPT | Mod: S$PBB,,, | Performed by: NURSE PRACTITIONER

## 2022-10-12 PROCEDURE — 1159F PR MEDICATION LIST DOCUMENTED IN MEDICAL RECORD: ICD-10-PCS | Mod: CPTII,,, | Performed by: NURSE PRACTITIONER

## 2022-10-12 PROCEDURE — 1159F MED LIST DOCD IN RCRD: CPT | Mod: CPTII,,, | Performed by: NURSE PRACTITIONER

## 2022-10-12 PROCEDURE — 3066F PR DOCUMENTATION OF TREATMENT FOR NEPHROPATHY: ICD-10-PCS | Mod: CPTII,,, | Performed by: NURSE PRACTITIONER

## 2022-10-12 PROCEDURE — 99213 OFFICE O/P EST LOW 20 MIN: CPT | Mod: PBBFAC | Performed by: NURSE PRACTITIONER

## 2022-10-12 PROCEDURE — 3046F HEMOGLOBIN A1C LEVEL >9.0%: CPT | Mod: CPTII,,, | Performed by: NURSE PRACTITIONER

## 2022-10-12 PROCEDURE — 3046F PR MOST RECENT HEMOGLOBIN A1C LEVEL > 9.0%: ICD-10-PCS | Mod: CPTII,,, | Performed by: NURSE PRACTITIONER

## 2022-10-17 ENCOUNTER — HOSPITAL ENCOUNTER (OUTPATIENT)
Dept: RADIOLOGY | Facility: HOSPITAL | Age: 67
Discharge: HOME | End: 2022-10-17
Attending: NURSE PRACTITIONER
Payer: MEDICARE

## 2022-10-17 ENCOUNTER — TELEPHONE (OUTPATIENT)
Dept: PRIMARY CARE CLINIC | Facility: CLINIC | Age: 67
End: 2022-10-17
Payer: MEDICARE

## 2022-10-17 ENCOUNTER — OFFICE VISIT (OUTPATIENT)
Dept: PRIMARY CARE CLINIC | Facility: CLINIC | Age: 67
End: 2022-10-17
Payer: MEDICARE

## 2022-10-17 VITALS
BODY MASS INDEX: 30.73 KG/M2 | HEART RATE: 58 BPM | HEIGHT: 64 IN | TEMPERATURE: 98 F | RESPIRATION RATE: 18 BRPM | SYSTOLIC BLOOD PRESSURE: 117 MMHG | DIASTOLIC BLOOD PRESSURE: 83 MMHG | OXYGEN SATURATION: 99 % | WEIGHT: 180 LBS

## 2022-10-17 DIAGNOSIS — M25.552 LEFT HIP PAIN: ICD-10-CM

## 2022-10-17 DIAGNOSIS — E11.9 TYPE 2 DIABETES MELLITUS WITHOUT COMPLICATION, WITHOUT LONG-TERM CURRENT USE OF INSULIN: ICD-10-CM

## 2022-10-17 DIAGNOSIS — M79.652 PAIN OF LEFT THIGH: Primary | ICD-10-CM

## 2022-10-17 DIAGNOSIS — M79.652 PAIN OF LEFT THIGH: ICD-10-CM

## 2022-10-17 DIAGNOSIS — I10 ESSENTIAL HYPERTENSION: ICD-10-CM

## 2022-10-17 PROCEDURE — 3061F PR NEG MICROALBUMINURIA RESULT DOCUMENTED/REVIEW: ICD-10-PCS | Mod: ,,, | Performed by: NURSE PRACTITIONER

## 2022-10-17 PROCEDURE — 1160F PR REVIEW ALL MEDS BY PRESCRIBER/CLIN PHARMACIST DOCUMENTED: ICD-10-PCS | Mod: ,,, | Performed by: NURSE PRACTITIONER

## 2022-10-17 PROCEDURE — 99213 PR OFFICE/OUTPT VISIT, EST, LEVL III, 20-29 MIN: ICD-10-PCS | Mod: ,,, | Performed by: NURSE PRACTITIONER

## 2022-10-17 PROCEDURE — 3046F HEMOGLOBIN A1C LEVEL >9.0%: CPT | Mod: ,,, | Performed by: NURSE PRACTITIONER

## 2022-10-17 PROCEDURE — 93971 EXTREMITY STUDY: CPT | Mod: TC,LT

## 2022-10-17 PROCEDURE — 3061F NEG MICROALBUMINURIA REV: CPT | Mod: ,,, | Performed by: NURSE PRACTITIONER

## 2022-10-17 PROCEDURE — 1101F PR PT FALLS ASSESS DOC 0-1 FALLS W/OUT INJ PAST YR: ICD-10-PCS | Mod: ,,, | Performed by: NURSE PRACTITIONER

## 2022-10-17 PROCEDURE — 1101F PT FALLS ASSESS-DOCD LE1/YR: CPT | Mod: ,,, | Performed by: NURSE PRACTITIONER

## 2022-10-17 PROCEDURE — 3066F PR DOCUMENTATION OF TREATMENT FOR NEPHROPATHY: ICD-10-PCS | Mod: ,,, | Performed by: NURSE PRACTITIONER

## 2022-10-17 PROCEDURE — 3288F FALL RISK ASSESSMENT DOCD: CPT | Mod: ,,, | Performed by: NURSE PRACTITIONER

## 2022-10-17 PROCEDURE — 3288F PR FALLS RISK ASSESSMENT DOCUMENTED: ICD-10-PCS | Mod: ,,, | Performed by: NURSE PRACTITIONER

## 2022-10-17 PROCEDURE — 1160F RVW MEDS BY RX/DR IN RCRD: CPT | Mod: ,,, | Performed by: NURSE PRACTITIONER

## 2022-10-17 PROCEDURE — 1159F MED LIST DOCD IN RCRD: CPT | Mod: ,,, | Performed by: NURSE PRACTITIONER

## 2022-10-17 PROCEDURE — 3046F PR MOST RECENT HEMOGLOBIN A1C LEVEL > 9.0%: ICD-10-PCS | Mod: ,,, | Performed by: NURSE PRACTITIONER

## 2022-10-17 PROCEDURE — 3079F DIAST BP 80-89 MM HG: CPT | Mod: ,,, | Performed by: NURSE PRACTITIONER

## 2022-10-17 PROCEDURE — 3074F SYST BP LT 130 MM HG: CPT | Mod: ,,, | Performed by: NURSE PRACTITIONER

## 2022-10-17 PROCEDURE — 1159F PR MEDICATION LIST DOCUMENTED IN MEDICAL RECORD: ICD-10-PCS | Mod: ,,, | Performed by: NURSE PRACTITIONER

## 2022-10-17 PROCEDURE — 3066F NEPHROPATHY DOC TX: CPT | Mod: ,,, | Performed by: NURSE PRACTITIONER

## 2022-10-17 PROCEDURE — 3074F PR MOST RECENT SYSTOLIC BLOOD PRESSURE < 130 MM HG: ICD-10-PCS | Mod: ,,, | Performed by: NURSE PRACTITIONER

## 2022-10-17 PROCEDURE — 99213 OFFICE O/P EST LOW 20 MIN: CPT | Mod: ,,, | Performed by: NURSE PRACTITIONER

## 2022-10-17 PROCEDURE — 3079F PR MOST RECENT DIASTOLIC BLOOD PRESSURE 80-89 MM HG: ICD-10-PCS | Mod: ,,, | Performed by: NURSE PRACTITIONER

## 2022-10-17 NOTE — TELEPHONE ENCOUNTER
----- Message from Lizzette Meadows DNP, FNP-C sent at 10/17/2022 11:49 AM CDT -----  Please notify patient of results

## 2022-10-17 NOTE — TELEPHONE ENCOUNTER
----- Message from Lizzette Meadows DNP, FNP-C sent at 10/17/2022  4:32 PM CDT -----  Please notify patient of results. Collaborated with Dr. Kendall regarding results; states patient has a seroma. A seroma is a build-up of clear fluid inside the body. She can apply heat to the area for 15 minutes every few hours. She needs to follow up with wound clinic regarding seroma.

## 2022-10-17 NOTE — PROGRESS NOTES
[facility]  Primary Care       PATIENT NAME: Selena Proctor   : 1955    AGE: 67 y.o. DATE: 10/17/2022    MRN: [id number]        Reason for Visit / Chief Complaint:  Leg Pain (Follow up pt still having issues with wound area to upper lf thigh area, pt states feels like maybe it has fluid and swollen . ) and Diabetes (Blood sugar is 244 pt has eaten)     Subjective:     HPI: Patient states she continues to have pain to left upper thigh below hip; States she does not have difficulty bearing weight on left lower extremity; patient had fall on 2022 and went to ER for evaluation; x-ray of left hip and left femur were performed and showed the following:    Reading Physician Reading Date Result Priority  Turner Garrison II, MD  [phone] 2022     Narrative & Impression  EXAMINATION:  XR HIP WITH PELVIS WHEN PERFORMED, 2 OR 3 VIEWS LEFT; XR FEMUR 2 VIEW LEFT     CLINICAL HISTORY:  Injury, unspecified, initial encounter     COMPARISON:  3 January 2018     FINDINGS:  No evidence of fracture seen.  The alignment of the joints appears normal.  Bilateral hip arthroplasties appear within normal limits.  Severe knee degenerative change is present.  No soft tissue abnormality is seen.     Impression:     No evidence of acute injury demonstrated        Electronically signed by: Turner Garrison  Date:                                            2022  Time:                                           16:16        Exam Ended: 22 16:12 Last Resulted: 22 16:16        Reading Physician Reading Date Result Priority  Turner Garrison II, MD  [phone] 2022 STAT    Narrative & Impression  EXAMINATION:  XR HIP WITH PELVIS WHEN PERFORMED, 2 OR 3 VIEWS LEFT; XR FEMUR 2 VIEW LEFT     CLINICAL HISTORY:  Injury, unspecified, initial encounter     COMPARISON:  3 January 2018     FINDINGS:  No evidence of fracture seen.  The alignment of the joints appears normal.  Bilateral hip  arthroplasties appear within normal limits.  Severe knee degenerative change is present.  No soft tissue abnormality is seen.     Impression:     No evidence of acute injury demonstrated        Electronically signed by: Turner Garrison  Date:                                            09/20/2022  Time:                                           16:16        Exam Ended: 09/20/22 16:13 Last Resulted: 09/20/22 16:16              Leg Pain     Diabetes  Pertinent negatives for hypoglycemia include no headaches. Pertinent negatives for diabetes include no chest pain and no fatigue.        Review of Systems: Review of Systems   Constitutional:  Negative for chills, fatigue and fever.   Respiratory:  Negative for cough, chest tightness and shortness of breath.    Cardiovascular:  Negative for chest pain.   Gastrointestinal:  Negative for abdominal pain, constipation, diarrhea, nausea and vomiting.   Genitourinary:  Negative for dysuria.   Musculoskeletal:  Positive for arthralgias (patient has pain to left hip and left upper thigh.). Negative for gait problem.   Skin:  Negative for rash.   Neurological:  Negative for headaches.        Review of patient's allergies indicates:   Allergen Reactions    Betadine [povidone-iodine] Itching        Med List:  Current Outpatient Medications on File Prior to Visit   Medication Sig Dispense Refill    ACCU-CHEK DONNA PLUS TEST STRP Strp CHECK BLOOD SUGAR ONE TIME DAILY 100 strip 1    ACCU-CHEK SOFT DEV LANCETS Kit USE AS DIRECTED 100 each 1    ACCU-CHEK SOFTCLIX LANCETS Misc CHECK BLOOD SUGAR ONE TIME DAILY 100 each 1    alcohol swabs (DROPSAFE ALCOHOL PREP PADS) PadM USE AS DIRECTED 100 each 3    aspirin (ECOTRIN) 81 MG EC tablet Take 81 mg by mouth once daily.       blood glucose control, low (TRUE METRIX LEVEL 1) Soln USE AS DIRECTED WITH GLUCOSE METER 1 each 0    chlorhexidine (HIBICLENS) 4 % external liquid Apply topically daily as needed (thigh wound). (Patient not taking: No sig  reported) 120 mL 0    diclofenac sodium (VOLTAREN ARTHRITIS PAIN) 1 % Gel Apply 2 g topically 4 (four) times daily. Apply to knees, elbows, joints as needed 10 each 2    ezetimibe (ZETIA) 10 mg tablet TAKE 1 TABLET EVERY DAY 90 tablet 2    furosemide (LASIX) 40 MG tablet TAKE 1 TABLET EVERY DAY AS NEEDED 90 tablet 0    HYDROcodone-acetaminophen (NORCO)  mg per tablet Take 1 tablet by mouth every 8 (eight) hours. 90 tablet 0    [START ON 11/5/2022] HYDROcodone-acetaminophen (NORCO)  mg per tablet Take 1 tablet by mouth every 8 (eight) hours. 90 tablet 0    JARDIANCE 10 mg tablet TAKE 1 TABLET EVERY DAY 90 tablet 1    losartan-hydrochlorothiazide 100-25 mg (HYZAAR) 100-25 mg per tablet TAKE 1 TABLET EVERY DAY 90 tablet 2    metFORMIN (GLUCOPHAGE) 500 MG tablet Take 1 tablet (500 mg total) by mouth 2 (two) times daily with meals. 180 tablet 3    metoprolol succinate (TOPROL-XL) 100 MG 24 hr tablet TAKE 1 TABLET EVERY DAY 90 tablet 2    miconazole (MONISTAT 7) 2 % vaginal cream Apply cream to external vaginal area for vaginal itching. 1 kit 0    montelukast (SINGULAIR) 10 mg tablet TAKE 1 TABLET EVERY DAY AS NEEDED 90 tablet 2    NIFEdipine (PROCARDIA-XL) 90 MG (OSM) 24 hr tablet TAKE 1 TABLET EVERY DAY 90 tablet 1    omeprazole (PRILOSEC) 40 MG capsule TAKE 1 CAPSULE EVERY DAY 90 capsule 0    pitavastatin calcium (LIVALO) 4 mg Tab Take one tablet by mouth three times per week on Monday, Wednesday, Friday. 90 tablet 2    potassium chloride (MICRO-K) 10 MEQ CpSR Take 2 capsules (20 mEq total) by mouth 2 (two) times daily. for 90 doses 180 capsule 3    silver sulfADIAZINE 1% (SILVADENE) 1 % cream Apply topically once daily. (Patient not taking: Reported on 9/23/2022) 50 g 2    spironolactone (ALDACTONE) 25 MG tablet TAKE 1 TABLET EVERY DAY 90 tablet 0    TRUE METRIX GLUCOSE METER kit USE AS DIRECTED 180 each 2    TRUEPLUS LANCETS 33 gauge Misc USE AS DIRECTED 200 each 0     No current facility-administered  "medications on file prior to visit.       Medical/Social/Family History:  Past Medical History:   Diagnosis Date    Acid reflux     Coronary arteriosclerosis     Diabetes     Diabetes mellitus, type 2     Hypertension     Spondylosis without myelopathy or radiculopathy, lumbar region       Social History     Tobacco Use   Smoking Status Never   Smokeless Tobacco Never      Social History     Substance and Sexual Activity   Alcohol Use Not Currently       Family History   Problem Relation Age of Onset    Hypertension Mother     Heart disease Mother     Diabetes Sister     Hypertension Sister     Hypertension Brother     Alcohol abuse Father     Cancer Father     Hypertension Son     Mental illness Maternal Aunt       Past Surgical History:   Procedure Laterality Date    APPENDECTOMY      BLOCK, NERVE, OBTURATOR Right 2019    Right Femoral/Obturator Nerve Block  Dr Headley     SECTION      x2    CORONARY ARTERY BYPASS GRAFT      KNEE ARTHROPLASTY Right     KNEE ARTHROSCOPY Left     SKIN GRAFT      Left Thigh    TOTAL HIP ARTHROPLASTY Bilateral     TUBAL LIGATION        Immunization History   Administered Date(s) Administered    COVID-19 MRNA, LN-S PF (MODERNA HALF 0.25 ML DOSE) 10/29/2021    COVID-19, MRNA, LN-S, PF (MODERNA FULL 0.5 ML DOSE) 2021, 03/10/2021    Influenza - Quadrivalent - PF *Preferred* (6 months and older) 2022    Pneumococcal Conjugate - 13 Valent 2017    Pneumococcal Conjugate - 20 Valent 2022          Objective:      Vitals:    10/17/22 0853   BP: 117/83   BP Location: Left arm   Patient Position: Sitting   BP Method: Large (Manual)   Pulse: (!) 58   Resp: 18   Temp: 98 °F (36.7 °C)   TempSrc: Temporal   SpO2: 99%   Weight: 81.6 kg (180 lb)   Height: 5' 4" (1.626 m)     Body mass index is 30.9 kg/m².     Physical Exam: Physical Exam  Vitals and nursing note reviewed.   Constitutional:       Appearance: Normal appearance.   HENT:      Head: Normocephalic.      " Mouth/Throat:      Mouth: Mucous membranes are moist.   Eyes:      Pupils: Pupils are equal, round, and reactive to light.   Cardiovascular:      Rate and Rhythm: Regular rhythm. Bradycardia present.      Heart sounds: Normal heart sounds.   Pulmonary:      Effort: Pulmonary effort is normal. No respiratory distress.      Breath sounds: Normal breath sounds. No wheezing or rhonchi.   Abdominal:      General: Bowel sounds are normal.      Palpations: Abdomen is soft.   Musculoskeletal:         General: Swelling (patient has swelling to left upper anterior thigh) and tenderness (patient has some tenderness to left upper anterior thigh.) present. Normal range of motion.      Cervical back: Normal range of motion.   Skin:     General: Skin is warm and dry.   Neurological:      General: No focal deficit present.      Mental Status: She is alert and oriented to person, place, and time.      Gait: Gait normal.   Psychiatric:         Mood and Affect: Mood normal.         Behavior: Behavior normal.              Assessment:          ICD-10-CM ICD-9-CM   1. Pain of left thigh  M79.652 729.5   2. Left hip pain  M25.552 719.45   3. Essential hypertension  I10 401.9   4. Type 2 diabetes mellitus without complication, without long-term current use of insulin  E11.9 250.00        Plan:       Pain of left thigh  -     X-Ray Hip 2 or 3 views Left (with Pelvis when performed); Future; Expected date: 10/17/2022  -     X-Ray Femur 2 View Left; Future; Expected date: 10/17/2022  -     US Lower Extremity Veins Left; Future; Expected date: 10/17/2022    Left hip pain  -     X-Ray Hip 2 or 3 views Left (with Pelvis when performed); Future; Expected date: 10/17/2022    Essential hypertension    Type 2 diabetes mellitus without complication, without long-term current use of insulin        New & refilled meds:  Requested Prescriptions      No prescriptions requested or ordered in this encounter       No follow-ups on file.     There are no  Patient Instructions on file for this visit.       Signature: Lizzette Meadows DNP, FNP-C

## 2022-10-18 ENCOUNTER — TELEPHONE (OUTPATIENT)
Dept: PRIMARY CARE CLINIC | Facility: CLINIC | Age: 67
End: 2022-10-18
Payer: MEDICARE

## 2022-10-18 NOTE — ASSESSMENT & PLAN NOTE
Wash with vashe.  Apply sensicare lightly to wound edges  Pack wound with dry drawtex. Cut to fit in wound bed.    Cover with border gauze. Secure with paper tape if needed  Change daily and as needed for soilage     Monitor closely for s/s of infection including fever, chills, increase in pain, odor from wound, and increased redness from foot. Go to ER if any complications develop.   Keep leg elevated and avoid pressure on wound.      Diabetes:  Monitor glucose closely. Check fasting glucose and 2 hours after meals. HgA1C goal <7, fasting glucose , and 2 hours after meals <180  Hypertension:  Check blood pressure twice daily, goal <120/80  Diet:   Increase protein intake, avoid fried, fatty foods and foods high in simple carbs.   Vitamins:  Take vitamin C 1000 mg, zinc 50mg, vitamin d 5000 units, and a daily multivitamin. Anant is a good source of protein and nutrients to aid in wound healing.

## 2022-10-22 ENCOUNTER — HOSPITAL ENCOUNTER (EMERGENCY)
Facility: HOSPITAL | Age: 67
Discharge: HOME | End: 2022-10-22
Attending: EMERGENCY MEDICINE
Payer: MEDICARE

## 2022-10-22 VITALS
OXYGEN SATURATION: 98 % | TEMPERATURE: 99 F | RESPIRATION RATE: 18 BRPM | SYSTOLIC BLOOD PRESSURE: 145 MMHG | WEIGHT: 181 LBS | BODY MASS INDEX: 30.9 KG/M2 | DIASTOLIC BLOOD PRESSURE: 80 MMHG | HEART RATE: 60 BPM | HEIGHT: 64 IN

## 2022-10-22 DIAGNOSIS — M79.652 LEFT THIGH PAIN: Primary | ICD-10-CM

## 2022-10-22 LAB — GLUCOSE SERPL-MCNC: 232 MG/DL (ref 70–105)

## 2022-10-22 PROCEDURE — 99284 EMERGENCY DEPT VISIT MOD MDM: CPT

## 2022-10-22 PROCEDURE — 63600175 PHARM REV CODE 636 W HCPCS: Performed by: EMERGENCY MEDICINE

## 2022-10-22 PROCEDURE — 99284 EMERGENCY DEPT VISIT MOD MDM: CPT | Performed by: EMERGENCY MEDICINE

## 2022-10-22 PROCEDURE — 82962 GLUCOSE BLOOD TEST: CPT

## 2022-10-22 PROCEDURE — 96372 THER/PROPH/DIAG INJ SC/IM: CPT

## 2022-10-22 RX ORDER — HYDROMORPHONE HYDROCHLORIDE 1 MG/ML
1 INJECTION, SOLUTION INTRAMUSCULAR; INTRAVENOUS; SUBCUTANEOUS
Status: COMPLETED | OUTPATIENT
Start: 2022-10-22 | End: 2022-10-22

## 2022-10-22 RX ORDER — PROMETHAZINE HYDROCHLORIDE 25 MG/ML
12.5 INJECTION, SOLUTION INTRAMUSCULAR; INTRAVENOUS
Status: COMPLETED | OUTPATIENT
Start: 2022-10-22 | End: 2022-10-22

## 2022-10-22 RX ADMIN — PROMETHAZINE HYDROCHLORIDE 12.5 MG: 25 INJECTION INTRAMUSCULAR; INTRAVENOUS at 08:10

## 2022-10-22 RX ADMIN — HYDROMORPHONE HYDROCHLORIDE 1 MG: 1 INJECTION, SOLUTION INTRAMUSCULAR; INTRAVENOUS; SUBCUTANEOUS at 08:10

## 2022-10-23 NOTE — DISCHARGE INSTRUCTIONS
Follow-up with general surgeon or an orthopedic surgeon further evaluation and management of left thigh fluid collection.  We have requested an appointment with General surgery, if you do not hear from somebody over the next few days please see your primary care provider for referral to have fluid collection left anterolateral thigh drained.
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
## 2022-10-25 ENCOUNTER — OFFICE VISIT (OUTPATIENT)
Dept: SURGERY | Facility: CLINIC | Age: 67
End: 2022-10-25
Payer: MEDICARE

## 2022-10-25 DIAGNOSIS — T14.8XXA HEMATOMA: Primary | ICD-10-CM

## 2022-10-25 PROCEDURE — 3061F NEG MICROALBUMINURIA REV: CPT | Mod: CPTII,,, | Performed by: STUDENT IN AN ORGANIZED HEALTH CARE EDUCATION/TRAINING PROGRAM

## 2022-10-25 PROCEDURE — 1159F PR MEDICATION LIST DOCUMENTED IN MEDICAL RECORD: ICD-10-PCS | Mod: CPTII,,, | Performed by: STUDENT IN AN ORGANIZED HEALTH CARE EDUCATION/TRAINING PROGRAM

## 2022-10-25 PROCEDURE — 99214 OFFICE O/P EST MOD 30 MIN: CPT | Mod: PBBFAC,25 | Performed by: STUDENT IN AN ORGANIZED HEALTH CARE EDUCATION/TRAINING PROGRAM

## 2022-10-25 PROCEDURE — 10061 PR DRAIN SKIN ABSCESS COMPLIC: ICD-10-PCS | Mod: S$PBB,,, | Performed by: STUDENT IN AN ORGANIZED HEALTH CARE EDUCATION/TRAINING PROGRAM

## 2022-10-25 PROCEDURE — A4550 SURGICAL TRAYS: HCPCS | Mod: PBBFAC | Performed by: STUDENT IN AN ORGANIZED HEALTH CARE EDUCATION/TRAINING PROGRAM

## 2022-10-25 PROCEDURE — 3061F PR NEG MICROALBUMINURIA RESULT DOCUMENTED/REVIEW: ICD-10-PCS | Mod: CPTII,,, | Performed by: STUDENT IN AN ORGANIZED HEALTH CARE EDUCATION/TRAINING PROGRAM

## 2022-10-25 PROCEDURE — 3066F NEPHROPATHY DOC TX: CPT | Mod: CPTII,,, | Performed by: STUDENT IN AN ORGANIZED HEALTH CARE EDUCATION/TRAINING PROGRAM

## 2022-10-25 PROCEDURE — 1159F MED LIST DOCD IN RCRD: CPT | Mod: CPTII,,, | Performed by: STUDENT IN AN ORGANIZED HEALTH CARE EDUCATION/TRAINING PROGRAM

## 2022-10-25 PROCEDURE — 99499 UNLISTED E&M SERVICE: CPT | Mod: S$PBB,,, | Performed by: STUDENT IN AN ORGANIZED HEALTH CARE EDUCATION/TRAINING PROGRAM

## 2022-10-25 PROCEDURE — 10061 I&D ABSCESS COMP/MULTIPLE: CPT | Mod: S$PBB,,, | Performed by: STUDENT IN AN ORGANIZED HEALTH CARE EDUCATION/TRAINING PROGRAM

## 2022-10-25 PROCEDURE — 99499 NO LOS: ICD-10-PCS | Mod: S$PBB,,, | Performed by: STUDENT IN AN ORGANIZED HEALTH CARE EDUCATION/TRAINING PROGRAM

## 2022-10-25 PROCEDURE — 10061 I&D ABSCESS COMP/MULTIPLE: CPT | Mod: PBBFAC | Performed by: STUDENT IN AN ORGANIZED HEALTH CARE EDUCATION/TRAINING PROGRAM

## 2022-10-25 PROCEDURE — 3046F PR MOST RECENT HEMOGLOBIN A1C LEVEL > 9.0%: ICD-10-PCS | Mod: CPTII,,, | Performed by: STUDENT IN AN ORGANIZED HEALTH CARE EDUCATION/TRAINING PROGRAM

## 2022-10-25 PROCEDURE — 3066F PR DOCUMENTATION OF TREATMENT FOR NEPHROPATHY: ICD-10-PCS | Mod: CPTII,,, | Performed by: STUDENT IN AN ORGANIZED HEALTH CARE EDUCATION/TRAINING PROGRAM

## 2022-10-25 PROCEDURE — 10021 FNA BX W/O IMG GDN 1ST LES: CPT | Mod: PBBFAC | Performed by: STUDENT IN AN ORGANIZED HEALTH CARE EDUCATION/TRAINING PROGRAM

## 2022-10-25 PROCEDURE — 3046F HEMOGLOBIN A1C LEVEL >9.0%: CPT | Mod: CPTII,,, | Performed by: STUDENT IN AN ORGANIZED HEALTH CARE EDUCATION/TRAINING PROGRAM

## 2022-10-25 RX ORDER — LIDOCAINE HYDROCHLORIDE AND EPINEPHRINE 10; 10 MG/ML; UG/ML
10 INJECTION, SOLUTION INFILTRATION; PERINEURAL
Status: COMPLETED | OUTPATIENT
Start: 2022-10-25 | End: 2022-10-25

## 2022-10-25 RX ADMIN — LIDOCAINE HYDROCHLORIDE,EPINEPHRINE BITARTRATE 10 ML: 10; .01 INJECTION, SOLUTION INFILTRATION; PERINEURAL at 12:10

## 2022-10-25 NOTE — PROGRESS NOTES
Needle aspiration of hematoma     Pre-operative Diagnosis:  Left thigh hematoma    Post-operative Diagnosis: same    Locations:  Left lateral thigh    Indications: pain     Anesthesia: Lidocaine 1% with epinephrine without added sodium bicarbonate    Procedure Details   The risks, benefits, indications, potential complications, and alternatives were explained to the patient and informed consent obtained.    Patient was taken the procedure room and placed on the procedure table in the supine position.  The left lateral thigh was then prepped and draped in usual sterile fashion.  We localize the skin with 1% lidocaine with epinephrine.  We then injected an 18 gauge needle over the area of most fluctuance and aspirated approximately 20 cc of sanguinous fluid.  A pressure dressing was applied.  Patient tolerated procedure well.    EBL: minimal    Findings:  Hematoma; as above    Condition: Stable    Complications:  None

## 2022-10-25 NOTE — PROGRESS NOTES
Subjective:       Patient ID: Selena Proctor is a 67 y.o. female.    Chief Complaint: Mass (Left thigh. Causing pain. States she fell on 9/20/22 and hit leg. )    67-year-old  female presents to the clinic for evaluation for left thigh fluid collection.  Patient states she fell on September 20th on the side of her tub, hitting her left thigh.  At that time she developed a large bruise; this has resolved since then, but she now has what she feels pressure on the left thigh is complain of pain.  The patient also has complain of back pain that has pain radiating down her left leg and down her lower foot.  An ultrasound was done of the left leg which showed no DVT; it did show a complex fluid collection in the left lateral upper thigh that measured 6.9 x 4.3 x 2.8 cm with several internal septations present.  Denies any chest pain/shortness of breath, nausea/vomiting/diarrhea, fever/chills.    Review of Systems   Constitutional: Negative.    HENT: Negative.     Eyes: Negative.    Respiratory:  Negative for shortness of breath.    Cardiovascular:  Negative for chest pain.   Gastrointestinal:  Negative for abdominal distention, abdominal pain, blood in stool, change in bowel habit and change in bowel habit.   Genitourinary: Negative.  Negative for hematuria.   Musculoskeletal:  Negative for myalgias.   Neurological:  Negative for dizziness and weakness.   Psychiatric/Behavioral: Negative.         Objective:      Physical Exam  Constitutional:       General: She is not in acute distress.     Appearance: Normal appearance.   HENT:      Head: Normocephalic and atraumatic.      Nose: Nose normal.   Eyes:      General: No scleral icterus.     Pupils: Pupils are equal, round, and reactive to light.   Cardiovascular:      Rate and Rhythm: Normal rate.   Pulmonary:      Effort: Pulmonary effort is normal. No respiratory distress.      Breath sounds: Normal breath sounds.   Abdominal:      General: There is no  distension.      Palpations: Abdomen is soft.      Tenderness: There is no abdominal tenderness. There is no guarding.   Musculoskeletal:         General: Normal range of motion.      Cervical back: Normal range of motion and neck supple.        Legs:       Comments: Fluid collection left lateral upper thigh; mild tenderness to palpation.  No signs of infection   Skin:     General: Skin is warm.      Coloration: Skin is not jaundiced.   Neurological:      General: No focal deficit present.      Mental Status: She is alert and oriented to person, place, and time.      Cranial Nerves: No cranial nerve deficit.   Psychiatric:         Mood and Affect: Mood normal.       Assessment:       Problem List Items Addressed This Visit    None  Visit Diagnoses       Hematoma    -  Primary    Relevant Medications    LIDOcaine-EPINEPHrine 1%-1:100,000 injection 10 mL (Completed)              Plan:       Due to pain and some pressure in the left leg, we will perform a needle aspiration of the fluid collection of the left lateral thigh.  If the fluid collection reoccurs, we can try needle aspiration 2nd time; but if it occurs the 3rd time, patient will need incision and drainage of fluid collection.  After this resolved, if patient still having chronic pain in her back radiating down to her legs with radiculopathy, we will refer to ortho spine

## 2022-10-26 ENCOUNTER — OFFICE VISIT (OUTPATIENT)
Dept: PRIMARY CARE CLINIC | Facility: CLINIC | Age: 67
End: 2022-10-26
Payer: MEDICARE

## 2022-10-26 VITALS
HEIGHT: 64 IN | DIASTOLIC BLOOD PRESSURE: 90 MMHG | BODY MASS INDEX: 30.05 KG/M2 | SYSTOLIC BLOOD PRESSURE: 152 MMHG | TEMPERATURE: 98 F | RESPIRATION RATE: 22 BRPM | OXYGEN SATURATION: 96 % | HEART RATE: 75 BPM | WEIGHT: 176 LBS

## 2022-10-26 DIAGNOSIS — T40.2X5A CONSTIPATION DUE TO OPIOID THERAPY: Primary | ICD-10-CM

## 2022-10-26 DIAGNOSIS — K59.03 CONSTIPATION DUE TO OPIOID THERAPY: Primary | ICD-10-CM

## 2022-10-26 DIAGNOSIS — M19.90 ARTHRITIS: ICD-10-CM

## 2022-10-26 DIAGNOSIS — S70.12XD HEMATOMA OF LEFT THIGH, SUBSEQUENT ENCOUNTER: ICD-10-CM

## 2022-10-26 PROBLEM — S70.12XA HEMATOMA OF LEFT THIGH: Status: ACTIVE | Noted: 2022-10-26

## 2022-10-26 PROCEDURE — 1159F PR MEDICATION LIST DOCUMENTED IN MEDICAL RECORD: ICD-10-PCS | Mod: ,,, | Performed by: FAMILY MEDICINE

## 2022-10-26 PROCEDURE — 1100F PR PT FALLS ASSESS DOC 2+ FALLS/FALL W/INJURY/YR: ICD-10-PCS | Mod: ,,, | Performed by: FAMILY MEDICINE

## 2022-10-26 PROCEDURE — 3061F PR NEG MICROALBUMINURIA RESULT DOCUMENTED/REVIEW: ICD-10-PCS | Mod: ,,, | Performed by: FAMILY MEDICINE

## 2022-10-26 PROCEDURE — 3288F PR FALLS RISK ASSESSMENT DOCUMENTED: ICD-10-PCS | Mod: ,,, | Performed by: FAMILY MEDICINE

## 2022-10-26 PROCEDURE — 1125F AMNT PAIN NOTED PAIN PRSNT: CPT | Mod: ,,, | Performed by: FAMILY MEDICINE

## 2022-10-26 PROCEDURE — 99213 OFFICE O/P EST LOW 20 MIN: CPT | Mod: ,,, | Performed by: FAMILY MEDICINE

## 2022-10-26 PROCEDURE — 3077F PR MOST RECENT SYSTOLIC BLOOD PRESSURE >= 140 MM HG: ICD-10-PCS | Mod: ,,, | Performed by: FAMILY MEDICINE

## 2022-10-26 PROCEDURE — 3080F DIAST BP >= 90 MM HG: CPT | Mod: ,,, | Performed by: FAMILY MEDICINE

## 2022-10-26 PROCEDURE — 3061F NEG MICROALBUMINURIA REV: CPT | Mod: ,,, | Performed by: FAMILY MEDICINE

## 2022-10-26 PROCEDURE — 99213 PR OFFICE/OUTPT VISIT, EST, LEVL III, 20-29 MIN: ICD-10-PCS | Mod: ,,, | Performed by: FAMILY MEDICINE

## 2022-10-26 PROCEDURE — 3066F NEPHROPATHY DOC TX: CPT | Mod: ,,, | Performed by: FAMILY MEDICINE

## 2022-10-26 PROCEDURE — 3288F FALL RISK ASSESSMENT DOCD: CPT | Mod: ,,, | Performed by: FAMILY MEDICINE

## 2022-10-26 PROCEDURE — 3077F SYST BP >= 140 MM HG: CPT | Mod: ,,, | Performed by: FAMILY MEDICINE

## 2022-10-26 PROCEDURE — 3046F HEMOGLOBIN A1C LEVEL >9.0%: CPT | Mod: ,,, | Performed by: FAMILY MEDICINE

## 2022-10-26 PROCEDURE — 3080F PR MOST RECENT DIASTOLIC BLOOD PRESSURE >= 90 MM HG: ICD-10-PCS | Mod: ,,, | Performed by: FAMILY MEDICINE

## 2022-10-26 PROCEDURE — 1159F MED LIST DOCD IN RCRD: CPT | Mod: ,,, | Performed by: FAMILY MEDICINE

## 2022-10-26 PROCEDURE — 3046F PR MOST RECENT HEMOGLOBIN A1C LEVEL > 9.0%: ICD-10-PCS | Mod: ,,, | Performed by: FAMILY MEDICINE

## 2022-10-26 PROCEDURE — 1100F PTFALLS ASSESS-DOCD GE2>/YR: CPT | Mod: ,,, | Performed by: FAMILY MEDICINE

## 2022-10-26 PROCEDURE — 1125F PR PAIN SEVERITY QUANTIFIED, PAIN PRESENT: ICD-10-PCS | Mod: ,,, | Performed by: FAMILY MEDICINE

## 2022-10-26 PROCEDURE — 3066F PR DOCUMENTATION OF TREATMENT FOR NEPHROPATHY: ICD-10-PCS | Mod: ,,, | Performed by: FAMILY MEDICINE

## 2022-10-26 RX ORDER — NALOXEGOL OXALATE 25 MG/1
25 TABLET, FILM COATED ORAL DAILY
Qty: 30 TABLET | Refills: 5 | Status: SHIPPED | OUTPATIENT
Start: 2022-10-26 | End: 2023-07-27

## 2022-10-26 RX ORDER — NALOXEGOL OXALATE 25 MG/1
25 TABLET, FILM COATED ORAL DAILY
COMMUNITY
End: 2022-10-26 | Stop reason: SDUPTHER

## 2022-10-26 NOTE — PROGRESS NOTES
Subjective:       Patient ID: Selena Proctor is a 67 y.o. female.    Chief Complaint: Abdominal Pain (Severe pain on left side of abd. Worse when she walk.) and Hypertension (Haven't taken blood pressure medication today.)    Pt. With a hematoma of her left lateral thigh. Dr. Cano did a needle aspiration on this this week. She is to see him again on the 11th. She is also suffering from opiate induced constipation.    Abdominal Pain  Associated symptoms include arthralgias and constipation. Pertinent negatives include no diarrhea, fever, headaches, nausea or vomiting.   Hypertension  Pertinent negatives include no chest pain, headaches or shortness of breath.   Review of Systems   Constitutional:  Negative for fatigue and fever.   HENT:  Negative for nasal congestion and dental problem.    Eyes:  Negative for discharge.   Respiratory:  Negative for cough and shortness of breath.    Cardiovascular:  Negative for chest pain.   Gastrointestinal:  Positive for abdominal pain and constipation. Negative for diarrhea, nausea and vomiting.   Genitourinary:  Negative for bladder incontinence, difficulty urinating and hot flashes.   Musculoskeletal:  Positive for arthralgias.   Allergic/Immunologic: Negative for environmental allergies.   Neurological:  Negative for headaches.   Psychiatric/Behavioral:  Negative for behavioral problems and confusion.        Objective:      Physical Exam  Vitals and nursing note reviewed.   Constitutional:       Appearance: Normal appearance. She is normal weight.   HENT:      Head: Normocephalic and atraumatic.      Right Ear: Tympanic membrane normal.      Left Ear: Tympanic membrane normal.      Nose: Nose normal.      Mouth/Throat:      Mouth: Mucous membranes are moist.   Eyes:      Extraocular Movements: Extraocular movements intact.      Conjunctiva/sclera: Conjunctivae normal.      Pupils: Pupils are equal, round, and reactive to light.   Cardiovascular:      Rate and Rhythm: Normal  rate and regular rhythm.      Pulses: Normal pulses.   Pulmonary:      Effort: Pulmonary effort is normal.      Breath sounds: Normal breath sounds.   Abdominal:      General: Abdomen is flat. Bowel sounds are normal.      Palpations: Abdomen is soft.   Musculoskeletal:         General: Normal range of motion.      Cervical back: Normal range of motion and neck supple.      Comments: Large hematoma left lateral thigh. Has a compression dressing in place. Multiple site arthritis; multiple joint replacements.   Skin:     General: Skin is warm and dry.   Neurological:      General: No focal deficit present.      Mental Status: She is alert and oriented to person, place, and time.   Psychiatric:         Mood and Affect: Mood normal.       Assessment:       Problem List Items Addressed This Visit    None      Plan:         Hematoma left thigh - pt. To see Dr. Cano on the 11th  Arthritis  Opiate induced constipation - pt. Is to try movantik for this   RTC as needed

## 2022-11-09 DIAGNOSIS — Z71.89 COMPLEX CARE COORDINATION: ICD-10-CM

## 2022-11-11 ENCOUNTER — OFFICE VISIT (OUTPATIENT)
Dept: SURGERY | Facility: CLINIC | Age: 67
End: 2022-11-11
Payer: MEDICARE

## 2022-11-11 DIAGNOSIS — T14.8XXA HEMATOMA: Primary | ICD-10-CM

## 2022-11-11 PROCEDURE — 99214 PR OFFICE/OUTPT VISIT, EST, LEVL IV, 30-39 MIN: ICD-10-PCS | Mod: S$PBB,,, | Performed by: STUDENT IN AN ORGANIZED HEALTH CARE EDUCATION/TRAINING PROGRAM

## 2022-11-11 PROCEDURE — 99214 OFFICE O/P EST MOD 30 MIN: CPT | Mod: S$PBB,,, | Performed by: STUDENT IN AN ORGANIZED HEALTH CARE EDUCATION/TRAINING PROGRAM

## 2022-11-11 PROCEDURE — 3046F PR MOST RECENT HEMOGLOBIN A1C LEVEL > 9.0%: ICD-10-PCS | Mod: CPTII,,, | Performed by: STUDENT IN AN ORGANIZED HEALTH CARE EDUCATION/TRAINING PROGRAM

## 2022-11-11 PROCEDURE — 3066F NEPHROPATHY DOC TX: CPT | Mod: CPTII,,, | Performed by: STUDENT IN AN ORGANIZED HEALTH CARE EDUCATION/TRAINING PROGRAM

## 2022-11-11 PROCEDURE — 99212 OFFICE O/P EST SF 10 MIN: CPT | Mod: PBBFAC | Performed by: STUDENT IN AN ORGANIZED HEALTH CARE EDUCATION/TRAINING PROGRAM

## 2022-11-11 PROCEDURE — 3046F HEMOGLOBIN A1C LEVEL >9.0%: CPT | Mod: CPTII,,, | Performed by: STUDENT IN AN ORGANIZED HEALTH CARE EDUCATION/TRAINING PROGRAM

## 2022-11-11 PROCEDURE — 3066F PR DOCUMENTATION OF TREATMENT FOR NEPHROPATHY: ICD-10-PCS | Mod: CPTII,,, | Performed by: STUDENT IN AN ORGANIZED HEALTH CARE EDUCATION/TRAINING PROGRAM

## 2022-11-11 PROCEDURE — 3061F NEG MICROALBUMINURIA REV: CPT | Mod: CPTII,,, | Performed by: STUDENT IN AN ORGANIZED HEALTH CARE EDUCATION/TRAINING PROGRAM

## 2022-11-11 PROCEDURE — 3061F PR NEG MICROALBUMINURIA RESULT DOCUMENTED/REVIEW: ICD-10-PCS | Mod: CPTII,,, | Performed by: STUDENT IN AN ORGANIZED HEALTH CARE EDUCATION/TRAINING PROGRAM

## 2022-11-11 NOTE — PROGRESS NOTES
Subjective:       Patient ID: Selena Proctor is a 67 y.o. female.    Chief Complaint: Follow-up    67-year-old  female presents to the clinic for 2 weeks follow-up status post aspiration of a left leg hematoma.  Patient has kept a pressure dressing on the leg.  Patient states the area feels better and less swelling; she states she still feels a firm nodule, but no large fluctuant fluid collection.  Denies any chest pain/shortness of breath, nausea/vomiting/diarrhea, fever/chills.    Review of Systems   Constitutional: Negative.    HENT: Negative.     Eyes: Negative.    Respiratory:  Negative for shortness of breath.    Cardiovascular:  Negative for chest pain.   Gastrointestinal:  Negative for abdominal distention, abdominal pain, blood in stool, change in bowel habit and change in bowel habit.   Genitourinary: Negative.  Negative for hematuria.   Musculoskeletal:  Negative for myalgias.   Neurological:  Negative for dizziness and weakness.   Psychiatric/Behavioral: Negative.         Objective:      Physical Exam  Constitutional:       General: She is not in acute distress.     Appearance: Normal appearance.   HENT:      Head: Normocephalic.   Cardiovascular:      Rate and Rhythm: Normal rate.   Pulmonary:      Effort: Pulmonary effort is normal. No respiratory distress.   Abdominal:      General: There is no distension.      Tenderness: There is no abdominal tenderness.   Musculoskeletal:         General: Normal range of motion.        Legs:       Comments: 2 cm firm tissue, but no fluctuance around the area, no palpable fluid collection as previously seen on evaluation   Skin:     General: Skin is warm.      Coloration: Skin is not jaundiced.   Neurological:      General: No focal deficit present.      Mental Status: She is alert and oriented to person, place, and time.      Cranial Nerves: No cranial nerve deficit.       Assessment:       Problem List Items Addressed This Visit    None  Visit  Diagnoses       Hematoma    -  Primary              Plan:       Areas healing; seroma resolving; no fluid appreciated on physical exam.  Patient can not wear Ace wrap anymore.  Patient to follow-up in 3 months for re-evaluation.

## 2022-11-30 NOTE — PROGRESS NOTES
Subjective:         Patient ID: Selena Proctor is a 67 y.o. female.    Chief Complaint: No chief complaint on file.      Pain  This is a chronic problem. The current episode started more than 1 year ago. The problem occurs daily. The problem has been unchanged. Associated symptoms include arthralgias and neck pain. Pertinent negatives include no anorexia, change in bowel habit, chest pain, coughing, diaphoresis, fever, sore throat, vertigo or vomiting.   Review of Systems   Constitutional:  Negative for activity change, appetite change, diaphoresis, fever and unexpected weight change.   HENT:  Negative for drooling, ear discharge, ear pain, facial swelling, nosebleeds, sore throat, trouble swallowing, voice change and goiter.    Eyes:  Negative for photophobia, pain, discharge, redness and visual disturbance.   Respiratory:  Negative for apnea, cough, choking, chest tightness, shortness of breath, wheezing and stridor.    Cardiovascular:  Negative for chest pain, palpitations and leg swelling.   Gastrointestinal:  Negative for abdominal distention, anorexia, change in bowel habit, diarrhea, rectal pain, vomiting, fecal incontinence and change in bowel habit.   Endocrine: Negative for cold intolerance, heat intolerance, polydipsia, polyphagia and polyuria.   Genitourinary:  Negative for bladder incontinence, dysuria, flank pain, frequency and hot flashes.   Musculoskeletal:  Positive for arthralgias, back pain, leg pain and neck pain.   Integumentary:  Negative for color change and pallor.   Allergic/Immunologic: Negative for immunocompromised state.   Neurological:  Negative for dizziness, vertigo, seizures, syncope, facial asymmetry, speech difficulty, light-headedness, coordination difficulties, memory loss and coordination difficulties.   Hematological:  Negative for adenopathy. Does not bruise/bleed easily.   Psychiatric/Behavioral:  Negative for agitation, behavioral problems, confusion, decreased  concentration, dysphoric mood, hallucinations, self-injury and suicidal ideas. The patient is not nervous/anxious and is not hyperactive.          Past Medical History:   Diagnosis Date    Acid reflux     Coronary arteriosclerosis     Diabetes     Diabetes mellitus, type 2     Hypertension     Spondylosis without myelopathy or radiculopathy, lumbar region      Past Surgical History:   Procedure Laterality Date    APPENDECTOMY      BLOCK, NERVE, OBTURATOR Right 2019    Right Femoral/Obturator Nerve Block  Dr Headley     SECTION      x2    CORONARY ARTERY BYPASS GRAFT      KNEE ARTHROPLASTY Right     KNEE ARTHROSCOPY Left     SKIN GRAFT      Left Thigh    TOTAL HIP ARTHROPLASTY Bilateral     TUBAL LIGATION       Social History     Socioeconomic History    Marital status: Legally     Number of children: 6   Occupational History    Occupation: retired   Tobacco Use    Smoking status: Never    Smokeless tobacco: Never   Substance and Sexual Activity    Alcohol use: Not Currently    Drug use: Yes     Types: Hydrocodone    Sexual activity: Not Currently     Family History   Problem Relation Age of Onset    Hypertension Mother     Heart disease Mother     Diabetes Sister     Hypertension Sister     Hypertension Brother     Alcohol abuse Father     Cancer Father     Hypertension Son     Mental illness Maternal Aunt      Review of patient's allergies indicates:   Allergen Reactions    Betadine [povidone-iodine] Itching        Objective:  There were no vitals filed for this visit.        Physical Exam  Vitals and nursing note reviewed. Exam conducted with a chaperone present.   Constitutional:       General: She is awake.      Appearance: Normal appearance. She is not ill-appearing, toxic-appearing or diaphoretic.   HENT:      Head: Normocephalic and atraumatic.      Nose: Nose normal.      Mouth/Throat:      Mouth: Mucous membranes are moist.      Pharynx: Oropharynx is clear.   Eyes:       Conjunctiva/sclera: Conjunctivae normal.      Pupils: Pupils are equal, round, and reactive to light.   Cardiovascular:      Rate and Rhythm: Normal rate.   Pulmonary:      Effort: Pulmonary effort is normal. No respiratory distress.   Abdominal:      Palpations: Abdomen is soft.      Tenderness: There is no guarding.   Musculoskeletal:         General: Normal range of motion.      Right shoulder: Tenderness present.      Left shoulder: Tenderness present.      Cervical back: Normal range of motion and neck supple. No rigidity.      Thoracic back: Tenderness present.      Lumbar back: Tenderness present.      Right hip: Tenderness present.      Left hip: Tenderness present.   Skin:     General: Skin is warm and dry.      Coloration: Skin is not jaundiced or pale.   Neurological:      General: No focal deficit present.      Mental Status: She is alert and oriented to person, place, and time. Mental status is at baseline.      Cranial Nerves: No cranial nerve deficit (II-XII).   Psychiatric:         Mood and Affect: Mood normal.         Behavior: Behavior normal. Behavior is cooperative.         Thought Content: Thought content normal.         X-Ray Hip 2 or 3 views Left (with Pelvis when performed)  Narrative: EXAMINATION:  XR FEMUR 2 VIEW LEFT; XR HIP WITH PELVIS WHEN PERFORMED, 2 OR 3 VIEWS LEFT    CLINICAL HISTORY:  Pain in left thigh    COMPARISON:  Pelvis and left hip exam dated 07/01/2015 and 09/20/2022    FINDINGS:  A total left hip arthroplasty is present and no abnormal lucencies are seen around the prosthetic component.  A total right hip arthroplasty is also noted.  There is osteoarthritis involving the symphysis pubis with a comparable appearance to the previous examination.  No significant abnormalities are seen elsewhere in the pelvis.    No abnormalities are seen in the mid to distal femur.  There is osteoarthritis in the left knee.  Surgical clips are present in the soft tissues of the medial  posterior thigh.  Impression: Chronic changes are present in the hip and knee but no significant focal bony lesions are seen.    Place of service: [facility]    Electronically signed by: Erendira Hester  Date:    10/17/2022  Time:    11:25  X-Ray Femur 2 View Left  Narrative: EXAMINATION:  XR FEMUR 2 VIEW LEFT; XR HIP WITH PELVIS WHEN PERFORMED, 2 OR 3 VIEWS LEFT    CLINICAL HISTORY:  Pain in left thigh    COMPARISON:  Pelvis and left hip exam dated 07/01/2015 and 09/20/2022    FINDINGS:  A total left hip arthroplasty is present and no abnormal lucencies are seen around the prosthetic component.  A total right hip arthroplasty is also noted.  There is osteoarthritis involving the symphysis pubis with a comparable appearance to the previous examination.  No significant abnormalities are seen elsewhere in the pelvis.    No abnormalities are seen in the mid to distal femur.  There is osteoarthritis in the left knee.  Surgical clips are present in the soft tissues of the medial posterior thigh.  Impression: Chronic changes are present in the hip and knee but no significant focal bony lesions are seen.    Place of service: [facility]    Electronically signed by: Erendira Hester  Date:    10/17/2022  Time:    11:25  US Lower Extremity Veins Left  Narrative: EXAMINATION:  US LOWER EXTREMITY VEINS LEFT    CLINICAL HISTORY:  Pain in left thigh    TECHNIQUE:  Duplex and color flow Doppler evaluation and graded compression of the left lower extremity veins was performed.  Ultrasound images captured and stored.    COMPARISON:  None    FINDINGS:  Left thigh veins: The common femoral, femoral, popliteal, upper greater saphenous, and deep femoral veins are patent and free of thrombus. The veins are normally compressible and have normal phasic flow and augmentation response.    Left calf veins: The visualized calf veins are patent.    There is complex fluid collection in the upper lateral left thigh measuring  6.9 x 4.3 x 2.8 cm with several internal septations.  Impression: No evidence of deep venous thrombosis in the left lower extremity.    Complex collection in the superficial soft tissues of the left thigh as described.  Uncertain if this represents a hematoma or abscess.  Correlate with patient history.    Electronically signed by: Mukund Harrison  Date:    10/17/2022  Time:    10:41       Admission on 10/22/2022, Discharged on 10/22/2022   Component Date Value Ref Range Status    POC Glucose 10/22/2022 232 (H)  70 - 105 mg/dL Final   Office Visit on 09/23/2022   Component Date Value Ref Range Status    Hemoglobin A1C 09/23/2022 9.3 (H)  4.5 - 6.6 % Final    Estimated Average Glucose 09/23/2022 224  mg/dL Final    Triglycerides 09/23/2022 139  35 - 150 mg/dL Final    Cholesterol 09/23/2022 158  0 - 200 mg/dL Final    HDL Cholesterol 09/23/2022 51  40 - 60 mg/dL Final    Cholesterol/HDL Ratio (Risk Factor) 09/23/2022 3.1   Final    Non-HDL 09/23/2022 107  mg/dL Final    LDL Calculated 09/23/2022 79  mg/dL Final    LDL/HDL 09/23/2022 1.5   Final    VLDL 09/23/2022 28  mg/dL Final    Sodium 09/23/2022 141  136 - 145 mmol/L Final    Potassium 09/23/2022 3.6  3.5 - 5.1 mmol/L Final    Chloride 09/23/2022 108 (H)  98 - 107 mmol/L Final    CO2 09/23/2022 26  21 - 32 mmol/L Final    Anion Gap 09/23/2022 11  7 - 16 mmol/L Final    Glucose 09/23/2022 169 (H)  74 - 106 mg/dL Final    BUN 09/23/2022 20 (H)  7 - 18 mg/dL Final    Creatinine 09/23/2022 1.15 (H)  0.55 - 1.02 mg/dL Final    BUN/Creatinine Ratio 09/23/2022 17  6 - 20 Final    Calcium 09/23/2022 9.2  8.5 - 10.1 mg/dL Final    Total Protein 09/23/2022 7.7  6.4 - 8.2 g/dL Final    Albumin 09/23/2022 3.6  3.5 - 5.0 g/dL Final    Globulin 09/23/2022 4.1 (H)  2.0 - 4.0 g/dL Final    A/G Ratio 09/23/2022 0.9   Final    Bilirubin, Total 09/23/2022 0.5  >0.0 - 1.2 mg/dL Final    Alk Phos 09/23/2022 50 (L)  55 - 142 U/L Final    ALT 09/23/2022 20  13 - 56 U/L Final    AST  09/23/2022 13 (L)  15 - 37 U/L Final    eGFR 09/23/2022 52 (L)  >=60 mL/min/1.73m² Final    WBC 09/23/2022 5.26  4.50 - 11.00 K/uL Final    RBC 09/23/2022 4.71  4.20 - 5.40 M/uL Final    Hemoglobin 09/23/2022 12.5  12.0 - 16.0 g/dL Final    Hematocrit 09/23/2022 40.7  38.0 - 47.0 % Final    MCV 09/23/2022 86.4  80.0 - 96.0 fL Final    MCH 09/23/2022 26.5 (L)  27.0 - 31.0 pg Final    MCHC 09/23/2022 30.7 (L)  32.0 - 36.0 g/dL Final    RDW 09/23/2022 16.3 (H)  11.5 - 14.5 % Final    Platelet Count 09/23/2022 253  150 - 400 K/uL Final    MPV 09/23/2022 11.6  9.4 - 12.4 fL Final    Neutrophils % 09/23/2022 49.4 (L)  53.0 - 65.0 % Final    Lymphocytes % 09/23/2022 33.8  27.0 - 41.0 % Final    Monocytes % 09/23/2022 12.0 (H)  2.0 - 6.0 % Final    Eosinophils % 09/23/2022 3.2  1.0 - 4.0 % Final    Basophils % 09/23/2022 1.0  0.0 - 1.0 % Final    Immature Granulocytes % 09/23/2022 0.6 (H)  0.0 - 0.4 % Final    nRBC, Auto 09/23/2022 0.0  <=0.0 % Final    Neutrophils, Abs 09/23/2022 2.60  1.80 - 7.70 K/uL Final    Lymphocytes, Absolute 09/23/2022 1.78  1.00 - 4.80 K/uL Final    Monocytes, Absolute 09/23/2022 0.63  0.00 - 0.80 K/uL Final    Eosinophils, Absolute 09/23/2022 0.17  0.00 - 0.50 K/uL Final    Basophils, Absolute 09/23/2022 0.05  0.00 - 0.20 K/uL Final    Immature Granulocytes, Absolute 09/23/2022 0.03  0.00 - 0.04 K/uL Final    nRBC, Absolute 09/23/2022 0.00  <=0.00 x10e3/uL Final    Diff Type 09/23/2022 Auto   Final   Office Visit on 09/07/2022   Component Date Value Ref Range Status    POC Amphetamines 09/07/2022 Negative  Negative, Inconclusive Final    POC Barbiturates 09/07/2022 Negative  Negative, Inconclusive Final    POC Benzodiazepines 09/07/2022 Negative  Negative, Inconclusive Final    POC Cocaine 09/07/2022 Negative  Negative, Inconclusive Final    POC THC 09/07/2022 Negative  Negative, Inconclusive Final    POC Methadone 09/07/2022 Negative  Negative, Inconclusive Final    POC Methamphetamine  09/07/2022 Negative  Negative, Inconclusive Final    POC Opiates 09/07/2022 Presumptive Positive (A)  Negative, Inconclusive Final    POC Oxycodone 09/07/2022 Negative  Negative, Inconclusive Final    POC Phencyclidine 09/07/2022 Negative  Negative, Inconclusive Final    POC Methylenedioxymethamphetamine * 09/07/2022 Negative  Negative, Inconclusive Final    POC Tricyclic Antidepressants 09/07/2022 Negative  Negative, Inconclusive Final    POC Buprenorphine 09/07/2022 Negative   Final     Acceptable 09/07/2022 Yes   Final    POC Temperature (Urine) 09/07/2022 92   Final   Patient Outreach on 08/23/2022   Component Date Value Ref Range Status    Left Eye DM Retinopathy 08/18/2022 Negative   Final    Right Eye DM Retinopathy 08/18/2022 Negative   Final   Patient Outreach on 07/28/2022   Component Date Value Ref Range Status    CRC Recommendation External 05/13/2019 Repeat colonoscopy in 5 years   Final   Office Visit on 06/23/2022   Component Date Value Ref Range Status    POC Glucose 06/23/2022 139 (A)  70 - 110 MG/DL Final    Sodium 06/23/2022 139  136 - 145 mmol/L Final    Potassium 06/23/2022 3.7  3.5 - 5.1 mmol/L Final    Chloride 06/23/2022 104  98 - 107 mmol/L Final    CO2 06/23/2022 28  21 - 32 mmol/L Final    Anion Gap 06/23/2022 11  7 - 16 mmol/L Final    Glucose 06/23/2022 139 (H)  74 - 106 mg/dL Final    BUN 06/23/2022 16  7 - 18 mg/dL Final    Creatinine 06/23/2022 0.90  0.55 - 1.02 mg/dL Final    BUN/Creatinine Ratio 06/23/2022 18  6 - 20 Final    Calcium 06/23/2022 9.3  8.5 - 10.1 mg/dL Final    Total Protein 06/23/2022 7.6  6.4 - 8.2 g/dL Final    Albumin 06/23/2022 3.6  3.5 - 5.0 g/dL Final    Globulin 06/23/2022 4.0  2.0 - 4.0 g/dL Final    A/G Ratio 06/23/2022 0.9   Final    Bilirubin, Total 06/23/2022 0.5  0.0 - 1.2 mg/dL Final    Alk Phos 06/23/2022 55  55 - 142 U/L Final    ALT 06/23/2022 15  13 - 56 U/L Final    AST 06/23/2022 10 (L)  15 - 37 U/L Final    eGFR 06/23/2022 66  >=60  mL/min/1.73m² Final    Hemoglobin A1C 06/23/2022 8.3 (H)  4.5 - 6.6 % Final    Estimated Average Glucose 06/23/2022 190  mg/dL Final    Hepatitis C Ab 06/23/2022 Non-Reactive  Non-Reactive Final    WBC 06/23/2022 5.67  4.50 - 11.00 K/uL Final    RBC 06/23/2022 4.86  4.20 - 5.40 M/uL Final    Hemoglobin 06/23/2022 13.3  12.0 - 16.0 g/dL Final    Hematocrit 06/23/2022 41.7  38.0 - 47.0 % Final    MCV 06/23/2022 85.8  80.0 - 96.0 fL Final    MCH 06/23/2022 27.4  27.0 - 31.0 pg Final    MCHC 06/23/2022 31.9 (L)  32.0 - 36.0 g/dL Final    RDW 06/23/2022 15.6 (H)  11.5 - 14.5 % Final    Platelet Count 06/23/2022 248  150 - 400 K/uL Final    MPV 06/23/2022 11.5  9.4 - 12.4 fL Final    Neutrophils % 06/23/2022 50.5 (L)  53.0 - 65.0 % Final    Lymphocytes % 06/23/2022 35.8  27.0 - 41.0 % Final    Monocytes % 06/23/2022 10.2 (H)  2.0 - 6.0 % Final    Eosinophils % 06/23/2022 2.5  1.0 - 4.0 % Final    Basophils % 06/23/2022 0.5  0.0 - 1.0 % Final    Immature Granulocytes % 06/23/2022 0.5 (H)  0.0 - 0.4 % Final    nRBC, Auto 06/23/2022 0.0  <=0.0 % Final    Neutrophils, Abs 06/23/2022 2.86  1.80 - 7.70 K/uL Final    Lymphocytes, Absolute 06/23/2022 2.03  1.00 - 4.80 K/uL Final    Monocytes, Absolute 06/23/2022 0.58  0.00 - 0.80 K/uL Final    Eosinophils, Absolute 06/23/2022 0.14  0.00 - 0.50 K/uL Final    Basophils, Absolute 06/23/2022 0.03  0.00 - 0.20 K/uL Final    Immature Granulocytes, Absolute 06/23/2022 0.03  0.00 - 0.04 K/uL Final    nRBC, Absolute 06/23/2022 0.00  <=0.00 x10e3/uL Final    Diff Type 06/23/2022 Auto   Final    Creatinine, Urine 06/23/2022 51  28 - 219 mg/dL Final    Microalbumin 06/23/2022 0.7  0.0 - 2.8 mg/dL Final    Microalbumin/Creatinine Ratio 06/23/2022 13.7  0.0 - 30.0 mg/g Final   Office Visit on 06/09/2022   Component Date Value Ref Range Status    POC Amphetamines 06/09/2022 Negative  Negative, Inconclusive Final    POC Barbiturates 06/09/2022 Negative  Negative, Inconclusive Final    POC  Benzodiazepines 06/09/2022 Negative  Negative, Inconclusive Final    POC Cocaine 06/09/2022 Negative  Negative, Inconclusive Final    POC THC 06/09/2022 Negative  Negative, Inconclusive Final    POC Methadone 06/09/2022 Negative  Negative, Inconclusive Final    POC Methamphetamine 06/09/2022 Negative  Negative, Inconclusive Final    POC Opiates 06/09/2022 Negative  Negative, Inconclusive Final    POC Oxycodone 06/09/2022 Negative  Negative, Inconclusive Final    POC Phencyclidine 06/09/2022 Negative  Negative, Inconclusive Final    POC Methylenedioxymethamphetamine * 06/09/2022 Negative  Negative, Inconclusive Final    POC Tricyclic Antidepressants 06/09/2022 Negative  Negative, Inconclusive Final    POC Buprenorphine 06/09/2022 Negative   Final     Acceptable 06/09/2022 Yes   Final    POC Temperature (Urine) 06/09/2022 92   Final    pH, UA 06/09/2022 6.0  5.0, 5.5, 6.0, 6.5, 7.0, 7.5, 8.0 pH Units Final    Specific Gravity, UA 06/09/2022 1.015  <=1.005, 1.010, 1.015, 1.020, 1.025, 1.030 Final    Creatinine, Urine 06/09/2022 48  28 - 219 mg/dL Final    6-Acetylmorphine 06/09/2022 Negative  10 ng/mL Final    7-Aminoclonazepam 06/09/2022 Negative  Negative 25 ng/mL Final    a-Hydroxyalprazolam 06/09/2022 Negative  25 ng/mL Final    Acetyl Fentanyl 06/09/2022 Negative  2.5 ng/mL Final    Acetyl Norfentanyl Oxalate 06/09/2022 Negative  5 ng/mL Final    Benzoylecgonine 06/09/2022 Negative  100 ng/mL Final    Buprenorphine 06/09/2022 Negative  25 ng/mL Final    Codeine 06/09/2022 Negative  25 ng/mL Final    EDDP 06/09/2022 Negative  25 ng/mL Final    Fentanyl 06/09/2022 Negative  2.5 ng/mL Final    Hydrocodone 06/09/2022 127.2 (H)  <25.0 25 ng/mL Final    Hydromorphone 06/09/2022 72.5 (H)  <25.0 25 ng/mL Final    Lorazepam 06/09/2022 Negative  25 ng/mL Final    Morphine 06/09/2022 Negative  25 ng/mL Final    Norbuprenorphine 06/09/2022 Negative  25 ng/mL Final    Nordiazepam 06/09/2022 Negative  25 ng/mL  Final    Norfentanyl Oxalate 06/09/2022 Negative  5 ng/mL Final    Norhydrocodone 06/09/2022 215.6 (H)  <50.0 50 ng/mL Final    Noroxycodone HCL 06/09/2022 Negative  50 ng/mL Final    Oxazepam 06/09/2022 Negative  25 ng/mL Final    Oxymorphone 06/09/2022 Negative  25 ng/mL Final    Tapentadol 06/09/2022 Negative  25 ng/mL Final    Temazepam 06/09/2022 Negative  25 ng/mL Final    THC-COOH 06/09/2022 Negative  25 ng/mL Final    Tramadol 06/09/2022 Negative  100 ng/mL Final    Amphetamine, Urine 06/09/2022 Negative  Negative Final    Methamphetamines, Urine 06/09/2022 Negative  Negative Final    Methadone, Urine 06/09/2022 Negative  Negative 25 ng/mL Final    Oxycodone, Urine 06/09/2022 Negative  Negative 25 ng/mL Final         No orders of the defined types were placed in this encounter.      Requested Prescriptions      No prescriptions requested or ordered in this encounter       Assessment:     No diagnosis found.       A's of Opioid Risk Assessment  Activity:Patient can perform ADL.   Analgesia:Patients pain is partially controlled by current medication. Patient has tried OTC medications such as Tylenol and Ibuprofen with out relief.   Adverse Effects: Patient denies constipation or sedation.  Aberrant Behavior:  reviewed with no aberrant drug seeking/taking behavior.  Overdose reversal drug naloxone discussed    Drug screen reviewed      Plan:    Definitive drug screen June 9, 2022 consistent with hydrocodone and metabolite    Complaint right lateral thigh pain pointing to her right trochanteric area worse with sitting or lying on her side     She states she would like to continue conservative management current medications helping control her discomfort     Continue current medication    Continue home exercise program as directed    Follow-up 3 months    Dr. Bai, August 2022    Bring original prescription medication bottles/container/box with labels to each visit    Pill count    Physical therapy

## 2022-12-06 ENCOUNTER — OFFICE VISIT (OUTPATIENT)
Dept: WOUND CARE | Facility: CLINIC | Age: 67
End: 2022-12-06
Attending: FAMILY MEDICINE
Payer: MEDICARE

## 2022-12-06 ENCOUNTER — OFFICE VISIT (OUTPATIENT)
Dept: PAIN MEDICINE | Facility: CLINIC | Age: 67
End: 2022-12-06
Payer: MEDICARE

## 2022-12-06 VITALS
HEART RATE: 59 BPM | RESPIRATION RATE: 18 BRPM | DIASTOLIC BLOOD PRESSURE: 72 MMHG | SYSTOLIC BLOOD PRESSURE: 115 MMHG | TEMPERATURE: 98 F

## 2022-12-06 VITALS
HEART RATE: 60 BPM | DIASTOLIC BLOOD PRESSURE: 63 MMHG | BODY MASS INDEX: 31.07 KG/M2 | SYSTOLIC BLOOD PRESSURE: 126 MMHG | RESPIRATION RATE: 16 BRPM | WEIGHT: 182 LBS | HEIGHT: 64 IN

## 2022-12-06 DIAGNOSIS — M25.551 HIP PAIN, RIGHT: Chronic | ICD-10-CM

## 2022-12-06 DIAGNOSIS — S71.102S OPEN WOUND OF LEFT THIGH, SEQUELA: Primary | ICD-10-CM

## 2022-12-06 DIAGNOSIS — M47.816 SPONDYLOSIS WITHOUT MYELOPATHY OR RADICULOPATHY, LUMBAR REGION: Primary | Chronic | ICD-10-CM

## 2022-12-06 DIAGNOSIS — M89.49 OSTEOARTHROSIS MULTIPLE SITES, NOT SPECIFIED AS GENERALIZED: Chronic | ICD-10-CM

## 2022-12-06 DIAGNOSIS — Z79.899 ENCOUNTER FOR LONG-TERM (CURRENT) USE OF OTHER MEDICATIONS: ICD-10-CM

## 2022-12-06 PROCEDURE — 80305 DRUG TEST PRSMV DIR OPT OBS: CPT | Mod: PBBFAC | Performed by: PHYSICIAN ASSISTANT

## 2022-12-06 PROCEDURE — 3046F PR MOST RECENT HEMOGLOBIN A1C LEVEL > 9.0%: ICD-10-PCS | Mod: CPTII,,, | Performed by: PHYSICIAN ASSISTANT

## 2022-12-06 PROCEDURE — 1100F PR PT FALLS ASSESS DOC 2+ FALLS/FALL W/INJURY/YR: ICD-10-PCS | Mod: CPTII,,, | Performed by: PHYSICIAN ASSISTANT

## 2022-12-06 PROCEDURE — 3074F PR MOST RECENT SYSTOLIC BLOOD PRESSURE < 130 MM HG: ICD-10-PCS | Mod: CPTII,,, | Performed by: PHYSICIAN ASSISTANT

## 2022-12-06 PROCEDURE — 1125F PR PAIN SEVERITY QUANTIFIED, PAIN PRESENT: ICD-10-PCS | Mod: CPTII,,, | Performed by: PHYSICIAN ASSISTANT

## 2022-12-06 PROCEDURE — 99213 OFFICE O/P EST LOW 20 MIN: CPT | Mod: PBBFAC | Performed by: NURSE PRACTITIONER

## 2022-12-06 PROCEDURE — 1101F PT FALLS ASSESS-DOCD LE1/YR: CPT | Mod: CPTII,,, | Performed by: NURSE PRACTITIONER

## 2022-12-06 PROCEDURE — 3061F NEG MICROALBUMINURIA REV: CPT | Mod: CPTII,,, | Performed by: NURSE PRACTITIONER

## 2022-12-06 PROCEDURE — 3078F PR MOST RECENT DIASTOLIC BLOOD PRESSURE < 80 MM HG: ICD-10-PCS | Mod: CPTII,,, | Performed by: NURSE PRACTITIONER

## 2022-12-06 PROCEDURE — 3066F NEPHROPATHY DOC TX: CPT | Mod: CPTII,,, | Performed by: NURSE PRACTITIONER

## 2022-12-06 PROCEDURE — 3066F PR DOCUMENTATION OF TREATMENT FOR NEPHROPATHY: ICD-10-PCS | Mod: CPTII,,, | Performed by: PHYSICIAN ASSISTANT

## 2022-12-06 PROCEDURE — 3061F PR NEG MICROALBUMINURIA RESULT DOCUMENTED/REVIEW: ICD-10-PCS | Mod: CPTII,,, | Performed by: PHYSICIAN ASSISTANT

## 2022-12-06 PROCEDURE — 3066F NEPHROPATHY DOC TX: CPT | Mod: CPTII,,, | Performed by: PHYSICIAN ASSISTANT

## 2022-12-06 PROCEDURE — 99214 OFFICE O/P EST MOD 30 MIN: CPT | Mod: S$PBB,,, | Performed by: PHYSICIAN ASSISTANT

## 2022-12-06 PROCEDURE — 3074F SYST BP LT 130 MM HG: CPT | Mod: CPTII,,, | Performed by: PHYSICIAN ASSISTANT

## 2022-12-06 PROCEDURE — 99214 PR OFFICE/OUTPT VISIT, EST, LEVL IV, 30-39 MIN: ICD-10-PCS | Mod: S$PBB,,, | Performed by: NURSE PRACTITIONER

## 2022-12-06 PROCEDURE — 3046F HEMOGLOBIN A1C LEVEL >9.0%: CPT | Mod: CPTII,,, | Performed by: NURSE PRACTITIONER

## 2022-12-06 PROCEDURE — 3061F PR NEG MICROALBUMINURIA RESULT DOCUMENTED/REVIEW: ICD-10-PCS | Mod: CPTII,,, | Performed by: NURSE PRACTITIONER

## 2022-12-06 PROCEDURE — 3288F FALL RISK ASSESSMENT DOCD: CPT | Mod: CPTII,,, | Performed by: NURSE PRACTITIONER

## 2022-12-06 PROCEDURE — 3288F PR FALLS RISK ASSESSMENT DOCUMENTED: ICD-10-PCS | Mod: CPTII,,, | Performed by: NURSE PRACTITIONER

## 2022-12-06 PROCEDURE — 99214 PR OFFICE/OUTPT VISIT, EST, LEVL IV, 30-39 MIN: ICD-10-PCS | Mod: S$PBB,,, | Performed by: PHYSICIAN ASSISTANT

## 2022-12-06 PROCEDURE — 3008F PR BODY MASS INDEX (BMI) DOCUMENTED: ICD-10-PCS | Mod: CPTII,,, | Performed by: PHYSICIAN ASSISTANT

## 2022-12-06 PROCEDURE — 3078F PR MOST RECENT DIASTOLIC BLOOD PRESSURE < 80 MM HG: ICD-10-PCS | Mod: CPTII,,, | Performed by: PHYSICIAN ASSISTANT

## 2022-12-06 PROCEDURE — 3074F PR MOST RECENT SYSTOLIC BLOOD PRESSURE < 130 MM HG: ICD-10-PCS | Mod: CPTII,,, | Performed by: NURSE PRACTITIONER

## 2022-12-06 PROCEDURE — 3078F DIAST BP <80 MM HG: CPT | Mod: CPTII,,, | Performed by: PHYSICIAN ASSISTANT

## 2022-12-06 PROCEDURE — 3008F BODY MASS INDEX DOCD: CPT | Mod: CPTII,,, | Performed by: PHYSICIAN ASSISTANT

## 2022-12-06 PROCEDURE — 3074F SYST BP LT 130 MM HG: CPT | Mod: CPTII,,, | Performed by: NURSE PRACTITIONER

## 2022-12-06 PROCEDURE — 1159F PR MEDICATION LIST DOCUMENTED IN MEDICAL RECORD: ICD-10-PCS | Mod: CPTII,,, | Performed by: NURSE PRACTITIONER

## 2022-12-06 PROCEDURE — 99215 OFFICE O/P EST HI 40 MIN: CPT | Mod: PBBFAC | Performed by: PHYSICIAN ASSISTANT

## 2022-12-06 PROCEDURE — 3046F HEMOGLOBIN A1C LEVEL >9.0%: CPT | Mod: CPTII,,, | Performed by: PHYSICIAN ASSISTANT

## 2022-12-06 PROCEDURE — 1100F PTFALLS ASSESS-DOCD GE2>/YR: CPT | Mod: CPTII,,, | Performed by: PHYSICIAN ASSISTANT

## 2022-12-06 PROCEDURE — 3061F NEG MICROALBUMINURIA REV: CPT | Mod: CPTII,,, | Performed by: PHYSICIAN ASSISTANT

## 2022-12-06 PROCEDURE — 1159F MED LIST DOCD IN RCRD: CPT | Mod: CPTII,,, | Performed by: NURSE PRACTITIONER

## 2022-12-06 PROCEDURE — 1159F PR MEDICATION LIST DOCUMENTED IN MEDICAL RECORD: ICD-10-PCS | Mod: CPTII,,, | Performed by: PHYSICIAN ASSISTANT

## 2022-12-06 PROCEDURE — 3078F DIAST BP <80 MM HG: CPT | Mod: CPTII,,, | Performed by: NURSE PRACTITIONER

## 2022-12-06 PROCEDURE — 3066F PR DOCUMENTATION OF TREATMENT FOR NEPHROPATHY: ICD-10-PCS | Mod: CPTII,,, | Performed by: NURSE PRACTITIONER

## 2022-12-06 PROCEDURE — 1159F MED LIST DOCD IN RCRD: CPT | Mod: CPTII,,, | Performed by: PHYSICIAN ASSISTANT

## 2022-12-06 PROCEDURE — 3288F PR FALLS RISK ASSESSMENT DOCUMENTED: ICD-10-PCS | Mod: CPTII,,, | Performed by: PHYSICIAN ASSISTANT

## 2022-12-06 PROCEDURE — 3288F FALL RISK ASSESSMENT DOCD: CPT | Mod: CPTII,,, | Performed by: PHYSICIAN ASSISTANT

## 2022-12-06 PROCEDURE — 3046F PR MOST RECENT HEMOGLOBIN A1C LEVEL > 9.0%: ICD-10-PCS | Mod: CPTII,,, | Performed by: NURSE PRACTITIONER

## 2022-12-06 PROCEDURE — 1101F PR PT FALLS ASSESS DOC 0-1 FALLS W/OUT INJ PAST YR: ICD-10-PCS | Mod: CPTII,,, | Performed by: NURSE PRACTITIONER

## 2022-12-06 PROCEDURE — 99214 OFFICE O/P EST MOD 30 MIN: CPT | Mod: S$PBB,,, | Performed by: NURSE PRACTITIONER

## 2022-12-06 PROCEDURE — 1125F AMNT PAIN NOTED PAIN PRSNT: CPT | Mod: CPTII,,, | Performed by: PHYSICIAN ASSISTANT

## 2022-12-06 RX ORDER — HYDROCODONE BITARTRATE AND ACETAMINOPHEN 10; 325 MG/1; MG/1
1 TABLET ORAL EVERY 8 HOURS
Qty: 90 TABLET | Refills: 0 | Status: SHIPPED | OUTPATIENT
Start: 2023-01-07 | End: 2023-02-06

## 2022-12-06 RX ORDER — DICLOFENAC SODIUM 10 MG/G
2 GEL TOPICAL 4 TIMES DAILY
Qty: 10 EACH | Refills: 2 | Status: SHIPPED | OUTPATIENT
Start: 2022-12-06 | End: 2023-06-01 | Stop reason: SDUPTHER

## 2022-12-06 RX ORDER — CLOTRIMAZOLE AND BETAMETHASONE DIPROPIONATE 10; .64 MG/G; MG/G
CREAM TOPICAL 2 TIMES DAILY
Qty: 45 G | Refills: 2 | Status: ON HOLD | OUTPATIENT
Start: 2022-12-06 | End: 2023-08-18 | Stop reason: HOSPADM

## 2022-12-06 RX ORDER — MUPIROCIN 20 MG/G
OINTMENT TOPICAL 3 TIMES DAILY
Qty: 30 G | Refills: 2 | Status: SHIPPED | OUTPATIENT
Start: 2022-12-06 | End: 2023-07-27

## 2022-12-06 RX ORDER — HYDROCODONE BITARTRATE AND ACETAMINOPHEN 10; 325 MG/1; MG/1
1 TABLET ORAL EVERY 8 HOURS
Qty: 90 TABLET | Refills: 0 | Status: SHIPPED | OUTPATIENT
Start: 2023-02-06 | End: 2023-06-01 | Stop reason: SDUPTHER

## 2022-12-06 RX ORDER — HYDROCODONE BITARTRATE AND ACETAMINOPHEN 10; 325 MG/1; MG/1
1 TABLET ORAL EVERY 8 HOURS
Qty: 90 TABLET | Refills: 0 | Status: SHIPPED | OUTPATIENT
Start: 2022-12-08 | End: 2023-01-07

## 2022-12-06 RX ORDER — NALOXONE HYDROCHLORIDE 4 MG/.1ML
1 SPRAY NASAL ONCE
Qty: 1 EACH | Refills: 0 | Status: SHIPPED | OUTPATIENT
Start: 2022-12-06 | End: 2022-12-06

## 2022-12-06 NOTE — ASSESSMENT & PLAN NOTE
Wash with vashe.  Apply barrier spray to periwound  Apply mixture of three creams (calmoseptine, mupirocin, and antifungal) lightly to wound edges  three times a day  Leave open to air, so we do not further irritate the area. Can cover with dry gauze if needed to avoid creams rubbing, but do not secure it down    Monitor closely for s/s of infection including fever, chills, increase in pain, odor from wound, and increased redness from foot. Go to ER if any complications develop.   Keep leg elevated and avoid pressure on wound.      Diabetes:  Monitor glucose closely. Check fasting glucose and 2 hours after meals. HgA1C goal <7, fasting glucose , and 2 hours after meals <180  Hypertension:  Check blood pressure twice daily, goal <120/80  Diet:   Increase protein intake, avoid fried, fatty foods and foods high in simple carbs.   Vitamins:  Take vitamin C 1000 mg, zinc 50mg, vitamin d 5000 units, and a daily multivitamin. Anant is a good source of protein and nutrients to aid in wound healing.     Referral to Dr. Cano for advanced wound closure options
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
## 2022-12-06 NOTE — PATIENT INSTRUCTIONS
Wash with vashe.  Apply barrier spray to periwound  Apply mixture of three creams (calmoseptine, mupirocin, and antifungal) lightly to wound edges  three times a day  Leave open to air, so we do not further irritate the area. Can cover with dry gauze if needed to avoid creams rubbing, but do not secure it down    Monitor closely for s/s of infection including fever, chills, increase in pain, odor from wound, and increased redness from foot. Go to ER if any complications develop.   Keep leg elevated and avoid pressure on wound.      Diabetes:  Monitor glucose closely. Check fasting glucose and 2 hours after meals. HgA1C goal <7, fasting glucose , and 2 hours after meals <180  Hypertension:  Check blood pressure twice daily, goal <120/80  Diet:   Increase protein intake, avoid fried, fatty foods and foods high in simple carbs.   Vitamins:  Take vitamin C 1000 mg, zinc 50mg, vitamin d 5000 units, and a daily multivitamin. Anant is a good source of protein and nutrients to aid in wound healing.     We are waiting to hear back from general surgery and we will contact you when we have more information

## 2022-12-06 NOTE — PROGRESS NOTES
Subjective:         Patient ID: Selena Proctor is a 67 y.o. female.    Chief Complaint: Hip Pain and Low-back Pain      Pain  This is a chronic problem. The current episode started more than 1 year ago. The problem occurs daily. The problem has been unchanged. Associated symptoms include arthralgias and neck pain. Pertinent negatives include no anorexia, change in bowel habit, coughing, diaphoresis, fever, sore throat, vertigo or vomiting.   Review of Systems   Constitutional:  Negative for activity change, appetite change, diaphoresis, fever and unexpected weight change.   HENT:  Negative for drooling, ear discharge, ear pain, facial swelling, nosebleeds, sore throat, trouble swallowing, voice change and goiter.    Eyes:  Negative for photophobia, pain, discharge, redness and visual disturbance.   Respiratory:  Negative for apnea, cough, choking, chest tightness, shortness of breath, wheezing and stridor.    Cardiovascular:  Negative for palpitations and leg swelling.   Gastrointestinal:  Negative for abdominal distention, anorexia, change in bowel habit, diarrhea, rectal pain, vomiting, fecal incontinence and change in bowel habit.   Endocrine: Negative for cold intolerance, heat intolerance, polydipsia, polyphagia and polyuria.   Genitourinary:  Negative for flank pain, frequency and hot flashes.   Musculoskeletal:  Positive for arthralgias, back pain and neck pain.   Integumentary:  Negative for color change and pallor.   Allergic/Immunologic: Negative for immunocompromised state.   Neurological:  Negative for dizziness, vertigo, seizures, syncope, facial asymmetry, speech difficulty, light-headedness, coordination difficulties, memory loss and coordination difficulties.   Hematological:  Negative for adenopathy. Does not bruise/bleed easily.   Psychiatric/Behavioral:  Negative for agitation, behavioral problems, confusion, decreased concentration, dysphoric mood, hallucinations, self-injury and suicidal ideas.  "The patient is not nervous/anxious and is not hyperactive.          Past Medical History:   Diagnosis Date    Acid reflux     Coronary arteriosclerosis     Diabetes     Diabetes mellitus, type 2     Hypertension     Spondylosis without myelopathy or radiculopathy, lumbar region      Past Surgical History:   Procedure Laterality Date    APPENDECTOMY      BLOCK, NERVE, OBTURATOR Right 2019    Right Femoral/Obturator Nerve Block  Dr Headley     SECTION      x2    CORONARY ARTERY BYPASS GRAFT      KNEE ARTHROPLASTY Right     KNEE ARTHROSCOPY Left     SKIN GRAFT      Left Thigh    TOTAL HIP ARTHROPLASTY Bilateral     TUBAL LIGATION       Social History     Socioeconomic History    Marital status: Legally     Number of children: 6   Occupational History    Occupation: retired   Tobacco Use    Smoking status: Never    Smokeless tobacco: Never   Substance and Sexual Activity    Alcohol use: Not Currently    Drug use: Yes     Types: Hydrocodone    Sexual activity: Not Currently     Family History   Problem Relation Age of Onset    Hypertension Mother     Heart disease Mother     Diabetes Sister     Hypertension Sister     Hypertension Brother     Alcohol abuse Father     Cancer Father     Hypertension Son     Mental illness Maternal Aunt      Review of patient's allergies indicates:   Allergen Reactions    Betadine [povidone-iodine] Itching        Objective:  Vitals:    22 0747   BP: 126/63   Pulse: 60   Resp: 16   Weight: 82.6 kg (182 lb)   Height: 5' 4" (1.626 m)   PainSc:   8         Physical Exam  Vitals and nursing note reviewed. Exam conducted with a chaperone present.   Constitutional:       General: She is awake.      Appearance: Normal appearance. She is not ill-appearing, toxic-appearing or diaphoretic.   HENT:      Head: Normocephalic and atraumatic.      Nose: Nose normal.      Mouth/Throat:      Mouth: Mucous membranes are moist.      Pharynx: Oropharynx is clear.   Eyes:      " Conjunctiva/sclera: Conjunctivae normal.      Pupils: Pupils are equal, round, and reactive to light.   Cardiovascular:      Rate and Rhythm: Normal rate.   Pulmonary:      Effort: Pulmonary effort is normal. No respiratory distress.   Abdominal:      Palpations: Abdomen is soft.      Tenderness: There is no guarding.   Musculoskeletal:         General: Normal range of motion.      Right shoulder: Tenderness present.      Left shoulder: Tenderness present.      Cervical back: Normal range of motion and neck supple. No rigidity.      Thoracic back: Tenderness present.      Lumbar back: Tenderness present.      Right hip: Tenderness present.      Left hip: Tenderness present.   Skin:     General: Skin is warm and dry.      Coloration: Skin is not jaundiced or pale.   Neurological:      General: No focal deficit present.      Mental Status: She is alert and oriented to person, place, and time. Mental status is at baseline.      Cranial Nerves: No cranial nerve deficit (II-XII).   Psychiatric:         Mood and Affect: Mood normal.         Behavior: Behavior normal. Behavior is cooperative.         Thought Content: Thought content normal.         X-Ray Hip 2 or 3 views Left (with Pelvis when performed)  Narrative: EXAMINATION:  XR FEMUR 2 VIEW LEFT; XR HIP WITH PELVIS WHEN PERFORMED, 2 OR 3 VIEWS LEFT    CLINICAL HISTORY:  Pain in left thigh    COMPARISON:  Pelvis and left hip exam dated 07/01/2015 and 09/20/2022    FINDINGS:  A total left hip arthroplasty is present and no abnormal lucencies are seen around the prosthetic component.  A total right hip arthroplasty is also noted.  There is osteoarthritis involving the symphysis pubis with a comparable appearance to the previous examination.  No significant abnormalities are seen elsewhere in the pelvis.    No abnormalities are seen in the mid to distal femur.  There is osteoarthritis in the left knee.  Surgical clips are present in the soft tissues of the medial  posterior thigh.  Impression: Chronic changes are present in the hip and knee but no significant focal bony lesions are seen.    Place of service: [facility]    Electronically signed by: Erendira Hester  Date:    10/17/2022  Time:    11:25  X-Ray Femur 2 View Left  Narrative: EXAMINATION:  XR FEMUR 2 VIEW LEFT; XR HIP WITH PELVIS WHEN PERFORMED, 2 OR 3 VIEWS LEFT    CLINICAL HISTORY:  Pain in left thigh    COMPARISON:  Pelvis and left hip exam dated 07/01/2015 and 09/20/2022    FINDINGS:  A total left hip arthroplasty is present and no abnormal lucencies are seen around the prosthetic component.  A total right hip arthroplasty is also noted.  There is osteoarthritis involving the symphysis pubis with a comparable appearance to the previous examination.  No significant abnormalities are seen elsewhere in the pelvis.    No abnormalities are seen in the mid to distal femur.  There is osteoarthritis in the left knee.  Surgical clips are present in the soft tissues of the medial posterior thigh.  Impression: Chronic changes are present in the hip and knee but no significant focal bony lesions are seen.    Place of service: [facility]    Electronically signed by: Erendira Hester  Date:    10/17/2022  Time:    11:25  US Lower Extremity Veins Left  Narrative: EXAMINATION:  US LOWER EXTREMITY VEINS LEFT    CLINICAL HISTORY:  Pain in left thigh    TECHNIQUE:  Duplex and color flow Doppler evaluation and graded compression of the left lower extremity veins was performed.  Ultrasound images captured and stored.    COMPARISON:  None    FINDINGS:  Left thigh veins: The common femoral, femoral, popliteal, upper greater saphenous, and deep femoral veins are patent and free of thrombus. The veins are normally compressible and have normal phasic flow and augmentation response.    Left calf veins: The visualized calf veins are patent.    There is complex fluid collection in the upper lateral left thigh measuring  6.9 x 4.3 x 2.8 cm with several internal septations.  Impression: No evidence of deep venous thrombosis in the left lower extremity.    Complex collection in the superficial soft tissues of the left thigh as described.  Uncertain if this represents a hematoma or abscess.  Correlate with patient history.    Electronically signed by: Mukund Harrison  Date:    10/17/2022  Time:    10:41       Admission on 10/22/2022, Discharged on 10/22/2022   Component Date Value Ref Range Status    POC Glucose 10/22/2022 232 (H)  70 - 105 mg/dL Final   Office Visit on 09/23/2022   Component Date Value Ref Range Status    Hemoglobin A1C 09/23/2022 9.3 (H)  4.5 - 6.6 % Final    Estimated Average Glucose 09/23/2022 224  mg/dL Final    Triglycerides 09/23/2022 139  35 - 150 mg/dL Final    Cholesterol 09/23/2022 158  0 - 200 mg/dL Final    HDL Cholesterol 09/23/2022 51  40 - 60 mg/dL Final    Cholesterol/HDL Ratio (Risk Factor) 09/23/2022 3.1   Final    Non-HDL 09/23/2022 107  mg/dL Final    LDL Calculated 09/23/2022 79  mg/dL Final    LDL/HDL 09/23/2022 1.5   Final    VLDL 09/23/2022 28  mg/dL Final    Sodium 09/23/2022 141  136 - 145 mmol/L Final    Potassium 09/23/2022 3.6  3.5 - 5.1 mmol/L Final    Chloride 09/23/2022 108 (H)  98 - 107 mmol/L Final    CO2 09/23/2022 26  21 - 32 mmol/L Final    Anion Gap 09/23/2022 11  7 - 16 mmol/L Final    Glucose 09/23/2022 169 (H)  74 - 106 mg/dL Final    BUN 09/23/2022 20 (H)  7 - 18 mg/dL Final    Creatinine 09/23/2022 1.15 (H)  0.55 - 1.02 mg/dL Final    BUN/Creatinine Ratio 09/23/2022 17  6 - 20 Final    Calcium 09/23/2022 9.2  8.5 - 10.1 mg/dL Final    Total Protein 09/23/2022 7.7  6.4 - 8.2 g/dL Final    Albumin 09/23/2022 3.6  3.5 - 5.0 g/dL Final    Globulin 09/23/2022 4.1 (H)  2.0 - 4.0 g/dL Final    A/G Ratio 09/23/2022 0.9   Final    Bilirubin, Total 09/23/2022 0.5  >0.0 - 1.2 mg/dL Final    Alk Phos 09/23/2022 50 (L)  55 - 142 U/L Final    ALT 09/23/2022 20  13 - 56 U/L Final    AST  09/23/2022 13 (L)  15 - 37 U/L Final    eGFR 09/23/2022 52 (L)  >=60 mL/min/1.73m² Final    WBC 09/23/2022 5.26  4.50 - 11.00 K/uL Final    RBC 09/23/2022 4.71  4.20 - 5.40 M/uL Final    Hemoglobin 09/23/2022 12.5  12.0 - 16.0 g/dL Final    Hematocrit 09/23/2022 40.7  38.0 - 47.0 % Final    MCV 09/23/2022 86.4  80.0 - 96.0 fL Final    MCH 09/23/2022 26.5 (L)  27.0 - 31.0 pg Final    MCHC 09/23/2022 30.7 (L)  32.0 - 36.0 g/dL Final    RDW 09/23/2022 16.3 (H)  11.5 - 14.5 % Final    Platelet Count 09/23/2022 253  150 - 400 K/uL Final    MPV 09/23/2022 11.6  9.4 - 12.4 fL Final    Neutrophils % 09/23/2022 49.4 (L)  53.0 - 65.0 % Final    Lymphocytes % 09/23/2022 33.8  27.0 - 41.0 % Final    Monocytes % 09/23/2022 12.0 (H)  2.0 - 6.0 % Final    Eosinophils % 09/23/2022 3.2  1.0 - 4.0 % Final    Basophils % 09/23/2022 1.0  0.0 - 1.0 % Final    Immature Granulocytes % 09/23/2022 0.6 (H)  0.0 - 0.4 % Final    nRBC, Auto 09/23/2022 0.0  <=0.0 % Final    Neutrophils, Abs 09/23/2022 2.60  1.80 - 7.70 K/uL Final    Lymphocytes, Absolute 09/23/2022 1.78  1.00 - 4.80 K/uL Final    Monocytes, Absolute 09/23/2022 0.63  0.00 - 0.80 K/uL Final    Eosinophils, Absolute 09/23/2022 0.17  0.00 - 0.50 K/uL Final    Basophils, Absolute 09/23/2022 0.05  0.00 - 0.20 K/uL Final    Immature Granulocytes, Absolute 09/23/2022 0.03  0.00 - 0.04 K/uL Final    nRBC, Absolute 09/23/2022 0.00  <=0.00 x10e3/uL Final    Diff Type 09/23/2022 Auto   Final   Office Visit on 09/07/2022   Component Date Value Ref Range Status    POC Amphetamines 09/07/2022 Negative  Negative, Inconclusive Final    POC Barbiturates 09/07/2022 Negative  Negative, Inconclusive Final    POC Benzodiazepines 09/07/2022 Negative  Negative, Inconclusive Final    POC Cocaine 09/07/2022 Negative  Negative, Inconclusive Final    POC THC 09/07/2022 Negative  Negative, Inconclusive Final    POC Methadone 09/07/2022 Negative  Negative, Inconclusive Final    POC Methamphetamine  09/07/2022 Negative  Negative, Inconclusive Final    POC Opiates 09/07/2022 Presumptive Positive (A)  Negative, Inconclusive Final    POC Oxycodone 09/07/2022 Negative  Negative, Inconclusive Final    POC Phencyclidine 09/07/2022 Negative  Negative, Inconclusive Final    POC Methylenedioxymethamphetamine * 09/07/2022 Negative  Negative, Inconclusive Final    POC Tricyclic Antidepressants 09/07/2022 Negative  Negative, Inconclusive Final    POC Buprenorphine 09/07/2022 Negative   Final     Acceptable 09/07/2022 Yes   Final    POC Temperature (Urine) 09/07/2022 92   Final   Patient Outreach on 08/23/2022   Component Date Value Ref Range Status    Left Eye DM Retinopathy 08/18/2022 Negative   Final    Right Eye DM Retinopathy 08/18/2022 Negative   Final   Patient Outreach on 07/28/2022   Component Date Value Ref Range Status    CRC Recommendation External 05/13/2019 Repeat colonoscopy in 5 years   Final   Office Visit on 06/23/2022   Component Date Value Ref Range Status    POC Glucose 06/23/2022 139 (A)  70 - 110 MG/DL Final    Sodium 06/23/2022 139  136 - 145 mmol/L Final    Potassium 06/23/2022 3.7  3.5 - 5.1 mmol/L Final    Chloride 06/23/2022 104  98 - 107 mmol/L Final    CO2 06/23/2022 28  21 - 32 mmol/L Final    Anion Gap 06/23/2022 11  7 - 16 mmol/L Final    Glucose 06/23/2022 139 (H)  74 - 106 mg/dL Final    BUN 06/23/2022 16  7 - 18 mg/dL Final    Creatinine 06/23/2022 0.90  0.55 - 1.02 mg/dL Final    BUN/Creatinine Ratio 06/23/2022 18  6 - 20 Final    Calcium 06/23/2022 9.3  8.5 - 10.1 mg/dL Final    Total Protein 06/23/2022 7.6  6.4 - 8.2 g/dL Final    Albumin 06/23/2022 3.6  3.5 - 5.0 g/dL Final    Globulin 06/23/2022 4.0  2.0 - 4.0 g/dL Final    A/G Ratio 06/23/2022 0.9   Final    Bilirubin, Total 06/23/2022 0.5  0.0 - 1.2 mg/dL Final    Alk Phos 06/23/2022 55  55 - 142 U/L Final    ALT 06/23/2022 15  13 - 56 U/L Final    AST 06/23/2022 10 (L)  15 - 37 U/L Final    eGFR 06/23/2022 66  >=60  mL/min/1.73m² Final    Hemoglobin A1C 06/23/2022 8.3 (H)  4.5 - 6.6 % Final    Estimated Average Glucose 06/23/2022 190  mg/dL Final    Hepatitis C Ab 06/23/2022 Non-Reactive  Non-Reactive Final    WBC 06/23/2022 5.67  4.50 - 11.00 K/uL Final    RBC 06/23/2022 4.86  4.20 - 5.40 M/uL Final    Hemoglobin 06/23/2022 13.3  12.0 - 16.0 g/dL Final    Hematocrit 06/23/2022 41.7  38.0 - 47.0 % Final    MCV 06/23/2022 85.8  80.0 - 96.0 fL Final    MCH 06/23/2022 27.4  27.0 - 31.0 pg Final    MCHC 06/23/2022 31.9 (L)  32.0 - 36.0 g/dL Final    RDW 06/23/2022 15.6 (H)  11.5 - 14.5 % Final    Platelet Count 06/23/2022 248  150 - 400 K/uL Final    MPV 06/23/2022 11.5  9.4 - 12.4 fL Final    Neutrophils % 06/23/2022 50.5 (L)  53.0 - 65.0 % Final    Lymphocytes % 06/23/2022 35.8  27.0 - 41.0 % Final    Monocytes % 06/23/2022 10.2 (H)  2.0 - 6.0 % Final    Eosinophils % 06/23/2022 2.5  1.0 - 4.0 % Final    Basophils % 06/23/2022 0.5  0.0 - 1.0 % Final    Immature Granulocytes % 06/23/2022 0.5 (H)  0.0 - 0.4 % Final    nRBC, Auto 06/23/2022 0.0  <=0.0 % Final    Neutrophils, Abs 06/23/2022 2.86  1.80 - 7.70 K/uL Final    Lymphocytes, Absolute 06/23/2022 2.03  1.00 - 4.80 K/uL Final    Monocytes, Absolute 06/23/2022 0.58  0.00 - 0.80 K/uL Final    Eosinophils, Absolute 06/23/2022 0.14  0.00 - 0.50 K/uL Final    Basophils, Absolute 06/23/2022 0.03  0.00 - 0.20 K/uL Final    Immature Granulocytes, Absolute 06/23/2022 0.03  0.00 - 0.04 K/uL Final    nRBC, Absolute 06/23/2022 0.00  <=0.00 x10e3/uL Final    Diff Type 06/23/2022 Auto   Final    Creatinine, Urine 06/23/2022 51  28 - 219 mg/dL Final    Microalbumin 06/23/2022 0.7  0.0 - 2.8 mg/dL Final    Microalbumin/Creatinine Ratio 06/23/2022 13.7  0.0 - 30.0 mg/g Final   Office Visit on 06/09/2022   Component Date Value Ref Range Status    POC Amphetamines 06/09/2022 Negative  Negative, Inconclusive Final    POC Barbiturates 06/09/2022 Negative  Negative, Inconclusive Final    POC  Benzodiazepines 06/09/2022 Negative  Negative, Inconclusive Final    POC Cocaine 06/09/2022 Negative  Negative, Inconclusive Final    POC THC 06/09/2022 Negative  Negative, Inconclusive Final    POC Methadone 06/09/2022 Negative  Negative, Inconclusive Final    POC Methamphetamine 06/09/2022 Negative  Negative, Inconclusive Final    POC Opiates 06/09/2022 Negative  Negative, Inconclusive Final    POC Oxycodone 06/09/2022 Negative  Negative, Inconclusive Final    POC Phencyclidine 06/09/2022 Negative  Negative, Inconclusive Final    POC Methylenedioxymethamphetamine * 06/09/2022 Negative  Negative, Inconclusive Final    POC Tricyclic Antidepressants 06/09/2022 Negative  Negative, Inconclusive Final    POC Buprenorphine 06/09/2022 Negative   Final     Acceptable 06/09/2022 Yes   Final    POC Temperature (Urine) 06/09/2022 92   Final    pH, UA 06/09/2022 6.0  5.0, 5.5, 6.0, 6.5, 7.0, 7.5, 8.0 pH Units Final    Specific Gravity, UA 06/09/2022 1.015  <=1.005, 1.010, 1.015, 1.020, 1.025, 1.030 Final    Creatinine, Urine 06/09/2022 48  28 - 219 mg/dL Final    6-Acetylmorphine 06/09/2022 Negative  10 ng/mL Final    7-Aminoclonazepam 06/09/2022 Negative  Negative 25 ng/mL Final    a-Hydroxyalprazolam 06/09/2022 Negative  25 ng/mL Final    Acetyl Fentanyl 06/09/2022 Negative  2.5 ng/mL Final    Acetyl Norfentanyl Oxalate 06/09/2022 Negative  5 ng/mL Final    Benzoylecgonine 06/09/2022 Negative  100 ng/mL Final    Buprenorphine 06/09/2022 Negative  25 ng/mL Final    Codeine 06/09/2022 Negative  25 ng/mL Final    EDDP 06/09/2022 Negative  25 ng/mL Final    Fentanyl 06/09/2022 Negative  2.5 ng/mL Final    Hydrocodone 06/09/2022 127.2 (H)  <25.0 25 ng/mL Final    Hydromorphone 06/09/2022 72.5 (H)  <25.0 25 ng/mL Final    Lorazepam 06/09/2022 Negative  25 ng/mL Final    Morphine 06/09/2022 Negative  25 ng/mL Final    Norbuprenorphine 06/09/2022 Negative  25 ng/mL Final    Nordiazepam 06/09/2022 Negative  25 ng/mL  Final    Norfentanyl Oxalate 2022 Negative  5 ng/mL Final    Norhydrocodone 2022 215.6 (H)  <50.0 50 ng/mL Final    Noroxycodone HCL 2022 Negative  50 ng/mL Final    Oxazepam 2022 Negative  25 ng/mL Final    Oxymorphone 2022 Negative  25 ng/mL Final    Tapentadol 2022 Negative  25 ng/mL Final    Temazepam 2022 Negative  25 ng/mL Final    THC-COOH 2022 Negative  25 ng/mL Final    Tramadol 2022 Negative  100 ng/mL Final    Amphetamine, Urine 2022 Negative  Negative Final    Methamphetamines, Urine 2022 Negative  Negative Final    Methadone, Urine 2022 Negative  Negative 25 ng/mL Final    Oxycodone, Urine 2022 Negative  Negative 25 ng/mL Final         Orders Placed This Encounter   Procedures    POCT Urine Drug Screen Presump     Interpretive Information:     Negative:  No drug detected at the cut off level.   Positive:  This result represents presumptive positive for the   tested drug, other substances may yield a positive response other   than the analyte of interest. This result should be utilized for   diagnostic purpose only. Confirmation testing will be performed upon physician request only.          Requested Prescriptions     Signed Prescriptions Disp Refills    diclofenac sodium (VOLTAREN ARTHRITIS PAIN) 1 % Gel 10 each 2     Sig: Apply 2 g topically 4 (four) times daily. Apply to knees, elbows, joints as needed    naloxone (NARCAN) 4 mg/actuation Spry 1 each 0     Si spray (4 mg total) by Nasal route once. for 1 dose    HYDROcodone-acetaminophen (NORCO)  mg per tablet 90 tablet 0     Sig: Take 1 tablet by mouth every 8 (eight) hours.    HYDROcodone-acetaminophen (NORCO)  mg per tablet 90 tablet 0     Sig: Take 1 tablet by mouth every 8 (eight) hours.    HYDROcodone-acetaminophen (NORCO)  mg per tablet 90 tablet 0     Sig: Take 1 tablet by mouth every 8 (eight) hours.       Assessment:     1. Spondylosis without  myelopathy or radiculopathy, lumbar region    2. Hip pain, right    3. Osteoarthrosis multiple sites, not specified as generalized    4. Encounter for long-term (current) use of other medications         A's of Opioid Risk Assessment  Activity:Patient can perform ADL.   Analgesia:Patients pain is partially controlled by current medication. Patient has tried OTC medications such as Tylenol and Ibuprofen with out relief.   Adverse Effects: Patient denies constipation or sedation.  Aberrant Behavior:  reviewed with no aberrant drug seeking/taking behavior.  Overdose reversal drug naloxone discussed    Drug screen reviewed      Plan:    No new complaints     She states current medication continues to help control her chronic discomfort    Continue current medication    Continue home exercise program as directed    Follow-up 3 months    Dr. Bai, August 2022    Bring original prescription medication bottles/container/box with labels to each visit    Pill count    Physical therapy

## 2022-12-20 ENCOUNTER — OFFICE VISIT (OUTPATIENT)
Dept: SURGERY | Facility: CLINIC | Age: 67
End: 2022-12-20
Payer: MEDICARE

## 2022-12-20 DIAGNOSIS — S71.102A OPEN WOUND OF LEFT THIGH, INITIAL ENCOUNTER: ICD-10-CM

## 2022-12-20 DIAGNOSIS — T14.8XXA HEMATOMA: Primary | ICD-10-CM

## 2022-12-20 PROCEDURE — 1159F MED LIST DOCD IN RCRD: CPT | Mod: CPTII,,, | Performed by: STUDENT IN AN ORGANIZED HEALTH CARE EDUCATION/TRAINING PROGRAM

## 2022-12-20 PROCEDURE — 3066F PR DOCUMENTATION OF TREATMENT FOR NEPHROPATHY: ICD-10-PCS | Mod: CPTII,,, | Performed by: STUDENT IN AN ORGANIZED HEALTH CARE EDUCATION/TRAINING PROGRAM

## 2022-12-20 PROCEDURE — 99212 OFFICE O/P EST SF 10 MIN: CPT | Mod: PBBFAC | Performed by: STUDENT IN AN ORGANIZED HEALTH CARE EDUCATION/TRAINING PROGRAM

## 2022-12-20 PROCEDURE — 3046F PR MOST RECENT HEMOGLOBIN A1C LEVEL > 9.0%: ICD-10-PCS | Mod: CPTII,,, | Performed by: STUDENT IN AN ORGANIZED HEALTH CARE EDUCATION/TRAINING PROGRAM

## 2022-12-20 PROCEDURE — 3046F HEMOGLOBIN A1C LEVEL >9.0%: CPT | Mod: CPTII,,, | Performed by: STUDENT IN AN ORGANIZED HEALTH CARE EDUCATION/TRAINING PROGRAM

## 2022-12-20 PROCEDURE — 3061F PR NEG MICROALBUMINURIA RESULT DOCUMENTED/REVIEW: ICD-10-PCS | Mod: CPTII,,, | Performed by: STUDENT IN AN ORGANIZED HEALTH CARE EDUCATION/TRAINING PROGRAM

## 2022-12-20 PROCEDURE — 99214 PR OFFICE/OUTPT VISIT, EST, LEVL IV, 30-39 MIN: ICD-10-PCS | Mod: S$PBB,,, | Performed by: STUDENT IN AN ORGANIZED HEALTH CARE EDUCATION/TRAINING PROGRAM

## 2022-12-20 PROCEDURE — 3066F NEPHROPATHY DOC TX: CPT | Mod: CPTII,,, | Performed by: STUDENT IN AN ORGANIZED HEALTH CARE EDUCATION/TRAINING PROGRAM

## 2022-12-20 PROCEDURE — 1159F PR MEDICATION LIST DOCUMENTED IN MEDICAL RECORD: ICD-10-PCS | Mod: CPTII,,, | Performed by: STUDENT IN AN ORGANIZED HEALTH CARE EDUCATION/TRAINING PROGRAM

## 2022-12-20 PROCEDURE — 99214 OFFICE O/P EST MOD 30 MIN: CPT | Mod: S$PBB,,, | Performed by: STUDENT IN AN ORGANIZED HEALTH CARE EDUCATION/TRAINING PROGRAM

## 2022-12-20 PROCEDURE — 3061F NEG MICROALBUMINURIA REV: CPT | Mod: CPTII,,, | Performed by: STUDENT IN AN ORGANIZED HEALTH CARE EDUCATION/TRAINING PROGRAM

## 2022-12-20 NOTE — PROGRESS NOTES
Subjective:       Patient ID: Selena Proctor is a 67 y.o. female.    Chief Complaint: Other (Open wound - left thigh - seen Dr. Jensen before)    67-year-old  female presents to the clinic for 2 weeks follow-up status post aspiration of a left leg hematoma.  Patient has kept a pressure dressing on the leg.  Patient states the area feels better and less swelling; she states she still feels a firm nodule, but no large fluctuant fluid collection.  Denies any chest pain/shortness of breath, nausea/vomiting/diarrhea, fever/chills.    12/20:  Patient presents today for evaluation of a left inner thigh wound.  Patient's recent left outer leg hematoma has resolved and there is no fluid accumulation.  Patient states she has a wound on her left inner thigh which has been present for approximately 8 months and will not heal.  Denies any chest pain/shortness of breath, nausea/vomiting/diarrhea, fever/chills.  Denies any chest pain or shortness of breath on exertion; greater than 4 Mets    Review of Systems   Constitutional: Negative.    HENT: Negative.     Eyes: Negative.    Respiratory:  Negative for shortness of breath.    Cardiovascular:  Negative for chest pain.   Gastrointestinal:  Negative for abdominal distention, abdominal pain, blood in stool, change in bowel habit and change in bowel habit.   Genitourinary: Negative.  Negative for hematuria.   Musculoskeletal:  Negative for myalgias.   Neurological:  Negative for dizziness and weakness.   Psychiatric/Behavioral: Negative.         Objective:      Physical Exam  Constitutional:       General: She is not in acute distress.     Appearance: Normal appearance.   HENT:      Head: Normocephalic.   Cardiovascular:      Rate and Rhythm: Normal rate.   Pulmonary:      Effort: Pulmonary effort is normal. No respiratory distress.   Abdominal:      General: There is no distension.      Tenderness: There is no abdominal tenderness.   Musculoskeletal:         General:  Normal range of motion.        Legs:       Comments: No fluctuance around left lateral thigh.  There is a 3 x 2 by 1 cm chronic ulcer to left inner thigh granulation tissue; no signs of infection or necrosis   Skin:     General: Skin is warm.      Coloration: Skin is not jaundiced.   Neurological:      General: No focal deficit present.      Mental Status: She is alert and oriented to person, place, and time.      Cranial Nerves: No cranial nerve deficit.       Assessment:       Problem List Items Addressed This Visit          Orthopedic    Open wound of left thigh     Other Visit Diagnoses       Hematoma    -  Primary              Plan:       A left lateral thigh seroma resolved.      Nonhealing wound to left inner thigh the skin present for almost 1 year.  We will perform an excisional biopsy with rotational flap for skin coverage.      Risks and benefits explained with the patient including risks for infection, bleeding, injury to surrounding structures, hematoma/seroma formation with need for possible evacuation, possible open.  The patient verbalized understanding, agrees and wishes to proceed with surgery.    Patient will need cardiac clearance from a cardiologist due to her history of coronary bypass surgery.    Prior to surgery, she will need a CBC, BMP, EKG.

## 2023-01-05 ENCOUNTER — OFFICE VISIT (OUTPATIENT)
Dept: PRIMARY CARE CLINIC | Facility: CLINIC | Age: 68
End: 2023-01-05
Payer: MEDICARE

## 2023-01-05 VITALS
WEIGHT: 182.13 LBS | DIASTOLIC BLOOD PRESSURE: 68 MMHG | HEART RATE: 55 BPM | TEMPERATURE: 98 F | SYSTOLIC BLOOD PRESSURE: 123 MMHG | BODY MASS INDEX: 31.09 KG/M2 | RESPIRATION RATE: 20 BRPM | HEIGHT: 64 IN | OXYGEN SATURATION: 98 %

## 2023-01-05 DIAGNOSIS — I10 ESSENTIAL HYPERTENSION: Primary | ICD-10-CM

## 2023-01-05 DIAGNOSIS — E87.6 HYPOKALEMIA: ICD-10-CM

## 2023-01-05 DIAGNOSIS — E78.5 HYPERLIPIDEMIA, UNSPECIFIED HYPERLIPIDEMIA TYPE: ICD-10-CM

## 2023-01-05 DIAGNOSIS — E11.9 TYPE 2 DIABETES MELLITUS WITHOUT COMPLICATION, WITHOUT LONG-TERM CURRENT USE OF INSULIN: ICD-10-CM

## 2023-01-05 LAB
ALBUMIN SERPL BCP-MCNC: 3.6 G/DL (ref 3.5–5)
ALBUMIN/GLOB SERPL: 0.9 {RATIO}
ALP SERPL-CCNC: 59 U/L (ref 55–142)
ALT SERPL W P-5'-P-CCNC: 18 U/L (ref 13–56)
ANION GAP SERPL CALCULATED.3IONS-SCNC: 12 MMOL/L (ref 7–16)
AST SERPL W P-5'-P-CCNC: 13 U/L (ref 15–37)
BASOPHILS # BLD AUTO: 0.03 K/UL (ref 0–0.2)
BASOPHILS NFR BLD AUTO: 0.6 % (ref 0–1)
BILIRUB SERPL-MCNC: 0.4 MG/DL (ref ?–1.2)
BUN SERPL-MCNC: 19 MG/DL (ref 7–18)
BUN/CREAT SERPL: 20 (ref 6–20)
CALCIUM SERPL-MCNC: 9.2 MG/DL (ref 8.5–10.1)
CHLORIDE SERPL-SCNC: 105 MMOL/L (ref 98–107)
CO2 SERPL-SCNC: 27 MMOL/L (ref 21–32)
CREAT SERPL-MCNC: 0.96 MG/DL (ref 0.55–1.02)
DIFFERENTIAL METHOD BLD: ABNORMAL
EGFR (NO RACE VARIABLE) (RUSH/TITUS): 64 ML/MIN/1.73M²
EOSINOPHIL # BLD AUTO: 0.13 K/UL (ref 0–0.5)
EOSINOPHIL NFR BLD AUTO: 2.5 % (ref 1–4)
ERYTHROCYTE [DISTWIDTH] IN BLOOD BY AUTOMATED COUNT: 15.6 % (ref 11.5–14.5)
EST. AVERAGE GLUCOSE BLD GHB EST-MCNC: 244 MG/DL
GLOBULIN SER-MCNC: 3.8 G/DL (ref 2–4)
GLUCOSE SERPL-MCNC: 184 MG/DL (ref 74–106)
HBA1C MFR BLD HPLC: 9.9 % (ref 4.5–6.6)
HCT VFR BLD AUTO: 45.1 % (ref 38–47)
HGB BLD-MCNC: 13.8 G/DL (ref 12–16)
IMM GRANULOCYTES # BLD AUTO: 0.01 K/UL (ref 0–0.04)
IMM GRANULOCYTES NFR BLD: 0.2 % (ref 0–0.4)
LYMPHOCYTES # BLD AUTO: 1.92 K/UL (ref 1–4.8)
LYMPHOCYTES NFR BLD AUTO: 37.6 % (ref 27–41)
MCH RBC QN AUTO: 27.5 PG (ref 27–31)
MCHC RBC AUTO-ENTMCNC: 30.6 G/DL (ref 32–36)
MCV RBC AUTO: 89.8 FL (ref 80–96)
MONOCYTES # BLD AUTO: 0.59 K/UL (ref 0–0.8)
MONOCYTES NFR BLD AUTO: 11.5 % (ref 2–6)
MPC BLD CALC-MCNC: 11.7 FL (ref 9.4–12.4)
NEUTROPHILS # BLD AUTO: 2.43 K/UL (ref 1.8–7.7)
NEUTROPHILS NFR BLD AUTO: 47.6 % (ref 53–65)
NRBC # BLD AUTO: 0 X10E3/UL
NRBC, AUTO (.00): 0 %
PLATELET # BLD AUTO: 224 K/UL (ref 150–400)
POTASSIUM SERPL-SCNC: 3.7 MMOL/L (ref 3.5–5.1)
PROT SERPL-MCNC: 7.4 G/DL (ref 6.4–8.2)
RBC # BLD AUTO: 5.02 M/UL (ref 4.2–5.4)
SODIUM SERPL-SCNC: 140 MMOL/L (ref 136–145)
WBC # BLD AUTO: 5.11 K/UL (ref 4.5–11)

## 2023-01-05 PROCEDURE — 3008F PR BODY MASS INDEX (BMI) DOCUMENTED: ICD-10-PCS | Mod: ,,, | Performed by: NURSE PRACTITIONER

## 2023-01-05 PROCEDURE — 85025 COMPLETE CBC W/AUTO DIFF WBC: CPT | Mod: ,,, | Performed by: CLINICAL MEDICAL LABORATORY

## 2023-01-05 PROCEDURE — 99213 PR OFFICE/OUTPT VISIT, EST, LEVL III, 20-29 MIN: ICD-10-PCS | Mod: ,,, | Performed by: NURSE PRACTITIONER

## 2023-01-05 PROCEDURE — 3074F PR MOST RECENT SYSTOLIC BLOOD PRESSURE < 130 MM HG: ICD-10-PCS | Mod: ,,, | Performed by: NURSE PRACTITIONER

## 2023-01-05 PROCEDURE — 1160F PR REVIEW ALL MEDS BY PRESCRIBER/CLIN PHARMACIST DOCUMENTED: ICD-10-PCS | Mod: ,,, | Performed by: NURSE PRACTITIONER

## 2023-01-05 PROCEDURE — 80053 COMPREHEN METABOLIC PANEL: CPT | Mod: ,,, | Performed by: CLINICAL MEDICAL LABORATORY

## 2023-01-05 PROCEDURE — 3078F PR MOST RECENT DIASTOLIC BLOOD PRESSURE < 80 MM HG: ICD-10-PCS | Mod: ,,, | Performed by: NURSE PRACTITIONER

## 2023-01-05 PROCEDURE — 1160F RVW MEDS BY RX/DR IN RCRD: CPT | Mod: ,,, | Performed by: NURSE PRACTITIONER

## 2023-01-05 PROCEDURE — 80053 COMPREHENSIVE METABOLIC PANEL: ICD-10-PCS | Mod: ,,, | Performed by: CLINICAL MEDICAL LABORATORY

## 2023-01-05 PROCEDURE — 85025 CBC WITH DIFFERENTIAL: ICD-10-PCS | Mod: ,,, | Performed by: CLINICAL MEDICAL LABORATORY

## 2023-01-05 PROCEDURE — 83036 HEMOGLOBIN A1C: ICD-10-PCS | Mod: ,,, | Performed by: CLINICAL MEDICAL LABORATORY

## 2023-01-05 PROCEDURE — 3008F BODY MASS INDEX DOCD: CPT | Mod: ,,, | Performed by: NURSE PRACTITIONER

## 2023-01-05 PROCEDURE — 83036 HEMOGLOBIN GLYCOSYLATED A1C: CPT | Mod: ,,, | Performed by: CLINICAL MEDICAL LABORATORY

## 2023-01-05 PROCEDURE — 3078F DIAST BP <80 MM HG: CPT | Mod: ,,, | Performed by: NURSE PRACTITIONER

## 2023-01-05 PROCEDURE — 1159F PR MEDICATION LIST DOCUMENTED IN MEDICAL RECORD: ICD-10-PCS | Mod: ,,, | Performed by: NURSE PRACTITIONER

## 2023-01-05 PROCEDURE — 3074F SYST BP LT 130 MM HG: CPT | Mod: ,,, | Performed by: NURSE PRACTITIONER

## 2023-01-05 PROCEDURE — 1159F MED LIST DOCD IN RCRD: CPT | Mod: ,,, | Performed by: NURSE PRACTITIONER

## 2023-01-05 PROCEDURE — 99213 OFFICE O/P EST LOW 20 MIN: CPT | Mod: ,,, | Performed by: NURSE PRACTITIONER

## 2023-01-05 RX ORDER — NALOXONE HYDROCHLORIDE 4 MG/.1ML
SPRAY NASAL
COMMUNITY
Start: 2022-12-08

## 2023-01-05 NOTE — PATIENT INSTRUCTIONS
"Patient Education       Controlling Your Blood Pressure Through Lifestyle   The Basics   Written by the doctors and editors at [facility]   What does my lifestyle have to do with my blood pressure? -- The things you do and the foods you eat have a big effect on your blood pressure and your overall health. Following the right lifestyle can:  Lower your blood pressure or keep you from getting high blood pressure in the first place  Reduce your need for blood pressure medicines  Make medicines for high blood pressure work better, if you do take them  Lower the chances that you'll have a heart attack or stroke, or develop kidney disease  Which lifestyle choices will help lower my blood pressure? -- Here's what you can do:  Lose weight (if you are overweight)  Choose a diet rich in fruits, vegetables, and low-fat dairy products, and low in meats, sweets, and refined grains  Eat less salt (sodium)  Do something active for at least 30 minutes a day on most days of the week  Limit the amount of alcohol you drink  If you have high blood pressure, it's also very important to quit smoking (if you smoke). Quitting smoking might not bring your blood pressure down. But it will lower the chances that you'll have a heart attack or stroke, and it will help you feel better and live longer.  Start low and go slow -- The changes listed above might sound like a lot, but don't worry. You don't have to change everything all at once. The key to improving your lifestyle is to "start low and go slow." Choose 1 small, specific thing to change and try doing it for a while. If it works for you, keep doing it until it becomes a habit. If it doesn't, don't give up. Choose something else to change and see how that goes.  Let's say, for example, that you would like to improve your diet. If you're the type of person who eats cheeseburgers and French fries all the time, you can't switch to eating just salads from one day to the next. When people try to " "make changes like that, they often fail. Then they feel frustrated and tend to give up. So instead of trying to change everything about your diet in 1 day, change 1 or 2 small things about your diet and give yourself time to get used to those changes. For instance, keep the cheeseburger but give up the French fries. Or eat the same things but cut your portions in half.  As you find things that you are able to change and stick with, keep adding new changes. In time, you will see that you can actually change a lot. You just have to get used to the changes slowly.  Lose weight -- When people think about losing weight, they sometimes make it more complicated than it really is. To lose weight, you have to either eat less or move more. If you do both of those things, it's even better. But there is no single weight-loss diet or activity that's better than any other. When it comes to weight loss, the most effective plan is the one that you'll stick with.  Improve your diet -- There is no single diet that is right for everyone. But in general, a healthy diet can include:  Lots of fruits, vegetables, and whole grains  Some beans, peas, lentils, chickpeas, and similar foods  Some nuts, such as walnuts, almonds, and peanuts  Fat-free or low-fat milk and milk products  Some fish  To have a healthy diet, it's also important to limit or avoid sugar, sweets, meats, and refined grains. (Refined grains are found in white bread, white rice, most forms of pasta, and most packaged "snack" foods.)  Reduce salt -- Many people think that eating a low-sodium diet means avoiding the salt shaker and not adding salt when cooking. The truth is, not adding salt at the table or when you cook will only help a little. Almost all of the sodium you eat is already in the food you buy at the grocery store or at restaurants (figure 1).  The most important thing you can do to cut down on sodium is to eat less processed food. That means that you should " "avoid most foods that are sold in cans, boxes, jars, and bags. You should also eat in restaurants less often.  To reduce the amount of sodium you get, buy fresh or fresh-frozen fruits, vegetables, and meats. (Fresh-frozen foods have had nothing added to them before freezing.) Then you can make meals at home, from scratch, with these ingredients.  As with the other changes, don't try to cut out salt all at once. Instead, choose 1 or 2 foods that have a lot of sodium and try to replace them with low-sodium choices. When you get used to those low-sodium options, find another food or 2 to change. Then keep going, until all the foods you eat are sodium-free or low in sodium.  Become more active -- If you want to be more active, you don't have to go to the gym or get all sweaty. It is possible to increase your activity level while doing everyday things you enjoy. Walking, gardening, and dancing are just a few of the things that you might try. As with all the other changes, the key is not to do too much too fast. If you don't do any activity now, start by walking for just a few minutes every other day. Do that for a few weeks. If you stick with it, try doing it for longer. But if you find that you don't like walking, try a different activity.  Drink less alcohol -- If you are a woman, do not have more than 1 "standard drink" of alcohol a day. If you are a man, do not have more than 2. A "standard drink" is:  A can or bottle that has 12 ounces of beer  A glass that has 5 ounces of wine  A shot that has 1.5 ounces of whiskey  Where should I start? -- If you want to improve your lifestyle, start by making the changes that you think would be easiest for you. If you used to exercise and just got out of the habit, maybe it would be easy for you to start exercising again. Or if you actually like cooking meals from scratch, maybe the first thing you should focus on is eating home-cooked meals that are low in sodium.  Whatever you " "tackle first, choose specific, realistic goals, and give yourself a deadline. For example, do not decide that you are going to "exercise more." Instead, decide that you are going to walk for 10 minutes on Monday, Wednesday, and Friday, and that you are going to do this for the next 2 weeks.  When lifestyle changes are too general, people have a hard time following through.  Now go. You can do it!  All topics are updated as new evidence becomes available and our peer review process is complete.  This topic retrieved from [employer] on: Sep 21, 2021.  Topic 48824 Version 8.0  Release: 29.4.2 - C29.263  © 2021 UpToDate, Inc. and/or its affiliates. All rights reserved.  figure 1: Sources of sodium in your diet     Graphic 89321 Version 2.0    Consumer Information Use and Disclaimer   This information is not specific medical advice and does not replace information you receive from your health care provider. This is only a brief summary of general information. It does NOT include all information about conditions, illnesses, injuries, tests, procedures, treatments, therapies, discharge instructions or life-style choices that may apply to you. You must talk with your health care provider for complete information about your health and treatment options. This information should not be used to decide whether or not to accept your health care provider's advice, instructions or recommendations. Only your health care provider has the knowledge and training to provide advice that is right for you. The use of this information is governed by the [employer] End User License Agreement, available at https://[email or web address]/en/solutions/[email or web address]/about/craig.The use of [employer] content is governed by the [employer] Terms of Use. ©2021 UpToDate, Inc. All rights reserved.  Copyright   © 2021 UpToDate, Inc. and/or its affiliates. All rights reserved.  Patient Education       Diabetes and Diet   The Basics   Written by the doctors and editors " "at [employer]   Why is diet important in diabetes? -- Diet is important because it is part of diabetes treatment. Many people need to change what they eat and how much they eat to help treat their diabetes. It is important for people to treat their diabetes so that they:  Keep their blood sugar at or near a normal level  Prevent long-term problems, such as heart or kidney problems, that can happen in people with diabetes  Changing your diet can also help treat obesity, high blood pressure, and high cholesterol. These conditions can affect people with diabetes and can lead to future problems, such as heart attacks or strokes.  Who will work with me to change my diet? -- Your doctor or nurse will work with you to make a food plan to change your diet. They might also recommend that you work with a "dietitian." A dietitian is an expert on food and eating.  Do I need to eat at the same times every day? -- When and how often you should eat depends, in part, on the diabetes medicines you take. For example:  People who take about the same amount of insulin at the same time each day (called a "fixed regimen") should eat meals at the same times. This is also true for people who take pills that increase insulin levels, such as sulfonylureas. Eating meals at the same time every day helps prevent low blood sugar.  People who adjust the dose and timing of their insulin each day (called a "flexible regimen") do not always have to eat meals at the same time. That's because they can time their insulin dose for before they plan to eat, and also adjust the dose for how much they plan to eat.  People who take medicines that don't usually cause low blood sugar, such as metformin, don't have to eat meals at the same time every day.  What do I need to think about when planning what to eat? -- Our bodies break down the food we eat into small pieces called carbohydrates, proteins, and fats.  When planning what to eat, people with diabetes " "need to think about:  Carbohydrates (or "carbs") - Carbohydrates, which are sugars that our bodies use for energy, can raise a person's blood sugar level. Your doctor, nurse, or dietitian will tell you how many carbohydrates you should eat at each meal or snack. Foods that have carbohydrates include:  Bread, pasta, and rice  Vegetables and fruits  Dairy foods  Foods and drinks with added sugar  It is best to get your carbohydrates from fruits, vegetables, whole grains, and low-fat milk. It is best to avoid drinks with added sugar, like soda, juices, and sports drinks.   Protein - Your doctor, nurse, or dietitian will tell you how much protein you should eat each day. It is best to eat lean meats, fish, eggs, beans, peas, soy products, nuts, and seeds.  Fats - The type of fat you eat is more important than the amount of fat. "Saturated" and "trans" fats can increase your risk for heart problems, like a heart attack.  Foods that have saturated fats include meat, butter, cheese, and ice cream.  Foods that have trans fats include processed food with "partially hydrogenated oils" on the ingredient list. This may include fried foods, store bought cookies, muffins, pies, and cakes.  "Monounsaturated" and "polyunsaturated" fats are better for you. Foods with these types of fat include fish, avocado, olive oil, and nuts.  Calories - People need to eat a certain amount of calories each day to keep their weight the same. People who are overweight and want to lose weight need to eat fewer calories each day.  Fiber - Eating foods with a lot of fiber can help control a person's blood sugar level. Foods that have a lot of fiber include apples, green beans, peas, beans, lentils, nuts, oatmeal, and whole grains.  Salt - People who have high blood pressure should not eat foods that contain a lot of salt (also called sodium). People with high blood pressure should also eat healthy foods, such as fruits, vegetables, and low-fat dairy " foods.  Alcohol - Having more than 1 drink (for women) or 2 drinks (for men) a day can raise blood sugar levels. Also, drinks that have fruit juice or soda in them can raise blood sugar levels.  What can I do if I need to lose weight? -- If you need to lose weight, you can:  Exercise - Try to get at least 30 minutes of physical activity a day, most days of the week. Even gentle exercise, like walking, is good for your health. Some people with diabetes need to change their medicine dose before they exercise. They might also need to check their blood sugar levels before and after exercising.  Eat fewer calories - Your doctor, nurse, or dietitian can tell you how many calories you should eat each day in order to lose weight.  If you are worried about your weight, size, or shape, talk with your doctor, nurse, or dietitian. They can help you make changes to improve your health.  Can I eat the same foods as my family? -- Yes. You do not need to eat special foods if you have diabetes. You and your family can eat the same foods. Changing your diet is mostly about eating healthy foods and not eating too much.  What are the other parts of diabetes treatment? -- Besides changing your diet, the other parts of diabetes treatment are:  Exercise  Medicines  Some people with diabetes need to learn how to match their diet and exercise with their medicine dose. For example, people who use insulin might need to choose the dose of insulin they give themselves. To choose their dose, they need to think about:  What they plan to eat at the next meal  How much exercise they plan to do  What their blood sugar level is  If the diet and exercise do not match the medicine dose, a person's blood sugar level can get too low or too high. Blood sugar levels that are too low or too high can cause problems.  All topics are updated as new evidence becomes available and our peer review process is complete.  This topic retrieved from [employer] on: Sep  21, 2021.  Topic 15251 Version 7.0  Release: 29.4.2 - C29.263  © 2021 UpToDate, Inc. and/or its affiliates. All rights reserved.  Consumer Information Use and Disclaimer   This information is not specific medical advice and does not replace information you receive from your health care provider. This is only a brief summary of general information. It does NOT include all information about conditions, illnesses, injuries, tests, procedures, treatments, therapies, discharge instructions or life-style choices that may apply to you. You must talk with your health care provider for complete information about your health and treatment options. This information should not be used to decide whether or not to accept your health care provider's advice, instructions or recommendations. Only your health care provider has the knowledge and training to provide advice that is right for you. The use of this information is governed by the [employer] End User License Agreement, available at https://[email or web address]/solutions/[email or web address]/[email or web address]/[email or web address].The use of [employer] content is governed by the [employer] Terms of Use. ©2021 UpToDate, Inc. All rights reserved.  Copyright   © 2021 UpToDate, Inc. and/or its affiliates. All rights reserved.

## 2023-01-05 NOTE — PROGRESS NOTES
[facility]  Primary Care       PATIENT NAME: Selena Proctor   : 1955    AGE: 67 y.o. DATE: 2023    MRN: [id number]        Reason for Visit / Chief Complaint:  Hypertension and Diabetes     Subjective:     HPI: Patient here for routine check up, labs. Denies any chest pain or shortness of breath. Patient continues to have wound to left inner thigh; has been seeing wound care; has seen Dr. Norman for surgery for wound; states she has to see her cardiologist  for clearance.     Hypertension  Pertinent negatives include no chest pain, headaches or shortness of breath.   Diabetes  Pertinent negatives for hypoglycemia include no headaches. Pertinent negatives for diabetes include no chest pain and no fatigue.        Review of Systems: Review of Systems   Constitutional:  Negative for chills, fatigue and fever.   Respiratory:  Negative for cough, chest tightness and shortness of breath.    Cardiovascular:  Negative for chest pain.   Gastrointestinal:  Negative for abdominal pain, constipation, diarrhea, nausea and vomiting.   Genitourinary:  Negative for dysuria.   Musculoskeletal:  Negative for gait problem.   Skin:  Positive for wound (wound to left inner thigh.). Negative for rash.   Neurological:  Negative for headaches.        Review of patient's allergies indicates:   Allergen Reactions    Betadine [povidone-iodine] Itching        Med List:  Current Outpatient Medications on File Prior to Visit   Medication Sig Dispense Refill    ACCU-CHEK DONNA PLUS TEST STRP Strp CHECK BLOOD SUGAR ONE TIME DAILY 100 strip 1    alcohol swabs (DROPSAFE ALCOHOL PREP PADS) PadM USE AS DIRECTED 1 each 5    aspirin (ECOTRIN) 81 MG EC tablet Take 81 mg by mouth once daily.       blood glucose control, low (TRUE METRIX LEVEL 1) Soln USE AS DIRECTED WITH GLUCOSE METER 1 each 0    clotrimazole-betamethasone 1-0.05% (LOTRISONE) cream Apply topically 2 (two) times daily. 45 g 2    diclofenac sodium (VOLTAREN  ARTHRITIS PAIN) 1 % Gel Apply 2 g topically 4 (four) times daily. Apply to knees, elbows, joints as needed 10 each 2    ezetimibe (ZETIA) 10 mg tablet TAKE 1 TABLET EVERY DAY 90 tablet 2    furosemide (LASIX) 40 MG tablet TAKE 1 TABLET EVERY DAY AS NEEDED 90 tablet 0    HYDROcodone-acetaminophen (NORCO)  mg per tablet Take 1 tablet by mouth every 8 (eight) hours. 90 tablet 0    JARDIANCE 10 mg tablet TAKE 1 TABLET EVERY DAY 90 tablet 1    lancets (ACCU-CHEK SOFTCLIX LANCETS) Misc Use as directed 200 each 1    losartan-hydrochlorothiazide 100-25 mg (HYZAAR) 100-25 mg per tablet TAKE 1 TABLET EVERY DAY 90 tablet 2    metFORMIN (GLUCOPHAGE) 500 MG tablet Take 1 tablet (500 mg total) by mouth 2 (two) times daily with meals. 180 tablet 3    metoprolol succinate (TOPROL-XL) 100 MG 24 hr tablet TAKE 1 TABLET EVERY DAY 90 tablet 2    montelukast (SINGULAIR) 10 mg tablet TAKE 1 TABLET EVERY DAY AS NEEDED 90 tablet 2    mupirocin (BACTROBAN) 2 % ointment Apply topically 3 (three) times daily. 30 g 2    naloxegoL (MOVANTIK) 25 mg tablet Take 25 mg by mouth once daily. 30 tablet 5    naloxone (NARCAN) 4 mg/actuation Spry PUT 1 SPRAY IN 1 NOSTRIL WAIT 2 MINUTES THEN REPEAT DOSE IN THE OTHER NOSTRIL      NIFEdipine (PROCARDIA-XL) 90 MG (OSM) 24 hr tablet TAKE 1 TABLET EVERY DAY 90 tablet 1    omeprazole (PRILOSEC) 40 MG capsule TAKE 1 CAPSULE EVERY DAY 90 capsule 0    pitavastatin calcium (LIVALO) 4 mg Tab Take one tablet by mouth three times per week on Monday, Wednesday, Friday. 90 tablet 2    spironolactone (ALDACTONE) 25 MG tablet TAKE 1 TABLET EVERY DAY 90 tablet 0    TRUE METRIX GLUCOSE METER kit USE AS DIRECTED 180 each 2    TRUEPLUS LANCETS 33 gauge Misc USE AS DIRECTED 200 each 0    ACCU-CHEK SOFT DEV LANCETS Kit USE AS DIRECTED 100 each 1    chlorhexidine (HIBICLENS) 4 % external liquid Apply topically daily as needed (thigh wound). (Patient not taking: Reported on 8/1/2022) 120 mL 0    [START ON 1/7/2023]  HYDROcodone-acetaminophen (NORCO)  mg per tablet Take 1 tablet by mouth every 8 (eight) hours. (Patient not taking: Reported on 2023) 90 tablet 0    [START ON 2023] HYDROcodone-acetaminophen (NORCO)  mg per tablet Take 1 tablet by mouth every 8 (eight) hours. (Patient not taking: Reported on 2023) 90 tablet 0    miconazole (MONISTAT 7) 2 % vaginal cream Apply cream to external vaginal area for vaginal itching. (Patient not taking: Reported on 2023) 1 kit 0    silver sulfADIAZINE 1% (SILVADENE) 1 % cream Apply topically once daily. (Patient not taking: Reported on 2022) 50 g 2     No current facility-administered medications on file prior to visit.       Medical/Social/Family History:  Past Medical History:   Diagnosis Date    Acid reflux     Coronary arteriosclerosis     Diabetes     Diabetes mellitus, type 2     Hypertension     Spondylosis without myelopathy or radiculopathy, lumbar region       Social History     Tobacco Use   Smoking Status Never   Smokeless Tobacco Never      Social History     Substance and Sexual Activity   Alcohol Use Not Currently       Family History   Problem Relation Age of Onset    Hypertension Mother     Heart disease Mother     Diabetes Sister     Hypertension Sister     Hypertension Brother     Alcohol abuse Father     Cancer Father     Hypertension Son     Mental illness Maternal Aunt       Past Surgical History:   Procedure Laterality Date    APPENDECTOMY      BLOCK, NERVE, OBTURATOR Right 2019    Right Femoral/Obturator Nerve Block  Dr Headley     SECTION      x2    CORONARY ARTERY BYPASS GRAFT      KNEE ARTHROPLASTY Right     KNEE ARTHROSCOPY Left     SKIN GRAFT      Left Thigh    TOTAL HIP ARTHROPLASTY Bilateral     TUBAL LIGATION        Immunization History   Administered Date(s) Administered    COVID-19 MRNA, LN-S PF (MODERNA HALF 0.25 ML DOSE) 10/29/2021    COVID-19, MRNA, LN-S, PF (MODERNA FULL 0.5 ML DOSE) 2021, 03/10/2021  "   Influenza - Quadrivalent - PF *Preferred* (6 months and older) 02/02/2022    Pneumococcal Conjugate - 13 Valent 12/04/2017    Pneumococcal Conjugate - 20 Valent 06/23/2022          Objective:      Vitals:    01/05/23 0950   BP: 123/68   BP Location: Left arm   Patient Position: Sitting   BP Method: Large (Automatic)   Pulse: (!) 55   Resp: 20   Temp: 97.6 °F (36.4 °C)   TempSrc: Oral   SpO2: 98%   Weight: 82.6 kg (182 lb 1.6 oz)   Height: 5' 4" (1.626 m)     Body mass index is 31.26 kg/m².     Physical Exam: Physical Exam  Vitals and nursing note reviewed.   Constitutional:       General: She is not in acute distress.     Appearance: Normal appearance. She is not ill-appearing.   HENT:      Head: Normocephalic.      Mouth/Throat:      Mouth: Mucous membranes are moist.   Eyes:      Extraocular Movements: Extraocular movements intact.      Conjunctiva/sclera: Conjunctivae normal.      Pupils: Pupils are equal, round, and reactive to light.   Cardiovascular:      Rate and Rhythm: Regular rhythm. Bradycardia present.      Heart sounds: Normal heart sounds.   Pulmonary:      Effort: Pulmonary effort is normal.      Breath sounds: Normal breath sounds.   Abdominal:      General: Bowel sounds are normal.      Palpations: Abdomen is soft.   Musculoskeletal:         General: Normal range of motion.      Cervical back: Normal range of motion.   Skin:     General: Skin is warm and dry.      Comments: Patient has wound to left inner thigh: states she has dressing to wound.    Neurological:      Mental Status: She is alert and oriented to person, place, and time.      Gait: Gait normal.   Psychiatric:         Mood and Affect: Mood normal.         Behavior: Behavior normal.              Assessment:          ICD-10-CM ICD-9-CM   1. Essential hypertension  I10 401.9   2. Hyperlipidemia, unspecified hyperlipidemia type  E78.5 272.4   3. Type 2 diabetes mellitus without complication, without long-term current use of insulin  " E11.9 250.00   4. Hypokalemia  E87.6 276.8        Plan:       Essential hypertension  -     CBC Auto Differential; Future; Expected date: 01/05/2023  -     Comprehensive Metabolic Panel; Future; Expected date: 01/05/2023    Hyperlipidemia, unspecified hyperlipidemia type    Type 2 diabetes mellitus without complication, without long-term current use of insulin  -     Hemoglobin A1C; Future; Expected date: 01/05/2023    Hypokalemia          New & refilled meds:  Requested Prescriptions      No prescriptions requested or ordered in this encounter       Follow up in about 3 months (around 4/5/2023), or if symptoms worsen or fail to improve, for Hypertension, diabetes.     Patient Instructions   Patient Education       Controlling Your Blood Pressure Through Lifestyle   The Basics   Written by the doctors and editors at [facility]   What does my lifestyle have to do with my blood pressure? -- The things you do and the foods you eat have a big effect on your blood pressure and your overall health. Following the right lifestyle can:  Lower your blood pressure or keep you from getting high blood pressure in the first place  Reduce your need for blood pressure medicines  Make medicines for high blood pressure work better, if you do take them  Lower the chances that you'll have a heart attack or stroke, or develop kidney disease  Which lifestyle choices will help lower my blood pressure? -- Here's what you can do:  Lose weight (if you are overweight)  Choose a diet rich in fruits, vegetables, and low-fat dairy products, and low in meats, sweets, and refined grains  Eat less salt (sodium)  Do something active for at least 30 minutes a day on most days of the week  Limit the amount of alcohol you drink  If you have high blood pressure, it's also very important to quit smoking (if you smoke). Quitting smoking might not bring your blood pressure down. But it will lower the chances that you'll have a heart attack or stroke, and it  "will help you feel better and live longer.  Start low and go slow -- The changes listed above might sound like a lot, but don't worry. You don't have to change everything all at once. The key to improving your lifestyle is to "start low and go slow." Choose 1 small, specific thing to change and try doing it for a while. If it works for you, keep doing it until it becomes a habit. If it doesn't, don't give up. Choose something else to change and see how that goes.  Let's say, for example, that you would like to improve your diet. If you're the type of person who eats cheeseburgers and French fries all the time, you can't switch to eating just salads from one day to the next. When people try to make changes like that, they often fail. Then they feel frustrated and tend to give up. So instead of trying to change everything about your diet in 1 day, change 1 or 2 small things about your diet and give yourself time to get used to those changes. For instance, keep the cheeseburger but give up the French fries. Or eat the same things but cut your portions in half.  As you find things that you are able to change and stick with, keep adding new changes. In time, you will see that you can actually change a lot. You just have to get used to the changes slowly.  Lose weight -- When people think about losing weight, they sometimes make it more complicated than it really is. To lose weight, you have to either eat less or move more. If you do both of those things, it's even better. But there is no single weight-loss diet or activity that's better than any other. When it comes to weight loss, the most effective plan is the one that you'll stick with.  Improve your diet -- There is no single diet that is right for everyone. But in general, a healthy diet can include:  Lots of fruits, vegetables, and whole grains  Some beans, peas, lentils, chickpeas, and similar foods  Some nuts, such as walnuts, almonds, and peanuts  Fat-free or " "low-fat milk and milk products  Some fish  To have a healthy diet, it's also important to limit or avoid sugar, sweets, meats, and refined grains. (Refined grains are found in white bread, white rice, most forms of pasta, and most packaged "snack" foods.)  Reduce salt -- Many people think that eating a low-sodium diet means avoiding the salt shaker and not adding salt when cooking. The truth is, not adding salt at the table or when you cook will only help a little. Almost all of the sodium you eat is already in the food you buy at the grocery store or at restaurants (figure 1).  The most important thing you can do to cut down on sodium is to eat less processed food. That means that you should avoid most foods that are sold in cans, boxes, jars, and bags. You should also eat in restaurants less often.  To reduce the amount of sodium you get, buy fresh or fresh-frozen fruits, vegetables, and meats. (Fresh-frozen foods have had nothing added to them before freezing.) Then you can make meals at home, from scratch, with these ingredients.  As with the other changes, don't try to cut out salt all at once. Instead, choose 1 or 2 foods that have a lot of sodium and try to replace them with low-sodium choices. When you get used to those low-sodium options, find another food or 2 to change. Then keep going, until all the foods you eat are sodium-free or low in sodium.  Become more active -- If you want to be more active, you don't have to go to the gym or get all sweaty. It is possible to increase your activity level while doing everyday things you enjoy. Walking, gardening, and dancing are just a few of the things that you might try. As with all the other changes, the key is not to do too much too fast. If you don't do any activity now, start by walking for just a few minutes every other day. Do that for a few weeks. If you stick with it, try doing it for longer. But if you find that you don't like walking, try a different " "activity.  Drink less alcohol -- If you are a woman, do not have more than 1 "standard drink" of alcohol a day. If you are a man, do not have more than 2. A "standard drink" is:  A can or bottle that has 12 ounces of beer  A glass that has 5 ounces of wine  A shot that has 1.5 ounces of whiskey  Where should I start? -- If you want to improve your lifestyle, start by making the changes that you think would be easiest for you. If you used to exercise and just got out of the habit, maybe it would be easy for you to start exercising again. Or if you actually like cooking meals from scratch, maybe the first thing you should focus on is eating home-cooked meals that are low in sodium.  Whatever you tackle first, choose specific, realistic goals, and give yourself a deadline. For example, do not decide that you are going to "exercise more." Instead, decide that you are going to walk for 10 minutes on Monday, Wednesday, and Friday, and that you are going to do this for the next 2 weeks.  When lifestyle changes are too general, people have a hard time following through.  Now go. You can do it!  All topics are updated as new evidence becomes available and our peer review process is complete.  This topic retrieved from [employer] on: Sep 21, 2021.  Topic 44483 Version 8.0  Release: 29.4.2 - C29.263  © 2021 UpToDate, Inc. and/or its affiliates. All rights reserved.  figure 1: Sources of sodium in your diet     Graphic 74780 Version 2.0    Consumer Information Use and Disclaimer   This information is not specific medical advice and does not replace information you receive from your health care provider. This is only a brief summary of general information. It does NOT include all information about conditions, illnesses, injuries, tests, procedures, treatments, therapies, discharge instructions or life-style choices that may apply to you. You must talk with your health care provider for complete information about your health and " "treatment options. This information should not be used to decide whether or not to accept your health care provider's advice, instructions or recommendations. Only your health care provider has the knowledge and training to provide advice that is right for you. The use of this information is governed by the [employer] End User License Agreement, available at https://www.Zapa/en/solutions/[employer]Mart/about/craig.The use of [employer] content is governed by the [employer] Terms of Use. ©2021 UpToDate, Inc. All rights reserved.  Copyright   © 2021 UpToDate, Inc. and/or its affiliates. All rights reserved.  Patient Education       Diabetes and Diet   The Basics   Written by the doctors and editors at [facility]   Why is diet important in diabetes? -- Diet is important because it is part of diabetes treatment. Many people need to change what they eat and how much they eat to help treat their diabetes. It is important for people to treat their diabetes so that they:  Keep their blood sugar at or near a normal level  Prevent long-term problems, such as heart or kidney problems, that can happen in people with diabetes  Changing your diet can also help treat obesity, high blood pressure, and high cholesterol. These conditions can affect people with diabetes and can lead to future problems, such as heart attacks or strokes.  Who will work with me to change my diet? -- Your doctor or nurse will work with you to make a food plan to change your diet. They might also recommend that you work with a "dietitian." A dietitian is an expert on food and eating.  Do I need to eat at the same times every day? -- When and how often you should eat depends, in part, on the diabetes medicines you take. For example:  People who take about the same amount of insulin at the same time each day (called a "fixed regimen") should eat meals at the same times. This is also true for people who take pills that increase insulin levels, such as " "sulfonylureas. Eating meals at the same time every day helps prevent low blood sugar.  People who adjust the dose and timing of their insulin each day (called a "flexible regimen") do not always have to eat meals at the same time. That's because they can time their insulin dose for before they plan to eat, and also adjust the dose for how much they plan to eat.  People who take medicines that don't usually cause low blood sugar, such as metformin, don't have to eat meals at the same time every day.  What do I need to think about when planning what to eat? -- Our bodies break down the food we eat into small pieces called carbohydrates, proteins, and fats.  When planning what to eat, people with diabetes need to think about:  Carbohydrates (or "carbs") - Carbohydrates, which are sugars that our bodies use for energy, can raise a person's blood sugar level. Your doctor, nurse, or dietitian will tell you how many carbohydrates you should eat at each meal or snack. Foods that have carbohydrates include:  Bread, pasta, and rice  Vegetables and fruits  Dairy foods  Foods and drinks with added sugar  It is best to get your carbohydrates from fruits, vegetables, whole grains, and low-fat milk. It is best to avoid drinks with added sugar, like soda, juices, and sports drinks.   Protein - Your doctor, nurse, or dietitian will tell you how much protein you should eat each day. It is best to eat lean meats, fish, eggs, beans, peas, soy products, nuts, and seeds.  Fats - The type of fat you eat is more important than the amount of fat. "Saturated" and "trans" fats can increase your risk for heart problems, like a heart attack.  Foods that have saturated fats include meat, butter, cheese, and ice cream.  Foods that have trans fats include processed food with "partially hydrogenated oils" on the ingredient list. This may include fried foods, store bought cookies, muffins, pies, and cakes.  "Monounsaturated" and "polyunsaturated" " fats are better for you. Foods with these types of fat include fish, avocado, olive oil, and nuts.  Calories - People need to eat a certain amount of calories each day to keep their weight the same. People who are overweight and want to lose weight need to eat fewer calories each day.  Fiber - Eating foods with a lot of fiber can help control a person's blood sugar level. Foods that have a lot of fiber include apples, green beans, peas, beans, lentils, nuts, oatmeal, and whole grains.  Salt - People who have high blood pressure should not eat foods that contain a lot of salt (also called sodium). People with high blood pressure should also eat healthy foods, such as fruits, vegetables, and low-fat dairy foods.  Alcohol - Having more than 1 drink (for women) or 2 drinks (for men) a day can raise blood sugar levels. Also, drinks that have fruit juice or soda in them can raise blood sugar levels.  What can I do if I need to lose weight? -- If you need to lose weight, you can:  Exercise - Try to get at least 30 minutes of physical activity a day, most days of the week. Even gentle exercise, like walking, is good for your health. Some people with diabetes need to change their medicine dose before they exercise. They might also need to check their blood sugar levels before and after exercising.  Eat fewer calories - Your doctor, nurse, or dietitian can tell you how many calories you should eat each day in order to lose weight.  If you are worried about your weight, size, or shape, talk with your doctor, nurse, or dietitian. They can help you make changes to improve your health.  Can I eat the same foods as my family? -- Yes. You do not need to eat special foods if you have diabetes. You and your family can eat the same foods. Changing your diet is mostly about eating healthy foods and not eating too much.  What are the other parts of diabetes treatment? -- Besides changing your diet, the other parts of diabetes treatment  are:  Exercise  Medicines  Some people with diabetes need to learn how to match their diet and exercise with their medicine dose. For example, people who use insulin might need to choose the dose of insulin they give themselves. To choose their dose, they need to think about:  What they plan to eat at the next meal  How much exercise they plan to do  What their blood sugar level is  If the diet and exercise do not match the medicine dose, a person's blood sugar level can get too low or too high. Blood sugar levels that are too low or too high can cause problems.  All topics are updated as new evidence becomes available and our peer review process is complete.  This topic retrieved from [employer] on: Sep 21, 2021.  Topic 81174 Version 7.0  Release: 29.4.2 - C29.263  © 2021 UpToDate, Inc. and/or its affiliates. All rights reserved.  Consumer Information Use and Disclaimer   This information is not specific medical advice and does not replace information you receive from your health care provider. This is only a brief summary of general information. It does NOT include all information about conditions, illnesses, injuries, tests, procedures, treatments, therapies, discharge instructions or life-style choices that may apply to you. You must talk with your health care provider for complete information about your health and treatment options. This information should not be used to decide whether or not to accept your health care provider's advice, instructions or recommendations. Only your health care provider has the knowledge and training to provide advice that is right for you. The use of this information is governed by the [employer] End User License Agreement, available at https://www.Attero.[employer]/en/solutions/Onyvax/about/craig.The use of [employer] content is governed by the [employer] Terms of Use. ©2021 UpToDate, Inc. All rights reserved.  Copyright   © 2021 UpToDate, Inc. and/or its affiliates. All rights  reserved.         Signature: Lizzette Meadows, MARQUISE, FNP-C

## 2023-01-17 ENCOUNTER — TELEPHONE (OUTPATIENT)
Dept: PRIMARY CARE CLINIC | Facility: CLINIC | Age: 68
End: 2023-01-17
Payer: MEDICARE

## 2023-01-17 DIAGNOSIS — E11.9 TYPE 2 DIABETES MELLITUS WITHOUT COMPLICATION, WITHOUT LONG-TERM CURRENT USE OF INSULIN: Primary | ICD-10-CM

## 2023-01-17 DIAGNOSIS — E11.9 TYPE 2 DIABETES MELLITUS WITHOUT COMPLICATION, WITHOUT LONG-TERM CURRENT USE OF INSULIN: ICD-10-CM

## 2023-01-17 RX ORDER — GLIPIZIDE 2.5 MG/1
2.5 TABLET, EXTENDED RELEASE ORAL
Qty: 90 TABLET | Refills: 3 | Status: SHIPPED | OUTPATIENT
Start: 2023-01-17 | End: 2023-01-17 | Stop reason: SDUPTHER

## 2023-01-17 RX ORDER — GLIPIZIDE 2.5 MG/1
2.5 TABLET, EXTENDED RELEASE ORAL
Qty: 90 TABLET | Refills: 3 | Status: SHIPPED | OUTPATIENT
Start: 2023-01-17 | End: 2023-07-27 | Stop reason: SDUPTHER

## 2023-02-13 ENCOUNTER — OFFICE VISIT (OUTPATIENT)
Dept: SURGERY | Facility: CLINIC | Age: 68
End: 2023-02-13
Payer: MEDICARE

## 2023-02-13 DIAGNOSIS — S71.102A OPEN WOUND OF LEFT THIGH, INITIAL ENCOUNTER: Primary | ICD-10-CM

## 2023-02-13 PROCEDURE — 99213 OFFICE O/P EST LOW 20 MIN: CPT | Mod: S$PBB,,, | Performed by: STUDENT IN AN ORGANIZED HEALTH CARE EDUCATION/TRAINING PROGRAM

## 2023-02-13 PROCEDURE — 99212 OFFICE O/P EST SF 10 MIN: CPT | Mod: PBBFAC | Performed by: STUDENT IN AN ORGANIZED HEALTH CARE EDUCATION/TRAINING PROGRAM

## 2023-02-13 PROCEDURE — 1159F PR MEDICATION LIST DOCUMENTED IN MEDICAL RECORD: ICD-10-PCS | Mod: CPTII,,, | Performed by: STUDENT IN AN ORGANIZED HEALTH CARE EDUCATION/TRAINING PROGRAM

## 2023-02-13 PROCEDURE — 1159F MED LIST DOCD IN RCRD: CPT | Mod: CPTII,,, | Performed by: STUDENT IN AN ORGANIZED HEALTH CARE EDUCATION/TRAINING PROGRAM

## 2023-02-13 PROCEDURE — 99213 PR OFFICE/OUTPT VISIT, EST, LEVL III, 20-29 MIN: ICD-10-PCS | Mod: S$PBB,,, | Performed by: STUDENT IN AN ORGANIZED HEALTH CARE EDUCATION/TRAINING PROGRAM

## 2023-02-13 PROCEDURE — 3046F PR MOST RECENT HEMOGLOBIN A1C LEVEL > 9.0%: ICD-10-PCS | Mod: CPTII,,, | Performed by: STUDENT IN AN ORGANIZED HEALTH CARE EDUCATION/TRAINING PROGRAM

## 2023-02-13 PROCEDURE — 3046F HEMOGLOBIN A1C LEVEL >9.0%: CPT | Mod: CPTII,,, | Performed by: STUDENT IN AN ORGANIZED HEALTH CARE EDUCATION/TRAINING PROGRAM

## 2023-02-13 NOTE — PROGRESS NOTES
Subjective:       Patient ID: Selena Proctor is a 67 y.o. female.    Chief Complaint: Follow-up (3 month follow up)    67-year-old  female presents to the clinic for 2 weeks follow-up status post aspiration of a left leg hematoma.  Patient has kept a pressure dressing on the leg.  Patient states the area feels better and less swelling; she states she still feels a firm nodule, but no large fluctuant fluid collection.  Denies any chest pain/shortness of breath, nausea/vomiting/diarrhea, fever/chills.    12/20:  Patient presents today for evaluation of a left inner thigh wound.  Patient's recent left outer leg hematoma has resolved and there is no fluid accumulation.  Patient states she has a wound on her left inner thigh which has been present for approximately 8 months and will not heal.  Denies any chest pain/shortness of breath, nausea/vomiting/diarrhea, fever/chills.  Denies any chest pain or shortness of breath on exertion; greater than 4 Mets    2/13:  Patient still has wounds the left leg at the inner thigh.  Patient did see cardiology and had a nuclear medicine test done.  Patient follows up on the 23rd to discuss with Cardiology the findings    Review of Systems   Constitutional: Negative.    HENT: Negative.     Eyes: Negative.    Respiratory:  Negative for shortness of breath.    Cardiovascular:  Negative for chest pain.   Gastrointestinal:  Negative for abdominal distention, abdominal pain, blood in stool, change in bowel habit and change in bowel habit.   Genitourinary: Negative.  Negative for hematuria.   Musculoskeletal:  Negative for myalgias.   Neurological:  Negative for dizziness and weakness.   Psychiatric/Behavioral: Negative.         Objective:      Physical Exam  Constitutional:       General: She is not in acute distress.     Appearance: Normal appearance.   HENT:      Head: Normocephalic.   Cardiovascular:      Rate and Rhythm: Normal rate.   Pulmonary:      Effort: Pulmonary  effort is normal. No respiratory distress.   Abdominal:      General: There is no distension.      Tenderness: There is no abdominal tenderness.   Musculoskeletal:         General: Normal range of motion.        Legs:       Comments: No fluctuance around left lateral thigh.  There is a 3 x 2 by 1 cm chronic ulcer to left inner thigh granulation tissue; no signs of infection or necrosis   Skin:     General: Skin is warm.      Coloration: Skin is not jaundiced.   Neurological:      General: No focal deficit present.      Mental Status: She is alert and oriented to person, place, and time.      Cranial Nerves: No cranial nerve deficit.       Assessment:       Problem List Items Addressed This Visit          Orthopedic    Open wound of left thigh - Primary         Plan:       A left lateral thigh seroma resolved.      Nonhealing wound to left inner thigh the skin present for almost 1 year.  We will perform an excisional biopsy with rotational flap for skin coverage.      Risks and benefits explained with the patient including risks for infection, bleeding, injury to surrounding structures, hematoma/seroma formation with need for possible evacuation, possible open.  The patient verbalized understanding, agrees and wishes to proceed with surgery.    Patient will need cardiac clearance from a cardiologist due to her history of coronary bypass surgery.    Prior to surgery, she will need a CBC, BMP, EKG.    Addendum:  Patient to follow up with Cardiology later this month and if given low to intermediate risk, we will proceed with surgery.  If deemed a high risk, we will hold off on surgery and apply for [employer] for some skin substitution products

## 2023-02-15 NOTE — TELEPHONE ENCOUNTER
----- Message from Lizzette Meadows DNP, FNP-C sent at 1/12/2023 12:47 PM CST -----  Please notify patient of results. Her Hgb A1c is elevated. Ask patient if she is taking her diabetic medication as prescribed and following a diabetic diet. I see jardiance and metformin as the two medications she is taking for diabetes. She needs to be sure and check her blood sugar twice a day and record results. If she is following a diabetic diet and taking her medications, will add glipizide. Let me know if she agrees, and if she does, I'll send med to the pharmacy.    no

## 2023-03-03 ENCOUNTER — TELEPHONE (OUTPATIENT)
Dept: SURGERY | Facility: CLINIC | Age: 68
End: 2023-03-03
Payer: MEDICARE

## 2023-03-03 NOTE — TELEPHONE ENCOUNTER
Attempted to call pt to let her know that we received surgery clearance letter and scheduled surgery. No answer. Voicemail left.

## 2023-03-06 ENCOUNTER — OFFICE VISIT (OUTPATIENT)
Dept: PAIN MEDICINE | Facility: CLINIC | Age: 68
End: 2023-03-06
Payer: MEDICARE

## 2023-03-06 VITALS
HEIGHT: 64 IN | DIASTOLIC BLOOD PRESSURE: 82 MMHG | WEIGHT: 184 LBS | HEART RATE: 63 BPM | SYSTOLIC BLOOD PRESSURE: 158 MMHG | BODY MASS INDEX: 31.41 KG/M2

## 2023-03-06 DIAGNOSIS — Z79.899 ENCOUNTER FOR LONG-TERM (CURRENT) USE OF OTHER MEDICATIONS: Primary | ICD-10-CM

## 2023-03-06 DIAGNOSIS — M89.49 OSTEOARTHROSIS MULTIPLE SITES, NOT SPECIFIED AS GENERALIZED: ICD-10-CM

## 2023-03-06 DIAGNOSIS — M25.559 HIP PAIN: ICD-10-CM

## 2023-03-06 PROCEDURE — 1101F PR PT FALLS ASSESS DOC 0-1 FALLS W/OUT INJ PAST YR: ICD-10-PCS | Mod: CPTII,,, | Performed by: PAIN MEDICINE

## 2023-03-06 PROCEDURE — 1125F PR PAIN SEVERITY QUANTIFIED, PAIN PRESENT: ICD-10-PCS | Mod: CPTII,,, | Performed by: PAIN MEDICINE

## 2023-03-06 PROCEDURE — 3008F BODY MASS INDEX DOCD: CPT | Mod: CPTII,,, | Performed by: PAIN MEDICINE

## 2023-03-06 PROCEDURE — 3079F PR MOST RECENT DIASTOLIC BLOOD PRESSURE 80-89 MM HG: ICD-10-PCS | Mod: CPTII,,, | Performed by: PAIN MEDICINE

## 2023-03-06 PROCEDURE — 1101F PT FALLS ASSESS-DOCD LE1/YR: CPT | Mod: CPTII,,, | Performed by: PAIN MEDICINE

## 2023-03-06 PROCEDURE — 3288F PR FALLS RISK ASSESSMENT DOCUMENTED: ICD-10-PCS | Mod: CPTII,,, | Performed by: PAIN MEDICINE

## 2023-03-06 PROCEDURE — 99213 PR OFFICE/OUTPT VISIT, EST, LEVL III, 20-29 MIN: ICD-10-PCS | Mod: S$PBB,,, | Performed by: PAIN MEDICINE

## 2023-03-06 PROCEDURE — 3079F DIAST BP 80-89 MM HG: CPT | Mod: CPTII,,, | Performed by: PAIN MEDICINE

## 2023-03-06 PROCEDURE — 99213 OFFICE O/P EST LOW 20 MIN: CPT | Mod: S$PBB,,, | Performed by: PAIN MEDICINE

## 2023-03-06 PROCEDURE — 3046F PR MOST RECENT HEMOGLOBIN A1C LEVEL > 9.0%: ICD-10-PCS | Mod: CPTII,,, | Performed by: PAIN MEDICINE

## 2023-03-06 PROCEDURE — 3077F SYST BP >= 140 MM HG: CPT | Mod: CPTII,,, | Performed by: PAIN MEDICINE

## 2023-03-06 PROCEDURE — 3077F PR MOST RECENT SYSTOLIC BLOOD PRESSURE >= 140 MM HG: ICD-10-PCS | Mod: CPTII,,, | Performed by: PAIN MEDICINE

## 2023-03-06 PROCEDURE — 1159F PR MEDICATION LIST DOCUMENTED IN MEDICAL RECORD: ICD-10-PCS | Mod: CPTII,,, | Performed by: PAIN MEDICINE

## 2023-03-06 PROCEDURE — 3288F FALL RISK ASSESSMENT DOCD: CPT | Mod: CPTII,,, | Performed by: PAIN MEDICINE

## 2023-03-06 PROCEDURE — 1159F MED LIST DOCD IN RCRD: CPT | Mod: CPTII,,, | Performed by: PAIN MEDICINE

## 2023-03-06 PROCEDURE — 99215 OFFICE O/P EST HI 40 MIN: CPT | Mod: PBBFAC | Performed by: PAIN MEDICINE

## 2023-03-06 PROCEDURE — 1125F AMNT PAIN NOTED PAIN PRSNT: CPT | Mod: CPTII,,, | Performed by: PAIN MEDICINE

## 2023-03-06 PROCEDURE — 80305 DRUG TEST PRSMV DIR OPT OBS: CPT | Mod: PBBFAC | Performed by: PAIN MEDICINE

## 2023-03-06 PROCEDURE — 3046F HEMOGLOBIN A1C LEVEL >9.0%: CPT | Mod: CPTII,,, | Performed by: PAIN MEDICINE

## 2023-03-06 PROCEDURE — 3008F PR BODY MASS INDEX (BMI) DOCUMENTED: ICD-10-PCS | Mod: CPTII,,, | Performed by: PAIN MEDICINE

## 2023-03-06 RX ORDER — HYDROCODONE BITARTRATE AND ACETAMINOPHEN 7.5; 325 MG/1; MG/1
1 TABLET ORAL EVERY 8 HOURS PRN
Qty: 90 TABLET | Refills: 0 | Status: SHIPPED | OUTPATIENT
Start: 2023-03-08 | End: 2023-04-07

## 2023-03-06 RX ORDER — HYDROCODONE BITARTRATE AND ACETAMINOPHEN 7.5; 325 MG/1; MG/1
1 TABLET ORAL EVERY 8 HOURS PRN
Qty: 90 TABLET | Refills: 0 | Status: SHIPPED | OUTPATIENT
Start: 2023-04-07 | End: 2023-05-07

## 2023-03-06 RX ORDER — HYDROCODONE BITARTRATE AND ACETAMINOPHEN 7.5; 325 MG/1; MG/1
1 TABLET ORAL EVERY 8 HOURS PRN
Qty: 90 TABLET | Refills: 0 | Status: SHIPPED | OUTPATIENT
Start: 2023-05-05 | End: 2023-06-04

## 2023-03-06 NOTE — PROGRESS NOTES
Disclaimer: This note has been generated using voice-recognition software. There may be typographical errors that have been missed during proof-reading        Patient ID: Selena Proctor is a 67 y.o. female.      Chief Complaint: Hip Pain      67-year-old female returns for re-evaluation of chronic right hip pain.  She is status post bilateral hip replacements in [facility].  She denies any changes since the previous office visit.  She has not received an orthopedic re-evaluation or physical therapy.  Hip injections greater than 2 years ago failed to provide relief.  She is been maintained on opiates and returns for medication refill.  She is unable to tolerate NSAIDs due to coronary artery disease.  She defers nerve block injections or surgical intervention.                 Pain Assessment  Pain Assessment: 0-10  Pain Score:   9  Pain Location: Hip  Pain Descriptors: Dull  Pain Frequency: Constant/continuous  Clinical Progression: Not changed  Pain Intervention(s): Medication (See eMAR)      A's of Opioid Risk Assessment  Activity:Patient can perform ADL.   Analgesia:Patients pain is  controlled by current medication.   Adverse Effects: Patient denies constipation or sedation.  Aberrant Behavior:  reviewed with no aberrant drug seeking/taking behavior.      Patient denies any suicidal or homicidal ideations    Physical Therapy/Home Exercise: yes      X-Ray Hip 2 or 3 views Left (with Pelvis when performed)  Narrative: EXAMINATION:  XR FEMUR 2 VIEW LEFT; XR HIP WITH PELVIS WHEN PERFORMED, 2 OR 3 VIEWS LEFT    CLINICAL HISTORY:  Pain in left thigh    COMPARISON:  Pelvis and left hip exam dated 07/01/2015 and 09/20/2022    FINDINGS:  A total left hip arthroplasty is present and no abnormal lucencies are seen around the prosthetic component.  A total right hip arthroplasty is also noted.  There is osteoarthritis involving the symphysis pubis with a comparable appearance to the previous examination.  No significant  abnormalities are seen elsewhere in the pelvis.    No abnormalities are seen in the mid to distal femur.  There is osteoarthritis in the left knee.  Surgical clips are present in the soft tissues of the medial posterior thigh.  Impression: Chronic changes are present in the hip and knee but no significant focal bony lesions are seen.    Place of service: [facility]    Electronically signed by: Erendira Hester  Date:    10/17/2022  Time:    11:25  X-Ray Femur 2 View Left  Narrative: EXAMINATION:  XR FEMUR 2 VIEW LEFT; XR HIP WITH PELVIS WHEN PERFORMED, 2 OR 3 VIEWS LEFT    CLINICAL HISTORY:  Pain in left thigh    COMPARISON:  Pelvis and left hip exam dated 07/01/2015 and 09/20/2022    FINDINGS:  A total left hip arthroplasty is present and no abnormal lucencies are seen around the prosthetic component.  A total right hip arthroplasty is also noted.  There is osteoarthritis involving the symphysis pubis with a comparable appearance to the previous examination.  No significant abnormalities are seen elsewhere in the pelvis.    No abnormalities are seen in the mid to distal femur.  There is osteoarthritis in the left knee.  Surgical clips are present in the soft tissues of the medial posterior thigh.  Impression: Chronic changes are present in the hip and knee but no significant focal bony lesions are seen.    Place of service: [facility]    Electronically signed by: Erendira Hester  Date:    10/17/2022  Time:    11:25  US Lower Extremity Veins Left  Narrative: EXAMINATION:  US LOWER EXTREMITY VEINS LEFT    CLINICAL HISTORY:  Pain in left thigh    TECHNIQUE:  Duplex and color flow Doppler evaluation and graded compression of the left lower extremity veins was performed.  Ultrasound images captured and stored.    COMPARISON:  None    FINDINGS:  Left thigh veins: The common femoral, femoral, popliteal, upper greater saphenous, and deep femoral veins are patent and free of thrombus. The veins are  normally compressible and have normal phasic flow and augmentation response.    Left calf veins: The visualized calf veins are patent.    There is complex fluid collection in the upper lateral left thigh measuring 6.9 x 4.3 x 2.8 cm with several internal septations.  Impression: No evidence of deep venous thrombosis in the left lower extremity.    Complex collection in the superficial soft tissues of the left thigh as described.  Uncertain if this represents a hematoma or abscess.  Correlate with patient history.    Electronically signed by: Mukund Harrison  Date:    10/17/2022  Time:    10:41      Review of Systems   Constitutional: Negative.    HENT: Negative.     Eyes: Negative.    Respiratory: Negative.     Cardiovascular: Negative.    Gastrointestinal: Negative.    Endocrine: Negative.    Genitourinary: Negative.    Musculoskeletal: Negative.         Bilateral hip pain   Integumentary:  Negative.   Neurological: Negative.    Hematological: Negative.    Psychiatric/Behavioral:  Positive for sleep disturbance.            Past Medical History:   Diagnosis Date    Acid reflux     Coronary arteriosclerosis     Diabetes     Diabetes mellitus, type 2     Hypertension     Spondylosis without myelopathy or radiculopathy, lumbar region      Past Surgical History:   Procedure Laterality Date    APPENDECTOMY      BLOCK, NERVE, OBTURATOR Right 2019    Right Femoral/Obturator Nerve Block  Dr Headley     SECTION      x2    CORONARY ARTERY BYPASS GRAFT      KNEE ARTHROPLASTY Right     KNEE ARTHROSCOPY Left     SKIN GRAFT      Left Thigh    TOTAL HIP ARTHROPLASTY Bilateral     TUBAL LIGATION       Social History     Socioeconomic History    Marital status: Legally     Number of children: 6   Occupational History    Occupation: retired   Tobacco Use    Smoking status: Never    Smokeless tobacco: Never   Substance and Sexual Activity    Alcohol use: Not Currently    Drug use: Yes     Types: Hydrocodone    Sexual  activity: Not Currently     Family History   Problem Relation Age of Onset    Hypertension Mother     Heart disease Mother     Diabetes Sister     Hypertension Sister     Hypertension Brother     Alcohol abuse Father     Cancer Father     Hypertension Son     Mental illness Maternal Aunt      Review of patient's allergies indicates:   Allergen Reactions    Betadine [povidone-iodine] Itching         Objective:  Vitals:    03/06/23 0822   BP: (!) 158/82   Pulse: 63        Physical Exam  Vitals and nursing note reviewed.   Constitutional:       General: She is not in acute distress.     Appearance: Normal appearance. She is not ill-appearing, toxic-appearing or diaphoretic.   HENT:      Head: Normocephalic and atraumatic.      Nose: Nose normal.      Mouth/Throat:      Mouth: Mucous membranes are moist.   Eyes:      Extraocular Movements: Extraocular movements intact.      Pupils: Pupils are equal, round, and reactive to light.   Cardiovascular:      Rate and Rhythm: Normal rate and regular rhythm.      Heart sounds: Normal heart sounds.   Pulmonary:      Effort: Pulmonary effort is normal. No respiratory distress.      Breath sounds: Normal breath sounds. No stridor. No wheezing or rhonchi.   Abdominal:      General: Bowel sounds are normal.      Palpations: Abdomen is soft.   Musculoskeletal:         General: No swelling or deformity.      Cervical back: Normal and normal range of motion. No spasms or tenderness. No pain with movement. Normal range of motion.      Thoracic back: Normal.      Lumbar back: No spasms, tenderness or bony tenderness. Normal range of motion. Negative right straight leg raise test and negative left straight leg raise test. No scoliosis.      Right hip: Tenderness present. Decreased range of motion.      Left hip: Tenderness present. Decreased range of motion.      Right lower leg: No edema.      Left lower leg: No edema.   Skin:     General: Skin is warm.   Neurological:      General: No  focal deficit present.      Mental Status: She is alert and oriented to person, place, and time. Mental status is at baseline.      Cranial Nerves: No cranial nerve deficit.      Sensory: Sensation is intact. No sensory deficit.      Motor: No weakness.      Coordination: Coordination normal.      Gait: Gait normal.      Deep Tendon Reflexes: Reflexes are normal and symmetric.   Psychiatric:         Mood and Affect: Mood normal.         Behavior: Behavior normal.         Assessment:      1. Encounter for long-term (current) use of other medications    2. Osteoarthrosis multiple sites, not specified as generalized    3. Hip pain          Plan:  1. reviewed  2.Addiction, Dependency, Tolerance, Opioid abuse-misuse, Death, Diversion Discussed. Overdose reversal drug Naloxone discussed.  3.Refill/Continue medications for pain control and function       Requested Prescriptions     Signed Prescriptions Disp Refills    HYDROcodone-acetaminophen (NORCO) 7.5-325 mg per tablet 90 tablet 0     Sig: Take 1 tablet by mouth every 8 (eight) hours as needed for Pain.    HYDROcodone-acetaminophen (NORCO) 7.5-325 mg per tablet 90 tablet 0     Sig: Take 1 tablet by mouth every 8 (eight) hours as needed for Pain.    HYDROcodone-acetaminophen (NORCO) 7.5-325 mg per tablet 90 tablet 0     Sig: Take 1 tablet by mouth every 8 (eight) hours as needed for Pain.     4. Urine drug screen point of care obtained and consistent with prescribed medications and medication refill date    Orders Placed This Encounter   Procedures    POCT Urine Drug Screen Presump     Interpretive Information:     Negative:  No drug detected at the cut off level.   Positive:  This result represents presumptive positive for the   tested drug, other substances may yield a positive response other   than the analyte of interest. This result should be utilized for   diagnostic purpose only. Confirmation testing will be performed upon physician request only.        5.  Patient defers physical therapy or  nerve block injections  6. Follow with ZACHARIAH Lipscomb in 3 months for re-evaluation and medication refill         report:  Reviewed and consistent with medication use as prescribed.      The total time spent for evaluation and management on 03/07/2023 including reviewing separately obtained history, performing a medically appropriate exam and evaluation, documenting clinical information in the health record, independently interpreting results and communicating them to the patient/family/caregiver, and ordering medications/tests/procedures was between 15-29 minutes.    The above plan and management options were discussed at length with patient. Patient is in agreement with the above and verbalized understanding. It will be communicated with the referring physician via electronic record, fax, or mail.

## 2023-03-07 DIAGNOSIS — S71.109A: ICD-10-CM

## 2023-03-07 DIAGNOSIS — S71.102A OPEN WOUND OF LEFT THIGH, INITIAL ENCOUNTER: Primary | ICD-10-CM

## 2023-03-07 RX ORDER — SODIUM CHLORIDE 9 MG/ML
INJECTION, SOLUTION INTRAVENOUS CONTINUOUS
Status: CANCELLED | OUTPATIENT
Start: 2023-03-07

## 2023-03-08 RX ORDER — SODIUM CHLORIDE 9 MG/ML
INJECTION, SOLUTION INTRAVENOUS CONTINUOUS
Status: CANCELLED | OUTPATIENT
Start: 2023-03-08

## 2023-03-13 ENCOUNTER — TELEPHONE (OUTPATIENT)
Dept: PRIMARY CARE CLINIC | Facility: CLINIC | Age: 68
End: 2023-03-13
Payer: MEDICARE

## 2023-03-13 NOTE — TELEPHONE ENCOUNTER
----- Message from Angela Meyers sent at 3/10/2023  9:00 AM CST -----  Patient request a script for Kombiglyze XR to be sent to [facility].  Please call the patient at [phone] if there are any issues.

## 2023-03-29 ENCOUNTER — ANESTHESIA (OUTPATIENT)
Dept: SURGERY | Facility: HOSPITAL | Age: 68
End: 2023-03-29
Payer: MEDICARE

## 2023-03-29 ENCOUNTER — HOSPITAL ENCOUNTER (OUTPATIENT)
Facility: HOSPITAL | Age: 68
Discharge: HOME | End: 2023-03-29
Attending: STUDENT IN AN ORGANIZED HEALTH CARE EDUCATION/TRAINING PROGRAM | Admitting: STUDENT IN AN ORGANIZED HEALTH CARE EDUCATION/TRAINING PROGRAM
Payer: MEDICARE

## 2023-03-29 ENCOUNTER — ANESTHESIA EVENT (OUTPATIENT)
Dept: SURGERY | Facility: HOSPITAL | Age: 68
End: 2023-03-29
Payer: MEDICARE

## 2023-03-29 VITALS
TEMPERATURE: 98 F | DIASTOLIC BLOOD PRESSURE: 79 MMHG | HEIGHT: 64 IN | SYSTOLIC BLOOD PRESSURE: 145 MMHG | OXYGEN SATURATION: 96 % | WEIGHT: 181 LBS | BODY MASS INDEX: 30.9 KG/M2 | RESPIRATION RATE: 16 BRPM | HEART RATE: 54 BPM

## 2023-03-29 DIAGNOSIS — S71.109A: ICD-10-CM

## 2023-03-29 DIAGNOSIS — S71.102A OPEN WOUND OF LEFT THIGH, INITIAL ENCOUNTER: ICD-10-CM

## 2023-03-29 LAB
GLUCOSE SERPL-MCNC: 114 MG/DL (ref 70–105)
GLUCOSE SERPL-MCNC: 122 MG/DL (ref 70–105)

## 2023-03-29 PROCEDURE — 88305 TISSUE EXAM BY PATHOLOGIST: CPT | Mod: 26,,, | Performed by: PATHOLOGY

## 2023-03-29 PROCEDURE — 71000015 HC POSTOP RECOV 1ST HR: Performed by: STUDENT IN AN ORGANIZED HEALTH CARE EDUCATION/TRAINING PROGRAM

## 2023-03-29 PROCEDURE — 25000003 PHARM REV CODE 250: Performed by: STUDENT IN AN ORGANIZED HEALTH CARE EDUCATION/TRAINING PROGRAM

## 2023-03-29 PROCEDURE — 25000003 PHARM REV CODE 250: Performed by: NURSE ANESTHETIST, CERTIFIED REGISTERED

## 2023-03-29 PROCEDURE — 37000008 HC ANESTHESIA 1ST 15 MINUTES: Performed by: STUDENT IN AN ORGANIZED HEALTH CARE EDUCATION/TRAINING PROGRAM

## 2023-03-29 PROCEDURE — D9220A PRA ANESTHESIA: ICD-10-PCS | Mod: ANES,,, | Performed by: ANESTHESIOLOGY

## 2023-03-29 PROCEDURE — 71000033 HC RECOVERY, INTIAL HOUR: Performed by: STUDENT IN AN ORGANIZED HEALTH CARE EDUCATION/TRAINING PROGRAM

## 2023-03-29 PROCEDURE — 88305 TISSUE EXAM BY PATHOLOGIST: CPT | Mod: TC,SUR | Performed by: STUDENT IN AN ORGANIZED HEALTH CARE EDUCATION/TRAINING PROGRAM

## 2023-03-29 PROCEDURE — 88305 SURGICAL PATHOLOGY: ICD-10-PCS | Mod: 26,,, | Performed by: PATHOLOGY

## 2023-03-29 PROCEDURE — D9220A PRA ANESTHESIA: Mod: CRNA,,, | Performed by: NURSE ANESTHETIST, CERTIFIED REGISTERED

## 2023-03-29 PROCEDURE — D9220A PRA ANESTHESIA: Mod: ANES,,, | Performed by: ANESTHESIOLOGY

## 2023-03-29 PROCEDURE — 36000707: Performed by: STUDENT IN AN ORGANIZED HEALTH CARE EDUCATION/TRAINING PROGRAM

## 2023-03-29 PROCEDURE — 27000716 HC OXISENSOR PROBE, ANY SIZE: Performed by: ANESTHESIOLOGY

## 2023-03-29 PROCEDURE — 14020 PR ADJ TISS XFER SCALP,EXTREM <10 SQCM: ICD-10-PCS | Mod: ,,, | Performed by: STUDENT IN AN ORGANIZED HEALTH CARE EDUCATION/TRAINING PROGRAM

## 2023-03-29 PROCEDURE — 82962 GLUCOSE BLOOD TEST: CPT | Mod: 91

## 2023-03-29 PROCEDURE — 27000177 HC AIRWAY, LARYNGEAL MASK: Performed by: ANESTHESIOLOGY

## 2023-03-29 PROCEDURE — 63600175 PHARM REV CODE 636 W HCPCS: Performed by: STUDENT IN AN ORGANIZED HEALTH CARE EDUCATION/TRAINING PROGRAM

## 2023-03-29 PROCEDURE — 63600175 PHARM REV CODE 636 W HCPCS: Performed by: NURSE ANESTHETIST, CERTIFIED REGISTERED

## 2023-03-29 PROCEDURE — 37000009 HC ANESTHESIA EA ADD 15 MINS: Performed by: STUDENT IN AN ORGANIZED HEALTH CARE EDUCATION/TRAINING PROGRAM

## 2023-03-29 PROCEDURE — C1729 CATH, DRAINAGE: HCPCS | Performed by: STUDENT IN AN ORGANIZED HEALTH CARE EDUCATION/TRAINING PROGRAM

## 2023-03-29 PROCEDURE — 14020 TIS TRNFR S/A/L 10 SQ CM/<: CPT | Mod: ,,, | Performed by: STUDENT IN AN ORGANIZED HEALTH CARE EDUCATION/TRAINING PROGRAM

## 2023-03-29 PROCEDURE — 36000706: Performed by: STUDENT IN AN ORGANIZED HEALTH CARE EDUCATION/TRAINING PROGRAM

## 2023-03-29 PROCEDURE — 27000655: Performed by: ANESTHESIOLOGY

## 2023-03-29 PROCEDURE — 71000016 HC POSTOP RECOV ADDL HR: Performed by: STUDENT IN AN ORGANIZED HEALTH CARE EDUCATION/TRAINING PROGRAM

## 2023-03-29 PROCEDURE — D9220A PRA ANESTHESIA: ICD-10-PCS | Mod: CRNA,,, | Performed by: NURSE ANESTHETIST, CERTIFIED REGISTERED

## 2023-03-29 RX ORDER — PROPOFOL 10 MG/ML
VIAL (ML) INTRAVENOUS
Status: DISCONTINUED | OUTPATIENT
Start: 2023-03-29 | End: 2023-03-29

## 2023-03-29 RX ORDER — MUPIROCIN 20 MG/G
OINTMENT TOPICAL DAILY
Qty: 30 G | Refills: 12 | Status: SHIPPED | OUTPATIENT
Start: 2023-03-29

## 2023-03-29 RX ORDER — LIDOCAINE HYDROCHLORIDE 10 MG/ML
1 INJECTION, SOLUTION EPIDURAL; INFILTRATION; INTRACAUDAL; PERINEURAL ONCE
Status: CANCELLED | OUTPATIENT
Start: 2023-03-29 | End: 2023-03-29

## 2023-03-29 RX ORDER — ONDANSETRON 2 MG/ML
4 INJECTION INTRAMUSCULAR; INTRAVENOUS DAILY PRN
Status: DISCONTINUED | OUTPATIENT
Start: 2023-03-29 | End: 2023-03-29 | Stop reason: HOSPADM

## 2023-03-29 RX ORDER — OXYCODONE AND ACETAMINOPHEN 5; 325 MG/1; MG/1
1 TABLET ORAL EVERY 6 HOURS PRN
Qty: 20 TABLET | Refills: 0 | Status: SHIPPED | OUTPATIENT
Start: 2023-03-29 | End: 2023-04-05

## 2023-03-29 RX ORDER — DOCUSATE SODIUM 100 MG/1
100 CAPSULE, LIQUID FILLED ORAL 2 TIMES DAILY
Qty: 28 CAPSULE | Refills: 0 | Status: SHIPPED | OUTPATIENT
Start: 2023-03-29

## 2023-03-29 RX ORDER — SODIUM CHLORIDE, SODIUM LACTATE, POTASSIUM CHLORIDE, CALCIUM CHLORIDE 600; 310; 30; 20 MG/100ML; MG/100ML; MG/100ML; MG/100ML
125 INJECTION, SOLUTION INTRAVENOUS CONTINUOUS
Status: DISCONTINUED | OUTPATIENT
Start: 2023-03-29 | End: 2023-03-29 | Stop reason: HOSPADM

## 2023-03-29 RX ORDER — MORPHINE SULFATE 10 MG/ML
4 INJECTION INTRAMUSCULAR; INTRAVENOUS; SUBCUTANEOUS EVERY 5 MIN PRN
Status: DISCONTINUED | OUTPATIENT
Start: 2023-03-29 | End: 2023-03-29 | Stop reason: HOSPADM

## 2023-03-29 RX ORDER — MEPERIDINE HYDROCHLORIDE 25 MG/ML
25 INJECTION INTRAMUSCULAR; INTRAVENOUS; SUBCUTANEOUS EVERY 10 MIN PRN
Status: DISCONTINUED | OUTPATIENT
Start: 2023-03-29 | End: 2023-03-29 | Stop reason: HOSPADM

## 2023-03-29 RX ORDER — FENTANYL CITRATE 50 UG/ML
INJECTION, SOLUTION INTRAMUSCULAR; INTRAVENOUS
Status: DISCONTINUED | OUTPATIENT
Start: 2023-03-29 | End: 2023-03-29

## 2023-03-29 RX ORDER — SODIUM CHLORIDE, SODIUM LACTATE, POTASSIUM CHLORIDE, CALCIUM CHLORIDE 600; 310; 30; 20 MG/100ML; MG/100ML; MG/100ML; MG/100ML
INJECTION, SOLUTION INTRAVENOUS CONTINUOUS
Status: CANCELLED | OUTPATIENT
Start: 2023-03-29

## 2023-03-29 RX ORDER — LIDOCAINE HYDROCHLORIDE 20 MG/ML
INJECTION, SOLUTION EPIDURAL; INFILTRATION; INTRACAUDAL; PERINEURAL
Status: DISCONTINUED | OUTPATIENT
Start: 2023-03-29 | End: 2023-03-29

## 2023-03-29 RX ORDER — SODIUM CHLORIDE 9 MG/ML
INJECTION, SOLUTION INTRAVENOUS CONTINUOUS
Status: DISCONTINUED | OUTPATIENT
Start: 2023-03-29 | End: 2023-03-29 | Stop reason: HOSPADM

## 2023-03-29 RX ORDER — HYDROMORPHONE HYDROCHLORIDE 2 MG/ML
0.5 INJECTION, SOLUTION INTRAMUSCULAR; INTRAVENOUS; SUBCUTANEOUS EVERY 5 MIN PRN
Status: DISCONTINUED | OUTPATIENT
Start: 2023-03-29 | End: 2023-03-29 | Stop reason: HOSPADM

## 2023-03-29 RX ORDER — DIPHENHYDRAMINE HYDROCHLORIDE 50 MG/ML
25 INJECTION INTRAMUSCULAR; INTRAVENOUS EVERY 6 HOURS PRN
Status: DISCONTINUED | OUTPATIENT
Start: 2023-03-29 | End: 2023-03-29 | Stop reason: HOSPADM

## 2023-03-29 RX ORDER — ONDANSETRON 2 MG/ML
INJECTION INTRAMUSCULAR; INTRAVENOUS
Status: DISCONTINUED | OUTPATIENT
Start: 2023-03-29 | End: 2023-03-29

## 2023-03-29 RX ADMIN — ONDANSETRON 4 MG: 2 INJECTION INTRAMUSCULAR; INTRAVENOUS at 12:03

## 2023-03-29 RX ADMIN — LIDOCAINE HYDROCHLORIDE 100 MG: 20 INJECTION, SOLUTION INTRAVENOUS at 12:03

## 2023-03-29 RX ADMIN — FENTANYL CITRATE 50 MCG: 50 INJECTION INTRAMUSCULAR; INTRAVENOUS at 12:03

## 2023-03-29 RX ADMIN — FENTANYL CITRATE 50 MCG: 50 INJECTION INTRAMUSCULAR; INTRAVENOUS at 01:03

## 2023-03-29 RX ADMIN — CEFAZOLIN 2 G: 2 INJECTION, POWDER, FOR SOLUTION INTRAMUSCULAR; INTRAVENOUS at 12:03

## 2023-03-29 RX ADMIN — SODIUM CHLORIDE: 9 INJECTION, SOLUTION INTRAVENOUS at 12:03

## 2023-03-29 RX ADMIN — PROPOFOL 60 MG: 10 INJECTION, EMULSION INTRAVENOUS at 12:03

## 2023-03-29 NOTE — ASSESSMENT & PLAN NOTE
To the OR for excision of left thigh wound with rotational flap coverage.      Patient deemed low to moderate risk by Cardiology and okay to proceed with surgery.      Risks and benefits explained with the patient including risks for infection, bleeding, injury to surrounding structures, hematoma/seroma formation with need for possible evacuation, possible open.  The patient verbalized understanding, agrees and wishes to proceed with surgery.

## 2023-03-29 NOTE — OP NOTE
[facility] - Periop Services  Surgery Department  Operative Note    SUMMARY     Date of Procedure: 3/29/2023     Procedure: Procedure(s) (LRB):  BIOPSY, LESION (Left)     Surgeon(s) and Role:     * Andrez Norman, DO - Primary    Assisting Surgeon: None    Pre-Operative Diagnosis: Open wound of left thigh, initial encounter [S71.102A]    Post-Operative Diagnosis: Post-Op Diagnosis Codes:     * Open wound of left thigh, initial encounter [S71.102A]    Anesthesia: General    Operative Findings (including complications, if any): Chronic ulcer to left inner thigh; measures 3m1o0in    Description of Technical Procedures:  Patient was taken the operating room and placed on the operating table in the supine position with the left leg externally rotated.  Patient underwent general anesthesia per the anesthesia team.  The left leg was then prepped and draped in usual sterile fashion.  We localized around the lesion which measured 4 x 1 x 1 cm with 0.25% Marcaine plain.  We then made a dinorah-shaped incision with a 15 blade scalpel dissected down through subcutaneous tissue and excise the lesion; we marked this short superior long lateral and sent to pathology.  We controlled bleeding with electrocautery.  The dissection was down to subcutaneous tissue.  We then extended the incision inferiorly and medially , going through the skin with a 15 blade scalpel and then subcutaneous tissue with electrocautery.  We made a subcutaneous flap with electrocautery to make a rhomboid flap.  We irrigated the cavity and suctioned clear; bleeding controlled electrocautery.  We placed a 10 [place] EVANGELINA drain in the cavity and then rotated the flap.  We secured the corners with 3-0 Vicryl deep dermal sutures.  We approximated skin edges with 3-0 Vicryl deep dermal sutures.  We placed several 2-0 nylon horizontal mattress sutures at a few corners where there was some tension to keep tension off the incision line.  We then approximated  skin edges with staples.  The skin was cleaned and dried, sterile dressing applied.  EVANGELINA drain was secured to the skin with a 2-0 silk suture.  Patient was awakened, extubated and taken the PACU in stable condition.  Patient tolerated procedure well.        Estimated Blood Loss (EBL): 25 mL           Implants: * No implants in log *    Specimens:   Specimen (24h ago, onward)       Start     Ordered    03/29/23 1236  Surgical Pathology  RELEASE UPON ORDERING         03/29/23 1236                            Condition: Good    Disposition: PACU - hemodynamically stable.    Attestation: I was present and scrubbed for the entire procedure.

## 2023-03-29 NOTE — BRIEF OP NOTE
[facility] - Periop Services  Brief Operative Note    Surgery Date: 3/29/2023     Surgeon(s) and Role:     * Andrez Norman, DO - Primary    Assisting Surgeon: None    Pre-op Diagnosis:  Open wound of left thigh, initial encounter [S71.102A]    Post-op Diagnosis:  Post-Op Diagnosis Codes:     * Open wound of left thigh, initial encounter [S71.102A]    Procedure(s) (LRB):  BIOPSY, LESION (Left)    Anesthesia: General    Operative Findings: Chronic ulcer to left inner thigh; measures [id number]    Estimated Blood Loss: 25 mL         Specimens:   Specimen (24h ago, onward)       Start     Ordered    03/29/23 1236  Surgical Pathology  RELEASE UPON ORDERING         03/29/23 1236                      Discharge Note    OUTCOME: Patient tolerated treatment/procedure well without complication and is now ready for discharge.    DISPOSITION: Home or Self Care    FINAL DIAGNOSIS:  Open wound of left thigh    FOLLOWUP: In clinic    DISCHARGE INSTRUCTIONS:    Discharge Procedure Orders   Diet diabetic      Remove dressing in 72 hours   Order Comments: Remove dressing in 72 hours.  Soap and water to rinse over area.  Placed mupirocin ointment over staple line daily for 7 days.  Keep covered.  Empty drain daily and record drainage.  No tub baths; okay to shower in 3 days     Activity as tolerated     Weight bearing restrictions (specify):   Order Comments: Toe-touch weight-bearing; try to stay off of left foot as much as she can to prevent pressure on left thigh

## 2023-03-29 NOTE — H&P
December 23, 2020 Pamela Valentin [address] [address] Dear Jane: 
Thank you for requesting access to [facility]. Please follow the instructions below to securely access and download your online medical record. [facility] allows you to send messages to your doctor, view your test results, renew your prescriptions, schedule appointments, and more. How Do I Sign Up? 1. In your internet browser, go to [email or web address]. 2. Click on the First Time User? Click Here link in the Sign In box. You will see the New Member Sign Up page. 3. Enter your [facility] Access Code exactly as it appears below. You will not need to use this code after youve completed the sign-up process. If you do not sign up before the expiration date, you must request a new code. [facility] Access Code: [email or web address] Expires: 2/6/2021  1:49 PM  
 
4. Enter the last four digits of your Social Security Number (xxxx) and Date of Birth (mm/dd/yyyy) as indicated and click Submit. You will be taken to the next sign-up page. 5. Create a [facility] ID. This will be your [facility] login ID and cannot be changed, so think of one that is secure and easy to remember. 6. Create a [facility] password. You can change your password at any time. 7. Enter your Password Reset Question and Answer. This can be used at a later time if you forget your password. 8. Enter your e-mail address. You will receive e-mail notification when new information is available in [address]. 9. Click Sign Up. You can now view and download portions of your medical record. 10. Click the Download Summary menu link to download a portable copy of your medical information. Additional Information If you have questions, please visit the Frequently Asked Questions section of the [facility] website at [email or web address] Remember, [facility] is NOT to be used for urgent needs. For medical emergencies, dial 911. [facility]  General Surgery  History & Physical    Patient Name: Selena Proctor  MRN: [id number]  Admission Date: 3/29/2023  Attending Physician: Andrez Norman DO   Primary Care Provider: Lizzette Meadows DNP, FNP-C    Patient information was obtained from patient and ER records.     Subjective:     Chief Complaint/Reason for Admission:  Left thigh chronic wound/ulcer    History of Present Illness: 67-year-old  female presents to the OR for elective excision of the left thigh skin lesion with rotational flap coverage.  Patient has history of coronary artery disease with coronary artery bypass graft.  Patient saw her cardiologist in [place] and had a nuclear stress test which was negative.  Patient was considered low to moderate risk and okay to proceed with surgery per her cardiologist; notes faxed and scanned into our media in EPIC.  No changes since the patient was seen in clinic.  Denies any chest pain/shortness of breath, nausea/vomiting/diarrhea, fever/chills.      No current facility-administered medications on file prior to encounter.     Current Outpatient Medications on File Prior to Encounter   Medication Sig    ACCU-CHEK SOFT DEV LANCETS Kit USE AS DIRECTED    alcohol swabs (BD ALCOHOL SWABS) PadM Use as directed    aspirin (ECOTRIN) 81 MG EC tablet Take 81 mg by mouth once daily.     blood glucose control, low (TRUE METRIX LEVEL 1) Soln USE AS DIRECTED WITH GLUCOSE METER    blood sugar diagnostic (ACCU-CHEK GUIDE TEST STRIPS) Strp Use as directed    blood-glucose meter (ACCU-CHEK GUIDE GLUCOSE METER) Misc Use as directed    chlorhexidine (HIBICLENS) 4 % external liquid Apply topically daily as needed (thigh wound).    clotrimazole-betamethasone 1-0.05% (LOTRISONE) cream Apply topically 2 (two) times daily.    diclofenac sodium (VOLTAREN ARTHRITIS PAIN) 1 % Gel Apply 2 g topically 4 (four) times daily. Apply to knees, elbows, joints as needed     Now available from your iPhone and Android! Sincerely, [employer] 
 ezetimibe (ZETIA) 10 mg tablet TAKE 1 TABLET EVERY DAY    glipiZIDE (GLUCOTROL) 2.5 MG TR24 Take 1 tablet (2.5 mg total) by mouth daily with breakfast.    HYDROcodone-acetaminophen (NORCO) 7.5-325 mg per tablet Take 1 tablet by mouth every 8 (eight) hours as needed for Pain.    [START ON 4/7/2023] HYDROcodone-acetaminophen (NORCO) 7.5-325 mg per tablet Take 1 tablet by mouth every 8 (eight) hours as needed for Pain.    [START ON 5/5/2023] HYDROcodone-acetaminophen (NORCO) 7.5-325 mg per tablet Take 1 tablet by mouth every 8 (eight) hours as needed for Pain.    lancets (ACCU-CHEK SOFTCLIX LANCETS) Misc Check blood sugar once a day as directed.    losartan-hydrochlorothiazide 100-25 mg (HYZAAR) 100-25 mg per tablet TAKE 1 TABLET EVERY DAY    metoprolol succinate (TOPROL-XL) 100 MG 24 hr tablet TAKE 1 TABLET EVERY DAY    miconazole (MONISTAT 7) 2 % vaginal cream Apply cream to external vaginal area for vaginal itching.    montelukast (SINGULAIR) 10 mg tablet TAKE 1 TABLET EVERY DAY AS NEEDED    mupirocin (BACTROBAN) 2 % ointment Apply topically 3 (three) times daily.    naloxegoL (MOVANTIK) 25 mg tablet Take 25 mg by mouth once daily.    naloxone (NARCAN) 4 mg/actuation Spry PUT 1 SPRAY IN 1 NOSTRIL WAIT 2 MINUTES THEN REPEAT DOSE IN THE OTHER NOSTRIL    NIFEdipine (PROCARDIA-XL) 90 MG (OSM) 24 hr tablet TAKE 1 TABLET EVERY DAY    pitavastatin calcium (LIVALO) 4 mg Tab Take one tablet by mouth three times per week on Monday, Wednesday, Friday.    silver sulfADIAZINE 1% (SILVADENE) 1 % cream Apply topically once daily.    TRUE METRIX GLUCOSE METER kit USE AS DIRECTED    TRUEPLUS LANCETS 33 gauge Misc USE AS DIRECTED       Review of patient's allergies indicates:   Allergen Reactions    Betadine [povidone-iodine] Itching       Past Medical History:   Diagnosis Date    Acid reflux     Coronary arteriosclerosis     Diabetes     Diabetes mellitus, type 2     Hypertension     Spondylosis without  myelopathy or radiculopathy, lumbar region      Past Surgical History:   Procedure Laterality Date    APPENDECTOMY      BLOCK, NERVE, OBTURATOR Right 2019    Right Femoral/Obturator Nerve Block  Dr Headley     SECTION      x2    CORONARY ARTERY BYPASS GRAFT      KNEE ARTHROPLASTY Right     KNEE ARTHROSCOPY Left     SKIN GRAFT      Left Thigh    TOTAL HIP ARTHROPLASTY Bilateral     TUBAL LIGATION       Family History       Problem Relation (Age of Onset)    Alcohol abuse Father    Cancer Father    Diabetes Sister    Heart disease Mother    Hypertension Mother, Sister, Brother, Son    Mental illness Maternal Aunt          Tobacco Use    Smoking status: Never    Smokeless tobacco: Never   Substance and Sexual Activity    Alcohol use: Not Currently    Drug use: Yes     Types: Hydrocodone    Sexual activity: Not Currently     Review of Systems   Constitutional: Negative.  Negative for fatigue and unexpected weight change.   HENT: Negative.  Negative for trouble swallowing.    Eyes: Negative.    Respiratory: Negative.  Negative for chest tightness and shortness of breath.    Cardiovascular: Negative.  Negative for chest pain.   Gastrointestinal: Negative.  Negative for abdominal pain, blood in stool and nausea.   Endocrine: Negative.    Genitourinary: Negative.  Negative for hematuria.   Musculoskeletal: Negative.  Negative for back pain and myalgias.   Neurological: Negative.  Negative for dizziness, speech difficulty, weakness and light-headedness.   Psychiatric/Behavioral: Negative.  Negative for agitation and behavioral problems.    Objective:     Vital Signs (Most Recent):    Vital Signs (24h Range):           There is no height or weight on file to calculate BMI.    Physical Exam  Constitutional:       General: She is not in acute distress.     Appearance: Normal appearance.   HENT:      Head: Normocephalic.   Cardiovascular:      Rate and Rhythm: Normal rate.   Pulmonary:      Effort:  Pulmonary effort is normal. No respiratory distress.   Abdominal:      General: There is no distension.      Tenderness: There is no abdominal tenderness.   Musculoskeletal:         General: Normal range of motion.        Legs:       Comments: Chronic ulceration to left inner thigh   Skin:     General: Skin is warm.      Coloration: Skin is not jaundiced.   Neurological:      General: No focal deficit present.      Mental Status: She is alert and oriented to person, place, and time.      Cranial Nerves: No cranial nerve deficit.       Significant Labs:  I have reviewed all pertinent lab results within the past 24 hours.  CBC:   Recent Labs   Lab 03/27/23  0940   WBC 6.47   RBC 5.10   HGB 13.8   HCT 45.0      MCV 88.2   MCH 27.1   MCHC 30.7*     BMP:   Recent Labs   Lab 03/27/23  0940   *      K 3.9      CO2 27   BUN 21*   CREATININE 0.89   CALCIUM 9.4       Significant Diagnostics:  I have reviewed all pertinent imaging results/findings within the past 24 hours.      Assessment/Plan:     * Open wound of left thigh  To the OR for excision of left thigh wound with rotational flap coverage.      Patient deemed low to moderate risk by Cardiology and okay to proceed with surgery.      Risks and benefits explained with the patient including risks for infection, bleeding, injury to surrounding structures, hematoma/seroma formation with need for possible evacuation, possible open.  The patient verbalized understanding, agrees and wishes to proceed with surgery.      VTE Risk Mitigation (From admission, onward)         Ordered     IP VTE LOW RISK PATIENT  Once         03/29/23 0657     Place sequential compression device  Until discontinued         03/29/23 0657                Andrez Cano, DO  General Surgery  [facility]

## 2023-03-29 NOTE — ANESTHESIA PREPROCEDURE EVALUATION
03/29/2023  Selena Proctor is a 67 y.o., female.      Pre-op Assessment    I have reviewed the Patient Summary Reports.     I have reviewed the Nursing Notes. I have reviewed the NPO Status.   I have reviewed the Medications.     Review of Systems  Anesthesia Hx:  No problems with previous Anesthesia    Social:  Non-Smoker, No Alcohol Use    Hematology/Oncology:  Hematology Normal   Oncology Normal     EENT/Dental:EENT/Dental Normal   Cardiovascular:   Hypertension CAD      Pulmonary:  Pulmonary Normal    Renal/:  Renal/ Normal     Hepatic/GI:   GERD    Musculoskeletal:   Arthritis     Neurological:  Neurology Normal    Endocrine:   Diabetes, well controlled, type 2    Dermatological:  Skin Normal    Psych:  Psychiatric Normal           Physical Exam  General: Well nourished    Airway:  Mallampati: III / III  Mouth Opening: Normal  TM Distance: > 6 cm  Tongue: Normal  Neck ROM: Normal ROM    Chest/Lungs:  Clear to auscultation, Normal Respiratory Rate    Heart:  Rate: Normal  Rhythm: Regular Rhythm        Anesthesia Plan  Type of Anesthesia, risks & benefits discussed:    Anesthesia Type: Gen ETT  Intra-op Monitoring Plan: Standard ASA Monitors  Post Op Pain Control Plan: multimodal analgesia  Induction:  IV  Informed Consent: Informed consent signed with the Patient and all parties understand the risks and agree with anesthesia plan.  All questions answered. Patient consented to blood products? Yes  ASA Score: 2  Day of Surgery Review of History & Physical: H&P Update referred to the surgeon/provider.I have interviewed and examined the patient. I have reviewed the patient's H&P dated: There are no significant changes. H&P completed by Anesthesiologist.    Ready For Surgery From Anesthesia Perspective.     .

## 2023-03-29 NOTE — OR NURSING
1338 PT TO PACU IN AND OUT OF SLEEP, AROUSABLE TO VOICE. RESP EVEN AND UNLABORED. O2 AT 8L. IV INFUSING. DRESSING TO L THIGH C/D/I. EVANGELINA DRAIN IN PLACE, NO DRAINAGE NOTED. VSS. SAFETY MEASURES ONGOING.     1353 PT RESTING QUIETLY, NO ACUTE DISTRESS NOTED. VSS. PT DENIES PAIN AT THIS TIME.     1408 PT AWAKE AND ALERT. DENIES PAIN. VSS. OUT OF PACU    1410 PT TRANSFERRED BACK TO [facility]. RELEASED TO GROVER SANTOS RN. PT AWAKE AND ALERT. RESP EVEN AND UNLABORED. IV INFUSING. DRESSING TO L THIGH C/D/I. EVANGELINA DRAIN IN PLACE, NO DRAINAGE NOTED. VS: /76, HR 52, RR 16, SPO2 94% ON RA. SAFETY MEASURES IN PLACE. FAMILY AT BEDSIDE.

## 2023-03-29 NOTE — ANESTHESIA PROCEDURE NOTES
Intubation    Date/Time: 3/29/2023 12:04 PM  Performed by: Lang Jones CRNA  Authorized by: Lang Jones CRNA     Intubation:     Induction:  Inhalational - mask    Intubated:  Postinduction    Mask Ventilation:  Easy mask    Attempts:  1    Attempted By:  CRNA    Difficult Airway Encountered?: No      Airway Device:  Supraglottic airway/LMA    Airway Device Size:  4.0    Style/Cuff Inflation:  Cuffed (inflated to minimal occlusive pressure)    Placement Verified By:  Capnometry    Complicating Factors:  None    Findings Post-Intubation:  BS equal bilateral

## 2023-03-29 NOTE — SUBJECTIVE & OBJECTIVE
No current facility-administered medications on file prior to encounter.     Current Outpatient Medications on File Prior to Encounter   Medication Sig    ACCU-CHEK SOFT DEV LANCETS Kit USE AS DIRECTED    alcohol swabs (BD ALCOHOL SWABS) PadM Use as directed    aspirin (ECOTRIN) 81 MG EC tablet Take 81 mg by mouth once daily.     blood glucose control, low (TRUE METRIX LEVEL 1) Soln USE AS DIRECTED WITH GLUCOSE METER    blood sugar diagnostic (ACCU-CHEK GUIDE TEST STRIPS) Strp Use as directed    blood-glucose meter (ACCU-CHEK GUIDE GLUCOSE METER) Misc Use as directed    chlorhexidine (HIBICLENS) 4 % external liquid Apply topically daily as needed (thigh wound).    clotrimazole-betamethasone 1-0.05% (LOTRISONE) cream Apply topically 2 (two) times daily.    diclofenac sodium (VOLTAREN ARTHRITIS PAIN) 1 % Gel Apply 2 g topically 4 (four) times daily. Apply to knees, elbows, joints as needed    ezetimibe (ZETIA) 10 mg tablet TAKE 1 TABLET EVERY DAY    glipiZIDE (GLUCOTROL) 2.5 MG TR24 Take 1 tablet (2.5 mg total) by mouth daily with breakfast.    HYDROcodone-acetaminophen (NORCO) 7.5-325 mg per tablet Take 1 tablet by mouth every 8 (eight) hours as needed for Pain.    [START ON 4/7/2023] HYDROcodone-acetaminophen (NORCO) 7.5-325 mg per tablet Take 1 tablet by mouth every 8 (eight) hours as needed for Pain.    [START ON 5/5/2023] HYDROcodone-acetaminophen (NORCO) 7.5-325 mg per tablet Take 1 tablet by mouth every 8 (eight) hours as needed for Pain.    lancets (ACCU-CHEK SOFTCLIX LANCETS) Misc Check blood sugar once a day as directed.    losartan-hydrochlorothiazide 100-25 mg (HYZAAR) 100-25 mg per tablet TAKE 1 TABLET EVERY DAY    metoprolol succinate (TOPROL-XL) 100 MG 24 hr tablet TAKE 1 TABLET EVERY DAY    miconazole (MONISTAT 7) 2 % vaginal cream Apply cream to external vaginal area for vaginal itching.    montelukast (SINGULAIR) 10 mg tablet TAKE 1 TABLET EVERY DAY AS NEEDED    mupirocin (BACTROBAN) 2 % ointment  Apply topically 3 (three) times daily.    naloxegoL (MOVANTIK) 25 mg tablet Take 25 mg by mouth once daily.    naloxone (NARCAN) 4 mg/actuation Spry PUT 1 SPRAY IN 1 NOSTRIL WAIT 2 MINUTES THEN REPEAT DOSE IN THE OTHER NOSTRIL    NIFEdipine (PROCARDIA-XL) 90 MG (OSM) 24 hr tablet TAKE 1 TABLET EVERY DAY    pitavastatin calcium (LIVALO) 4 mg Tab Take one tablet by mouth three times per week on Monday, Wednesday, Friday.    silver sulfADIAZINE 1% (SILVADENE) 1 % cream Apply topically once daily.    TRUE METRIX GLUCOSE METER kit USE AS DIRECTED    TRUEPLUS LANCETS 33 gauge Misc USE AS DIRECTED       Review of patient's allergies indicates:   Allergen Reactions    Betadine [povidone-iodine] Itching       Past Medical History:   Diagnosis Date    Acid reflux     Coronary arteriosclerosis     Diabetes     Diabetes mellitus, type 2     Hypertension     Spondylosis without myelopathy or radiculopathy, lumbar region      Past Surgical History:   Procedure Laterality Date    APPENDECTOMY      BLOCK, NERVE, OBTURATOR Right 2019    Right Femoral/Obturator Nerve Block  Dr Headley     SECTION      x2    CORONARY ARTERY BYPASS GRAFT      KNEE ARTHROPLASTY Right     KNEE ARTHROSCOPY Left     SKIN GRAFT      Left Thigh    TOTAL HIP ARTHROPLASTY Bilateral     TUBAL LIGATION       Family History       Problem Relation (Age of Onset)    Alcohol abuse Father    Cancer Father    Diabetes Sister    Heart disease Mother    Hypertension Mother, Sister, Brother, Son    Mental illness Maternal Aunt          Tobacco Use    Smoking status: Never    Smokeless tobacco: Never   Substance and Sexual Activity    Alcohol use: Not Currently    Drug use: Yes     Types: Hydrocodone    Sexual activity: Not Currently     Review of Systems   Constitutional: Negative.  Negative for fatigue and unexpected weight change.   HENT: Negative.  Negative for trouble swallowing.    Eyes: Negative.    Respiratory: Negative.  Negative for chest tightness  and shortness of breath.    Cardiovascular: Negative.  Negative for chest pain.   Gastrointestinal: Negative.  Negative for abdominal pain, blood in stool and nausea.   Endocrine: Negative.    Genitourinary: Negative.  Negative for hematuria.   Musculoskeletal: Negative.  Negative for back pain and myalgias.   Neurological: Negative.  Negative for dizziness, speech difficulty, weakness and light-headedness.   Psychiatric/Behavioral: Negative.  Negative for agitation and behavioral problems.    Objective:     Vital Signs (Most Recent):    Vital Signs (24h Range):           There is no height or weight on file to calculate BMI.    Physical Exam  Constitutional:       General: She is not in acute distress.     Appearance: Normal appearance.   HENT:      Head: Normocephalic.   Cardiovascular:      Rate and Rhythm: Normal rate.   Pulmonary:      Effort: Pulmonary effort is normal. No respiratory distress.   Abdominal:      General: There is no distension.      Tenderness: There is no abdominal tenderness.   Musculoskeletal:         General: Normal range of motion.        Legs:       Comments: Chronic ulceration to left inner thigh   Skin:     General: Skin is warm.      Coloration: Skin is not jaundiced.   Neurological:      General: No focal deficit present.      Mental Status: She is alert and oriented to person, place, and time.      Cranial Nerves: No cranial nerve deficit.       Significant Labs:  I have reviewed all pertinent lab results within the past 24 hours.  CBC:   Recent Labs   Lab 03/27/23  0940   WBC 6.47   RBC 5.10   HGB 13.8   HCT 45.0      MCV 88.2   MCH 27.1   MCHC 30.7*     BMP:   Recent Labs   Lab 03/27/23  0940   *      K 3.9      CO2 27   BUN 21*   CREATININE 0.89   CALCIUM 9.4       Significant Diagnostics:  I have reviewed all pertinent imaging results/findings within the past 24 hours.

## 2023-03-29 NOTE — HPI
67-year-old  female presents to the OR for elective excision of the left thigh skin lesion with rotational flap coverage.  Patient has history of coronary artery disease with coronary artery bypass graft.  Patient saw her cardiologist in [place] and had a nuclear stress test which was negative.  Patient was considered low to moderate risk and okay to proceed with surgery per her cardiologist; notes faxed and scanned into our media in EPIC.  No changes since the patient was seen in clinic.  Denies any chest pain/shortness of breath, nausea/vomiting/diarrhea, fever/chills.

## 2023-03-29 NOTE — TRANSFER OF CARE
"Anesthesia Transfer of Care Note    Patient: Selena Proctor    Procedure(s) Performed: Procedure(s) (LRB):  BIOPSY, LESION (Left)    Patient location: PACU    Anesthesia Type: general    Transport from OR: Transported from OR on 6-10 L/min O2 by face mask with adequate spontaneous ventilation    Post pain: adequate analgesia    Post assessment: no apparent anesthetic complications    Post vital signs: stable    Level of consciousness: awake and alert    Nausea/Vomiting: no nausea/vomiting    Complications: none    Transfer of care protocol was followed      Last vitals:   Visit Vitals  BP (!) 152/75 (BP Location: Left arm, Patient Position: Lying)   Pulse (!) 58   Temp 36.6 °C (97.8 °F)   Resp (!) 9   Ht 5' 4" (1.626 m)   Wt 82.1 kg (181 lb)   SpO2 99%   Breastfeeding No   BMI 31.07 kg/m²     "

## 2023-03-30 LAB
DHEA SERPL-MCNC: NORMAL
ESTROGEN SERPL-MCNC: NORMAL PG/ML
INSULIN SERPL-ACNC: NORMAL U[IU]/ML
LAB AP GROSS DESCRIPTION: NORMAL
LAB AP LABORATORY NOTES: NORMAL
T3RU NFR SERPL: NORMAL %

## 2023-04-09 NOTE — ANESTHESIA POSTPROCEDURE EVALUATION
Anesthesia Post Evaluation    Patient: Selena Proctor    Procedure(s) Performed: Procedure(s) (LRB):  BIOPSY, LESION (Left)    Final Anesthesia Type: general      Patient location during evaluation: PACU  Patient participation: Yes- Able to Participate  Level of consciousness: awake and alert  Post-procedure vital signs: reviewed and stable  Pain management: adequate  Airway patency: patent  ANDREA mitigation strategies: Multimodal analgesia  PONV status at discharge: No PONV  Anesthetic complications: no      Cardiovascular status: blood pressure returned to baseline  Respiratory status: unassisted  Hydration status: euvolemic  Follow-up not needed.          Vitals Value Taken Time   /79 03/29/23 1530   Temp 36.6 °C (97.8 °F) 03/29/23 1344   Pulse 54 03/29/23 1515   Resp 16 03/29/23 1410   SpO2 96 % 03/29/23 1515         Event Time   Out of Recovery 14:08:00         Pain/Daren Score: No data recorded

## 2023-04-11 ENCOUNTER — OFFICE VISIT (OUTPATIENT)
Dept: SURGERY | Facility: CLINIC | Age: 68
End: 2023-04-11
Payer: MEDICARE

## 2023-04-11 VITALS
TEMPERATURE: 98 F | HEART RATE: 58 BPM | HEIGHT: 64 IN | OXYGEN SATURATION: 98 % | SYSTOLIC BLOOD PRESSURE: 122 MMHG | WEIGHT: 181 LBS | BODY MASS INDEX: 30.9 KG/M2 | DIASTOLIC BLOOD PRESSURE: 72 MMHG | RESPIRATION RATE: 18 BRPM

## 2023-04-11 DIAGNOSIS — S71.102D OPEN WOUND OF LEFT THIGH, SUBSEQUENT ENCOUNTER: Primary | ICD-10-CM

## 2023-04-11 PROCEDURE — 1159F MED LIST DOCD IN RCRD: CPT | Mod: CPTII,,, | Performed by: STUDENT IN AN ORGANIZED HEALTH CARE EDUCATION/TRAINING PROGRAM

## 2023-04-11 PROCEDURE — 1159F PR MEDICATION LIST DOCUMENTED IN MEDICAL RECORD: ICD-10-PCS | Mod: CPTII,,, | Performed by: STUDENT IN AN ORGANIZED HEALTH CARE EDUCATION/TRAINING PROGRAM

## 2023-04-11 PROCEDURE — 3046F HEMOGLOBIN A1C LEVEL >9.0%: CPT | Mod: CPTII,,, | Performed by: STUDENT IN AN ORGANIZED HEALTH CARE EDUCATION/TRAINING PROGRAM

## 2023-04-11 PROCEDURE — 99215 OFFICE O/P EST HI 40 MIN: CPT | Mod: PBBFAC | Performed by: STUDENT IN AN ORGANIZED HEALTH CARE EDUCATION/TRAINING PROGRAM

## 2023-04-11 PROCEDURE — 3046F PR MOST RECENT HEMOGLOBIN A1C LEVEL > 9.0%: ICD-10-PCS | Mod: CPTII,,, | Performed by: STUDENT IN AN ORGANIZED HEALTH CARE EDUCATION/TRAINING PROGRAM

## 2023-04-11 PROCEDURE — 99024 POSTOP FOLLOW-UP VISIT: CPT | Mod: ,,, | Performed by: STUDENT IN AN ORGANIZED HEALTH CARE EDUCATION/TRAINING PROGRAM

## 2023-04-11 PROCEDURE — 3288F PR FALLS RISK ASSESSMENT DOCUMENTED: ICD-10-PCS | Mod: CPTII,,, | Performed by: STUDENT IN AN ORGANIZED HEALTH CARE EDUCATION/TRAINING PROGRAM

## 2023-04-11 PROCEDURE — 3078F PR MOST RECENT DIASTOLIC BLOOD PRESSURE < 80 MM HG: ICD-10-PCS | Mod: CPTII,,, | Performed by: STUDENT IN AN ORGANIZED HEALTH CARE EDUCATION/TRAINING PROGRAM

## 2023-04-11 PROCEDURE — 3008F PR BODY MASS INDEX (BMI) DOCUMENTED: ICD-10-PCS | Mod: CPTII,,, | Performed by: STUDENT IN AN ORGANIZED HEALTH CARE EDUCATION/TRAINING PROGRAM

## 2023-04-11 PROCEDURE — 3074F SYST BP LT 130 MM HG: CPT | Mod: CPTII,,, | Performed by: STUDENT IN AN ORGANIZED HEALTH CARE EDUCATION/TRAINING PROGRAM

## 2023-04-11 PROCEDURE — 1125F PR PAIN SEVERITY QUANTIFIED, PAIN PRESENT: ICD-10-PCS | Mod: CPTII,,, | Performed by: STUDENT IN AN ORGANIZED HEALTH CARE EDUCATION/TRAINING PROGRAM

## 2023-04-11 PROCEDURE — 1101F PT FALLS ASSESS-DOCD LE1/YR: CPT | Mod: CPTII,,, | Performed by: STUDENT IN AN ORGANIZED HEALTH CARE EDUCATION/TRAINING PROGRAM

## 2023-04-11 PROCEDURE — 1101F PR PT FALLS ASSESS DOC 0-1 FALLS W/OUT INJ PAST YR: ICD-10-PCS | Mod: CPTII,,, | Performed by: STUDENT IN AN ORGANIZED HEALTH CARE EDUCATION/TRAINING PROGRAM

## 2023-04-11 PROCEDURE — 99024 PR POST-OP FOLLOW-UP VISIT: ICD-10-PCS | Mod: ,,, | Performed by: STUDENT IN AN ORGANIZED HEALTH CARE EDUCATION/TRAINING PROGRAM

## 2023-04-11 PROCEDURE — 3078F DIAST BP <80 MM HG: CPT | Mod: CPTII,,, | Performed by: STUDENT IN AN ORGANIZED HEALTH CARE EDUCATION/TRAINING PROGRAM

## 2023-04-11 PROCEDURE — 1125F AMNT PAIN NOTED PAIN PRSNT: CPT | Mod: CPTII,,, | Performed by: STUDENT IN AN ORGANIZED HEALTH CARE EDUCATION/TRAINING PROGRAM

## 2023-04-11 PROCEDURE — 3008F BODY MASS INDEX DOCD: CPT | Mod: CPTII,,, | Performed by: STUDENT IN AN ORGANIZED HEALTH CARE EDUCATION/TRAINING PROGRAM

## 2023-04-11 PROCEDURE — 3288F FALL RISK ASSESSMENT DOCD: CPT | Mod: CPTII,,, | Performed by: STUDENT IN AN ORGANIZED HEALTH CARE EDUCATION/TRAINING PROGRAM

## 2023-04-11 PROCEDURE — 3074F PR MOST RECENT SYSTOLIC BLOOD PRESSURE < 130 MM HG: ICD-10-PCS | Mod: CPTII,,, | Performed by: STUDENT IN AN ORGANIZED HEALTH CARE EDUCATION/TRAINING PROGRAM

## 2023-04-13 RX ORDER — SULFAMETHOXAZOLE AND TRIMETHOPRIM 800; 160 MG/1; MG/1
1 TABLET ORAL 2 TIMES DAILY
Qty: 14 TABLET | Refills: 0 | Status: SHIPPED | OUTPATIENT
Start: 2023-04-13 | End: 2023-04-20

## 2023-04-13 NOTE — PROGRESS NOTES
Debridement of left thigh wound    Pre-operative Diagnosis:  Ischemic rotational flap with fat necrosis of the left thigh    Post-operative Diagnosis: same    Locations:  Left inner thigh    Indications:  Ischemic flap    Anesthesia: Lidocaine 1% without epinephrine without added sodium bicarbonate    Procedure Details   The risks, benefits, indications, potential complications, and alternatives were explained to the patient and informed consent obtained.    The patient was taken to the procedure room and placed on the stretcher in the supine position.  The leg was prepped and draped in usual sterile fashion.  We localize the area with 1% lidocaine plain around the flap.  We then used a curette and scissors to debride the surface of the rhomboid flap that was covering over the defect from the excision of the previous lesion; the portion of the flap that was approximated with staples was intact and healing well.  We debrided down to healthy subcutaneous tissue, removing all fat necrosis.  No purulent drainage or infection noted.  We irrigated the area and then packed with 4 x 4 gauze and tape.  Patient tolerated procedure well; bleeding controlled with pressure dressing.    EBL: minimal    Findings:  As above    Condition: Stable    Complications:  None

## 2023-04-13 NOTE — PROGRESS NOTES
Subjective     Patient ID: Selena Proctor is a 67 y.o. female.    Chief Complaint: Follow-up (Pt here for followup visit for site on left thigh where she had surgery--states she has a drain still in place--says she thinks it has some odor from site--denies any fever or chills)    67-year-old  female presents to the clinic for 1 week postop status post excision of left thigh wound with closure with rotational flap.  Patient has had some increased drainage from the EVANGELINA drain.  Denies any chest pain/shortness of breath, nausea/vomiting/diarrhea, fever/chills.    Review of Systems   Constitutional: Negative.    HENT: Negative.     Eyes: Negative.    Respiratory:  Negative for shortness of breath.    Cardiovascular:  Negative for chest pain.   Gastrointestinal:  Negative for abdominal distention, abdominal pain, blood in stool, change in bowel habit and change in bowel habit.   Genitourinary: Negative.  Negative for hematuria.   Musculoskeletal:  Negative for myalgias.   Neurological:  Negative for dizziness and weakness.   Psychiatric/Behavioral: Negative.          Objective     Physical Exam  Constitutional:       General: She is not in acute distress.     Appearance: Normal appearance.   HENT:      Head: Normocephalic.   Cardiovascular:      Rate and Rhythm: Normal rate.   Pulmonary:      Effort: Pulmonary effort is normal. No respiratory distress.   Abdominal:      General: There is no distension.      Tenderness: There is no abdominal tenderness.   Musculoskeletal:         General: Normal range of motion.        Legs:       Comments: Skin of rotational flap ischemic, but closure from the base of the rotation is intact with staples.  Fat necrosis present, no purulent drainage, no cellulitis or induration   Skin:     General: Skin is warm.      Coloration: Skin is not jaundiced.   Neurological:      General: No focal deficit present.      Mental Status: She is alert and oriented to person, place, and  time.      Cranial Nerves: No cranial nerve deficit.          Assessment and Plan     Problem List Items Addressed This Visit          Orthopedic    Open wound of left thigh - Primary       EVANGELINA drain removed.  We will debride the ischemic flap, but leave the staples in place that closed the rotational base; we will allow the area to heal by secondary intention; we will apply for PuraPly XT to help with wound closure.  Patient to follow back in 1 week.    Pathology did come back showing moderately differentiated squamous cell carcinoma with no lymphovascular or perineural invasion with margins of resection negative for squamous cell

## 2023-04-18 ENCOUNTER — OFFICE VISIT (OUTPATIENT)
Dept: SURGERY | Facility: CLINIC | Age: 68
End: 2023-04-18
Payer: MEDICARE

## 2023-04-18 DIAGNOSIS — S71.102D OPEN WOUND OF LEFT THIGH, SUBSEQUENT ENCOUNTER: Primary | ICD-10-CM

## 2023-04-18 PROCEDURE — 97597 DBRDMT OPN WND 1ST 20 CM/<: CPT | Mod: PBBFAC | Performed by: STUDENT IN AN ORGANIZED HEALTH CARE EDUCATION/TRAINING PROGRAM

## 2023-04-18 PROCEDURE — 1159F MED LIST DOCD IN RCRD: CPT | Mod: CPTII,,, | Performed by: STUDENT IN AN ORGANIZED HEALTH CARE EDUCATION/TRAINING PROGRAM

## 2023-04-18 PROCEDURE — 99024 PR POST-OP FOLLOW-UP VISIT: ICD-10-PCS | Mod: S$PBB,,, | Performed by: STUDENT IN AN ORGANIZED HEALTH CARE EDUCATION/TRAINING PROGRAM

## 2023-04-18 PROCEDURE — 99214 OFFICE O/P EST MOD 30 MIN: CPT | Mod: PBBFAC | Performed by: STUDENT IN AN ORGANIZED HEALTH CARE EDUCATION/TRAINING PROGRAM

## 2023-04-18 PROCEDURE — 3046F HEMOGLOBIN A1C LEVEL >9.0%: CPT | Mod: CPTII,,, | Performed by: STUDENT IN AN ORGANIZED HEALTH CARE EDUCATION/TRAINING PROGRAM

## 2023-04-18 PROCEDURE — 3046F PR MOST RECENT HEMOGLOBIN A1C LEVEL > 9.0%: ICD-10-PCS | Mod: CPTII,,, | Performed by: STUDENT IN AN ORGANIZED HEALTH CARE EDUCATION/TRAINING PROGRAM

## 2023-04-18 PROCEDURE — 99024 POSTOP FOLLOW-UP VISIT: CPT | Mod: S$PBB,,, | Performed by: STUDENT IN AN ORGANIZED HEALTH CARE EDUCATION/TRAINING PROGRAM

## 2023-04-18 PROCEDURE — 1159F PR MEDICATION LIST DOCUMENTED IN MEDICAL RECORD: ICD-10-PCS | Mod: CPTII,,, | Performed by: STUDENT IN AN ORGANIZED HEALTH CARE EDUCATION/TRAINING PROGRAM

## 2023-04-18 PROCEDURE — 97598 DBRDMT OPN WND ADDL 20CM/<: CPT | Mod: PBBFAC | Performed by: STUDENT IN AN ORGANIZED HEALTH CARE EDUCATION/TRAINING PROGRAM

## 2023-04-18 NOTE — PROGRESS NOTES
Debridement of left thigh wound    Pre-operative Diagnosis:  Ischemic rotational flap with fat necrosis of the left thigh    Post-operative Diagnosis: same    Locations:  Left inner thigh    Indications:  Ischemic flap    Anesthesia: Lidocaine 1% without epinephrine without added sodium bicarbonate    Procedure Details   The risks, benefits, indications, potential complications, and alternatives were explained to the patient and informed consent obtained.    The patient was taken to the procedure room and placed on the stretcher in the supine position.  The leg was prepped and draped in usual sterile fashion.  The wound measured 11 x 5 x 1.5 cm and had 50% with granulation tissue with healthy red beefy tissue, other 50% of fat necrosis with a little bit of eschar formation; we took a curette was able to debride this fat necrosis down to healthy subcutaneous tissue; patient tolerated the procedure well; bleeding controlled with pressure.  We debrided approximately half a cm over 50% a portion of this wound with a curette.  Area irrigated with hydrogen peroxide and saline; sterile dressing applied.  Patient tolerated procedure well.    EBL: minimal    Findings:  As above    Condition: Stable    Complications:  None   The patient comes in today for follow-up of hypertension, history of headaches and anemia.    Patient states her blood pressures are well controlled pain she only gets her headaches around the time of her menstrual cycle. She denies any chest pain, shortness of breath. She denies any neurological symptoms or tingling or numbness or slurring of speech.    Patient was noted to be slightly anemic on recent labs. She does have a history of alpha thalassemia minor. She has not seen hematology recently.    Review of systems is as above.    Past Medical History:   Diagnosis Date   • Alpha thalassemia minor    • Headache    • HTN (hypertension)        Past Surgical History:   Procedure Laterality Date   • D AND C     • GANGLION CYST EXCISION      2 removals left wrist   • POLYPECTOMY  12/01/2013       ALLERGIES:  No Known Allergies    Current Outpatient Prescriptions   Medication Sig Dispense Refill   • verapamil 80 MG tablet TAKE 1 TABLET BY MOUTH 3 TIMES DAILY. 270 tablet 1   • labetalol (NORMODYNE) 200 MG tablet TAKE 1 TABLET BY MOUTH 2 TIMES DAILY. 60 tablet 1   • rizatriptan (MAXALT) 10 MG tablet TAKE 1 TABLET BY MOUTH AS NEEDED FOR MIGRAINE. 10 tablet 3     No current facility-administered medications for this visit.        Social History     Social History   • Marital status: Single     Spouse name: N/A   • Number of children: N/A   • Years of education: N/A     Occupational History   • customer service  [employer]     Social History Main Topics   • Smoking status: Never Smoker   • Smokeless tobacco: Never Used   • Alcohol use Yes      Comment: social   • Drug use: Unknown   • Sexual activity: Not on file     Other Topics Concern   • Not on file     Social History Narrative   • No narrative on file       Family History   Problem Relation Age of Onset   • Hypertension Father    • High cholesterol Mother        Physical Exam    Vital Signs:    Vitals:    08/11/17 0837   BP: 128/84   Pulse: 76   Resp: 18    Temp: 97.7 °F (36.5 °C)   TempSrc: Tympanic   Weight: (!) 153.8 kg   Height: 5' 10\" (1.778 m)     Constitutional:  Well-appearing. In no acute distress. Overweight.   Integument:  Warm. Dry. No erythema. No rash.    HEENT:  Pupils equal and reacting to light, EOMI (extraocular movements intact), sclerae anicteric, conjunctivae without pallor, Normocephalic. Atraumatic. Bilateral external ears normal. Oropharynx moist. No oral exudates. Nose normal.  Neck: Normal range of motion.    Cardiovascular:  Normal heart rate. Normal rhythm. No murmurs. No rubs. No gallops.    Respiratory:  Clear to auscultation bilaterally with normal respiratory effort. No adventitious sounds.  Abdomen:  Soft, nontender, nondistended, no hepatosplenomegaly. No masses.    Neurologic:  Alert and oriented x 3. Cranial nerves grossly intact.  Extremities: No pedal edema    Assessment and Plan      1. Headache. Continue current management with prophylaxis with Channel Blocker and Using Triptan for Abortive Therapy. Doing Well. Stress Reduction Techniques and Weight Loss Reviewed.    2. Hypertension. Blood pressures are well controlled. Continue current management.    3. Anemia. Repeat labs in 1-2 months. Check B12, folic acid, iron TIBC.

## 2023-04-18 NOTE — PROGRESS NOTES
Subjective     Patient ID: Selena Protcor is a 67 y.o. female.    Chief Complaint: Wound Check    67-year-old  female presents to the clinic for 1 week postop status post excision of left thigh wound with closure with rotational flap.  Patient has had some increased drainage from the EVANGELINA drain.  Denies any chest pain/shortness of breath, nausea/vomiting/diarrhea, fever/chills.    4/18:  Presents for re-evaluation of left inner thigh wound.  The area has had little more dehiscence with separation of staple line; the wound has increased with granulation tissue about 50% of the wound coverage, followed by some fat necrosis at other areas; no purulent drainage or signs of infection; patient was placed on empirical antibiotics previously.  Denies any chest pain/shortness of breath, nausea/vomiting/diarrhea, fever/chills.    Review of Systems   Constitutional: Negative.    HENT: Negative.     Eyes: Negative.    Respiratory:  Negative for shortness of breath.    Cardiovascular:  Negative for chest pain.   Gastrointestinal:  Negative for abdominal distention, abdominal pain, blood in stool, change in bowel habit and change in bowel habit.   Genitourinary: Negative.  Negative for hematuria.   Musculoskeletal:  Negative for myalgias.   Neurological:  Negative for dizziness and weakness.   Psychiatric/Behavioral: Negative.          Objective     Physical Exam  Constitutional:       General: She is not in acute distress.     Appearance: Normal appearance.   HENT:      Head: Normocephalic.   Cardiovascular:      Rate and Rhythm: Normal rate.   Pulmonary:      Effort: Pulmonary effort is normal. No respiratory distress.   Abdominal:      General: There is no distension.      Tenderness: There is no abdominal tenderness.   Musculoskeletal:         General: Normal range of motion.        Legs:       Comments: Wound measured 11 x 5 x 1.5 cm; staples  with dehiscence of wound.  50% of wound with granulation  tissue that is red, healthy and beefy; other 50% of some fat necrosis with ischemic skin; no purulent drainage or signs of infection   Skin:     General: Skin is warm.      Coloration: Skin is not jaundiced.   Neurological:      General: No focal deficit present.      Mental Status: She is alert and oriented to person, place, and time.      Cranial Nerves: No cranial nerve deficit.          Assessment and Plan     Problem List Items Addressed This Visit          Orthopedic    Open wound of left thigh - Primary       We will take back to the procedure room to debride any fat necrosis and cleaned the wound, sterilely dressed this.  Patient to continue to use Vashe solution and then followed by daily application of Silvadene and dressings.  We are applying for PuraPly XT.  Follow-up in 1 week

## 2023-04-25 ENCOUNTER — OFFICE VISIT (OUTPATIENT)
Dept: SURGERY | Facility: CLINIC | Age: 68
End: 2023-04-25
Payer: MEDICARE

## 2023-04-25 DIAGNOSIS — S71.102D OPEN WOUND OF LEFT THIGH, SUBSEQUENT ENCOUNTER: Primary | ICD-10-CM

## 2023-04-25 PROCEDURE — 99024 PR POST-OP FOLLOW-UP VISIT: ICD-10-PCS | Mod: S$PBB,,, | Performed by: STUDENT IN AN ORGANIZED HEALTH CARE EDUCATION/TRAINING PROGRAM

## 2023-04-25 PROCEDURE — 99024 POSTOP FOLLOW-UP VISIT: CPT | Mod: S$PBB,,, | Performed by: STUDENT IN AN ORGANIZED HEALTH CARE EDUCATION/TRAINING PROGRAM

## 2023-04-25 PROCEDURE — 1159F MED LIST DOCD IN RCRD: CPT | Mod: CPTII,,, | Performed by: STUDENT IN AN ORGANIZED HEALTH CARE EDUCATION/TRAINING PROGRAM

## 2023-04-25 PROCEDURE — 1159F PR MEDICATION LIST DOCUMENTED IN MEDICAL RECORD: ICD-10-PCS | Mod: CPTII,,, | Performed by: STUDENT IN AN ORGANIZED HEALTH CARE EDUCATION/TRAINING PROGRAM

## 2023-04-25 PROCEDURE — 3046F HEMOGLOBIN A1C LEVEL >9.0%: CPT | Mod: CPTII,,, | Performed by: STUDENT IN AN ORGANIZED HEALTH CARE EDUCATION/TRAINING PROGRAM

## 2023-04-25 PROCEDURE — 99214 OFFICE O/P EST MOD 30 MIN: CPT | Mod: PBBFAC | Performed by: STUDENT IN AN ORGANIZED HEALTH CARE EDUCATION/TRAINING PROGRAM

## 2023-04-25 PROCEDURE — 3046F PR MOST RECENT HEMOGLOBIN A1C LEVEL > 9.0%: ICD-10-PCS | Mod: CPTII,,, | Performed by: STUDENT IN AN ORGANIZED HEALTH CARE EDUCATION/TRAINING PROGRAM

## 2023-04-25 NOTE — PROGRESS NOTES
Subjective     Patient ID: Selena Proctor is a 67 y.o. female.    Chief Complaint: Follow-up    67-year-old  female presents to the clinic for 1 week postop status post excision of left thigh wound with closure with rotational flap.  Patient has had some increased drainage from the EVANGELINA drain.  Denies any chest pain/shortness of breath, nausea/vomiting/diarrhea, fever/chills.    4/18:  Presents for re-evaluation of left inner thigh wound.  The area has had little more dehiscence with separation of staple line; the wound has increased with granulation tissue about 50% of the wound coverage, followed by some fat necrosis at other areas; no purulent drainage or signs of infection; patient was placed on empirical antibiotics previously.  Denies any chest pain/shortness of breath, nausea/vomiting/diarrhea, fever/chills.    4/25:  Wound is doing well; patient is cleaning with Vashe and placing Silvadene and fat necrosis almost completely resolved; healthy bed of granulation tissue growing.  Patient concerned there may be a staple left.  Denies any chest pain/shortness of breath, nausea/vomiting/diarrhea, fever/chills.    Review of Systems   Constitutional: Negative.    HENT: Negative.     Eyes: Negative.    Respiratory:  Negative for shortness of breath.    Cardiovascular:  Negative for chest pain.   Gastrointestinal:  Negative for abdominal distention, abdominal pain, blood in stool, change in bowel habit and change in bowel habit.   Genitourinary: Negative.  Negative for hematuria.   Musculoskeletal:  Negative for myalgias.   Neurological:  Negative for dizziness and weakness.   Psychiatric/Behavioral: Negative.          Objective     Physical Exam  Constitutional:       General: She is not in acute distress.     Appearance: Normal appearance.   HENT:      Head: Normocephalic.   Cardiovascular:      Rate and Rhythm: Normal rate.   Pulmonary:      Effort: Pulmonary effort is normal. No respiratory distress.    Abdominal:      General: There is no distension.      Tenderness: There is no abdominal tenderness.   Musculoskeletal:         General: Normal range of motion.        Legs:       Comments: Wound measured 11 x 5 x 1.5 cm; no staples present.  Wound with a proximally 90% granulation tissue coverage with very minimal biofilm/minimal fat necrosis; easily debrided with 4 x 4 gauze.  Wound redressed   Skin:     General: Skin is warm.      Coloration: Skin is not jaundiced.   Neurological:      General: No focal deficit present.      Mental Status: She is alert and oriented to person, place, and time.      Cranial Nerves: No cranial nerve deficit.          Assessment and Plan     Problem List Items Addressed This Visit          Orthopedic    Open wound of left thigh - Primary       Continue wound care to the wound.  We will have the patient follow back up in 2 weeks.  Awaiting for approval for [employer] products

## 2023-04-27 ENCOUNTER — OFFICE VISIT (OUTPATIENT)
Dept: PRIMARY CARE CLINIC | Facility: CLINIC | Age: 68
End: 2023-04-27
Payer: MEDICARE

## 2023-04-27 VITALS
TEMPERATURE: 99 F | RESPIRATION RATE: 18 BRPM | OXYGEN SATURATION: 99 % | HEIGHT: 63 IN | HEART RATE: 65 BPM | SYSTOLIC BLOOD PRESSURE: 128 MMHG | BODY MASS INDEX: 32.6 KG/M2 | DIASTOLIC BLOOD PRESSURE: 69 MMHG | WEIGHT: 184 LBS

## 2023-04-27 DIAGNOSIS — R10.2 PELVIC PAIN: ICD-10-CM

## 2023-04-27 DIAGNOSIS — E11.9 TYPE 2 DIABETES MELLITUS WITHOUT COMPLICATION, WITHOUT LONG-TERM CURRENT USE OF INSULIN: Primary | ICD-10-CM

## 2023-04-27 DIAGNOSIS — I10 ESSENTIAL HYPERTENSION: ICD-10-CM

## 2023-04-27 DIAGNOSIS — E78.5 HYPERLIPIDEMIA, UNSPECIFIED HYPERLIPIDEMIA TYPE: ICD-10-CM

## 2023-04-27 DIAGNOSIS — R31.9 HEMATURIA, UNSPECIFIED TYPE: ICD-10-CM

## 2023-04-27 LAB
ALBUMIN SERPL BCP-MCNC: 3.3 G/DL (ref 3.5–5)
ALBUMIN/GLOB SERPL: 0.7 {RATIO}
ALP SERPL-CCNC: 67 U/L (ref 55–142)
ALT SERPL W P-5'-P-CCNC: 28 U/L (ref 13–56)
ANION GAP SERPL CALCULATED.3IONS-SCNC: 11 MMOL/L (ref 7–16)
AST SERPL W P-5'-P-CCNC: 17 U/L (ref 15–37)
BASOPHILS # BLD AUTO: 0.03 K/UL (ref 0–0.2)
BASOPHILS NFR BLD AUTO: 0.6 % (ref 0–1)
BILIRUB SERPL-MCNC: 0.5 MG/DL (ref ?–1.2)
BILIRUB SERPL-MCNC: NEGATIVE MG/DL
BLOOD URINE, POC: NEGATIVE
BUN SERPL-MCNC: 18 MG/DL (ref 7–18)
BUN/CREAT SERPL: 23 (ref 6–20)
CALCIUM SERPL-MCNC: 9.3 MG/DL (ref 8.5–10.1)
CHLORIDE SERPL-SCNC: 106 MMOL/L (ref 98–107)
CHOLEST SERPL-MCNC: 164 MG/DL (ref 0–200)
CHOLEST/HDLC SERPL: 3.5 {RATIO}
CO2 SERPL-SCNC: 25 MMOL/L (ref 21–32)
COLOR, POC UA: YELLOW
CREAT SERPL-MCNC: 0.79 MG/DL (ref 0.55–1.02)
DIFFERENTIAL METHOD BLD: ABNORMAL
EGFR (NO RACE VARIABLE) (RUSH/TITUS): 81 ML/MIN/1.73M²
EOSINOPHIL # BLD AUTO: 0.13 K/UL (ref 0–0.5)
EOSINOPHIL NFR BLD AUTO: 2.4 % (ref 1–4)
ERYTHROCYTE [DISTWIDTH] IN BLOOD BY AUTOMATED COUNT: 15.8 % (ref 11.5–14.5)
EST. AVERAGE GLUCOSE BLD GHB EST-MCNC: 174 MG/DL
GLOBULIN SER-MCNC: 4.5 G/DL (ref 2–4)
GLUCOSE SERPL-MCNC: 122 MG/DL (ref 74–106)
GLUCOSE UR QL STRIP: 500
HBA1C MFR BLD HPLC: 7.8 % (ref 4.5–6.6)
HCT VFR BLD AUTO: 44.2 % (ref 38–47)
HDLC SERPL-MCNC: 47 MG/DL (ref 40–60)
HGB BLD-MCNC: 13.1 G/DL (ref 12–16)
IMM GRANULOCYTES # BLD AUTO: 0.02 K/UL (ref 0–0.04)
IMM GRANULOCYTES NFR BLD: 0.4 % (ref 0–0.4)
KETONES UR QL STRIP: NEGATIVE
LDLC SERPL CALC-MCNC: 86 MG/DL
LDLC/HDLC SERPL: 1.8 {RATIO}
LEUKOCYTE ESTERASE URINE, POC: NEGATIVE
LYMPHOCYTES # BLD AUTO: 1.74 K/UL (ref 1–4.8)
LYMPHOCYTES NFR BLD AUTO: 32.7 % (ref 27–41)
MCH RBC QN AUTO: 26.6 PG (ref 27–31)
MCHC RBC AUTO-ENTMCNC: 29.6 G/DL (ref 32–36)
MCV RBC AUTO: 89.8 FL (ref 80–96)
MONOCYTES # BLD AUTO: 0.55 K/UL (ref 0–0.8)
MONOCYTES NFR BLD AUTO: 10.3 % (ref 2–6)
MPC BLD CALC-MCNC: 10.6 FL (ref 9.4–12.4)
NEUTROPHILS # BLD AUTO: 2.85 K/UL (ref 1.8–7.7)
NEUTROPHILS NFR BLD AUTO: 53.6 % (ref 53–65)
NITRITE, POC UA: NEGATIVE
NONHDLC SERPL-MCNC: 117 MG/DL
NRBC # BLD AUTO: 0 X10E3/UL
NRBC, AUTO (.00): 0 %
PH, POC UA: 7
PLATELET # BLD AUTO: 298 K/UL (ref 150–400)
POTASSIUM SERPL-SCNC: 3.4 MMOL/L (ref 3.5–5.1)
PROT SERPL-MCNC: 7.8 G/DL (ref 6.4–8.2)
PROTEIN, POC: NEGATIVE
RBC # BLD AUTO: 4.92 M/UL (ref 4.2–5.4)
SODIUM SERPL-SCNC: 139 MMOL/L (ref 136–145)
SPECIFIC GRAVITY, POC UA: 1.02
TRIGL SERPL-MCNC: 153 MG/DL (ref 35–150)
UROBILINOGEN, POC UA: 0.2
VLDLC SERPL-MCNC: 31 MG/DL
WBC # BLD AUTO: 5.32 K/UL (ref 4.5–11)

## 2023-04-27 PROCEDURE — 3008F BODY MASS INDEX DOCD: CPT | Mod: ,,, | Performed by: NURSE PRACTITIONER

## 2023-04-27 PROCEDURE — 3288F PR FALLS RISK ASSESSMENT DOCUMENTED: ICD-10-PCS | Mod: ,,, | Performed by: NURSE PRACTITIONER

## 2023-04-27 PROCEDURE — 3074F SYST BP LT 130 MM HG: CPT | Mod: ,,, | Performed by: NURSE PRACTITIONER

## 2023-04-27 PROCEDURE — 3288F FALL RISK ASSESSMENT DOCD: CPT | Mod: ,,, | Performed by: NURSE PRACTITIONER

## 2023-04-27 PROCEDURE — 83036 HEMOGLOBIN GLYCOSYLATED A1C: CPT | Mod: ,,, | Performed by: CLINICAL MEDICAL LABORATORY

## 2023-04-27 PROCEDURE — 99214 OFFICE O/P EST MOD 30 MIN: CPT | Mod: ,,, | Performed by: NURSE PRACTITIONER

## 2023-04-27 PROCEDURE — 3046F HEMOGLOBIN A1C LEVEL >9.0%: CPT | Mod: ,,, | Performed by: NURSE PRACTITIONER

## 2023-04-27 PROCEDURE — 81003 POCT URINALYSIS W/O SCOPE: ICD-10-PCS | Mod: QW,,, | Performed by: NURSE PRACTITIONER

## 2023-04-27 PROCEDURE — 3046F PR MOST RECENT HEMOGLOBIN A1C LEVEL > 9.0%: ICD-10-PCS | Mod: ,,, | Performed by: NURSE PRACTITIONER

## 2023-04-27 PROCEDURE — 1101F PT FALLS ASSESS-DOCD LE1/YR: CPT | Mod: ,,, | Performed by: NURSE PRACTITIONER

## 2023-04-27 PROCEDURE — 81003 URINALYSIS AUTO W/O SCOPE: CPT | Mod: QW,,, | Performed by: NURSE PRACTITIONER

## 2023-04-27 PROCEDURE — 1101F PR PT FALLS ASSESS DOC 0-1 FALLS W/OUT INJ PAST YR: ICD-10-PCS | Mod: ,,, | Performed by: NURSE PRACTITIONER

## 2023-04-27 PROCEDURE — 1160F RVW MEDS BY RX/DR IN RCRD: CPT | Mod: ,,, | Performed by: NURSE PRACTITIONER

## 2023-04-27 PROCEDURE — 1160F PR REVIEW ALL MEDS BY PRESCRIBER/CLIN PHARMACIST DOCUMENTED: ICD-10-PCS | Mod: ,,, | Performed by: NURSE PRACTITIONER

## 2023-04-27 PROCEDURE — 99214 PR OFFICE/OUTPT VISIT, EST, LEVL IV, 30-39 MIN: ICD-10-PCS | Mod: ,,, | Performed by: NURSE PRACTITIONER

## 2023-04-27 PROCEDURE — 1159F PR MEDICATION LIST DOCUMENTED IN MEDICAL RECORD: ICD-10-PCS | Mod: ,,, | Performed by: NURSE PRACTITIONER

## 2023-04-27 PROCEDURE — 80053 COMPREHEN METABOLIC PANEL: CPT | Mod: ,,, | Performed by: CLINICAL MEDICAL LABORATORY

## 2023-04-27 PROCEDURE — 83036 HEMOGLOBIN A1C: ICD-10-PCS | Mod: ,,, | Performed by: CLINICAL MEDICAL LABORATORY

## 2023-04-27 PROCEDURE — 80061 LIPID PANEL: ICD-10-PCS | Mod: ,,, | Performed by: CLINICAL MEDICAL LABORATORY

## 2023-04-27 PROCEDURE — 85025 COMPLETE CBC W/AUTO DIFF WBC: CPT | Mod: ,,, | Performed by: CLINICAL MEDICAL LABORATORY

## 2023-04-27 PROCEDURE — 85025 CBC WITH DIFFERENTIAL: ICD-10-PCS | Mod: ,,, | Performed by: CLINICAL MEDICAL LABORATORY

## 2023-04-27 PROCEDURE — 3078F DIAST BP <80 MM HG: CPT | Mod: ,,, | Performed by: NURSE PRACTITIONER

## 2023-04-27 PROCEDURE — 3078F PR MOST RECENT DIASTOLIC BLOOD PRESSURE < 80 MM HG: ICD-10-PCS | Mod: ,,, | Performed by: NURSE PRACTITIONER

## 2023-04-27 PROCEDURE — 80061 LIPID PANEL: CPT | Mod: ,,, | Performed by: CLINICAL MEDICAL LABORATORY

## 2023-04-27 PROCEDURE — 3074F PR MOST RECENT SYSTOLIC BLOOD PRESSURE < 130 MM HG: ICD-10-PCS | Mod: ,,, | Performed by: NURSE PRACTITIONER

## 2023-04-27 PROCEDURE — 80053 COMPREHENSIVE METABOLIC PANEL: ICD-10-PCS | Mod: ,,, | Performed by: CLINICAL MEDICAL LABORATORY

## 2023-04-27 PROCEDURE — 1159F MED LIST DOCD IN RCRD: CPT | Mod: ,,, | Performed by: NURSE PRACTITIONER

## 2023-04-27 PROCEDURE — 3008F PR BODY MASS INDEX (BMI) DOCUMENTED: ICD-10-PCS | Mod: ,,, | Performed by: NURSE PRACTITIONER

## 2023-04-27 RX ORDER — SAXAGLIPTIN AND METFORMIN HYDROCHLORIDE 2.5; 1 MG/1; MG/1
1 TABLET, FILM COATED, EXTENDED RELEASE ORAL DAILY
Qty: 90 TABLET | Refills: 3 | Status: SHIPPED | OUTPATIENT
Start: 2023-04-27 | End: 2024-02-26

## 2023-04-27 NOTE — PROGRESS NOTES
[facility]  Primary Care       PATIENT NAME: Selena Proctor   : 1955    AGE: 67 y.o. DATE: 2023    MRN: [id number]        Reason for Visit / Chief Complaint:  Hypertension     Subjective:     HPI: Patient here for routine check up for HTN and Diabetes. Patient states she has taken Kombiglyze in the past with better results; med was changed at once due to insurance was not paying for it. Patient states she called [facility] pharmacy (mail order) and received confirmation that Kombiglyze would be covered by her insurance; patient states she has stopped the metformin and resumed her previous Kombiglyze and states blood sugars are better. Requesting refill on Kombiglyze. Patient has prescription bottle with Kombiglyze XrR 2.5mg-1000mg tablet.     Patient states she checks her blood sugar twice a day and states it range 130s.     Patient states she has seen Dr. Norman for left thigh wound and recently had a procedure to wound.     Patient states she thinks her ovaries are hurting; states she has right lower pelvic pain every now and then; states she saw blood on tissue after urination on yesterday.         Hypertension  Pertinent negatives include no chest pain, headaches or shortness of breath.        Review of Systems: Review of Systems   Constitutional:  Negative for chills, fatigue and fever.   Respiratory:  Negative for cough, chest tightness and shortness of breath.    Cardiovascular:  Negative for chest pain.   Gastrointestinal:  Negative for abdominal pain, constipation, diarrhea, nausea and vomiting.   Genitourinary:  Negative for dysuria.   Musculoskeletal:  Negative for gait problem.   Skin:  Positive for wound. Negative for rash. Pallor: left inner thigh wound.  Neurological:  Negative for headaches.        Review of patient's allergies indicates:   Allergen Reactions    Betadine [povidone-iodine] Itching        Med List:  Current Outpatient Medications on File Prior to Visit    Medication Sig Dispense Refill    ACCU-CHEK SOFT DEV LANCETS Kit USE AS DIRECTED 100 each 1    alcohol swabs (BD ALCOHOL SWABS) PadM Use as directed 100 each 0    aspirin (ECOTRIN) 81 MG EC tablet Take 81 mg by mouth once daily.       blood glucose control, low (TRUE METRIX LEVEL 1) Soln USE AS DIRECTED WITH GLUCOSE METER 1 each 0    blood sugar diagnostic (ACCU-CHEK GUIDE TEST STRIPS) Strp Use as directed 100 strip 0    blood-glucose meter (ACCU-CHEK GUIDE GLUCOSE METER) Misc Use as directed 1 each 0    chlorhexidine (HIBICLENS) 4 % external liquid Apply topically daily as needed (thigh wound). (Patient not taking: Reported on 4/11/2023) 120 mL 0    clotrimazole-betamethasone 1-0.05% (LOTRISONE) cream Apply topically 2 (two) times daily. 45 g 2    diclofenac sodium (VOLTAREN ARTHRITIS PAIN) 1 % Gel Apply 2 g topically 4 (four) times daily. Apply to knees, elbows, joints as needed 10 each 2    docusate sodium (COLACE) 100 MG capsule Take 1 capsule (100 mg total) by mouth 2 (two) times daily. 28 capsule 0    ezetimibe (ZETIA) 10 mg tablet TAKE 1 TABLET EVERY DAY 90 tablet 2    furosemide (LASIX) 40 MG tablet TAKE 1 TABLET EVERY DAY AS NEEDED 90 tablet 0    glipiZIDE (GLUCOTROL) 2.5 MG TR24 Take 1 tablet (2.5 mg total) by mouth daily with breakfast. 90 tablet 3    HYDROcodone-acetaminophen (NORCO) 7.5-325 mg per tablet Take 1 tablet by mouth every 8 (eight) hours as needed for Pain. 90 tablet 0    [START ON 5/5/2023] HYDROcodone-acetaminophen (NORCO) 7.5-325 mg per tablet Take 1 tablet by mouth every 8 (eight) hours as needed for Pain. 90 tablet 0    JARDIANCE 10 mg tablet TAKE 1 TABLET EVERY DAY 90 tablet 1    lancets (ACCU-CHEK SOFTCLIX LANCETS) Misc Check blood sugar once a day as directed. 100 each 0    losartan-hydrochlorothiazide 100-25 mg (HYZAAR) 100-25 mg per tablet TAKE 1 TABLET EVERY DAY 90 tablet 2    metoprolol succinate (TOPROL-XL) 100 MG 24 hr tablet TAKE 1 TABLET EVERY DAY 90 tablet 2    miconazole  (MONISTAT 7) 2 % vaginal cream Apply cream to external vaginal area for vaginal itching. (Patient not taking: Reported on 4/11/2023) 1 kit 0    montelukast (SINGULAIR) 10 mg tablet TAKE 1 TABLET EVERY DAY AS NEEDED 90 tablet 2    mupirocin (BACTROBAN) 2 % ointment Apply topically 3 (three) times daily. (Patient not taking: Reported on 4/11/2023) 30 g 2    mupirocin (BACTROBAN) 2 % ointment Apply topically once daily. 30 g 12    naloxegoL (MOVANTIK) 25 mg tablet Take 25 mg by mouth once daily. (Patient not taking: Reported on 4/11/2023) 30 tablet 5    naloxone (NARCAN) 4 mg/actuation Spry PUT 1 SPRAY IN 1 NOSTRIL WAIT 2 MINUTES THEN REPEAT DOSE IN THE OTHER NOSTRIL      NIFEdipine (PROCARDIA-XL) 90 MG (OSM) 24 hr tablet TAKE 1 TABLET EVERY DAY 90 tablet 1    omeprazole (PRILOSEC) 40 MG capsule TAKE 1 CAPSULE EVERY DAY 90 capsule 0    pitavastatin calcium (LIVALO) 4 mg Tab Take one tablet by mouth three times per week on Monday, Wednesday, Friday. 90 tablet 2    potassium chloride (MICRO-K) 10 MEQ CpSR TAKE 2 CAPSULES TWICE DAILY 360 capsule 1    silver sulfADIAZINE 1% (SILVADENE) 1 % cream Apply topically once daily. (Patient not taking: Reported on 4/11/2023) 50 g 2    spironolactone (ALDACTONE) 25 MG tablet TAKE 1 TABLET EVERY DAY 90 tablet 0    TRUE METRIX GLUCOSE METER kit USE AS DIRECTED 180 each 2    TRUEPLUS LANCETS 33 gauge Misc USE AS DIRECTED 200 each 0    [DISCONTINUED] metFORMIN (GLUCOPHAGE) 500 MG tablet TAKE 1 TABLET (500 MG TOTAL) BY MOUTH 2 (TWO) TIMES DAILY WITH MEALS. (Patient not taking: Reported on 4/27/2023) 180 tablet 3     No current facility-administered medications on file prior to visit.       Medical/Social/Family History:  Past Medical History:   Diagnosis Date    Acid reflux     Coronary arteriosclerosis     Diabetes     Diabetes mellitus, type 2     Hypertension     Spondylosis without myelopathy or radiculopathy, lumbar region       Social History     Tobacco Use   Smoking Status Never  "  Smokeless Tobacco Never      Social History     Substance and Sexual Activity   Alcohol Use Not Currently       Family History   Problem Relation Age of Onset    Hypertension Mother     Heart disease Mother     Diabetes Sister     Hypertension Sister     Hypertension Brother     Alcohol abuse Father     Cancer Father     Hypertension Son     Mental illness Maternal Aunt       Past Surgical History:   Procedure Laterality Date    APPENDECTOMY      BIOPSY OF LESION Left 3/29/2023    Procedure: BIOPSY, LESION;  Surgeon: Andrez Norman DO;  Location: [facility];  Service: General;  Laterality: Left;  excisional biospy of left thigh with rotational flap    BLOCK, NERVE, OBTURATOR Right 2019    Right Femoral/Obturator Nerve Block  Dr Headley     SECTION      x2    CORONARY ARTERY BYPASS GRAFT      KNEE ARTHROPLASTY Right     KNEE ARTHROSCOPY Left     SKIN GRAFT      Left Thigh    TOTAL HIP ARTHROPLASTY Bilateral     TUBAL LIGATION        Immunization History   Administered Date(s) Administered    COVID-19 MRNA, LN-S PF (MODERNA HALF 0.25 ML DOSE) 10/29/2021    COVID-19, MRNA, LN-S, PF (MODERNA FULL 0.5 ML DOSE) 2021, 03/10/2021    Influenza - Quadrivalent - PF *Preferred* (6 months and older) 2022    Pneumococcal Conjugate - 13 Valent 2017    Pneumococcal Conjugate - 20 Valent 2022          Objective:      Vitals:    23 0840   BP: 128/69   BP Location: Left arm   Patient Position: Sitting   BP Method: Large (Automatic)   Pulse: 65   Resp: 18   Temp: 98.5 °F (36.9 °C)   TempSrc: Oral   SpO2: 99%   Weight: 83.5 kg (184 lb)   Height: 5' 3" (1.6 m)     Body mass index is 32.59 kg/m².     Physical Exam: Physical Exam  Vitals and nursing note reviewed.   Constitutional:       General: She is not in acute distress.     Appearance: Normal appearance. She is not ill-appearing.   HENT:      Head: Normocephalic.      Mouth/Throat:      Mouth: Mucous membranes are moist.   Eyes:      " Extraocular Movements: Extraocular movements intact.      Conjunctiva/sclera: Conjunctivae normal.      Pupils: Pupils are equal, round, and reactive to light.   Cardiovascular:      Rate and Rhythm: Normal rate and regular rhythm.      Heart sounds: Normal heart sounds.   Pulmonary:      Effort: Pulmonary effort is normal. No respiratory distress.      Breath sounds: Normal breath sounds. No stridor. No wheezing, rhonchi or rales.   Abdominal:      General: Bowel sounds are normal. There is no distension.      Palpations: Abdomen is soft.      Tenderness: There is no abdominal tenderness.   Musculoskeletal:         General: Normal range of motion.      Cervical back: Normal range of motion and neck supple.   Skin:     General: Skin is warm and dry.          Neurological:      General: No focal deficit present.      Mental Status: She is alert and oriented to person, place, and time.      Gait: Gait normal.   Psychiatric:         Mood and Affect: Mood normal.         Behavior: Behavior normal.          Protective Sensation (w/ 10 gram monofilament):  Right: Intact  Left: Intact    Visual Inspection:  Normal -  Bilateral and Nails Intact - without Evidence of Foot Deformity- Bilateral    Pedal Pulses:   Right: Present  Left: Present    Posterior Tibialis Pulses:   Right:Present  Left: Present      Assessment:          ICD-10-CM ICD-9-CM   1. Type 2 diabetes mellitus without complication, without long-term current use of insulin  E11.9 250.00   2. Essential hypertension  I10 401.9   3. Hyperlipidemia, unspecified hyperlipidemia type  E78.5 272.4   4. Hematuria, unspecified type  R31.9 599.70   5. Pelvic pain  R10.2 [id number]        Plan:       Type 2 diabetes mellitus without complication, without long-term current use of insulin  -     Hemoglobin A1C; Future; Expected date: 04/27/2023  -     SAXagliptin-metformin (KOMBIGLYZE XR) 2.5-1,000 mg TM24; Take 1 tablet by mouth once daily.  Dispense: 90 tablet; Refill:  3    Essential hypertension  -     CBC Auto Differential; Future; Expected date: 04/27/2023  -     Comprehensive Metabolic Panel; Future; Expected date: 04/27/2023    Hyperlipidemia, unspecified hyperlipidemia type  -     Lipid Panel; Future; Expected date: 04/27/2023    Hematuria, unspecified type  -     POCT URINALYSIS W/O SCOPE  -     X-Ray KUB; Future; Expected date: 04/27/2023    Pelvic pain  -     POCT URINALYSIS W/O SCOPE  -     X-Ray KUB; Future; Expected date: 04/27/2023  -     Ambulatory referral/consult to Gynecology; Future; Expected date: 05/04/2023      Component      Latest Ref Rng & Units 4/27/2023   Color, UA       Yellow   Spec Grav UA       1.020   pH, UA       7.0   WBC, UA       NEGATIVE   Nitrite, UA       NEGATIVE   Protein, POC       NEGATIVE   Glucose, UA       500   Ketones, UA       NEGATIVE   Bilirubin, POC       NEGATIVE   Urobilinogen, UA       0.2   Blood, UA       NEGATIVE       New & refilled meds:  Requested Prescriptions     Signed Prescriptions Disp Refills    SAXagliptin-metformin (KOMBIGLYZE XR) 2.5-1,000 mg TM24 90 tablet 3     Sig: Take 1 tablet by mouth once daily.       Follow up in about 3 months (around 7/27/2023), or if symptoms worsen or fail to improve, for Hypertension, diabetes.     Patient Instructions   Patient Education       Diabetes and Diet   The Basics   Written by the doctors and editors at [facility]   Why is diet important in diabetes? -- Diet is important because it is part of diabetes treatment. Many people need to change what they eat and how much they eat to help treat their diabetes. It is important for people to treat their diabetes so that they:  Keep their blood sugar at or near a normal level  Prevent long-term problems, such as heart or kidney problems, that can happen in people with diabetes  Changing your diet can also help treat obesity, high blood pressure, and high cholesterol. These conditions can affect people with diabetes and can lead to  "future problems, such as heart attacks or strokes.  Who will work with me to change my diet? -- Your doctor or nurse will work with you to make a food plan to change your diet. They might also recommend that you work with a "dietitian." A dietitian is an expert on food and eating.  Do I need to eat at the same times every day? -- When and how often you should eat depends, in part, on the diabetes medicines you take. For example:  People who take about the same amount of insulin at the same time each day (called a "fixed regimen") should eat meals at the same times. This is also true for people who take pills that increase insulin levels, such as sulfonylureas. Eating meals at the same time every day helps prevent low blood sugar.  People who adjust the dose and timing of their insulin each day (called a "flexible regimen") do not always have to eat meals at the same time. That's because they can time their insulin dose for before they plan to eat, and also adjust the dose for how much they plan to eat.  People who take medicines that don't usually cause low blood sugar, such as metformin, don't have to eat meals at the same time every day.  What do I need to think about when planning what to eat? -- Our bodies break down the food we eat into small pieces called carbohydrates, proteins, and fats.  When planning what to eat, people with diabetes need to think about:  Carbohydrates (or "carbs") - Carbohydrates, which are sugars that our bodies use for energy, can raise a person's blood sugar level. Your doctor, nurse, or dietitian will tell you how many carbohydrates you should eat at each meal or snack. Foods that have carbohydrates include:  Bread, pasta, and rice  Vegetables and fruits  Dairy foods  Foods and drinks with added sugar  It is best to get your carbohydrates from fruits, vegetables, whole grains, and low-fat milk. It is best to avoid drinks with added sugar, like soda, juices, and sports drinks. " "  Protein - Your doctor, nurse, or dietitian will tell you how much protein you should eat each day. It is best to eat lean meats, fish, eggs, beans, peas, soy products, nuts, and seeds.  Fats - The type of fat you eat is more important than the amount of fat. "Saturated" and "trans" fats can increase your risk for heart problems, like a heart attack.  Foods that have saturated fats include meat, butter, cheese, and ice cream.  Foods that have trans fats include processed food with "partially hydrogenated oils" on the ingredient list. This may include fried foods, store bought cookies, muffins, pies, and cakes.  "Monounsaturated" and "polyunsaturated" fats are better for you. Foods with these types of fat include fish, avocado, olive oil, and nuts.  Calories - People need to eat a certain amount of calories each day to keep their weight the same. People who are overweight and want to lose weight need to eat fewer calories each day.  Fiber - Eating foods with a lot of fiber can help control a person's blood sugar level. Foods that have a lot of fiber include apples, green beans, peas, beans, lentils, nuts, oatmeal, and whole grains.  Salt - People who have high blood pressure should not eat foods that contain a lot of salt (also called sodium). People with high blood pressure should also eat healthy foods, such as fruits, vegetables, and low-fat dairy foods.  Alcohol - Having more than 1 drink (for women) or 2 drinks (for men) a day can raise blood sugar levels. Also, drinks that have fruit juice or soda in them can raise blood sugar levels.  What can I do if I need to lose weight? -- If you need to lose weight, you can:  Exercise - Try to get at least 30 minutes of physical activity a day, most days of the week. Even gentle exercise, like walking, is good for your health. Some people with diabetes need to change their medicine dose before they exercise. They might also need to check their blood sugar levels before " and after exercising.  Eat fewer calories - Your doctor, nurse, or dietitian can tell you how many calories you should eat each day in order to lose weight.  If you are worried about your weight, size, or shape, talk with your doctor, nurse, or dietitian. They can help you make changes to improve your health.  Can I eat the same foods as my family? -- Yes. You do not need to eat special foods if you have diabetes. You and your family can eat the same foods. Changing your diet is mostly about eating healthy foods and not eating too much.  What are the other parts of diabetes treatment? -- Besides changing your diet, the other parts of diabetes treatment are:  Exercise  Medicines  Some people with diabetes need to learn how to match their diet and exercise with their medicine dose. For example, people who use insulin might need to choose the dose of insulin they give themselves. To choose their dose, they need to think about:  What they plan to eat at the next meal  How much exercise they plan to do  What their blood sugar level is  If the diet and exercise do not match the medicine dose, a person's blood sugar level can get too low or too high. Blood sugar levels that are too low or too high can cause problems.  All topics are updated as new evidence becomes available and our peer review process is complete.  This topic retrieved from [employer] on: Sep 21, 2021.  Topic 88134 Version 7.0  Release: 29.4.2 - C29.263  © 2021 UpToDate, Inc. and/or its affiliates. All rights reserved.  Consumer Information Use and Disclaimer   This information is not specific medical advice and does not replace information you receive from your health care provider. This is only a brief summary of general information. It does NOT include all information about conditions, illnesses, injuries, tests, procedures, treatments, therapies, discharge instructions or life-style choices that may apply to you. You must talk with your health care  provider for complete information about your health and treatment options. This information should not be used to decide whether or not to accept your health care provider's advice, instructions or recommendations. Only your health care provider has the knowledge and training to provide advice that is right for you. The use of this information is governed by the [employer] End User License Agreement, available at https://www.[employer]/en/solutions/[employer]/about/craig.The use of [employer] content is governed by the [employer] Terms of Use. ©2021 UpToDate, Inc. All rights reserved.  Copyright   © 2021 UpToDate, Inc. and/or its affiliates. All rights reserved.  Patient Education       Controlling Your Blood Pressure Through Lifestyle   The Basics   Written by the doctors and editors at [facility]   What does my lifestyle have to do with my blood pressure? -- The things you do and the foods you eat have a big effect on your blood pressure and your overall health. Following the right lifestyle can:  Lower your blood pressure or keep you from getting high blood pressure in the first place  Reduce your need for blood pressure medicines  Make medicines for high blood pressure work better, if you do take them  Lower the chances that you'll have a heart attack or stroke, or develop kidney disease  Which lifestyle choices will help lower my blood pressure? -- Here's what you can do:  Lose weight (if you are overweight)  Choose a diet rich in fruits, vegetables, and low-fat dairy products, and low in meats, sweets, and refined grains  Eat less salt (sodium)  Do something active for at least 30 minutes a day on most days of the week  Limit the amount of alcohol you drink  If you have high blood pressure, it's also very important to quit smoking (if you smoke). Quitting smoking might not bring your blood pressure down. But it will lower the chances that you'll have a heart attack or stroke, and it will help you feel better and  "live longer.  Start low and go slow -- The changes listed above might sound like a lot, but don't worry. You don't have to change everything all at once. The key to improving your lifestyle is to "start low and go slow." Choose 1 small, specific thing to change and try doing it for a while. If it works for you, keep doing it until it becomes a habit. If it doesn't, don't give up. Choose something else to change and see how that goes.  Let's say, for example, that you would like to improve your diet. If you're the type of person who eats cheeseburgers and French fries all the time, you can't switch to eating just salads from one day to the next. When people try to make changes like that, they often fail. Then they feel frustrated and tend to give up. So instead of trying to change everything about your diet in 1 day, change 1 or 2 small things about your diet and give yourself time to get used to those changes. For instance, keep the cheeseburger but give up the French fries. Or eat the same things but cut your portions in half.  As you find things that you are able to change and stick with, keep adding new changes. In time, you will see that you can actually change a lot. You just have to get used to the changes slowly.  Lose weight -- When people think about losing weight, they sometimes make it more complicated than it really is. To lose weight, you have to either eat less or move more. If you do both of those things, it's even better. But there is no single weight-loss diet or activity that's better than any other. When it comes to weight loss, the most effective plan is the one that you'll stick with.  Improve your diet -- There is no single diet that is right for everyone. But in general, a healthy diet can include:  Lots of fruits, vegetables, and whole grains  Some beans, peas, lentils, chickpeas, and similar foods  Some nuts, such as walnuts, almonds, and peanuts  Fat-free or low-fat milk and milk " "products  Some fish  To have a healthy diet, it's also important to limit or avoid sugar, sweets, meats, and refined grains. (Refined grains are found in white bread, white rice, most forms of pasta, and most packaged "snack" foods.)  Reduce salt -- Many people think that eating a low-sodium diet means avoiding the salt shaker and not adding salt when cooking. The truth is, not adding salt at the table or when you cook will only help a little. Almost all of the sodium you eat is already in the food you buy at the grocery store or at restaurants (figure 1).  The most important thing you can do to cut down on sodium is to eat less processed food. That means that you should avoid most foods that are sold in cans, boxes, jars, and bags. You should also eat in restaurants less often.  To reduce the amount of sodium you get, buy fresh or fresh-frozen fruits, vegetables, and meats. (Fresh-frozen foods have had nothing added to them before freezing.) Then you can make meals at home, from scratch, with these ingredients.  As with the other changes, don't try to cut out salt all at once. Instead, choose 1 or 2 foods that have a lot of sodium and try to replace them with low-sodium choices. When you get used to those low-sodium options, find another food or 2 to change. Then keep going, until all the foods you eat are sodium-free or low in sodium.  Become more active -- If you want to be more active, you don't have to go to the gym or get all sweaty. It is possible to increase your activity level while doing everyday things you enjoy. Walking, gardening, and dancing are just a few of the things that you might try. As with all the other changes, the key is not to do too much too fast. If you don't do any activity now, start by walking for just a few minutes every other day. Do that for a few weeks. If you stick with it, try doing it for longer. But if you find that you don't like walking, try a different activity.  Drink less " "alcohol -- If you are a woman, do not have more than 1 "standard drink" of alcohol a day. If you are a man, do not have more than 2. A "standard drink" is:  A can or bottle that has 12 ounces of beer  A glass that has 5 ounces of wine  A shot that has 1.5 ounces of whiskey  Where should I start? -- If you want to improve your lifestyle, start by making the changes that you think would be easiest for you. If you used to exercise and just got out of the habit, maybe it would be easy for you to start exercising again. Or if you actually like cooking meals from scratch, maybe the first thing you should focus on is eating home-cooked meals that are low in sodium.  Whatever you tackle first, choose specific, realistic goals, and give yourself a deadline. For example, do not decide that you are going to "exercise more." Instead, decide that you are going to walk for 10 minutes on Monday, Wednesday, and Friday, and that you are going to do this for the next 2 weeks.  When lifestyle changes are too general, people have a hard time following through.  Now go. You can do it!  All topics are updated as new evidence becomes available and our peer review process is complete.  This topic retrieved from [employer] on: Sep 21, 2021.  Topic 75733 Version 8.0  Release: 29.4.2 - C29.263  © 2021 UpToDate, Inc. and/or its affiliates. All rights reserved.  figure 1: Sources of sodium in your diet     Graphic 23351 Version 2.0    Consumer Information Use and Disclaimer   This information is not specific medical advice and does not replace information you receive from your health care provider. This is only a brief summary of general information. It does NOT include all information about conditions, illnesses, injuries, tests, procedures, treatments, therapies, discharge instructions or life-style choices that may apply to you. You must talk with your health care provider for complete information about your health and treatment options. This " information should not be used to decide whether or not to accept your health care provider's advice, instructions or recommendations. Only your health care provider has the knowledge and training to provide advice that is right for you. The use of this information is governed by the [employer] End User License Agreement, available at https://www.SIPX/en/solutions/[employer]/about/craig.The use of [employer] content is governed by the [employer] Terms of Use. ©2021 UpToDate, Inc. All rights reserved.  Copyright   © 2021 UpToDate, Inc. and/or its affiliates. All rights reserved.           Signature: Lizzette Meadows DNP, FNP-C

## 2023-04-27 NOTE — PATIENT INSTRUCTIONS
"Patient Education       Diabetes and Diet   The Basics   Written by the doctors and editors at [facility]   Why is diet important in diabetes? -- Diet is important because it is part of diabetes treatment. Many people need to change what they eat and how much they eat to help treat their diabetes. It is important for people to treat their diabetes so that they:  Keep their blood sugar at or near a normal level  Prevent long-term problems, such as heart or kidney problems, that can happen in people with diabetes  Changing your diet can also help treat obesity, high blood pressure, and high cholesterol. These conditions can affect people with diabetes and can lead to future problems, such as heart attacks or strokes.  Who will work with me to change my diet? -- Your doctor or nurse will work with you to make a food plan to change your diet. They might also recommend that you work with a "dietitian." A dietitian is an expert on food and eating.  Do I need to eat at the same times every day? -- When and how often you should eat depends, in part, on the diabetes medicines you take. For example:  People who take about the same amount of insulin at the same time each day (called a "fixed regimen") should eat meals at the same times. This is also true for people who take pills that increase insulin levels, such as sulfonylureas. Eating meals at the same time every day helps prevent low blood sugar.  People who adjust the dose and timing of their insulin each day (called a "flexible regimen") do not always have to eat meals at the same time. That's because they can time their insulin dose for before they plan to eat, and also adjust the dose for how much they plan to eat.  People who take medicines that don't usually cause low blood sugar, such as metformin, don't have to eat meals at the same time every day.  What do I need to think about when planning what to eat? -- Our bodies break down the food we eat into small pieces " "called carbohydrates, proteins, and fats.  When planning what to eat, people with diabetes need to think about:  Carbohydrates (or "carbs") - Carbohydrates, which are sugars that our bodies use for energy, can raise a person's blood sugar level. Your doctor, nurse, or dietitian will tell you how many carbohydrates you should eat at each meal or snack. Foods that have carbohydrates include:  Bread, pasta, and rice  Vegetables and fruits  Dairy foods  Foods and drinks with added sugar  It is best to get your carbohydrates from fruits, vegetables, whole grains, and low-fat milk. It is best to avoid drinks with added sugar, like soda, juices, and sports drinks.   Protein - Your doctor, nurse, or dietitian will tell you how much protein you should eat each day. It is best to eat lean meats, fish, eggs, beans, peas, soy products, nuts, and seeds.  Fats - The type of fat you eat is more important than the amount of fat. "Saturated" and "trans" fats can increase your risk for heart problems, like a heart attack.  Foods that have saturated fats include meat, butter, cheese, and ice cream.  Foods that have trans fats include processed food with "partially hydrogenated oils" on the ingredient list. This may include fried foods, store bought cookies, muffins, pies, and cakes.  "Monounsaturated" and "polyunsaturated" fats are better for you. Foods with these types of fat include fish, avocado, olive oil, and nuts.  Calories - People need to eat a certain amount of calories each day to keep their weight the same. People who are overweight and want to lose weight need to eat fewer calories each day.  Fiber - Eating foods with a lot of fiber can help control a person's blood sugar level. Foods that have a lot of fiber include apples, green beans, peas, beans, lentils, nuts, oatmeal, and whole grains.  Salt - People who have high blood pressure should not eat foods that contain a lot of salt (also called sodium). People with high " blood pressure should also eat healthy foods, such as fruits, vegetables, and low-fat dairy foods.  Alcohol - Having more than 1 drink (for women) or 2 drinks (for men) a day can raise blood sugar levels. Also, drinks that have fruit juice or soda in them can raise blood sugar levels.  What can I do if I need to lose weight? -- If you need to lose weight, you can:  Exercise - Try to get at least 30 minutes of physical activity a day, most days of the week. Even gentle exercise, like walking, is good for your health. Some people with diabetes need to change their medicine dose before they exercise. They might also need to check their blood sugar levels before and after exercising.  Eat fewer calories - Your doctor, nurse, or dietitian can tell you how many calories you should eat each day in order to lose weight.  If you are worried about your weight, size, or shape, talk with your doctor, nurse, or dietitian. They can help you make changes to improve your health.  Can I eat the same foods as my family? -- Yes. You do not need to eat special foods if you have diabetes. You and your family can eat the same foods. Changing your diet is mostly about eating healthy foods and not eating too much.  What are the other parts of diabetes treatment? -- Besides changing your diet, the other parts of diabetes treatment are:  Exercise  Medicines  Some people with diabetes need to learn how to match their diet and exercise with their medicine dose. For example, people who use insulin might need to choose the dose of insulin they give themselves. To choose their dose, they need to think about:  What they plan to eat at the next meal  How much exercise they plan to do  What their blood sugar level is  If the diet and exercise do not match the medicine dose, a person's blood sugar level can get too low or too high. Blood sugar levels that are too low or too high can cause problems.  All topics are updated as new evidence becomes  available and our peer review process is complete.  This topic retrieved from [employer] on: Sep 21, 2021.  Topic 97469 Version 7.0  Release: 29.4.2 - C29.263  © 2021 UpToDate, Inc. and/or its affiliates. All rights reserved.  Consumer Information Use and Disclaimer   This information is not specific medical advice and does not replace information you receive from your health care provider. This is only a brief summary of general information. It does NOT include all information about conditions, illnesses, injuries, tests, procedures, treatments, therapies, discharge instructions or life-style choices that may apply to you. You must talk with your health care provider for complete information about your health and treatment options. This information should not be used to decide whether or not to accept your health care provider's advice, instructions or recommendations. Only your health care provider has the knowledge and training to provide advice that is right for you. The use of this information is governed by the [employer] End User License Agreement, available at https://www.HealthWyse/en/solutions/[employer]/about/craig.The use of [employer] content is governed by the [employer] Terms of Use. ©2021 UpToDate, Inc. All rights reserved.  Copyright   © 2021 UpToDate, Inc. and/or its affiliates. All rights reserved.  Patient Education       Controlling Your Blood Pressure Through Lifestyle   The Basics   Written by the doctors and editors at [employer]   What does my lifestyle have to do with my blood pressure? -- The things you do and the foods you eat have a big effect on your blood pressure and your overall health. Following the right lifestyle can:  Lower your blood pressure or keep you from getting high blood pressure in the first place  Reduce your need for blood pressure medicines  Make medicines for high blood pressure work better, if you do take them  Lower the chances that you'll have a heart attack or stroke,  "or develop kidney disease  Which lifestyle choices will help lower my blood pressure? -- Here's what you can do:  Lose weight (if you are overweight)  Choose a diet rich in fruits, vegetables, and low-fat dairy products, and low in meats, sweets, and refined grains  Eat less salt (sodium)  Do something active for at least 30 minutes a day on most days of the week  Limit the amount of alcohol you drink  If you have high blood pressure, it's also very important to quit smoking (if you smoke). Quitting smoking might not bring your blood pressure down. But it will lower the chances that you'll have a heart attack or stroke, and it will help you feel better and live longer.  Start low and go slow -- The changes listed above might sound like a lot, but don't worry. You don't have to change everything all at once. The key to improving your lifestyle is to "start low and go slow." Choose 1 small, specific thing to change and try doing it for a while. If it works for you, keep doing it until it becomes a habit. If it doesn't, don't give up. Choose something else to change and see how that goes.  Let's say, for example, that you would like to improve your diet. If you're the type of person who eats cheeseburgers and French fries all the time, you can't switch to eating just salads from one day to the next. When people try to make changes like that, they often fail. Then they feel frustrated and tend to give up. So instead of trying to change everything about your diet in 1 day, change 1 or 2 small things about your diet and give yourself time to get used to those changes. For instance, keep the cheeseburger but give up the French fries. Or eat the same things but cut your portions in half.  As you find things that you are able to change and stick with, keep adding new changes. In time, you will see that you can actually change a lot. You just have to get used to the changes slowly.  Lose weight -- When people think about " "losing weight, they sometimes make it more complicated than it really is. To lose weight, you have to either eat less or move more. If you do both of those things, it's even better. But there is no single weight-loss diet or activity that's better than any other. When it comes to weight loss, the most effective plan is the one that you'll stick with.  Improve your diet -- There is no single diet that is right for everyone. But in general, a healthy diet can include:  Lots of fruits, vegetables, and whole grains  Some beans, peas, lentils, chickpeas, and similar foods  Some nuts, such as walnuts, almonds, and peanuts  Fat-free or low-fat milk and milk products  Some fish  To have a healthy diet, it's also important to limit or avoid sugar, sweets, meats, and refined grains. (Refined grains are found in white bread, white rice, most forms of pasta, and most packaged "snack" foods.)  Reduce salt -- Many people think that eating a low-sodium diet means avoiding the salt shaker and not adding salt when cooking. The truth is, not adding salt at the table or when you cook will only help a little. Almost all of the sodium you eat is already in the food you buy at the grocery store or at restaurants (figure 1).  The most important thing you can do to cut down on sodium is to eat less processed food. That means that you should avoid most foods that are sold in cans, boxes, jars, and bags. You should also eat in restaurants less often.  To reduce the amount of sodium you get, buy fresh or fresh-frozen fruits, vegetables, and meats. (Fresh-frozen foods have had nothing added to them before freezing.) Then you can make meals at home, from scratch, with these ingredients.  As with the other changes, don't try to cut out salt all at once. Instead, choose 1 or 2 foods that have a lot of sodium and try to replace them with low-sodium choices. When you get used to those low-sodium options, find another food or 2 to change. Then keep " "going, until all the foods you eat are sodium-free or low in sodium.  Become more active -- If you want to be more active, you don't have to go to the gym or get all sweaty. It is possible to increase your activity level while doing everyday things you enjoy. Walking, gardening, and dancing are just a few of the things that you might try. As with all the other changes, the key is not to do too much too fast. If you don't do any activity now, start by walking for just a few minutes every other day. Do that for a few weeks. If you stick with it, try doing it for longer. But if you find that you don't like walking, try a different activity.  Drink less alcohol -- If you are a woman, do not have more than 1 "standard drink" of alcohol a day. If you are a man, do not have more than 2. A "standard drink" is:  A can or bottle that has 12 ounces of beer  A glass that has 5 ounces of wine  A shot that has 1.5 ounces of whiskey  Where should I start? -- If you want to improve your lifestyle, start by making the changes that you think would be easiest for you. If you used to exercise and just got out of the habit, maybe it would be easy for you to start exercising again. Or if you actually like cooking meals from scratch, maybe the first thing you should focus on is eating home-cooked meals that are low in sodium.  Whatever you tackle first, choose specific, realistic goals, and give yourself a deadline. For example, do not decide that you are going to "exercise more." Instead, decide that you are going to walk for 10 minutes on Monday, Wednesday, and Friday, and that you are going to do this for the next 2 weeks.  When lifestyle changes are too general, people have a hard time following through.  Now go. You can do it!  All topics are updated as new evidence becomes available and our peer review process is complete.  This topic retrieved from [employer] on: Sep 21, 2021.  Topic 98534 Version 8.0  Release: 29.4.2 - C29.263  © " 2021 UpToDate, Inc. and/or its affiliates. All rights reserved.  figure 1: Sources of sodium in your diet     Graphic 17358 Version 2.0    Consumer Information Use and Disclaimer   This information is not specific medical advice and does not replace information you receive from your health care provider. This is only a brief summary of general information. It does NOT include all information about conditions, illnesses, injuries, tests, procedures, treatments, therapies, discharge instructions or life-style choices that may apply to you. You must talk with your health care provider for complete information about your health and treatment options. This information should not be used to decide whether or not to accept your health care provider's advice, instructions or recommendations. Only your health care provider has the knowledge and training to provide advice that is right for you. The use of this information is governed by the [employer] End User License Agreement, available at https://www.Libretto.[employer]/en/solutions/[employer]/about/craig.The use of [employer] content is governed by the [employer] Terms of Use. ©2021 UpToDate, Inc. All rights reserved.  Copyright   © 2021 UpToDate, Inc. and/or its affiliates. All rights reserved.

## 2023-05-01 ENCOUNTER — TELEPHONE (OUTPATIENT)
Dept: PRIMARY CARE CLINIC | Facility: CLINIC | Age: 68
End: 2023-05-01
Payer: MEDICARE

## 2023-05-01 DIAGNOSIS — I10 ESSENTIAL HYPERTENSION: ICD-10-CM

## 2023-05-01 DIAGNOSIS — R10.2 PELVIC PAIN: Primary | ICD-10-CM

## 2023-05-01 NOTE — TELEPHONE ENCOUNTER
----- Message from Lizzette Meadows DNP, MORA-C sent at 4/27/2023  1:07 PM CDT -----  Please notify of results

## 2023-05-02 RX ORDER — NIFEDIPINE 90 MG/1
TABLET, EXTENDED RELEASE ORAL
Qty: 90 TABLET | Refills: 1 | Status: SHIPPED | OUTPATIENT
Start: 2023-05-02 | End: 2023-12-08

## 2023-05-02 NOTE — TELEPHONE ENCOUNTER
----- Message from Lizzette Meadows DNP, MORA-C sent at 5/1/2023  3:23 PM CDT -----  Please notify patient of results.

## 2023-05-08 RX ORDER — SILVER SULFADIAZINE 10 G/1000G
CREAM TOPICAL DAILY
Qty: 1000 G | Refills: 11 | Status: SHIPPED | OUTPATIENT
Start: 2023-05-08

## 2023-05-09 ENCOUNTER — OFFICE VISIT (OUTPATIENT)
Dept: SURGERY | Facility: CLINIC | Age: 68
End: 2023-05-09
Payer: MEDICARE

## 2023-05-09 DIAGNOSIS — S71.102D OPEN WOUND OF LEFT THIGH, SUBSEQUENT ENCOUNTER: Primary | ICD-10-CM

## 2023-05-09 PROCEDURE — 1159F PR MEDICATION LIST DOCUMENTED IN MEDICAL RECORD: ICD-10-PCS | Mod: CPTII,,, | Performed by: STUDENT IN AN ORGANIZED HEALTH CARE EDUCATION/TRAINING PROGRAM

## 2023-05-09 PROCEDURE — 99024 PR POST-OP FOLLOW-UP VISIT: ICD-10-PCS | Mod: S$PBB,,, | Performed by: STUDENT IN AN ORGANIZED HEALTH CARE EDUCATION/TRAINING PROGRAM

## 2023-05-09 PROCEDURE — 3051F PR MOST RECENT HEMOGLOBIN A1C LEVEL 7.0 - < 8.0%: ICD-10-PCS | Mod: CPTII,,, | Performed by: STUDENT IN AN ORGANIZED HEALTH CARE EDUCATION/TRAINING PROGRAM

## 2023-05-09 PROCEDURE — 3051F HG A1C>EQUAL 7.0%<8.0%: CPT | Mod: CPTII,,, | Performed by: STUDENT IN AN ORGANIZED HEALTH CARE EDUCATION/TRAINING PROGRAM

## 2023-05-09 PROCEDURE — 99024 POSTOP FOLLOW-UP VISIT: CPT | Mod: S$PBB,,, | Performed by: STUDENT IN AN ORGANIZED HEALTH CARE EDUCATION/TRAINING PROGRAM

## 2023-05-09 PROCEDURE — 99214 OFFICE O/P EST MOD 30 MIN: CPT | Mod: PBBFAC | Performed by: STUDENT IN AN ORGANIZED HEALTH CARE EDUCATION/TRAINING PROGRAM

## 2023-05-09 PROCEDURE — 1159F MED LIST DOCD IN RCRD: CPT | Mod: CPTII,,, | Performed by: STUDENT IN AN ORGANIZED HEALTH CARE EDUCATION/TRAINING PROGRAM

## 2023-05-09 RX ORDER — GABAPENTIN 300 MG/1
300 CAPSULE ORAL 3 TIMES DAILY
Qty: 90 CAPSULE | Refills: 11 | Status: SHIPPED | OUTPATIENT
Start: 2023-05-09 | End: 2024-05-08

## 2023-05-09 NOTE — PROGRESS NOTES
Subjective     Patient ID: Selena Proctor is a 67 y.o. female.    Chief Complaint: Follow-up    67-year-old  female presents to the clinic for 1 week postop status post excision of left thigh wound with closure with rotational flap.  Patient has had some increased drainage from the EVANGELINA drain.  Denies any chest pain/shortness of breath, nausea/vomiting/diarrhea, fever/chills.    4/18:  Presents for re-evaluation of left inner thigh wound.  The area has had little more dehiscence with separation of staple line; the wound has increased with granulation tissue about 50% of the wound coverage, followed by some fat necrosis at other areas; no purulent drainage or signs of infection; patient was placed on empirical antibiotics previously.  Denies any chest pain/shortness of breath, nausea/vomiting/diarrhea, fever/chills.    4/25:  Wound is doing well; patient is cleaning with Vashe and placing Silvadene and fat necrosis almost completely resolved; healthy bed of granulation tissue growing.  Patient concerned there may be a staple left.  Denies any chest pain/shortness of breath, nausea/vomiting/diarrhea, fever/chills.    5/9:  Area healing well.  Patient states she is having intermittent pain has she did right talar day as 10/10, but currently much improved.  Patient placing Silvadene to wound.  Denies any chest pain/shortness of breath, nausea/vomiting/diarrhea, fever/chills.    Review of Systems   Constitutional: Negative.    HENT: Negative.     Eyes: Negative.    Respiratory:  Negative for shortness of breath.    Cardiovascular:  Negative for chest pain.   Gastrointestinal:  Negative for abdominal distention, abdominal pain, blood in stool, change in bowel habit and change in bowel habit.   Genitourinary: Negative.  Negative for hematuria.   Musculoskeletal:  Negative for myalgias.   Neurological:  Negative for dizziness and weakness.   Psychiatric/Behavioral: Negative.          Objective     Physical  Exam  Constitutional:       General: She is not in acute distress.     Appearance: Normal appearance.   HENT:      Head: Normocephalic.   Cardiovascular:      Rate and Rhythm: Normal rate.   Pulmonary:      Effort: Pulmonary effort is normal. No respiratory distress.   Abdominal:      General: There is no distension.      Tenderness: There is no abdominal tenderness.   Musculoskeletal:         General: Normal range of motion.        Legs:       Comments: Wound measured 9 x 5 x 1 cm; no staples present.  Wound with complete granulation; no fat necrosis or necrotic tissue.  Epithelialization to outer edge of wound with good wound contracture   Skin:     General: Skin is warm.      Coloration: Skin is not jaundiced.   Neurological:      General: No focal deficit present.      Mental Status: She is alert and oriented to person, place, and time.      Cranial Nerves: No cranial nerve deficit.          Assessment and Plan     Problem List Items Addressed This Visit          Orthopedic    Open wound of left thigh - Primary       Continue wound care to the wound.  We will have the patient follow back up in 3 weeks.  Awaiting for approval for organogenesis products.  We will add Neurontin 300 mg t.i.d. for nerve pain

## 2023-05-19 DIAGNOSIS — E11.9 TYPE 2 DIABETES MELLITUS WITHOUT COMPLICATION, WITHOUT LONG-TERM CURRENT USE OF INSULIN: ICD-10-CM

## 2023-05-22 RX ORDER — LANCETS
EACH MISCELLANEOUS
Qty: 100 EACH | Refills: 0 | Status: SHIPPED | OUTPATIENT
Start: 2023-05-22 | End: 2023-10-09

## 2023-05-30 ENCOUNTER — OFFICE VISIT (OUTPATIENT)
Dept: SURGERY | Facility: CLINIC | Age: 68
End: 2023-05-30
Payer: MEDICARE

## 2023-05-30 DIAGNOSIS — S71.102D OPEN WOUND OF LEFT THIGH, SUBSEQUENT ENCOUNTER: Primary | ICD-10-CM

## 2023-05-30 PROCEDURE — 3051F HG A1C>EQUAL 7.0%<8.0%: CPT | Mod: CPTII,,, | Performed by: STUDENT IN AN ORGANIZED HEALTH CARE EDUCATION/TRAINING PROGRAM

## 2023-05-30 PROCEDURE — 99499 NO LOS: ICD-10-PCS | Mod: S$PBB,,, | Performed by: STUDENT IN AN ORGANIZED HEALTH CARE EDUCATION/TRAINING PROGRAM

## 2023-05-30 PROCEDURE — 3051F PR MOST RECENT HEMOGLOBIN A1C LEVEL 7.0 - < 8.0%: ICD-10-PCS | Mod: CPTII,,, | Performed by: STUDENT IN AN ORGANIZED HEALTH CARE EDUCATION/TRAINING PROGRAM

## 2023-05-30 PROCEDURE — 1159F PR MEDICATION LIST DOCUMENTED IN MEDICAL RECORD: ICD-10-PCS | Mod: CPTII,,, | Performed by: STUDENT IN AN ORGANIZED HEALTH CARE EDUCATION/TRAINING PROGRAM

## 2023-05-30 PROCEDURE — 99499 UNLISTED E&M SERVICE: CPT | Mod: S$PBB,,, | Performed by: STUDENT IN AN ORGANIZED HEALTH CARE EDUCATION/TRAINING PROGRAM

## 2023-05-30 PROCEDURE — 15271 PR SKIN SUB GRAFT TRNK/ARM/LEG: ICD-10-PCS | Mod: 58,S$PBB,, | Performed by: STUDENT IN AN ORGANIZED HEALTH CARE EDUCATION/TRAINING PROGRAM

## 2023-05-30 PROCEDURE — 99214 OFFICE O/P EST MOD 30 MIN: CPT | Mod: PBBFAC,25 | Performed by: STUDENT IN AN ORGANIZED HEALTH CARE EDUCATION/TRAINING PROGRAM

## 2023-05-30 PROCEDURE — 15271 SKIN SUB GRAFT TRNK/ARM/LEG: CPT | Mod: PBBFAC | Performed by: STUDENT IN AN ORGANIZED HEALTH CARE EDUCATION/TRAINING PROGRAM

## 2023-05-30 PROCEDURE — 15271 SKIN SUB GRAFT TRNK/ARM/LEG: CPT | Mod: 58,S$PBB,, | Performed by: STUDENT IN AN ORGANIZED HEALTH CARE EDUCATION/TRAINING PROGRAM

## 2023-05-30 PROCEDURE — 1159F MED LIST DOCD IN RCRD: CPT | Mod: CPTII,,, | Performed by: STUDENT IN AN ORGANIZED HEALTH CARE EDUCATION/TRAINING PROGRAM

## 2023-05-31 NOTE — PROGRESS NOTES
Subjective     Patient ID: Selena Proctor is a 67 y.o. female.    Chief Complaint: Wound Check    67-year-old  female presents to the clinic for 1 week postop status post excision of left thigh wound with closure with rotational flap.  Patient has had some increased drainage from the EVANGELINA drain.  Denies any chest pain/shortness of breath, nausea/vomiting/diarrhea, fever/chills.    4/18:  Presents for re-evaluation of left inner thigh wound.  The area has had little more dehiscence with separation of staple line; the wound has increased with granulation tissue about 50% of the wound coverage, followed by some fat necrosis at other areas; no purulent drainage or signs of infection; patient was placed on empirical antibiotics previously.  Denies any chest pain/shortness of breath, nausea/vomiting/diarrhea, fever/chills.    4/25:  Wound is doing well; patient is cleaning with Vashe and placing Silvadene and fat necrosis almost completely resolved; healthy bed of granulation tissue growing.  Patient concerned there may be a staple left.  Denies any chest pain/shortness of breath, nausea/vomiting/diarrhea, fever/chills.    5/9:  Area healing well.  Patient states she is having intermittent pain 10/10, but currently much improved.  Patient placing Silvadene to wound.  Denies any chest pain/shortness of breath, nausea/vomiting/diarrhea, fever/chills.    5/30:  Patient doing well, applying Silvadene to area and pain better controlled.  Presents for PuraPly application.  Denies any chest pain/shortness of breath, nausea/vomiting/diarrhea, fever/chills.    Review of Systems   Constitutional: Negative.    HENT: Negative.     Eyes: Negative.    Respiratory:  Negative for shortness of breath.    Cardiovascular:  Negative for chest pain.   Gastrointestinal:  Negative for abdominal distention, abdominal pain, blood in stool, change in bowel habit and change in bowel habit.   Genitourinary: Negative.  Negative for  hematuria.   Musculoskeletal:  Negative for myalgias.   Neurological:  Negative for dizziness and weakness.   Psychiatric/Behavioral: Negative.          Objective     Physical Exam  Constitutional:       General: She is not in acute distress.     Appearance: Normal appearance.   HENT:      Head: Normocephalic.   Cardiovascular:      Rate and Rhythm: Normal rate.   Pulmonary:      Effort: Pulmonary effort is normal. No respiratory distress.   Abdominal:      General: There is no distension.      Tenderness: There is no abdominal tenderness.   Musculoskeletal:         General: Normal range of motion.        Legs:       Comments: Wound measured 9 x 2 x 1 cm; no staples present.  Wound with complete granulation; no fat necrosis or necrotic tissue.  Epithelialization to outer edge of wound with good wound contracture   Skin:     General: Skin is warm.      Coloration: Skin is not jaundiced.   Neurological:      General: No focal deficit present.      Mental Status: She is alert and oriented to person, place, and time.      Cranial Nerves: No cranial nerve deficit.          Assessment and Plan   Apply Puraply now; seek approval for affinity; follow up in 1 week    Problem List Items Addressed This Visit    None    Patient Name: Selena Proctor  Patient MRN: [id number]  Patient YOB: 1955  CSN: [id number]  Date: 05/31/2023  Provider:   Andrez Cano    Application for Assessment: left leg wound  Skin Substitute: ...  Performed By: Andrez Cano  Time-out Taken: Yes  Location:     [] Genitalia/Hands/Feet/Multiple Digits    [] Scalp/Face/Neck/Ears    [x]Trunk/Arms/Legs  Application Area: (sq cm): 18   Product Applied: (sq cm): 24  Product Waste (sq cm): 0  Fenestrated: Yes   Instrument:    [] Blade [] Curette  [x]Forceps  []Nippers    [] Rongeur [] Scissors  []Others:   Secured: Yes   Secured with: Steri-strips  Dressing Applied: Yes  Response to Treatment:Well tolerated by patient.  Note:

## 2023-06-01 RX ORDER — HYDROCODONE BITARTRATE AND ACETAMINOPHEN 7.5; 325 MG/1; MG/1
1 TABLET ORAL EVERY 8 HOURS PRN
Qty: 90 TABLET | Refills: 0 | Status: CANCELLED | OUTPATIENT
Start: 2023-06-01 | End: 2023-07-01
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
## 2023-06-06 ENCOUNTER — OFFICE VISIT (OUTPATIENT)
Dept: SURGERY | Facility: CLINIC | Age: 68
End: 2023-06-06
Payer: MEDICARE

## 2023-06-06 ENCOUNTER — OFFICE VISIT (OUTPATIENT)
Dept: PAIN MEDICINE | Facility: CLINIC | Age: 68
End: 2023-06-06
Payer: MEDICARE

## 2023-06-06 VITALS
RESPIRATION RATE: 18 BRPM | BODY MASS INDEX: 32.78 KG/M2 | HEART RATE: 67 BPM | HEIGHT: 63 IN | SYSTOLIC BLOOD PRESSURE: 122 MMHG | DIASTOLIC BLOOD PRESSURE: 77 MMHG | WEIGHT: 185 LBS

## 2023-06-06 DIAGNOSIS — M89.49 OSTEOARTHROSIS MULTIPLE SITES, NOT SPECIFIED AS GENERALIZED: Chronic | ICD-10-CM

## 2023-06-06 DIAGNOSIS — M25.551 HIP PAIN, RIGHT: Primary | Chronic | ICD-10-CM

## 2023-06-06 DIAGNOSIS — M47.816 SPONDYLOSIS WITHOUT MYELOPATHY OR RADICULOPATHY, LUMBAR REGION: Chronic | ICD-10-CM

## 2023-06-06 DIAGNOSIS — Z79.899 ENCOUNTER FOR LONG-TERM (CURRENT) USE OF OTHER MEDICATIONS: ICD-10-CM

## 2023-06-06 DIAGNOSIS — S71.102D OPEN WOUND OF LEFT THIGH, SUBSEQUENT ENCOUNTER: Primary | ICD-10-CM

## 2023-06-06 LAB
CTP QC/QA: YES
POC (AMP) AMPHETAMINE: NEGATIVE
POC (BAR) BARBITURATES: NEGATIVE
POC (BUP) BUPRENORPHINE: NEGATIVE
POC (BZO) BENZODIAZEPINES: NEGATIVE
POC (COC) COCAINE: NEGATIVE
POC (MDMA) METHYLENEDIOXYMETHAMPHETAMINE 3,4: NEGATIVE
POC (MET) METHAMPHETAMINE: NEGATIVE
POC (MOP) OPIATES: NEGATIVE
POC (MTD) METHADONE: NEGATIVE
POC (OXY) OXYCODONE: NEGATIVE
POC (PCP) PHENCYCLIDINE: NEGATIVE
POC (TCA) TRICYCLIC ANTIDEPRESSANTS: NEGATIVE
POC TEMPERATURE (URINE): 90
POC THC: NEGATIVE

## 2023-06-06 PROCEDURE — 3074F SYST BP LT 130 MM HG: CPT | Mod: CPTII,,, | Performed by: PHYSICIAN ASSISTANT

## 2023-06-06 PROCEDURE — 99214 OFFICE O/P EST MOD 30 MIN: CPT | Mod: S$PBB,,, | Performed by: PHYSICIAN ASSISTANT

## 2023-06-06 PROCEDURE — 3288F FALL RISK ASSESSMENT DOCD: CPT | Mod: CPTII,,, | Performed by: PHYSICIAN ASSISTANT

## 2023-06-06 PROCEDURE — 99214 PR OFFICE/OUTPT VISIT, EST, LEVL IV, 30-39 MIN: ICD-10-PCS | Mod: S$PBB,,, | Performed by: PHYSICIAN ASSISTANT

## 2023-06-06 PROCEDURE — 3074F PR MOST RECENT SYSTOLIC BLOOD PRESSURE < 130 MM HG: ICD-10-PCS | Mod: CPTII,,, | Performed by: PHYSICIAN ASSISTANT

## 2023-06-06 PROCEDURE — 3008F BODY MASS INDEX DOCD: CPT | Mod: CPTII,,, | Performed by: PHYSICIAN ASSISTANT

## 2023-06-06 PROCEDURE — 1101F PR PT FALLS ASSESS DOC 0-1 FALLS W/OUT INJ PAST YR: ICD-10-PCS | Mod: CPTII,,, | Performed by: PHYSICIAN ASSISTANT

## 2023-06-06 PROCEDURE — 3288F PR FALLS RISK ASSESSMENT DOCUMENTED: ICD-10-PCS | Mod: CPTII,,, | Performed by: PHYSICIAN ASSISTANT

## 2023-06-06 PROCEDURE — 1125F AMNT PAIN NOTED PAIN PRSNT: CPT | Mod: CPTII,,, | Performed by: PHYSICIAN ASSISTANT

## 2023-06-06 PROCEDURE — 1159F MED LIST DOCD IN RCRD: CPT | Mod: CPTII,,, | Performed by: PHYSICIAN ASSISTANT

## 2023-06-06 PROCEDURE — 15271 PR SKIN SUB GRAFT TRNK/ARM/LEG: ICD-10-PCS | Mod: 58,S$PBB,, | Performed by: STUDENT IN AN ORGANIZED HEALTH CARE EDUCATION/TRAINING PROGRAM

## 2023-06-06 PROCEDURE — 3051F PR MOST RECENT HEMOGLOBIN A1C LEVEL 7.0 - < 8.0%: ICD-10-PCS | Mod: CPTII,,, | Performed by: STUDENT IN AN ORGANIZED HEALTH CARE EDUCATION/TRAINING PROGRAM

## 2023-06-06 PROCEDURE — 1125F PR PAIN SEVERITY QUANTIFIED, PAIN PRESENT: ICD-10-PCS | Mod: CPTII,,, | Performed by: PHYSICIAN ASSISTANT

## 2023-06-06 PROCEDURE — 1159F PR MEDICATION LIST DOCUMENTED IN MEDICAL RECORD: ICD-10-PCS | Mod: CPTII,,, | Performed by: STUDENT IN AN ORGANIZED HEALTH CARE EDUCATION/TRAINING PROGRAM

## 2023-06-06 PROCEDURE — 99213 PR OFFICE/OUTPT VISIT, EST, LEVL III, 20-29 MIN: ICD-10-PCS | Mod: 25,S$PBB,, | Performed by: STUDENT IN AN ORGANIZED HEALTH CARE EDUCATION/TRAINING PROGRAM

## 2023-06-06 PROCEDURE — 15271 SKIN SUB GRAFT TRNK/ARM/LEG: CPT | Mod: PBBFAC | Performed by: STUDENT IN AN ORGANIZED HEALTH CARE EDUCATION/TRAINING PROGRAM

## 2023-06-06 PROCEDURE — 3008F PR BODY MASS INDEX (BMI) DOCUMENTED: ICD-10-PCS | Mod: CPTII,,, | Performed by: PHYSICIAN ASSISTANT

## 2023-06-06 PROCEDURE — 15271 SKIN SUB GRAFT TRNK/ARM/LEG: CPT | Mod: 58,S$PBB,, | Performed by: STUDENT IN AN ORGANIZED HEALTH CARE EDUCATION/TRAINING PROGRAM

## 2023-06-06 PROCEDURE — 1159F PR MEDICATION LIST DOCUMENTED IN MEDICAL RECORD: ICD-10-PCS | Mod: CPTII,,, | Performed by: PHYSICIAN ASSISTANT

## 2023-06-06 PROCEDURE — 3078F PR MOST RECENT DIASTOLIC BLOOD PRESSURE < 80 MM HG: ICD-10-PCS | Mod: CPTII,,, | Performed by: PHYSICIAN ASSISTANT

## 2023-06-06 PROCEDURE — 99214 OFFICE O/P EST MOD 30 MIN: CPT | Mod: PBBFAC | Performed by: STUDENT IN AN ORGANIZED HEALTH CARE EDUCATION/TRAINING PROGRAM

## 2023-06-06 PROCEDURE — 3051F PR MOST RECENT HEMOGLOBIN A1C LEVEL 7.0 - < 8.0%: ICD-10-PCS | Mod: CPTII,,, | Performed by: PHYSICIAN ASSISTANT

## 2023-06-06 PROCEDURE — 1101F PT FALLS ASSESS-DOCD LE1/YR: CPT | Mod: CPTII,,, | Performed by: PHYSICIAN ASSISTANT

## 2023-06-06 PROCEDURE — 3051F HG A1C>EQUAL 7.0%<8.0%: CPT | Mod: CPTII,,, | Performed by: STUDENT IN AN ORGANIZED HEALTH CARE EDUCATION/TRAINING PROGRAM

## 2023-06-06 PROCEDURE — 3051F HG A1C>EQUAL 7.0%<8.0%: CPT | Mod: CPTII,,, | Performed by: PHYSICIAN ASSISTANT

## 2023-06-06 PROCEDURE — 80305 DRUG TEST PRSMV DIR OPT OBS: CPT | Mod: PBBFAC | Performed by: PHYSICIAN ASSISTANT

## 2023-06-06 PROCEDURE — 1159F MED LIST DOCD IN RCRD: CPT | Mod: CPTII,,, | Performed by: STUDENT IN AN ORGANIZED HEALTH CARE EDUCATION/TRAINING PROGRAM

## 2023-06-06 PROCEDURE — 3078F DIAST BP <80 MM HG: CPT | Mod: CPTII,,, | Performed by: PHYSICIAN ASSISTANT

## 2023-06-06 PROCEDURE — 99215 OFFICE O/P EST HI 40 MIN: CPT | Mod: PBBFAC,25 | Performed by: PHYSICIAN ASSISTANT

## 2023-06-06 PROCEDURE — 99213 OFFICE O/P EST LOW 20 MIN: CPT | Mod: 25,S$PBB,, | Performed by: STUDENT IN AN ORGANIZED HEALTH CARE EDUCATION/TRAINING PROGRAM

## 2023-06-06 RX ORDER — HYDROCODONE BITARTRATE AND ACETAMINOPHEN 10; 325 MG/1; MG/1
1 TABLET ORAL EVERY 8 HOURS
Qty: 90 TABLET | Refills: 0 | Status: SHIPPED | OUTPATIENT
Start: 2023-06-06 | End: 2023-07-06

## 2023-06-06 RX ORDER — HYDROCODONE BITARTRATE AND ACETAMINOPHEN 10; 325 MG/1; MG/1
1 TABLET ORAL EVERY 8 HOURS
Qty: 90 TABLET | Refills: 0 | Status: SHIPPED | OUTPATIENT
Start: 2023-08-04 | End: 2023-08-30 | Stop reason: SDUPTHER

## 2023-06-06 RX ORDER — HYDROCODONE BITARTRATE AND ACETAMINOPHEN 10; 325 MG/1; MG/1
1 TABLET ORAL EVERY 8 HOURS
Qty: 90 TABLET | Refills: 0 | Status: SHIPPED | OUTPATIENT
Start: 2023-07-05 | End: 2023-08-30 | Stop reason: SDUPTHER

## 2023-06-06 RX ORDER — DICLOFENAC SODIUM 10 MG/G
2 GEL TOPICAL 4 TIMES DAILY
Qty: 10 EACH | Refills: 2 | Status: SHIPPED | OUTPATIENT
Start: 2023-06-06

## 2023-06-06 NOTE — PROGRESS NOTES
Subjective     Patient ID: Selena Proctor is a 67 y.o. female.    Chief Complaint: Wound Check    67-year-old  female presents to the clinic for 1 week postop status post excision of left thigh wound with closure with rotational flap.  Patient has had some increased drainage from the EVANGELINA drain.  Denies any chest pain/shortness of breath, nausea/vomiting/diarrhea, fever/chills.    4/18:  Presents for re-evaluation of left inner thigh wound.  The area has had little more dehiscence with separation of staple line; the wound has increased with granulation tissue about 50% of the wound coverage, followed by some fat necrosis at other areas; no purulent drainage or signs of infection; patient was placed on empirical antibiotics previously.  Denies any chest pain/shortness of breath, nausea/vomiting/diarrhea, fever/chills.    4/25:  Wound is doing well; patient is cleaning with Vashe and placing Silvadene and fat necrosis almost completely resolved; healthy bed of granulation tissue growing.  Patient concerned there may be a staple left.  Denies any chest pain/shortness of breath, nausea/vomiting/diarrhea, fever/chills.    5/9:  Area healing well.  Patient states she is having intermittent pain 10/10, but currently much improved.  Patient placing Silvadene to wound.  Denies any chest pain/shortness of breath, nausea/vomiting/diarrhea, fever/chills.    5/30:  Patient doing well, applying Silvadene to area and pain better controlled.  Presents for PuraPly application.  Denies any chest pain/shortness of breath, nausea/vomiting/diarrhea, fever/chills.    6/6:  Patient doing well.  Dressing intact.  Dressing removed and area is healing very well.  Increased epithelialization across wound.  Denies any chest pain/shortness of breath, nausea/vomiting/diarrhea, fever/chills.    Review of Systems   Constitutional: Negative.    HENT: Negative.     Eyes: Negative.    Respiratory:  Negative for shortness of breath.     Cardiovascular:  Negative for chest pain.   Gastrointestinal:  Negative for abdominal distention, abdominal pain, blood in stool, change in bowel habit and change in bowel habit.   Genitourinary: Negative.  Negative for hematuria.   Musculoskeletal:  Negative for myalgias.   Neurological:  Negative for dizziness and weakness.   Psychiatric/Behavioral: Negative.          Objective     Physical Exam  Constitutional:       General: She is not in acute distress.     Appearance: Normal appearance.   HENT:      Head: Normocephalic.   Cardiovascular:      Rate and Rhythm: Normal rate.   Pulmonary:      Effort: Pulmonary effort is normal. No respiratory distress.   Abdominal:      General: There is no distension.      Tenderness: There is no abdominal tenderness.   Musculoskeletal:         General: Normal range of motion.        Legs:       Comments: Wound measured 9 x 1.5 x 0.3 cm; With 80% Epithelialization covering wound; small areas of 2x2x0.3cm medially and 1.5x0.5x0.3cm laterally with granulation tissue; no signs of infection or drainage   Skin:     General: Skin is warm.      Coloration: Skin is not jaundiced.   Neurological:      General: No focal deficit present.      Mental Status: She is alert and oriented to person, place, and time.      Cranial Nerves: No cranial nerve deficit.          Assessment and Plan   Apply Puraply now; seek approval for affinity; follow up in 1 week    Problem List Items Addressed This Visit          Orthopedic    Open wound of left thigh - Primary       Patient Name: Selena Proctor  Patient MRN: [id number]  Patient YOB: 1955  CSN: [id number]  Date: 06/06/2023  Provider:   Andrez Cano    Application for Assessment: left leg wound  Skin Substitute: ...  Performed By: Andrez Cano  Time-out Taken: Yes  Location:     [] Genitalia/Hands/Feet/Multiple Digits    [] Scalp/Face/Neck/Ears    [x]Trunk/Arms/Legs  Application Area: (sq cm): 13.5   Product Applied: (sq cm):  24  Product Waste (sq cm): 0  Fenestrated: Yes   Instrument:    [] Blade [] Curette  [x]Forceps  []Nippers    [] Rongeur [] Scissors  []Others:   Secured: Yes   Secured with: Steri-strips  Dressing Applied: Yes  Response to Treatment:Well tolerated by patient.  Note:     Product Number: [id number]  Expiration: 3/27/24

## 2023-06-09 DIAGNOSIS — Z71.89 COMPLEX CARE COORDINATION: ICD-10-CM

## 2023-06-15 ENCOUNTER — OFFICE VISIT (OUTPATIENT)
Dept: SURGERY | Facility: CLINIC | Age: 68
End: 2023-06-15
Payer: MEDICARE

## 2023-06-15 DIAGNOSIS — S71.102D OPEN WOUND OF LEFT THIGH, SUBSEQUENT ENCOUNTER: Primary | ICD-10-CM

## 2023-06-15 PROCEDURE — 3051F HG A1C>EQUAL 7.0%<8.0%: CPT | Mod: CPTII,,, | Performed by: NURSE PRACTITIONER

## 2023-06-15 PROCEDURE — 99214 OFFICE O/P EST MOD 30 MIN: CPT | Mod: PBBFAC | Performed by: NURSE PRACTITIONER

## 2023-06-15 PROCEDURE — 3051F PR MOST RECENT HEMOGLOBIN A1C LEVEL 7.0 - < 8.0%: ICD-10-PCS | Mod: CPTII,,, | Performed by: NURSE PRACTITIONER

## 2023-06-15 PROCEDURE — 1159F PR MEDICATION LIST DOCUMENTED IN MEDICAL RECORD: ICD-10-PCS | Mod: CPTII,,, | Performed by: NURSE PRACTITIONER

## 2023-06-15 PROCEDURE — 1160F RVW MEDS BY RX/DR IN RCRD: CPT | Mod: CPTII,,, | Performed by: NURSE PRACTITIONER

## 2023-06-15 PROCEDURE — 1160F PR REVIEW ALL MEDS BY PRESCRIBER/CLIN PHARMACIST DOCUMENTED: ICD-10-PCS | Mod: CPTII,,, | Performed by: NURSE PRACTITIONER

## 2023-06-15 PROCEDURE — 99024 POSTOP FOLLOW-UP VISIT: CPT | Mod: S$PBB,,, | Performed by: NURSE PRACTITIONER

## 2023-06-15 PROCEDURE — 99024 PR POST-OP FOLLOW-UP VISIT: ICD-10-PCS | Mod: S$PBB,,, | Performed by: NURSE PRACTITIONER

## 2023-06-15 PROCEDURE — 1159F MED LIST DOCD IN RCRD: CPT | Mod: CPTII,,, | Performed by: NURSE PRACTITIONER

## 2023-06-15 NOTE — PROGRESS NOTES
Subjective         Patient ID: Selena Proctor is a 67 y.o. female.     Chief Complaint: Wound Check     67-year-old  female presents to the clinic for 1 week postop status post excision of left thigh wound with closure with rotational flap.  Patient has had some increased drainage from the EVANGELINA drain.  Denies any chest pain/shortness of breath, nausea/vomiting/diarrhea, fever/chills.     4/18:  Presents for re-evaluation of left inner thigh wound.  The area has had little more dehiscence with separation of staple line; the wound has increased with granulation tissue about 50% of the wound coverage, followed by some fat necrosis at other areas; no purulent drainage or signs of infection; patient was placed on empirical antibiotics previously.  Denies any chest pain/shortness of breath, nausea/vomiting/diarrhea, fever/chills.     4/25:  Wound is doing well; patient is cleaning with Vashe and placing Silvadene and fat necrosis almost completely resolved; healthy bed of granulation tissue growing.  Patient concerned there may be a staple left.  Denies any chest pain/shortness of breath, nausea/vomiting/diarrhea, fever/chills.     5/9:  Area healing well.  Patient states she is having intermittent pain 10/10, but currently much improved.  Patient placing Silvadene to wound.  Denies any chest pain/shortness of breath, nausea/vomiting/diarrhea, fever/chills.     5/30:  Patient doing well, applying Silvadene to area and pain better controlled.  Presents for PuraPly application.  Denies any chest pain/shortness of breath, nausea/vomiting/diarrhea, fever/chills.     6/6:  Patient doing well.  Dressing intact.  Dressing removed and area is healing very well.  Increased epithelialization across wound.  Denies any chest pain/shortness of breath, nausea/vomiting/diarrhea, fever/chills.    6/15:  Patient doing well.  Denies complaints.  Previous dressing clean, dry, and intact.  Removed, healing well.  Pink  epithelialization throughout wound bed.     Review of Systems   Constitutional: Negative.    HENT: Negative.     Eyes: Negative.    Respiratory:  Negative for shortness of breath.    Cardiovascular:  Negative for chest pain.   Gastrointestinal:  Negative for abdominal distention, abdominal pain, blood in stool, change in bowel habit and change in bowel habit.   Genitourinary: Negative.  Negative for hematuria.   Musculoskeletal:  Negative for myalgias.   Neurological:  Negative for dizziness and weakness.   Psychiatric/Behavioral: Negative.                 Objective       Physical Exam  Constitutional:       General: She is not in acute distress.     Appearance: Normal appearance.   HENT:      Head: Normocephalic.   Cardiovascular:      Rate and Rhythm: Normal rate.   Pulmonary:      Effort: Pulmonary effort is normal. No respiratory distress.   Abdominal:      General: There is no distension.      Tenderness: There is no abdominal tenderness.   Musculoskeletal:         General: Normal range of motion.        Legs:       Comments: Wound measured 9 x 1.5 x 0.3 cm; With 80% Epithelialization covering wound; small areas of 2x2x0.3cm medially and 1.5x0.5x0.3cm laterally with granulation tissue; no signs of infection or drainage   Skin:     General: Skin is warm.      Coloration: Skin is not jaundiced.   Neurological:      General: No focal deficit present.      Mental Status: She is alert and oriented to person, place, and time.      Cranial Nerves: No cranial nerve deficit.                  Assessment and Plan      Apply Bryan now; seek approval for affinity; follow up in 1 week     Problem List Items Addressed This Visit                  Orthopedic     Open wound of left thigh - Primary         Patient Name: Selena Proctor  Patient MRN: [id number]  Patient YOB: 1955  CSN: [id number]  Date: 06/06/2023  Provider:  Tan Amaya     Application for Assessment: left leg wound  Skin Substitute:  ...  Performed By: Tan Amaya  Time-out Taken: Yes  Location:                           [] Genitalia/Hands/Feet/Multiple Digits                          [] Scalp/Face/Neck/Ears                          [x]Trunk/Arms/Legs  Application Area: (sq cm): 13.5   Product Applied: (sq cm): 24  Product Waste (sq cm): 0  Fenestrated: Yes              Instrument:                          [] Blade          [] Curette        [x]Forceps       []Nippers                          [] Rongeur     [] Scissors      []Others:   Secured: Yes              Secured with: Steri-strips  Dressing Applied: Yes  Response to Treatment:Well tolerated by patient.  Note:      Product Number: [id number]  Expiration: 04/10/24

## 2023-06-20 ENCOUNTER — OFFICE VISIT (OUTPATIENT)
Dept: SURGERY | Facility: CLINIC | Age: 68
End: 2023-06-20
Payer: MEDICARE

## 2023-06-20 DIAGNOSIS — S71.102D OPEN WOUND OF LEFT THIGH, SUBSEQUENT ENCOUNTER: Primary | ICD-10-CM

## 2023-06-20 PROCEDURE — 15271 PR SKIN SUB GRAFT TRNK/ARM/LEG: ICD-10-PCS | Mod: 58,S$PBB,, | Performed by: STUDENT IN AN ORGANIZED HEALTH CARE EDUCATION/TRAINING PROGRAM

## 2023-06-20 PROCEDURE — 1159F PR MEDICATION LIST DOCUMENTED IN MEDICAL RECORD: ICD-10-PCS | Mod: CPTII,,, | Performed by: STUDENT IN AN ORGANIZED HEALTH CARE EDUCATION/TRAINING PROGRAM

## 2023-06-20 PROCEDURE — 99024 PR POST-OP FOLLOW-UP VISIT: ICD-10-PCS | Mod: S$PBB,,, | Performed by: STUDENT IN AN ORGANIZED HEALTH CARE EDUCATION/TRAINING PROGRAM

## 2023-06-20 PROCEDURE — 99214 OFFICE O/P EST MOD 30 MIN: CPT | Mod: PBBFAC | Performed by: STUDENT IN AN ORGANIZED HEALTH CARE EDUCATION/TRAINING PROGRAM

## 2023-06-20 PROCEDURE — 99024 POSTOP FOLLOW-UP VISIT: CPT | Mod: S$PBB,,, | Performed by: STUDENT IN AN ORGANIZED HEALTH CARE EDUCATION/TRAINING PROGRAM

## 2023-06-20 PROCEDURE — 15271 SKIN SUB GRAFT TRNK/ARM/LEG: CPT | Mod: 58,S$PBB,, | Performed by: STUDENT IN AN ORGANIZED HEALTH CARE EDUCATION/TRAINING PROGRAM

## 2023-06-20 PROCEDURE — 3051F PR MOST RECENT HEMOGLOBIN A1C LEVEL 7.0 - < 8.0%: ICD-10-PCS | Mod: CPTII,,, | Performed by: STUDENT IN AN ORGANIZED HEALTH CARE EDUCATION/TRAINING PROGRAM

## 2023-06-20 PROCEDURE — 1159F MED LIST DOCD IN RCRD: CPT | Mod: CPTII,,, | Performed by: STUDENT IN AN ORGANIZED HEALTH CARE EDUCATION/TRAINING PROGRAM

## 2023-06-20 PROCEDURE — 15271 SKIN SUB GRAFT TRNK/ARM/LEG: CPT | Mod: PBBFAC | Performed by: STUDENT IN AN ORGANIZED HEALTH CARE EDUCATION/TRAINING PROGRAM

## 2023-06-20 PROCEDURE — 3051F HG A1C>EQUAL 7.0%<8.0%: CPT | Mod: CPTII,,, | Performed by: STUDENT IN AN ORGANIZED HEALTH CARE EDUCATION/TRAINING PROGRAM

## 2023-06-20 NOTE — PROGRESS NOTES
Subjective     Patient ID: Selena Proctor is a 67 y.o. female.    Chief Complaint: Wound Check    67-year-old  female presents to the clinic for 1 week postop status post excision of left thigh wound with closure with rotational flap.  Patient has had some increased drainage from the EVANGELINA drain.  Denies any chest pain/shortness of breath, nausea/vomiting/diarrhea, fever/chills.    4/18:  Presents for re-evaluation of left inner thigh wound.  The area has had little more dehiscence with separation of staple line; the wound has increased with granulation tissue about 50% of the wound coverage, followed by some fat necrosis at other areas; no purulent drainage or signs of infection; patient was placed on empirical antibiotics previously.  Denies any chest pain/shortness of breath, nausea/vomiting/diarrhea, fever/chills.    4/25:  Wound is doing well; patient is cleaning with Vashe and placing Silvadene and fat necrosis almost completely resolved; healthy bed of granulation tissue growing.  Patient concerned there may be a staple left.  Denies any chest pain/shortness of breath, nausea/vomiting/diarrhea, fever/chills.    5/9:  Area healing well.  Patient states she is having intermittent pain 10/10, but currently much improved.  Patient placing Silvadene to wound.  Denies any chest pain/shortness of breath, nausea/vomiting/diarrhea, fever/chills.    5/30:  Patient doing well, applying Silvadene to area and pain better controlled.  Presents for PuraPly application.  Denies any chest pain/shortness of breath, nausea/vomiting/diarrhea, fever/chills.    6/6:  Patient doing well.  Dressing intact.  Dressing removed and area is healing very well.  Increased epithelialization across wound.  Denies any chest pain/shortness of breath, nausea/vomiting/diarrhea, fever/chills.    6/20:  Patient doing well.  Epithelialization across wound at 95%.  Denies any chest pain/shortness of breath, nausea/vomiting/diarrhea,  fever/chills.    Review of Systems   Constitutional: Negative.    HENT: Negative.     Eyes: Negative.    Respiratory:  Negative for shortness of breath.    Cardiovascular:  Negative for chest pain.   Gastrointestinal:  Negative for abdominal distention, abdominal pain, blood in stool, change in bowel habit and change in bowel habit.   Genitourinary: Negative.  Negative for hematuria.   Musculoskeletal:  Negative for myalgias.   Neurological:  Negative for dizziness and weakness.   Psychiatric/Behavioral: Negative.          Objective     Physical Exam  Constitutional:       General: She is not in acute distress.     Appearance: Normal appearance.   HENT:      Head: Normocephalic.   Cardiovascular:      Rate and Rhythm: Normal rate.   Pulmonary:      Effort: Pulmonary effort is normal. No respiratory distress.   Abdominal:      General: There is no distension.      Tenderness: There is no abdominal tenderness.   Musculoskeletal:         General: Normal range of motion.        Legs:       Comments: Wound measured 9 x 1.5 x 0.3 cm; With 95% Epithelialization covering wound; small areas of 1x1cm at medial edge with biofilm and subcutaneous tissue exposed   Skin:     General: Skin is warm.      Coloration: Skin is not jaundiced.   Neurological:      General: No focal deficit present.      Mental Status: She is alert and oriented to person, place, and time.      Cranial Nerves: No cranial nerve deficit.          Assessment and Plan   Apply Puraply now; seek approval for affinity; follow up in 1 week    Problem List Items Addressed This Visit          Orthopedic    Open wound of left thigh - Primary       Patient Name: Selena Proctor  Patient MRN: [id number]  Patient YOB: 1955  CSN: [id number]  Date: 06/20/2023  Provider:   Andrez Cano    Application for Assessment: left leg wound  Skin Substitute: ...  Performed By: Andrez Cano  Time-out Taken: Yes  Location:     [] Genitalia/Hands/Feet/Multiple  Digits    [] Scalp/Face/Neck/Ears    [x]Trunk/Arms/Legs  Application Area: (sq cm): 1   Product Applied: (sq cm): 24  Product Waste (sq cm): 0  Fenestrated: Yes   Instrument:    [] Blade [] Curette  [x]Forceps  []Nippers    [] Rongeur [] Scissors  []Others:   Secured: Yes   Secured with: Steri-strips  Dressing Applied: Yes  Response to Treatment:Well tolerated by patient.  Note:     Product Number: [id number]  Expiration: 4/2/24

## 2023-06-27 ENCOUNTER — OFFICE VISIT (OUTPATIENT)
Dept: SURGERY | Facility: CLINIC | Age: 68
End: 2023-06-27
Payer: MEDICARE

## 2023-06-27 DIAGNOSIS — S71.102D OPEN WOUND OF LEFT THIGH, SUBSEQUENT ENCOUNTER: Primary | ICD-10-CM

## 2023-06-27 PROCEDURE — 99024 POSTOP FOLLOW-UP VISIT: CPT | Mod: S$PBB,,, | Performed by: STUDENT IN AN ORGANIZED HEALTH CARE EDUCATION/TRAINING PROGRAM

## 2023-06-27 PROCEDURE — 3051F HG A1C>EQUAL 7.0%<8.0%: CPT | Mod: CPTII,,, | Performed by: STUDENT IN AN ORGANIZED HEALTH CARE EDUCATION/TRAINING PROGRAM

## 2023-06-27 PROCEDURE — 1159F PR MEDICATION LIST DOCUMENTED IN MEDICAL RECORD: ICD-10-PCS | Mod: CPTII,,, | Performed by: STUDENT IN AN ORGANIZED HEALTH CARE EDUCATION/TRAINING PROGRAM

## 2023-06-27 PROCEDURE — 99214 OFFICE O/P EST MOD 30 MIN: CPT | Mod: PBBFAC | Performed by: STUDENT IN AN ORGANIZED HEALTH CARE EDUCATION/TRAINING PROGRAM

## 2023-06-27 PROCEDURE — 1159F MED LIST DOCD IN RCRD: CPT | Mod: CPTII,,, | Performed by: STUDENT IN AN ORGANIZED HEALTH CARE EDUCATION/TRAINING PROGRAM

## 2023-06-27 PROCEDURE — 3051F PR MOST RECENT HEMOGLOBIN A1C LEVEL 7.0 - < 8.0%: ICD-10-PCS | Mod: CPTII,,, | Performed by: STUDENT IN AN ORGANIZED HEALTH CARE EDUCATION/TRAINING PROGRAM

## 2023-06-27 PROCEDURE — 99024 PR POST-OP FOLLOW-UP VISIT: ICD-10-PCS | Mod: S$PBB,,, | Performed by: STUDENT IN AN ORGANIZED HEALTH CARE EDUCATION/TRAINING PROGRAM

## 2023-06-27 NOTE — PROGRESS NOTES
Subjective     Patient ID: Selena Proctor is a 67 y.o. female.    Chief Complaint: Wound Check    67-year-old  female presents to the clinic for 1 week postop status post excision of left thigh wound with closure with rotational flap.  Patient has had some increased drainage from the EVANGELINA drain.  Denies any chest pain/shortness of breath, nausea/vomiting/diarrhea, fever/chills.    4/18:  Presents for re-evaluation of left inner thigh wound.  The area has had little more dehiscence with separation of staple line; the wound has increased with granulation tissue about 50% of the wound coverage, followed by some fat necrosis at other areas; no purulent drainage or signs of infection; patient was placed on empirical antibiotics previously.  Denies any chest pain/shortness of breath, nausea/vomiting/diarrhea, fever/chills.    4/25:  Wound is doing well; patient is cleaning with Vashe and placing Silvadene and fat necrosis almost completely resolved; healthy bed of granulation tissue growing.  Patient concerned there may be a staple left.  Denies any chest pain/shortness of breath, nausea/vomiting/diarrhea, fever/chills.    5/9:  Area healing well.  Patient states she is having intermittent pain 10/10, but currently much improved.  Patient placing Silvadene to wound.  Denies any chest pain/shortness of breath, nausea/vomiting/diarrhea, fever/chills.    5/30:  Patient doing well, applying Silvadene to area and pain better controlled.  Presents for PuraPly application.  Denies any chest pain/shortness of breath, nausea/vomiting/diarrhea, fever/chills.    6/6:  Patient doing well.  Dressing intact.  Dressing removed and area is healing very well.  Increased epithelialization across wound.  Denies any chest pain/shortness of breath, nausea/vomiting/diarrhea, fever/chills.    6/20:  Patient doing well.  Epithelialization across wound at 95%.  Denies any chest pain/shortness of breath, nausea/vomiting/diarrhea,  fever/chills.    6/27:  Patient doing well.  Epithelialization across 99% of wound with small medial area with scab formation covering a spot of 0.3 x 0.3 cm.  Denies any chest pain/shortness of breath, nausea/vomiting/diarrhea, fever/chills.    Review of Systems   Constitutional: Negative.    HENT: Negative.     Eyes: Negative.    Respiratory:  Negative for shortness of breath.    Cardiovascular:  Negative for chest pain.   Gastrointestinal:  Negative for abdominal distention, abdominal pain, blood in stool, change in bowel habit and change in bowel habit.   Genitourinary: Negative.  Negative for hematuria.   Musculoskeletal:  Negative for myalgias.   Neurological:  Negative for dizziness and weakness.   Psychiatric/Behavioral: Negative.          Objective     Physical Exam  Constitutional:       General: She is not in acute distress.     Appearance: Normal appearance.   HENT:      Head: Normocephalic.   Cardiovascular:      Rate and Rhythm: Normal rate.   Pulmonary:      Effort: Pulmonary effort is normal. No respiratory distress.   Abdominal:      General: There is no distension.      Tenderness: There is no abdominal tenderness.   Musculoskeletal:         General: Normal range of motion.        Legs:       Comments: Wound measured 9 x 1.5 x 0.3 cm; With 99% Epithelialization covering wound; small areas of 0.3x0.3cm at medial edge with scab coverage   Skin:     General: Skin is warm.      Coloration: Skin is not jaundiced.   Neurological:      General: No focal deficit present.      Mental Status: She is alert and oriented to person, place, and time.      Cranial Nerves: No cranial nerve deficit.          Assessment and Plan   Apply Puraply now; seek approval for affinity; follow up in 1 week    Problem List Items Addressed This Visit          Orthopedic    Open wound of left thigh - Primary   Patient doing well.  Dressing removed in bed then placed over small scab formation.  No PuraPly applied.  We will have  the patient follow-up in 3 weeks for re-evaluation

## 2023-07-10 RX ORDER — BLOOD SUGAR DIAGNOSTIC
STRIP MISCELLANEOUS
Qty: 100 STRIP | Refills: 0 | Status: SHIPPED | OUTPATIENT
Start: 2023-07-10 | End: 2023-07-27 | Stop reason: SDUPTHER

## 2023-07-14 RX ORDER — ISOPROPYL ALCOHOL 70 ML/100ML
SWAB TOPICAL
Qty: 100 EACH | Refills: 2 | Status: SHIPPED | OUTPATIENT
Start: 2023-07-14 | End: 2023-09-25

## 2023-07-17 DIAGNOSIS — I10 ESSENTIAL HYPERTENSION: ICD-10-CM

## 2023-07-17 NOTE — TELEPHONE ENCOUNTER
----- Message from Lizzette Meadows DNP, FNP-C sent at 7/17/2023  2:29 PM CDT -----  Regarding: re: med clarification  Please call patient and have her verify her meds: is she taking Lasix, med with HCTZ, and Spironolactone?

## 2023-07-18 ENCOUNTER — OFFICE VISIT (OUTPATIENT)
Dept: SURGERY | Facility: CLINIC | Age: 68
End: 2023-07-18
Payer: MEDICARE

## 2023-07-18 DIAGNOSIS — S71.102D OPEN WOUND OF LEFT THIGH, SUBSEQUENT ENCOUNTER: ICD-10-CM

## 2023-07-18 DIAGNOSIS — R22.41 LEG MASS, RIGHT: Primary | ICD-10-CM

## 2023-07-18 PROCEDURE — 99214 PR OFFICE/OUTPT VISIT, EST, LEVL IV, 30-39 MIN: ICD-10-PCS | Mod: S$PBB,,, | Performed by: STUDENT IN AN ORGANIZED HEALTH CARE EDUCATION/TRAINING PROGRAM

## 2023-07-18 PROCEDURE — 3051F PR MOST RECENT HEMOGLOBIN A1C LEVEL 7.0 - < 8.0%: ICD-10-PCS | Mod: CPTII,,, | Performed by: STUDENT IN AN ORGANIZED HEALTH CARE EDUCATION/TRAINING PROGRAM

## 2023-07-18 PROCEDURE — 3051F HG A1C>EQUAL 7.0%<8.0%: CPT | Mod: CPTII,,, | Performed by: STUDENT IN AN ORGANIZED HEALTH CARE EDUCATION/TRAINING PROGRAM

## 2023-07-18 PROCEDURE — 99212 OFFICE O/P EST SF 10 MIN: CPT | Mod: PBBFAC | Performed by: STUDENT IN AN ORGANIZED HEALTH CARE EDUCATION/TRAINING PROGRAM

## 2023-07-18 PROCEDURE — 99214 OFFICE O/P EST MOD 30 MIN: CPT | Mod: S$PBB,,, | Performed by: STUDENT IN AN ORGANIZED HEALTH CARE EDUCATION/TRAINING PROGRAM

## 2023-07-18 RX ORDER — METOPROLOL SUCCINATE 100 MG/1
TABLET, EXTENDED RELEASE ORAL
Qty: 90 TABLET | Refills: 2 | Status: SHIPPED | OUTPATIENT
Start: 2023-07-18

## 2023-07-18 RX ORDER — LOSARTAN POTASSIUM AND HYDROCHLOROTHIAZIDE 25; 100 MG/1; MG/1
TABLET ORAL
Qty: 90 TABLET | Refills: 2 | Status: SHIPPED | OUTPATIENT
Start: 2023-07-18

## 2023-07-18 NOTE — PROGRESS NOTES
Subjective     Patient ID: Selena Proctor is a 67 y.o. female.    Chief Complaint: Follow-up (Follow with wound on left thigh )    67-year-old  female presents to the clinic for 1 week postop status post excision of left thigh wound with closure with rotational flap.  Patient has had some increased drainage from the EVANGELINA drain.  Denies any chest pain/shortness of breath, nausea/vomiting/diarrhea, fever/chills.    4/18:  Presents for re-evaluation of left inner thigh wound.  The area has had little more dehiscence with separation of staple line; the wound has increased with granulation tissue about 50% of the wound coverage, followed by some fat necrosis at other areas; no purulent drainage or signs of infection; patient was placed on empirical antibiotics previously.  Denies any chest pain/shortness of breath, nausea/vomiting/diarrhea, fever/chills.    4/25:  Wound is doing well; patient is cleaning with Vashe and placing Silvadene and fat necrosis almost completely resolved; healthy bed of granulation tissue growing.  Patient concerned there may be a staple left.  Denies any chest pain/shortness of breath, nausea/vomiting/diarrhea, fever/chills.    5/9:  Area healing well.  Patient states she is having intermittent pain 10/10, but currently much improved.  Patient placing Silvadene to wound.  Denies any chest pain/shortness of breath, nausea/vomiting/diarrhea, fever/chills.    5/30:  Patient doing well, applying Silvadene to area and pain better controlled.  Presents for PuraPly application.  Denies any chest pain/shortness of breath, nausea/vomiting/diarrhea, fever/chills.    6/6:  Patient doing well.  Dressing intact.  Dressing removed and area is healing very well.  Increased epithelialization across wound.  Denies any chest pain/shortness of breath, nausea/vomiting/diarrhea, fever/chills.    6/20:  Patient doing well.  Epithelialization across wound at 95%.  Denies any chest pain/shortness of  breath, nausea/vomiting/diarrhea, fever/chills.    6/27:  Patient doing well.  Epithelialization across 99% of wound with small medial area with scab formation covering a spot of 0.3 x 0.3 cm.  Denies any chest pain/shortness of breath, nausea/vomiting/diarrhea, fever/chills.    7/18:  Patient doing well; small opening on wound with granulation tissue and starting to heal.  Patient complain of mass on her right leg that she wants evaluated.  Denies any chest pain/shortness of breath, nausea/vomiting/diarrhea, fever/chills.    Review of Systems   Constitutional: Negative.    HENT: Negative.     Eyes: Negative.    Respiratory:  Negative for shortness of breath.    Cardiovascular:  Negative for chest pain.   Gastrointestinal:  Negative for abdominal distention, abdominal pain, blood in stool, change in bowel habit and change in bowel habit.   Genitourinary: Negative.  Negative for hematuria.   Musculoskeletal:  Negative for myalgias.   Neurological:  Negative for dizziness and weakness.   Psychiatric/Behavioral: Negative.          Objective     Physical Exam  Constitutional:       General: She is not in acute distress.     Appearance: Normal appearance.   HENT:      Head: Normocephalic.   Cardiovascular:      Rate and Rhythm: Normal rate.   Pulmonary:      Effort: Pulmonary effort is normal. No respiratory distress.   Abdominal:      General: There is no distension.      Tenderness: There is no abdominal tenderness.   Musculoskeletal:         General: Normal range of motion.        Legs:       Comments: Wound measured 9 x 1.5 x 0.3 cm; With 99% Epithelialization covering wound; small areas of 0.3x0.3cm at medial edge with scab coverage.    10 x 10 cm fatty tumor on the right anterior thigh   Skin:     General: Skin is warm.      Coloration: Skin is not jaundiced.   Neurological:      General: No focal deficit present.      Mental Status: She is alert and oriented to person, place, and time.      Cranial Nerves: No  cranial nerve deficit.          Assessment and Plan   Continue dressing changes to left inner thigh.  We will get a soft tissue ultrasound of the right leg to evaluate tumor.  Problem List Items Addressed This Visit          Orthopedic    Open wound of left thigh     Other Visit Diagnoses       Leg mass, right    -  Primary    Relevant Orders    US Extremity Non Vascular Complete Right

## 2023-07-27 ENCOUNTER — OFFICE VISIT (OUTPATIENT)
Dept: PRIMARY CARE CLINIC | Facility: CLINIC | Age: 68
End: 2023-07-27
Payer: MEDICARE

## 2023-07-27 VITALS
TEMPERATURE: 98 F | RESPIRATION RATE: 20 BRPM | OXYGEN SATURATION: 98 % | WEIGHT: 185 LBS | SYSTOLIC BLOOD PRESSURE: 126 MMHG | DIASTOLIC BLOOD PRESSURE: 72 MMHG | HEIGHT: 63 IN | BODY MASS INDEX: 32.78 KG/M2

## 2023-07-27 DIAGNOSIS — I10 ESSENTIAL HYPERTENSION: ICD-10-CM

## 2023-07-27 DIAGNOSIS — E11.9 TYPE 2 DIABETES MELLITUS WITHOUT COMPLICATION, WITHOUT LONG-TERM CURRENT USE OF INSULIN: Primary | ICD-10-CM

## 2023-07-27 DIAGNOSIS — R60.9 SWELLING: ICD-10-CM

## 2023-07-27 LAB
ALBUMIN SERPL BCP-MCNC: 3.3 G/DL (ref 3.5–5)
ALBUMIN/GLOB SERPL: 0.8 {RATIO}
ALP SERPL-CCNC: 61 U/L (ref 55–142)
ALT SERPL W P-5'-P-CCNC: 18 U/L (ref 13–56)
ANION GAP SERPL CALCULATED.3IONS-SCNC: 14 MMOL/L (ref 7–16)
AST SERPL W P-5'-P-CCNC: 15 U/L (ref 15–37)
BASOPHILS # BLD AUTO: 0.03 K/UL (ref 0–0.2)
BASOPHILS NFR BLD AUTO: 0.5 % (ref 0–1)
BILIRUB SERPL-MCNC: 0.6 MG/DL (ref ?–1.2)
BUN SERPL-MCNC: 19 MG/DL (ref 7–18)
BUN/CREAT SERPL: 18 (ref 6–20)
CALCIUM SERPL-MCNC: 9.1 MG/DL (ref 8.5–10.1)
CHLORIDE SERPL-SCNC: 108 MMOL/L (ref 98–107)
CO2 SERPL-SCNC: 22 MMOL/L (ref 21–32)
CREAT SERPL-MCNC: 1.08 MG/DL (ref 0.55–1.02)
DIFFERENTIAL METHOD BLD: ABNORMAL
EGFR (NO RACE VARIABLE) (RUSH/TITUS): 56 ML/MIN/1.73M2
EOSINOPHIL # BLD AUTO: 0.14 K/UL (ref 0–0.5)
EOSINOPHIL NFR BLD AUTO: 2.4 % (ref 1–4)
ERYTHROCYTE [DISTWIDTH] IN BLOOD BY AUTOMATED COUNT: 16.2 % (ref 11.5–14.5)
EST. AVERAGE GLUCOSE BLD GHB EST-MCNC: 184 MG/DL
GLOBULIN SER-MCNC: 4.4 G/DL (ref 2–4)
GLUCOSE SERPL-MCNC: 170 MG/DL (ref 74–106)
HBA1C MFR BLD HPLC: 8.1 % (ref 4.5–6.6)
HCT VFR BLD AUTO: 44.1 % (ref 38–47)
HGB BLD-MCNC: 13.5 G/DL (ref 12–16)
IMM GRANULOCYTES # BLD AUTO: 0.02 K/UL (ref 0–0.04)
IMM GRANULOCYTES NFR BLD: 0.3 % (ref 0–0.4)
LYMPHOCYTES # BLD AUTO: 1.97 K/UL (ref 1–4.8)
LYMPHOCYTES NFR BLD AUTO: 33.1 % (ref 27–41)
MCH RBC QN AUTO: 27.4 PG (ref 27–31)
MCHC RBC AUTO-ENTMCNC: 30.6 G/DL (ref 32–36)
MCV RBC AUTO: 89.6 FL (ref 80–96)
MONOCYTES # BLD AUTO: 0.64 K/UL (ref 0–0.8)
MONOCYTES NFR BLD AUTO: 10.8 % (ref 2–6)
MPC BLD CALC-MCNC: 11.9 FL (ref 9.4–12.4)
NEUTROPHILS # BLD AUTO: 3.15 K/UL (ref 1.8–7.7)
NEUTROPHILS NFR BLD AUTO: 52.9 % (ref 53–65)
NRBC # BLD AUTO: 0 X10E3/UL
NRBC, AUTO (.00): 0 %
PLATELET # BLD AUTO: 247 K/UL (ref 150–400)
POTASSIUM SERPL-SCNC: 3.8 MMOL/L (ref 3.5–5.1)
PROT SERPL-MCNC: 7.7 G/DL (ref 6.4–8.2)
RBC # BLD AUTO: 4.92 M/UL (ref 4.2–5.4)
SODIUM SERPL-SCNC: 140 MMOL/L (ref 136–145)
WBC # BLD AUTO: 5.95 K/UL (ref 4.5–11)

## 2023-07-27 PROCEDURE — 80053 COMPREHENSIVE METABOLIC PANEL: ICD-10-PCS | Mod: ,,, | Performed by: CLINICAL MEDICAL LABORATORY

## 2023-07-27 PROCEDURE — 83036 HEMOGLOBIN GLYCOSYLATED A1C: CPT | Mod: ,,, | Performed by: CLINICAL MEDICAL LABORATORY

## 2023-07-27 PROCEDURE — 99214 PR OFFICE/OUTPT VISIT, EST, LEVL IV, 30-39 MIN: ICD-10-PCS | Mod: ,,, | Performed by: NURSE PRACTITIONER

## 2023-07-27 PROCEDURE — 3078F PR MOST RECENT DIASTOLIC BLOOD PRESSURE < 80 MM HG: ICD-10-PCS | Mod: ,,, | Performed by: NURSE PRACTITIONER

## 2023-07-27 PROCEDURE — 1159F MED LIST DOCD IN RCRD: CPT | Mod: ,,, | Performed by: NURSE PRACTITIONER

## 2023-07-27 PROCEDURE — 83036 HEMOGLOBIN A1C: ICD-10-PCS | Mod: ,,, | Performed by: CLINICAL MEDICAL LABORATORY

## 2023-07-27 PROCEDURE — 99214 OFFICE O/P EST MOD 30 MIN: CPT | Mod: ,,, | Performed by: NURSE PRACTITIONER

## 2023-07-27 PROCEDURE — 3074F PR MOST RECENT SYSTOLIC BLOOD PRESSURE < 130 MM HG: ICD-10-PCS | Mod: ,,, | Performed by: NURSE PRACTITIONER

## 2023-07-27 PROCEDURE — 3051F PR MOST RECENT HEMOGLOBIN A1C LEVEL 7.0 - < 8.0%: ICD-10-PCS | Mod: ,,, | Performed by: NURSE PRACTITIONER

## 2023-07-27 PROCEDURE — 3008F BODY MASS INDEX DOCD: CPT | Mod: ,,, | Performed by: NURSE PRACTITIONER

## 2023-07-27 PROCEDURE — 3008F PR BODY MASS INDEX (BMI) DOCUMENTED: ICD-10-PCS | Mod: ,,, | Performed by: NURSE PRACTITIONER

## 2023-07-27 PROCEDURE — 1159F PR MEDICATION LIST DOCUMENTED IN MEDICAL RECORD: ICD-10-PCS | Mod: ,,, | Performed by: NURSE PRACTITIONER

## 2023-07-27 PROCEDURE — 3051F HG A1C>EQUAL 7.0%<8.0%: CPT | Mod: ,,, | Performed by: NURSE PRACTITIONER

## 2023-07-27 PROCEDURE — 85025 CBC WITH DIFFERENTIAL: ICD-10-PCS | Mod: ,,, | Performed by: CLINICAL MEDICAL LABORATORY

## 2023-07-27 PROCEDURE — 1160F PR REVIEW ALL MEDS BY PRESCRIBER/CLIN PHARMACIST DOCUMENTED: ICD-10-PCS | Mod: ,,, | Performed by: NURSE PRACTITIONER

## 2023-07-27 PROCEDURE — 80053 COMPREHEN METABOLIC PANEL: CPT | Mod: ,,, | Performed by: CLINICAL MEDICAL LABORATORY

## 2023-07-27 PROCEDURE — 85025 COMPLETE CBC W/AUTO DIFF WBC: CPT | Mod: ,,, | Performed by: CLINICAL MEDICAL LABORATORY

## 2023-07-27 PROCEDURE — 1160F RVW MEDS BY RX/DR IN RCRD: CPT | Mod: ,,, | Performed by: NURSE PRACTITIONER

## 2023-07-27 PROCEDURE — 3074F SYST BP LT 130 MM HG: CPT | Mod: ,,, | Performed by: NURSE PRACTITIONER

## 2023-07-27 PROCEDURE — 3078F DIAST BP <80 MM HG: CPT | Mod: ,,, | Performed by: NURSE PRACTITIONER

## 2023-07-27 RX ORDER — LANCETS 33 GAUGE
EACH MISCELLANEOUS
Qty: 200 EACH | Refills: 0 | Status: SHIPPED | OUTPATIENT
Start: 2023-07-27

## 2023-07-27 RX ORDER — GLIPIZIDE 2.5 MG/1
2.5 TABLET, EXTENDED RELEASE ORAL
Qty: 90 TABLET | Refills: 3 | Status: SHIPPED | OUTPATIENT
Start: 2023-07-27 | End: 2024-07-26

## 2023-07-27 NOTE — PROGRESS NOTES
[facility]  Primary Care       PATIENT NAME: Selena Proctor   : 1955    AGE: 67 y.o. DATE: 2023    MRN: [id number]        Reason for Visit / Chief Complaint:  Diabetes (Fasting blood sugar  158 ) and Hypertension     Subjective:     HPI: Patient here for routine follow up for HTN and Diabetes; denies any shortness of breath or chest pain.     Patient states she checks her blood sugar once in the morning and sometimes in the evenings, depending on what it is.   State blood sugar runs around 150.    Diabetes  Pertinent negatives for hypoglycemia include no headaches. Pertinent negatives for diabetes include no chest pain and no fatigue.   Hypertension  Pertinent negatives include no chest pain, headaches or shortness of breath.        Review of Systems: Review of Systems   Constitutional:  Negative for chills, fatigue and fever.   Respiratory:  Negative for cough, chest tightness and shortness of breath.    Cardiovascular:  Negative for chest pain.   Gastrointestinal:  Negative for abdominal pain, constipation, diarrhea, nausea and vomiting.   Genitourinary:  Negative for dysuria.   Musculoskeletal:  Negative for gait problem.   Skin:  Negative for rash.   Neurological:  Negative for headaches.        Review of patient's allergies indicates:   Allergen Reactions    Betadine [povidone-iodine] Itching        Med List:  Current Outpatient Medications on File Prior to Visit   Medication Sig Dispense Refill    ACCU-CHEK SOFT DEV LANCETS Kit USE AS DIRECTED 100 each 1    alcohol swabs (DROPSAFE ALCOHOL PREP PADS) PadM USE AS DIRECTED 100 each 2    aspirin (ECOTRIN) 81 MG EC tablet Take 81 mg by mouth once daily.       blood glucose control, low (TRUE METRIX LEVEL 1) Soln USE AS DIRECTED WITH GLUCOSE METER 1 each 0    blood-glucose meter (ACCU-CHEK GUIDE GLUCOSE METER) Misc Use as directed 1 each 0    clotrimazole-betamethasone 1-0.05% (LOTRISONE) cream Apply topically 2 (two) times daily. 45 g  2    diclofenac sodium (VOLTAREN ARTHRITIS PAIN) 1 % Gel Apply 2 g topically 4 (four) times daily. Apply to knees, elbows, joints as needed 10 each 2    docusate sodium (COLACE) 100 MG capsule Take 1 capsule (100 mg total) by mouth 2 (two) times daily. 28 capsule 0    ezetimibe (ZETIA) 10 mg tablet TAKE 1 TABLET EVERY DAY 90 tablet 2    furosemide (LASIX) 40 MG tablet TAKE 1 TABLET EVERY DAY AS NEEDED 90 tablet 0    gabapentin (NEURONTIN) 300 MG capsule Take 1 capsule (300 mg total) by mouth 3 (three) times daily. 90 capsule 11    HYDROcodone-acetaminophen (NORCO)  mg per tablet Take 1 tablet by mouth every 8 (eight) hours. 90 tablet 0    [START ON 8/4/2023] HYDROcodone-acetaminophen (NORCO)  mg per tablet Take 1 tablet by mouth every 8 (eight) hours. 90 tablet 0    JARDIANCE 10 mg tablet TAKE 1 TABLET EVERY DAY 90 tablet 1    lancets (ACCU-CHEK SOFTCLIX LANCETS) Misc CHECK BLOOD SUGAR EVERY DAY AS DIRECTED 100 each 0    losartan-hydrochlorothiazide 100-25 mg (HYZAAR) 100-25 mg per tablet TAKE 1 TABLET EVERY DAY 90 tablet 2    metoprolol succinate (TOPROL-XL) 100 MG 24 hr tablet TAKE 1 TABLET EVERY DAY 90 tablet 2    montelukast (SINGULAIR) 10 mg tablet TAKE 1 TABLET EVERY DAY AS NEEDED 90 tablet 2    mupirocin (BACTROBAN) 2 % ointment Apply topically once daily. 30 g 12    naloxone (NARCAN) 4 mg/actuation Spry PUT 1 SPRAY IN 1 NOSTRIL WAIT 2 MINUTES THEN REPEAT DOSE IN THE OTHER NOSTRIL      NIFEdipine (PROCARDIA-XL) 90 MG (OSM) 24 hr tablet TAKE 1 TABLET EVERY DAY 90 tablet 1    omeprazole (PRILOSEC) 40 MG capsule TAKE 1 CAPSULE EVERY DAY 90 capsule 0    pitavastatin calcium (LIVALO) 4 mg Tab Take one tablet by mouth three times per week on Monday, Wednesday, Friday. 90 tablet 2    potassium chloride (MICRO-K) 10 MEQ CpSR TAKE 2 CAPSULES TWICE DAILY 360 capsule 1    SAXagliptin-metformin (KOMBIGLYZE XR) 2.5-1,000 mg TM24 Take 1 tablet by mouth once daily. 90 tablet 3    silver sulfADIAZINE 1%  (SILVADENE) 1 % cream Apply topically once daily. 1000 g 11    spironolactone (ALDACTONE) 25 MG tablet TAKE 1 TABLET EVERY DAY 90 tablet 0    TRUE METRIX GLUCOSE METER kit USE AS DIRECTED 180 each 2    [DISCONTINUED] ACCU-CHEK GUIDE TEST STRIPS Strp TEST BLOOD SUGAR AS DIRECTED 100 strip 0    [DISCONTINUED] chlorhexidine (HIBICLENS) 4 % external liquid Apply topically daily as needed (thigh wound). (Patient not taking: Reported on 4/11/2023) 120 mL 0    [DISCONTINUED] glipiZIDE (GLUCOTROL) 2.5 MG TR24 Take 1 tablet (2.5 mg total) by mouth daily with breakfast. 90 tablet 3    [DISCONTINUED] miconazole (MONISTAT 7) 2 % vaginal cream Apply cream to external vaginal area for vaginal itching. (Patient not taking: Reported on 4/11/2023) 1 kit 0    [DISCONTINUED] mupirocin (BACTROBAN) 2 % ointment Apply topically 3 (three) times daily. (Patient not taking: Reported on 4/11/2023) 30 g 2    [DISCONTINUED] naloxegoL (MOVANTIK) 25 mg tablet Take 25 mg by mouth once daily. (Patient not taking: Reported on 4/11/2023) 30 tablet 5    [DISCONTINUED] TRUEPLUS LANCETS 33 gauge Misc USE AS DIRECTED 200 each 0     No current facility-administered medications on file prior to visit.       Medical/Social/Family History:  Past Medical History:   Diagnosis Date    Acid reflux     Coronary arteriosclerosis     Diabetes     Diabetes mellitus, type 2     Hypertension     Skin cancer     left leg    Spondylosis without myelopathy or radiculopathy, lumbar region       Social History     Tobacco Use   Smoking Status Never   Smokeless Tobacco Never      Social History     Substance and Sexual Activity   Alcohol Use Not Currently       Family History   Problem Relation Age of Onset    Hypertension Mother     Heart disease Mother     Diabetes Sister     Hypertension Sister     Hypertension Brother     Alcohol abuse Father     Cancer Father     Hypertension Son     Mental illness Maternal Aunt       Past Surgical History:   Procedure Laterality Date  "   APPENDECTOMY      BIOPSY OF LESION Left 3/29/2023    Procedure: BIOPSY, LESION;  Surgeon: Andrez Norman DO;  Location: [facility];  Service: General;  Laterality: Left;  excisional biospy of left thigh with rotational flap    BLOCK, NERVE, OBTURATOR Right 2019    Right Femoral/Obturator Nerve Block  Dr Headley     SECTION      x2    CORONARY ARTERY BYPASS GRAFT      KNEE ARTHROPLASTY Right     KNEE ARTHROSCOPY Left     SKIN GRAFT      Left Thigh    TOTAL HIP ARTHROPLASTY Bilateral     TUBAL LIGATION        Immunization History   Administered Date(s) Administered    COVID-19 MRNA, LN-S PF (MODERNA HALF 0.25 ML DOSE) 10/29/2021    COVID-19, MRNA, LN-S, PF (MODERNA FULL 0.5 ML DOSE) 2021, 03/10/2021    Influenza - Quadrivalent - PF *Preferred* (6 months and older) 2022    Pneumococcal Conjugate - 13 Valent 2017    Pneumococcal Conjugate - 20 Valent 2022          Objective:      Vitals:    23 1110   BP: 126/72   BP Location: Right arm   Patient Position: Sitting   BP Method: Large (Automatic)   Resp: 20   Temp: 97.5 °F (36.4 °C)   TempSrc: Oral   SpO2: 98%   Weight: 83.9 kg (185 lb)   Height: 5' 3" (1.6 m)     Body mass index is 32.77 kg/m².     Physical Exam: Physical Exam  Vitals and nursing note reviewed.   Constitutional:       General: She is not in acute distress.     Appearance: Normal appearance. She is not ill-appearing, toxic-appearing or diaphoretic.   HENT:      Head: Normocephalic.      Nose: Nose normal.      Mouth/Throat:      Mouth: Mucous membranes are moist.   Eyes:      Extraocular Movements: Extraocular movements intact.      Conjunctiva/sclera: Conjunctivae normal.      Pupils: Pupils are equal, round, and reactive to light.   Cardiovascular:      Rate and Rhythm: Normal rate and regular rhythm.      Heart sounds: Normal heart sounds.   Pulmonary:      Effort: Pulmonary effort is normal. No respiratory distress.      Breath sounds: Normal breath " sounds. No stridor. No wheezing, rhonchi or rales.   Abdominal:      General: Bowel sounds are normal. There is no distension.      Palpations: Abdomen is soft.      Tenderness: There is no abdominal tenderness.   Musculoskeletal:         General: Normal range of motion.      Cervical back: Normal range of motion and neck supple.   Skin:     General: Skin is warm and dry.   Neurological:      General: No focal deficit present.      Mental Status: She is alert and oriented to person, place, and time.      Gait: Gait normal.   Psychiatric:         Mood and Affect: Mood normal.         Behavior: Behavior normal.              Assessment:          ICD-10-CM ICD-9-CM   1. Type 2 diabetes mellitus without complication, without long-term current use of insulin  E11.9 250.00   2. Essential hypertension  I10 401.9   3. Swelling  R60.9 782.3        Plan:       Type 2 diabetes mellitus without complication, without long-term current use of insulin  -     CBC Auto Differential; Future; Expected date: 07/27/2023  -     Comprehensive Metabolic Panel; Future; Expected date: 07/27/2023  -     Hemoglobin A1C; Future; Expected date: 07/27/2023  -     glipiZIDE (GLUCOTROL) 2.5 MG TR24; Take 1 tablet (2.5 mg total) by mouth daily with breakfast.  Dispense: 90 tablet; Refill: 3  -     blood sugar diagnostic (ACCU-CHEK GUIDE TEST STRIPS) Strp; TEST BLOOD SUGAR AS DIRECTED  Dispense: 100 strip; Refill: 2  -     lancets (TRUEPLUS LANCETS) 33 gauge Misc; USE AS DIRECTED  Dispense: 200 each; Refill: 0    Essential hypertension  -     CBC Auto Differential; Future; Expected date: 07/27/2023  -     Comprehensive Metabolic Panel; Future; Expected date: 07/27/2023    Swelling          New & refilled meds:  Requested Prescriptions     Signed Prescriptions Disp Refills    glipiZIDE (GLUCOTROL) 2.5 MG TR24 90 tablet 3     Sig: Take 1 tablet (2.5 mg total) by mouth daily with breakfast.    blood sugar diagnostic (ACCU-CHEK GUIDE TEST STRIPS) Strp 100  "strip 2     Sig: TEST BLOOD SUGAR AS DIRECTED    lancets (TRUEPLUS LANCETS) 33 gauge Misc 200 each 0     Sig: USE AS DIRECTED       Follow up in about 3 months (around 10/27/2023), or if symptoms worsen or fail to improve, for Hypertension, diabetes.     Patient Instructions   Patient Education       Diabetes and Diet   The Basics   Written by the doctors and editors at [facility]   Why is diet important in diabetes? -- Diet is important because it is part of diabetes treatment. Many people need to change what they eat and how much they eat to help treat their diabetes. It is important for people to treat their diabetes so that they:  Keep their blood sugar at or near a normal level  Prevent long-term problems, such as heart or kidney problems, that can happen in people with diabetes  Changing your diet can also help treat obesity, high blood pressure, and high cholesterol. These conditions can affect people with diabetes and can lead to future problems, such as heart attacks or strokes.  Who will work with me to change my diet? -- Your doctor or nurse will work with you to make a food plan to change your diet. They might also recommend that you work with a "dietitian." A dietitian is an expert on food and eating.  Do I need to eat at the same times every day? -- When and how often you should eat depends, in part, on the diabetes medicines you take. For example:  People who take about the same amount of insulin at the same time each day (called a "fixed regimen") should eat meals at the same times. This is also true for people who take pills that increase insulin levels, such as sulfonylureas. Eating meals at the same time every day helps prevent low blood sugar.  People who adjust the dose and timing of their insulin each day (called a "flexible regimen") do not always have to eat meals at the same time. That's because they can time their insulin dose for before they plan to eat, and also adjust the dose for how much " "they plan to eat.  People who take medicines that don't usually cause low blood sugar, such as metformin, don't have to eat meals at the same time every day.  What do I need to think about when planning what to eat? -- Our bodies break down the food we eat into small pieces called carbohydrates, proteins, and fats.  When planning what to eat, people with diabetes need to think about:  Carbohydrates (or "carbs") - Carbohydrates, which are sugars that our bodies use for energy, can raise a person's blood sugar level. Your doctor, nurse, or dietitian will tell you how many carbohydrates you should eat at each meal or snack. Foods that have carbohydrates include:  Bread, pasta, and rice  Vegetables and fruits  Dairy foods  Foods and drinks with added sugar  It is best to get your carbohydrates from fruits, vegetables, whole grains, and low-fat milk. It is best to avoid drinks with added sugar, like soda, juices, and sports drinks.   Protein - Your doctor, nurse, or dietitian will tell you how much protein you should eat each day. It is best to eat lean meats, fish, eggs, beans, peas, soy products, nuts, and seeds.  Fats - The type of fat you eat is more important than the amount of fat. "Saturated" and "trans" fats can increase your risk for heart problems, like a heart attack.  Foods that have saturated fats include meat, butter, cheese, and ice cream.  Foods that have trans fats include processed food with "partially hydrogenated oils" on the ingredient list. This may include fried foods, store bought cookies, muffins, pies, and cakes.  "Monounsaturated" and "polyunsaturated" fats are better for you. Foods with these types of fat include fish, avocado, olive oil, and nuts.  Calories - People need to eat a certain amount of calories each day to keep their weight the same. People who are overweight and want to lose weight need to eat fewer calories each day.  Fiber - Eating foods with a lot of fiber can help control a " person's blood sugar level. Foods that have a lot of fiber include apples, green beans, peas, beans, lentils, nuts, oatmeal, and whole grains.  Salt - People who have high blood pressure should not eat foods that contain a lot of salt (also called sodium). People with high blood pressure should also eat healthy foods, such as fruits, vegetables, and low-fat dairy foods.  Alcohol - Having more than 1 drink (for women) or 2 drinks (for men) a day can raise blood sugar levels. Also, drinks that have fruit juice or soda in them can raise blood sugar levels.  What can I do if I need to lose weight? -- If you need to lose weight, you can:  Exercise - Try to get at least 30 minutes of physical activity a day, most days of the week. Even gentle exercise, like walking, is good for your health. Some people with diabetes need to change their medicine dose before they exercise. They might also need to check their blood sugar levels before and after exercising.  Eat fewer calories - Your doctor, nurse, or dietitian can tell you how many calories you should eat each day in order to lose weight.  If you are worried about your weight, size, or shape, talk with your doctor, nurse, or dietitian. They can help you make changes to improve your health.  Can I eat the same foods as my family? -- Yes. You do not need to eat special foods if you have diabetes. You and your family can eat the same foods. Changing your diet is mostly about eating healthy foods and not eating too much.  What are the other parts of diabetes treatment? -- Besides changing your diet, the other parts of diabetes treatment are:  Exercise  Medicines  Some people with diabetes need to learn how to match their diet and exercise with their medicine dose. For example, people who use insulin might need to choose the dose of insulin they give themselves. To choose their dose, they need to think about:  What they plan to eat at the next meal  How much exercise they plan  to do  What their blood sugar level is  If the diet and exercise do not match the medicine dose, a person's blood sugar level can get too low or too high. Blood sugar levels that are too low or too high can cause problems.  All topics are updated as new evidence becomes available and our peer review process is complete.  This topic retrieved from [employer] on: Sep 21, 2021.  Topic 10506 Version 7.0  Release: 29.4.2 - C29.263  © 2021 UpToDate, Inc. and/or its affiliates. All rights reserved.  Consumer Information Use and Disclaimer   This information is not specific medical advice and does not replace information you receive from your health care provider. This is only a brief summary of general information. It does NOT include all information about conditions, illnesses, injuries, tests, procedures, treatments, therapies, discharge instructions or life-style choices that may apply to you. You must talk with your health care provider for complete information about your health and treatment options. This information should not be used to decide whether or not to accept your health care provider's advice, instructions or recommendations. Only your health care provider has the knowledge and training to provide advice that is right for you. The use of this information is governed by the [employer] User License Agreement, available at [email or web address]/[email or web address].The use of [employer] content is governed by the [employer] Terms of Use. ©2021 UpToDate, Inc. All rights reserved.  Copyright   © 2021 UpToDate, Inc. and/or its affiliates. All rights reserved.  Patient Education       Controlling Your Blood Pressure Through Lifestyle   The Basics   Written by the doctors and editors at [employer]   What does my lifestyle have to do with my blood pressure? -- The things you do and the foods you eat have a big effect on your blood pressure and your overall health. Following the right lifestyle  "can:  Lower your blood pressure or keep you from getting high blood pressure in the first place  Reduce your need for blood pressure medicines  Make medicines for high blood pressure work better, if you do take them  Lower the chances that you'll have a heart attack or stroke, or develop kidney disease  Which lifestyle choices will help lower my blood pressure? -- Here's what you can do:  Lose weight (if you are overweight)  Choose a diet rich in fruits, vegetables, and low-fat dairy products, and low in meats, sweets, and refined grains  Eat less salt (sodium)  Do something active for at least 30 minutes a day on most days of the week  Limit the amount of alcohol you drink  If you have high blood pressure, it's also very important to quit smoking (if you smoke). Quitting smoking might not bring your blood pressure down. But it will lower the chances that you'll have a heart attack or stroke, and it will help you feel better and live longer.  Start low and go slow -- The changes listed above might sound like a lot, but don't worry. You don't have to change everything all at once. The key to improving your lifestyle is to "start low and go slow." Choose 1 small, specific thing to change and try doing it for a while. If it works for you, keep doing it until it becomes a habit. If it doesn't, don't give up. Choose something else to change and see how that goes.  Let's say, for example, that you would like to improve your diet. If you're the type of person who eats cheeseburgers and French fries all the time, you can't switch to eating just salads from one day to the next. When people try to make changes like that, they often fail. Then they feel frustrated and tend to give up. So instead of trying to change everything about your diet in 1 day, change 1 or 2 small things about your diet and give yourself time to get used to those changes. For instance, keep the cheeseburger but give up the French fries. Or eat the same " "things but cut your portions in half.  As you find things that you are able to change and stick with, keep adding new changes. In time, you will see that you can actually change a lot. You just have to get used to the changes slowly.  Lose weight -- When people think about losing weight, they sometimes make it more complicated than it really is. To lose weight, you have to either eat less or move more. If you do both of those things, it's even better. But there is no single weight-loss diet or activity that's better than any other. When it comes to weight loss, the most effective plan is the one that you'll stick with.  Improve your diet -- There is no single diet that is right for everyone. But in general, a healthy diet can include:  Lots of fruits, vegetables, and whole grains  Some beans, peas, lentils, chickpeas, and similar foods  Some nuts, such as walnuts, almonds, and peanuts  Fat-free or low-fat milk and milk products  Some fish  To have a healthy diet, it's also important to limit or avoid sugar, sweets, meats, and refined grains. (Refined grains are found in white bread, white rice, most forms of pasta, and most packaged "snack" foods.)  Reduce salt -- Many people think that eating a low-sodium diet means avoiding the salt shaker and not adding salt when cooking. The truth is, not adding salt at the table or when you cook will only help a little. Almost all of the sodium you eat is already in the food you buy at the grocery store or at restaurants (figure 1).  The most important thing you can do to cut down on sodium is to eat less processed food. That means that you should avoid most foods that are sold in cans, boxes, jars, and bags. You should also eat in restaurants less often.  To reduce the amount of sodium you get, buy fresh or fresh-frozen fruits, vegetables, and meats. (Fresh-frozen foods have had nothing added to them before freezing.) Then you can make meals at home, from scratch, with these " "ingredients.  As with the other changes, don't try to cut out salt all at once. Instead, choose 1 or 2 foods that have a lot of sodium and try to replace them with low-sodium choices. When you get used to those low-sodium options, find another food or 2 to change. Then keep going, until all the foods you eat are sodium-free or low in sodium.  Become more active -- If you want to be more active, you don't have to go to the gym or get all sweaty. It is possible to increase your activity level while doing everyday things you enjoy. Walking, gardening, and dancing are just a few of the things that you might try. As with all the other changes, the key is not to do too much too fast. If you don't do any activity now, start by walking for just a few minutes every other day. Do that for a few weeks. If you stick with it, try doing it for longer. But if you find that you don't like walking, try a different activity.  Drink less alcohol -- If you are a woman, do not have more than 1 "standard drink" of alcohol a day. If you are a man, do not have more than 2. A "standard drink" is:  A can or bottle that has 12 ounces of beer  A glass that has 5 ounces of wine  A shot that has 1.5 ounces of whiskey  Where should I start? -- If you want to improve your lifestyle, start by making the changes that you think would be easiest for you. If you used to exercise and just got out of the habit, maybe it would be easy for you to start exercising again. Or if you actually like cooking meals from scratch, maybe the first thing you should focus on is eating home-cooked meals that are low in sodium.  Whatever you tackle first, choose specific, realistic goals, and give yourself a deadline. For example, do not decide that you are going to "exercise more." Instead, decide that you are going to walk for 10 minutes on Monday, Wednesday, and Friday, and that you are going to do this for the next 2 weeks.  When lifestyle changes are too general, " people have a hard time following through.  Now go. You can do it!  All topics are updated as new evidence becomes available and our peer review process is complete.  This topic retrieved from [employer] on: Sep 21, 2021.  Topic 12455 Version 8.0  Release: 29.4.2 - C29.263  © 2021 UpToDate, Inc. and/or its affiliates. All rights reserved.  figure 1: Sources of sodium in your diet     Graphic 86420 Version 2.0    Consumer Information Use and Disclaimer   This information is not specific medical advice and does not replace information you receive from your health care provider. This is only a brief summary of general information. It does NOT include all information about conditions, illnesses, injuries, tests, procedures, treatments, therapies, discharge instructions or life-style choices that may apply to you. You must talk with your health care provider for complete information about your health and treatment options. This information should not be used to decide whether or not to accept your health care provider's advice, instructions or recommendations. Only your health care provider has the knowledge and training to provide advice that is right for you. The use of this information is governed by the [employer] End User License Agreement, available at https://www.Cognitive Networks.Shiftboard Online Scheduling/en/solutions/Adomik/about/craig.The use of [employer] content is governed by the [employer] Terms of Use. ©2021 UpToDate, Inc. All rights reserved.  Copyright   © 2021 UpToDate, Inc. and/or its affiliates. All rights reserved.           Signature: Lizzette Meadows DNP, [facility]

## 2023-07-27 NOTE — PATIENT INSTRUCTIONS
"Patient Education       Diabetes and Diet   The Basics   Written by the doctors and editors at [facility]   Why is diet important in diabetes? -- Diet is important because it is part of diabetes treatment. Many people need to change what they eat and how much they eat to help treat their diabetes. It is important for people to treat their diabetes so that they:  Keep their blood sugar at or near a normal level  Prevent long-term problems, such as heart or kidney problems, that can happen in people with diabetes  Changing your diet can also help treat obesity, high blood pressure, and high cholesterol. These conditions can affect people with diabetes and can lead to future problems, such as heart attacks or strokes.  Who will work with me to change my diet? -- Your doctor or nurse will work with you to make a food plan to change your diet. They might also recommend that you work with a "dietitian." A dietitian is an expert on food and eating.  Do I need to eat at the same times every day? -- When and how often you should eat depends, in part, on the diabetes medicines you take. For example:  People who take about the same amount of insulin at the same time each day (called a "fixed regimen") should eat meals at the same times. This is also true for people who take pills that increase insulin levels, such as sulfonylureas. Eating meals at the same time every day helps prevent low blood sugar.  People who adjust the dose and timing of their insulin each day (called a "flexible regimen") do not always have to eat meals at the same time. That's because they can time their insulin dose for before they plan to eat, and also adjust the dose for how much they plan to eat.  People who take medicines that don't usually cause low blood sugar, such as metformin, don't have to eat meals at the same time every day.  What do I need to think about when planning what to eat? -- Our bodies break down the food we eat into small pieces " "called carbohydrates, proteins, and fats.  When planning what to eat, people with diabetes need to think about:  Carbohydrates (or "carbs") - Carbohydrates, which are sugars that our bodies use for energy, can raise a person's blood sugar level. Your doctor, nurse, or dietitian will tell you how many carbohydrates you should eat at each meal or snack. Foods that have carbohydrates include:  Bread, pasta, and rice  Vegetables and fruits  Dairy foods  Foods and drinks with added sugar  It is best to get your carbohydrates from fruits, vegetables, whole grains, and low-fat milk. It is best to avoid drinks with added sugar, like soda, juices, and sports drinks.   Protein - Your doctor, nurse, or dietitian will tell you how much protein you should eat each day. It is best to eat lean meats, fish, eggs, beans, peas, soy products, nuts, and seeds.  Fats - The type of fat you eat is more important than the amount of fat. "Saturated" and "trans" fats can increase your risk for heart problems, like a heart attack.  Foods that have saturated fats include meat, butter, cheese, and ice cream.  Foods that have trans fats include processed food with "partially hydrogenated oils" on the ingredient list. This may include fried foods, store bought cookies, muffins, pies, and cakes.  "Monounsaturated" and "polyunsaturated" fats are better for you. Foods with these types of fat include fish, avocado, olive oil, and nuts.  Calories - People need to eat a certain amount of calories each day to keep their weight the same. People who are overweight and want to lose weight need to eat fewer calories each day.  Fiber - Eating foods with a lot of fiber can help control a person's blood sugar level. Foods that have a lot of fiber include apples, green beans, peas, beans, lentils, nuts, oatmeal, and whole grains.  Salt - People who have high blood pressure should not eat foods that contain a lot of salt (also called sodium). People with high " blood pressure should also eat healthy foods, such as fruits, vegetables, and low-fat dairy foods.  Alcohol - Having more than 1 drink (for women) or 2 drinks (for men) a day can raise blood sugar levels. Also, drinks that have fruit juice or soda in them can raise blood sugar levels.  What can I do if I need to lose weight? -- If you need to lose weight, you can:  Exercise - Try to get at least 30 minutes of physical activity a day, most days of the week. Even gentle exercise, like walking, is good for your health. Some people with diabetes need to change their medicine dose before they exercise. They might also need to check their blood sugar levels before and after exercising.  Eat fewer calories - Your doctor, nurse, or dietitian can tell you how many calories you should eat each day in order to lose weight.  If you are worried about your weight, size, or shape, talk with your doctor, nurse, or dietitian. They can help you make changes to improve your health.  Can I eat the same foods as my family? -- Yes. You do not need to eat special foods if you have diabetes. You and your family can eat the same foods. Changing your diet is mostly about eating healthy foods and not eating too much.  What are the other parts of diabetes treatment? -- Besides changing your diet, the other parts of diabetes treatment are:  Exercise  Medicines  Some people with diabetes need to learn how to match their diet and exercise with their medicine dose. For example, people who use insulin might need to choose the dose of insulin they give themselves. To choose their dose, they need to think about:  What they plan to eat at the next meal  How much exercise they plan to do  What their blood sugar level is  If the diet and exercise do not match the medicine dose, a person's blood sugar level can get too low or too high. Blood sugar levels that are too low or too high can cause problems.  All topics are updated as new evidence becomes  available and our peer review process is complete.  This topic retrieved from [employer] on: Sep 21, 2021.  Topic 49650 Version 7.0  Release: 29.4.2 - C29.263  © 2021 UpToDate, Inc. and/or its affiliates. All rights reserved.  Consumer Information Use and Disclaimer   This information is not specific medical advice and does not replace information you receive from your health care provider. This is only a brief summary of general information. It does NOT include all information about conditions, illnesses, injuries, tests, procedures, treatments, therapies, discharge instructions or life-style choices that may apply to you. You must talk with your health care provider for complete information about your health and treatment options. This information should not be used to decide whether or not to accept your health care provider's advice, instructions or recommendations. Only your health care provider has the knowledge and training to provide advice that is right for you. The use of this information is governed by the [employer] End User License Agreement, available at https://www.iTracs/en/solutions/Clarient/about/craig.The use of [employer] content is governed by the [employer] Terms of Use. ©2021 UpToDate, Inc. All rights reserved.  Copyright   © 2021 UpToDate, Inc. and/or its affiliates. All rights reserved.  Patient Education       Controlling Your Blood Pressure Through Lifestyle   The Basics   Written by the doctors and editors at [employer]   What does my lifestyle have to do with my blood pressure? -- The things you do and the foods you eat have a big effect on your blood pressure and your overall health. Following the right lifestyle can:  Lower your blood pressure or keep you from getting high blood pressure in the first place  Reduce your need for blood pressure medicines  Make medicines for high blood pressure work better, if you do take them  Lower the chances that you'll have a heart attack or stroke,  "or develop kidney disease  Which lifestyle choices will help lower my blood pressure? -- Here's what you can do:  Lose weight (if you are overweight)  Choose a diet rich in fruits, vegetables, and low-fat dairy products, and low in meats, sweets, and refined grains  Eat less salt (sodium)  Do something active for at least 30 minutes a day on most days of the week  Limit the amount of alcohol you drink  If you have high blood pressure, it's also very important to quit smoking (if you smoke). Quitting smoking might not bring your blood pressure down. But it will lower the chances that you'll have a heart attack or stroke, and it will help you feel better and live longer.  Start low and go slow -- The changes listed above might sound like a lot, but don't worry. You don't have to change everything all at once. The key to improving your lifestyle is to "start low and go slow." Choose 1 small, specific thing to change and try doing it for a while. If it works for you, keep doing it until it becomes a habit. If it doesn't, don't give up. Choose something else to change and see how that goes.  Let's say, for example, that you would like to improve your diet. If you're the type of person who eats cheeseburgers and French fries all the time, you can't switch to eating just salads from one day to the next. When people try to make changes like that, they often fail. Then they feel frustrated and tend to give up. So instead of trying to change everything about your diet in 1 day, change 1 or 2 small things about your diet and give yourself time to get used to those changes. For instance, keep the cheeseburger but give up the French fries. Or eat the same things but cut your portions in half.  As you find things that you are able to change and stick with, keep adding new changes. In time, you will see that you can actually change a lot. You just have to get used to the changes slowly.  Lose weight -- When people think about " "losing weight, they sometimes make it more complicated than it really is. To lose weight, you have to either eat less or move more. If you do both of those things, it's even better. But there is no single weight-loss diet or activity that's better than any other. When it comes to weight loss, the most effective plan is the one that you'll stick with.  Improve your diet -- There is no single diet that is right for everyone. But in general, a healthy diet can include:  Lots of fruits, vegetables, and whole grains  Some beans, peas, lentils, chickpeas, and similar foods  Some nuts, such as walnuts, almonds, and peanuts  Fat-free or low-fat milk and milk products  Some fish  To have a healthy diet, it's also important to limit or avoid sugar, sweets, meats, and refined grains. (Refined grains are found in white bread, white rice, most forms of pasta, and most packaged "snack" foods.)  Reduce salt -- Many people think that eating a low-sodium diet means avoiding the salt shaker and not adding salt when cooking. The truth is, not adding salt at the table or when you cook will only help a little. Almost all of the sodium you eat is already in the food you buy at the grocery store or at restaurants (figure 1).  The most important thing you can do to cut down on sodium is to eat less processed food. That means that you should avoid most foods that are sold in cans, boxes, jars, and bags. You should also eat in restaurants less often.  To reduce the amount of sodium you get, buy fresh or fresh-frozen fruits, vegetables, and meats. (Fresh-frozen foods have had nothing added to them before freezing.) Then you can make meals at home, from scratch, with these ingredients.  As with the other changes, don't try to cut out salt all at once. Instead, choose 1 or 2 foods that have a lot of sodium and try to replace them with low-sodium choices. When you get used to those low-sodium options, find another food or 2 to change. Then keep " "going, until all the foods you eat are sodium-free or low in sodium.  Become more active -- If you want to be more active, you don't have to go to the gym or get all sweaty. It is possible to increase your activity level while doing everyday things you enjoy. Walking, gardening, and dancing are just a few of the things that you might try. As with all the other changes, the key is not to do too much too fast. If you don't do any activity now, start by walking for just a few minutes every other day. Do that for a few weeks. If you stick with it, try doing it for longer. But if you find that you don't like walking, try a different activity.  Drink less alcohol -- If you are a woman, do not have more than 1 "standard drink" of alcohol a day. If you are a man, do not have more than 2. A "standard drink" is:  A can or bottle that has 12 ounces of beer  A glass that has 5 ounces of wine  A shot that has 1.5 ounces of whiskey  Where should I start? -- If you want to improve your lifestyle, start by making the changes that you think would be easiest for you. If you used to exercise and just got out of the habit, maybe it would be easy for you to start exercising again. Or if you actually like cooking meals from scratch, maybe the first thing you should focus on is eating home-cooked meals that are low in sodium.  Whatever you tackle first, choose specific, realistic goals, and give yourself a deadline. For example, do not decide that you are going to "exercise more." Instead, decide that you are going to walk for 10 minutes on Monday, Wednesday, and Friday, and that you are going to do this for the next 2 weeks.  When lifestyle changes are too general, people have a hard time following through.  Now go. You can do it!  All topics are updated as new evidence becomes available and our peer review process is complete.  This topic retrieved from [employer] on: Sep 21, 2021.  Topic 19591 Version 8.0  Release: 29.4.2 - C29.263  © " 2021 UpToDate, Inc. and/or its affiliates. All rights reserved.  figure 1: Sources of sodium in your diet     Graphic 86598 Version 2.0    Consumer Information Use and Disclaimer   This information is not specific medical advice and does not replace information you receive from your health care provider. This is only a brief summary of general information. It does NOT include all information about conditions, illnesses, injuries, tests, procedures, treatments, therapies, discharge instructions or life-style choices that may apply to you. You must talk with your health care provider for complete information about your health and treatment options. This information should not be used to decide whether or not to accept your health care provider's advice, instructions or recommendations. Only your health care provider has the knowledge and training to provide advice that is right for you. The use of this information is governed by the [employer] End User License Agreement, available at https://www.ChemDAQ.ClubJumpr.com/en/solutions/EcoSwarm/about/craig.The use of [employer] content is governed by the [employer] Terms of Use. ©2021 UpToDate, Inc. All rights reserved.  Copyright   © 2021 UpToDate, Inc. and/or its affiliates. All rights reserved.

## 2023-08-03 ENCOUNTER — OFFICE VISIT (OUTPATIENT)
Dept: PRIMARY CARE CLINIC | Facility: CLINIC | Age: 68
End: 2023-08-03
Payer: MEDICARE

## 2023-08-03 ENCOUNTER — PATIENT OUTREACH (OUTPATIENT)
Dept: ADMINISTRATIVE | Facility: HOSPITAL | Age: 68
End: 2023-08-03

## 2023-08-03 ENCOUNTER — TELEPHONE (OUTPATIENT)
Dept: PRIMARY CARE CLINIC | Facility: CLINIC | Age: 68
End: 2023-08-03
Payer: MEDICARE

## 2023-08-03 VITALS
SYSTOLIC BLOOD PRESSURE: 120 MMHG | HEART RATE: 66 BPM | HEIGHT: 63 IN | TEMPERATURE: 98 F | WEIGHT: 183 LBS | OXYGEN SATURATION: 97 % | RESPIRATION RATE: 20 BRPM | DIASTOLIC BLOOD PRESSURE: 60 MMHG | BODY MASS INDEX: 32.43 KG/M2

## 2023-08-03 DIAGNOSIS — Z00.00 ENCOUNTER FOR PREVENTIVE HEALTH EXAMINATION: ICD-10-CM

## 2023-08-03 DIAGNOSIS — Z12.31 SCREENING MAMMOGRAM, ENCOUNTER FOR: ICD-10-CM

## 2023-08-03 DIAGNOSIS — K59.03 CONSTIPATION DUE TO OPIOID THERAPY: ICD-10-CM

## 2023-08-03 DIAGNOSIS — I10 ESSENTIAL HYPERTENSION: ICD-10-CM

## 2023-08-03 DIAGNOSIS — M89.49 OSTEOARTHROSIS MULTIPLE SITES, NOT SPECIFIED AS GENERALIZED: Chronic | ICD-10-CM

## 2023-08-03 DIAGNOSIS — Z00.00 ENCOUNTER FOR SUBSEQUENT ANNUAL WELLNESS VISIT (AWV) IN MEDICARE PATIENT: Primary | ICD-10-CM

## 2023-08-03 DIAGNOSIS — E11.9 TYPE 2 DIABETES MELLITUS WITHOUT COMPLICATION, WITHOUT LONG-TERM CURRENT USE OF INSULIN: ICD-10-CM

## 2023-08-03 DIAGNOSIS — M25.551 HIP PAIN, RIGHT: Chronic | ICD-10-CM

## 2023-08-03 DIAGNOSIS — E78.5 HYPERLIPIDEMIA, UNSPECIFIED HYPERLIPIDEMIA TYPE: ICD-10-CM

## 2023-08-03 DIAGNOSIS — T40.2X5A CONSTIPATION DUE TO OPIOID THERAPY: ICD-10-CM

## 2023-08-03 DIAGNOSIS — M47.816 SPONDYLOSIS WITHOUT MYELOPATHY OR RADICULOPATHY, LUMBAR REGION: ICD-10-CM

## 2023-08-03 DIAGNOSIS — Z23 ENCOUNTER FOR IMMUNIZATION: ICD-10-CM

## 2023-08-03 LAB
BCS RECOMMENDATION EXT: NORMAL
BMD RECOMMENDATION EXT: NORMAL

## 2023-08-03 PROCEDURE — 1125F AMNT PAIN NOTED PAIN PRSNT: CPT | Mod: ,,, | Performed by: NURSE PRACTITIONER

## 2023-08-03 PROCEDURE — 3078F DIAST BP <80 MM HG: CPT | Mod: ,,, | Performed by: NURSE PRACTITIONER

## 2023-08-03 PROCEDURE — 1160F PR REVIEW ALL MEDS BY PRESCRIBER/CLIN PHARMACIST DOCUMENTED: ICD-10-PCS | Mod: ,,, | Performed by: NURSE PRACTITIONER

## 2023-08-03 PROCEDURE — 3074F PR MOST RECENT SYSTOLIC BLOOD PRESSURE < 130 MM HG: ICD-10-PCS | Mod: ,,, | Performed by: NURSE PRACTITIONER

## 2023-08-03 PROCEDURE — 3288F FALL RISK ASSESSMENT DOCD: CPT | Mod: ,,, | Performed by: NURSE PRACTITIONER

## 2023-08-03 PROCEDURE — 1159F MED LIST DOCD IN RCRD: CPT | Mod: ,,, | Performed by: NURSE PRACTITIONER

## 2023-08-03 PROCEDURE — 3008F BODY MASS INDEX DOCD: CPT | Mod: ,,, | Performed by: NURSE PRACTITIONER

## 2023-08-03 PROCEDURE — 3074F SYST BP LT 130 MM HG: CPT | Mod: ,,, | Performed by: NURSE PRACTITIONER

## 2023-08-03 PROCEDURE — 3078F PR MOST RECENT DIASTOLIC BLOOD PRESSURE < 80 MM HG: ICD-10-PCS | Mod: ,,, | Performed by: NURSE PRACTITIONER

## 2023-08-03 PROCEDURE — 1101F PR PT FALLS ASSESS DOC 0-1 FALLS W/OUT INJ PAST YR: ICD-10-PCS | Mod: ,,, | Performed by: NURSE PRACTITIONER

## 2023-08-03 PROCEDURE — 1159F PR MEDICATION LIST DOCUMENTED IN MEDICAL RECORD: ICD-10-PCS | Mod: ,,, | Performed by: NURSE PRACTITIONER

## 2023-08-03 PROCEDURE — 1160F RVW MEDS BY RX/DR IN RCRD: CPT | Mod: ,,, | Performed by: NURSE PRACTITIONER

## 2023-08-03 PROCEDURE — 3288F PR FALLS RISK ASSESSMENT DOCUMENTED: ICD-10-PCS | Mod: ,,, | Performed by: NURSE PRACTITIONER

## 2023-08-03 PROCEDURE — G0439 PR MEDICARE ANNUAL WELLNESS SUBSEQUENT VISIT: ICD-10-PCS | Mod: ,,, | Performed by: NURSE PRACTITIONER

## 2023-08-03 PROCEDURE — 3008F PR BODY MASS INDEX (BMI) DOCUMENTED: ICD-10-PCS | Mod: ,,, | Performed by: NURSE PRACTITIONER

## 2023-08-03 PROCEDURE — 1125F PR PAIN SEVERITY QUANTIFIED, PAIN PRESENT: ICD-10-PCS | Mod: ,,, | Performed by: NURSE PRACTITIONER

## 2023-08-03 PROCEDURE — 3052F PR MOST RECENT HEMOGLOBIN A1C LEVEL 8.0 - < 9.0%: ICD-10-PCS | Mod: ,,, | Performed by: NURSE PRACTITIONER

## 2023-08-03 PROCEDURE — 1101F PT FALLS ASSESS-DOCD LE1/YR: CPT | Mod: ,,, | Performed by: NURSE PRACTITIONER

## 2023-08-03 PROCEDURE — G0439 PPPS, SUBSEQ VISIT: HCPCS | Mod: ,,, | Performed by: NURSE PRACTITIONER

## 2023-08-03 PROCEDURE — 3052F HG A1C>EQUAL 8.0%<EQUAL 9.0%: CPT | Mod: ,,, | Performed by: NURSE PRACTITIONER

## 2023-08-03 RX ORDER — ZOSTER VACCINE RECOMBINANT, ADJUVANTED 50 MCG/0.5
0.5 KIT INTRAMUSCULAR ONCE
Qty: 1 EACH | Refills: 0 | Status: SHIPPED | OUTPATIENT
Start: 2023-08-03 | End: 2023-08-03

## 2023-08-03 NOTE — LETTER
AUTHORIZATION FOR RELEASE OF   CONFIDENTIAL INFORMATION    Dear [facility],    We are seeing Selena Proctor, date of birth 1955, in the clinic at [facility]. Lizzette Maedows DNP, [facility] is the patient's PCP. Selena Proctor has an outstanding lab/procedure at the time we reviewed her chart. In order to help keep her health information updated, she has authorized us to request the following medical record(s):        (  X)  MAMMOGRAM for                                      (  )  COLONOSCOPY      (  )  PAP SMEAR                                          (  )  OUTSIDE LAB RESULTS     (  )  DEXA SCAN                                          (  )  EYE EXAM            (  )  FOOT EXAM                                          (  )  ENTIRE RECORD     (  )  OUTSIDE IMMUNIZATIONS                 (  )  _______________         Please fax records to Lizzette Meadows DNP, [facility], [phone]     If you have any questions, please contact Rebekah at [phone].           Patient Name: Selena Proctor  : 1955  Patient Phone #: [phone]     2023    Selena Proctor  [address] [place] [id number]

## 2023-08-03 NOTE — PATIENT INSTRUCTIONS
Counseling and Referral of Other Preventative  (Italic type indicates deductible and co-insurance are waived)    Patient Name: Selena Proctor  Today's Date: 8/3/2023    Health Maintenance       Date Due Completion Date    TETANUS VACCINE Never done ---    Sign Pain Contract Never done ---    Shingles Vaccine (1 of 2) Never done ---    COVID-19 Vaccine (4 - Moderna series) 12/24/2021 10/29/2021    Diabetes Urine Screening 06/23/2023 6/23/2022    Eye Exam 08/18/2023 8/18/2022    Influenza Vaccine (1) 09/01/2023 2/2/2022    Hemoglobin A1c 10/27/2023 7/27/2023    Foot Exam 04/27/2024 4/27/2023    Lipid Panel 04/27/2024 4/27/2023    Override on 2/24/2021: Done    Low Dose Statin 08/03/2024 8/3/2023    Mammogram 08/03/2024 8/3/2023    DEXA Scan 08/15/2025 8/15/2022    Colorectal Cancer Screening 05/13/2029 5/13/2019 (Done)    Override on 5/13/2019: Done        No orders of the defined types were placed in this encounter.    The following information is provided to all patients.  This information is to help you find resources for any of the problems found today that may be affecting your health:                Living healthy guide: www.[facility].[place].gov      Understanding Diabetes: www.diabetes.org      Eating healthy: www.cdc.gov/healthyweight      CDC home safety checklist: www.cdc.gov/steadi/patient.html      Agency on Aging: www.goea.[place].[facility]      Alcoholics anonymous (AA): www.aa.org      Physical Activity: www.ammon.nih.gov/cx9bnlr      Tobacco use: www.quitwithusla.org

## 2023-08-03 NOTE — PROGRESS NOTES
08/03/2023   --Chart accessed for: RECORD UPLOAD  --Care Gaps addressed: MAMMOGRAM, PREVENTATIVE SCREENING, IMMUNIZATIONS, UPLOAD RECORD  Outreach made to patient via CHART REVIEW   Care Everywhere updates requested and reviewed.  Media reports reviewed.  [facility] and [facility] reviewed.  Immunization Database (Immprint/MIXX) reviewed. Vaccinations uploaded: COVID X1, TDAPX1  HM updated with external MAMMOGRAM/DEXA Report  [facility] abstracted.

## 2023-08-03 NOTE — PROGRESS NOTES
.   [facility] URGENT CARE       PATIENT NAME: Selena Proctor   : 1955    AGE: 67 y.o. DATE: 2023   MRN: [id number]        Reason for Visit / Chief Complaint: Medicare AWV Follow Up ([employer] Subsequent)        Selena Proctor presents for an [employer]  AWV today.     The following components were reviewed and updated:    Medical/Social/Family History:  Past Medical History:   Diagnosis Date    Acid reflux     Coronary arteriosclerosis     Diabetes     Diabetes mellitus, type 2     Hypertension     Skin cancer     left leg    Spondylosis without myelopathy or radiculopathy, lumbar region         Family History   Problem Relation Age of Onset    Hypertension Mother     Heart disease Mother     Diabetes Sister     Hypertension Sister     Hypertension Brother     Alcohol abuse Father     Cancer Father     Hypertension Son     Mental illness Maternal Aunt         Social History     Tobacco Use   Smoking Status Never    Passive exposure: Current (.)   Smokeless Tobacco Never      Social History     Substance and Sexual Activity   Alcohol Use Not Currently       Family History   Problem Relation Age of Onset    Hypertension Mother     Heart disease Mother     Diabetes Sister     Hypertension Sister     Hypertension Brother     Alcohol abuse Father     Cancer Father     Hypertension Son     Mental illness Maternal Aunt        Past Surgical History:   Procedure Laterality Date    APPENDECTOMY      BIOPSY OF LESION Left 3/29/2023    Procedure: BIOPSY, LESION;  Surgeon: Andrez Norman DO;  Location: [facility], [place];  Service: General;  Laterality: Left;  excisional biospy of left thigh with rotational flap    BLOCK, NERVE, OBTURATOR Right 2019    Right Femoral/Obturator Nerve Block  Dr Headley     SECTION      x2    CORONARY ARTERY BYPASS GRAFT      KNEE ARTHROPLASTY Right     KNEE ARTHROSCOPY Left     SKIN GRAFT      Left Thigh    TOTAL HIP ARTHROPLASTY Bilateral     TUBAL  LIGATION           Allergies and Current Medications     Review of patient's allergies indicates:   Allergen Reactions    Betadine [povidone-iodine] Itching       Current Outpatient Medications:     alcohol swabs (DROPSAFE ALCOHOL PREP PADS) PadM, USE AS DIRECTED, Disp: 100 each, Rfl: 2    aspirin (ECOTRIN) 81 MG EC tablet, Take 81 mg by mouth once daily. , Disp: , Rfl:     blood sugar diagnostic (ACCU-CHEK GUIDE TEST STRIPS) Strp, TEST BLOOD SUGAR AS DIRECTED, Disp: 100 strip, Rfl: 2    blood-glucose meter (ACCU-CHEK GUIDE GLUCOSE METER) Misc, Use as directed, Disp: 1 each, Rfl: 0    diclofenac sodium (VOLTAREN ARTHRITIS PAIN) 1 % Gel, Apply 2 g topically 4 (four) times daily. Apply to knees, elbows, joints as needed, Disp: 10 each, Rfl: 2    docusate sodium (COLACE) 100 MG capsule, Take 1 capsule (100 mg total) by mouth 2 (two) times daily., Disp: 28 capsule, Rfl: 0    ezetimibe (ZETIA) 10 mg tablet, TAKE 1 TABLET EVERY DAY, Disp: 90 tablet, Rfl: 2    furosemide (LASIX) 40 MG tablet, TAKE 1 TABLET EVERY DAY AS NEEDED, Disp: 90 tablet, Rfl: 0    gabapentin (NEURONTIN) 300 MG capsule, Take 1 capsule (300 mg total) by mouth 3 (three) times daily., Disp: 90 capsule, Rfl: 11    glipiZIDE (GLUCOTROL) 2.5 MG TR24, Take 1 tablet (2.5 mg total) by mouth daily with breakfast., Disp: 90 tablet, Rfl: 3    HYDROcodone-acetaminophen (NORCO)  mg per tablet, Take 1 tablet by mouth every 8 (eight) hours., Disp: 90 tablet, Rfl: 0    [START ON 8/4/2023] HYDROcodone-acetaminophen (NORCO)  mg per tablet, Take 1 tablet by mouth every 8 (eight) hours., Disp: 90 tablet, Rfl: 0    JARDIANCE 10 mg tablet, TAKE 1 TABLET EVERY DAY, Disp: 90 tablet, Rfl: 1    lancets (ACCU-CHEK SOFTCLIX LANCETS) Misc, CHECK BLOOD SUGAR EVERY DAY AS DIRECTED, Disp: 100 each, Rfl: 0    lancets (TRUEPLUS LANCETS) 33 gauge Misc, USE AS DIRECTED, Disp: 200 each, Rfl: 0    losartan-hydrochlorothiazide 100-25 mg (HYZAAR) 100-25 mg per tablet, TAKE 1  TABLET EVERY DAY, Disp: 90 tablet, Rfl: 2    metoprolol succinate (TOPROL-XL) 100 MG 24 hr tablet, TAKE 1 TABLET EVERY DAY, Disp: 90 tablet, Rfl: 2    montelukast (SINGULAIR) 10 mg tablet, TAKE 1 TABLET EVERY DAY AS NEEDED, Disp: 90 tablet, Rfl: 2    naloxone (NARCAN) 4 mg/actuation Spry, PUT 1 SPRAY IN 1 NOSTRIL WAIT 2 MINUTES THEN REPEAT DOSE IN THE OTHER NOSTRIL, Disp: , Rfl:     NIFEdipine (PROCARDIA-XL) 90 MG (OSM) 24 hr tablet, TAKE 1 TABLET EVERY DAY, Disp: 90 tablet, Rfl: 1    omeprazole (PRILOSEC) 40 MG capsule, TAKE 1 CAPSULE EVERY DAY, Disp: 90 capsule, Rfl: 0    pitavastatin calcium (LIVALO) 4 mg Tab, Take one tablet by mouth three times per week on Monday, Wednesday, Friday., Disp: 90 tablet, Rfl: 2    potassium chloride (MICRO-K) 10 MEQ CpSR, TAKE 2 CAPSULES TWICE DAILY, Disp: 360 capsule, Rfl: 1    SAXagliptin-metformin (KOMBIGLYZE XR) 2.5-1,000 mg TM24, Take 1 tablet by mouth once daily., Disp: 90 tablet, Rfl: 3    silver sulfADIAZINE 1% (SILVADENE) 1 % cream, Apply topically once daily., Disp: 1000 g, Rfl: 11    spironolactone (ALDACTONE) 25 MG tablet, TAKE 1 TABLET EVERY DAY, Disp: 90 tablet, Rfl: 0    ACCU-CHEK SOFT DEV LANCETS Kit, USE AS DIRECTED, Disp: 100 each, Rfl: 1    blood glucose control, low (TRUE METRIX LEVEL 1) Soln, USE AS DIRECTED WITH GLUCOSE METER (Patient not taking: Reported on 8/3/2023), Disp: 1 each, Rfl: 0    clotrimazole-betamethasone 1-0.05% (LOTRISONE) cream, Apply topically 2 (two) times daily. (Patient not taking: Reported on 8/3/2023), Disp: 45 g, Rfl: 2    mupirocin (BACTROBAN) 2 % ointment, Apply topically once daily. (Patient not taking: Reported on 8/3/2023), Disp: 30 g, Rfl: 12    TRUE METRIX GLUCOSE METER kit, USE AS DIRECTED (Patient not taking: Reported on 8/3/2023), Disp: 180 each, Rfl: 2    varicella-zoster gE-AS01B, PF, (SHINGRIX, PF,) 50 mcg/0.5 mL injection, Inject 0.5 mLs into the muscle once. for 1 dose, Disp: 1 each, Rfl: 0    Health Risk  Assessment   Fall Risk: yes   Obesity: BMI 32.42     Advance Directive: no- Does not have an advanced directive. Verbal education and written education included in today's AVS.  I offered to discuss advanced care planning, including how to pick a person who would make decisions for you if you were unable to make them for yourself, called a health care power of , and what kind of decisions you might make such as use of life sustaining treatments such as ventilators and tube feeding when faced with a life limiting illness recorded on a living will that they will need to know. (How you want to be cared for as you near the end of your natural life)    Patient is interested in learning more about how to make advanced directives.  I provided them paperwork and offered to discuss this with them.   Depression: PHQ-9=1    HTN: yes    T2DM: yes  STI: no    Alcohol misuse: no   Statin Use: Yes      Health Risk Assessment  What is your age?: 65-69  Are you male or female?: Female  During the past four weeks, how much have you been bothered by emotional problems such as feeling anxious, depressed, irritable, sad, or downhearted and blue?: Not at all  During the past five weeks, has your physical and/or emotional health limited your social activities with family, friends, neighbors, or groups?: Not at all  During the past four weeks, how much bodily pain have you generally had?: Severe pain  During the past four weeks, was someone available to help if you needed and wanted help?: Yes, as much as I wanted  During the past four weeks, what was the hardest physical activity you could do for at least two minutes?: Light  Can you get to places out of walking distance without help?  (For example, can you travel alone on buses or taxis, or drive your own car?): Yes  Can you go shopping for groceries or clothes without someone's help?: Yes  Can you prepare your own meals?: Yes  Can you do your own housework without help?:  Yes  Because of any health problems, do you need the help of another person with your personal care needs such as eating, bathing, dressing, or getting around the house?: No  Can you handle your own money without help?: Yes  During the past four weeks, how would you rate your health in general?: Fair  How have things been going for you during the past four weeks?: Pretty well  Are you having difficulties driving your car?: No  Do you always fasten your seat belt when you are in a car?: Yes, usually  How often in the past four weeks have you been bothered by falling or dizzy when standing up?: Seldom  How often in the past four weeks have you been bothered by sexual problems?: Never  How often in the past four weeks have you been bothered by trouble eating well?: Never  How often in the past four weeks have you been bothered by teeth or denture problems?: Never  How often in the past four weeks have you been bothered with problems using the telephone?: Never  How often in the past four weeks have you been bothered by tiredness or fatigue?: Sometimes  Have you fallen two or more times in the past year?: No  Are you afraid of falling?: No  Are you a smoker?: No  During the past four weeks, how many drinks of wine, beer, or other alcoholic beverages did you have?: One drink or less per week  Do you exercise for about 20 minutes three or more days a week?: No, I usually do not exercise this much  Have you been given any information to help you with hazards in your house that might hurt you?: No  Have you been given any information to help you with keeping track of your medications?: No  How often do you have trouble taking medicines the way you've been told to take them?: I always take them as prescribed  How confident are you that you can control and manage most of your health problems?: Very confident  What is your race? (Check all that apply.):     Health Maintenance   Last eye exam: 08/18/2022   Last  CV screen with lipids: 04/27/2023    Diabetes screening with fasting glucose or A1c: 07/27/2023   Colonoscopy: 05/13/2019   Flu Vaccine: season ended   Pneumonia vaccines: 06/23/2022   COVID vaccine: 3 doses   Hep B vaccine: n/a   DEXA: 08/15/2022   Last pap/pelvic: n/a   Last Mammogram: 08/15/2022   Last PSA screen: n/a   AAA screening: n/a (once in lifetime for males 65-75 who have smoked > 100 cigarettes in lifetime)  HIV Screeing: n/a  Hepatitis C Screen: 06/23/2022  Low Dose CT Scan: n/a    Health Maintenance Topics with due status: Not Due       Topic Last Completion Date    Colorectal Cancer Screening 05/13/2019    Influenza Vaccine 02/02/2022    TETANUS VACCINE 08/01/2022    DEXA Scan 08/15/2022    Foot Exam 04/27/2023    Lipid Panel 04/27/2023    Hemoglobin A1c 07/27/2023    Low Dose Statin 08/03/2023     Health Maintenance Due   Topic Date Due    Sign Pain Contract  Never done    Shingles Vaccine (1 of 2) Never done    COVID-19 Vaccine (5 - Moderna series) 12/24/2021    Diabetes Urine Screening  06/23/2023    Mammogram  08/15/2023    Eye Exam  08/18/2023       Incontinence  Bowel: no  Bladder: no    Lab results available in Epic or see dates from [facility] above:   Lab Results   Component Value Date    CHOL 164 04/27/2023    CHOL 158 09/23/2022    CHOL 176 02/07/2022     Lab Results   Component Value Date    HDL 47 04/27/2023    HDL 51 09/23/2022    HDL 44 02/07/2022     Lab Results   Component Value Date    LDLCALC 86 04/27/2023    LDLCALC 79 09/23/2022    LDLCALC 100 02/07/2022     Lab Results   Component Value Date    TRIG 153 (H) 04/27/2023    TRIG 139 09/23/2022    TRIG 162 (H) 02/07/2022     Lab Results   Component Value Date    CHOLHDL 3.5 04/27/2023    CHOLHDL 3.1 09/23/2022    CHOLHDL 4.0 02/07/2022       Lab Results   Component Value Date    HGBA1C 8.1 (H) 07/27/2023       Sodium   Date Value Ref Range Status   07/27/2023 140 136 - 145 mmol/L Final     Potassium   Date Value Ref Range Status  "  07/27/2023 3.8 3.5 - 5.1 mmol/L Final     Chloride   Date Value Ref Range Status   07/27/2023 108 (H) 98 - 107 mmol/L Final     CO2   Date Value Ref Range Status   07/27/2023 22 21 - 32 mmol/L Final     Glucose   Date Value Ref Range Status   07/27/2023 170 (H) 74 - 106 mg/dL Final     BUN   Date Value Ref Range Status   07/27/2023 19 (H) 7 - 18 mg/dL Final     Creatinine   Date Value Ref Range Status   07/27/2023 1.08 (H) 0.55 - 1.02 mg/dL Final     Calcium   Date Value Ref Range Status   07/27/2023 9.1 8.5 - 10.1 mg/dL Final     Total Protein   Date Value Ref Range Status   07/27/2023 7.7 6.4 - 8.2 g/dL Final     Albumin   Date Value Ref Range Status   07/27/2023 3.3 (L) 3.5 - 5.0 g/dL Final     Bilirubin, Total   Date Value Ref Range Status   07/27/2023 0.6 >0.0 - 1.2 mg/dL Final     Alk Phos   Date Value Ref Range Status   07/27/2023 61 55 - 142 U/L Final     AST   Date Value Ref Range Status   07/27/2023 15 15 - 37 U/L Final     ALT   Date Value Ref Range Status   07/27/2023 18 13 - 56 U/L Final     Anion Gap   Date Value Ref Range Status   07/27/2023 14 7 - 16 mmol/L Final     eGFR    Date Value Ref Range Status   08/04/2021 70 >=60 mL/min/1.73m² Final     eGFR   Date Value Ref Range Status   06/23/2022 66 >=60 mL/min/1.73m² Final         No results found for: "PSA" (Delete this line if female pt)        Care Team   PCP: Lizzette Meadows    Eye specialist: Andrew Dai   Cardiologist: Mayco Parnell   Surgery specialist:Andrez Norman        **See Completed Assessments for Annual Wellness visit within the encounter summary    The following assessments were completed & reviewed:  Depression Screening  Cognitive function Screening  Timed Get Up Test  Whisper Test  Vision Screen  Health Risk Assessment  Checklist of ADLs and IADLs      Objective  Vitals:    08/03/23 0923   BP: 120/60   Pulse: 66   Resp: 20   Temp: 98.2 °F (36.8 °C)   TempSrc: Oral   SpO2: 97%   Weight: 83 kg (183 lb)   Height: " "5' 3" (1.6 m)   PainSc:   6   PainLoc: Hip      Body mass index is 32.42 kg/m².  Ideal body weight: 52.4 kg (115 lb 8.3 oz)       Physical Exam  Vitals and nursing note reviewed.   Constitutional:       General: She is not in acute distress.     Appearance: Normal appearance. She is not ill-appearing, toxic-appearing or diaphoretic.   HENT:      Head: Normocephalic.      Right Ear: Tympanic membrane, ear canal and external ear normal.      Left Ear: Tympanic membrane, ear canal and external ear normal.      Nose: Nose normal.      Mouth/Throat:      Mouth: Mucous membranes are moist.   Eyes:      Extraocular Movements: Extraocular movements intact.      Conjunctiva/sclera: Conjunctivae normal.      Pupils: Pupils are equal, round, and reactive to light.   Cardiovascular:      Rate and Rhythm: Normal rate and regular rhythm.      Heart sounds: Normal heart sounds.   Pulmonary:      Effort: Pulmonary effort is normal.      Breath sounds: Normal breath sounds.   Abdominal:      General: Bowel sounds are normal. There is no distension.      Palpations: Abdomen is soft.      Tenderness: There is no abdominal tenderness.   Musculoskeletal:         General: Normal range of motion.      Cervical back: Normal range of motion and neck supple.   Skin:     General: Skin is warm and dry.   Neurological:      General: No focal deficit present.      Mental Status: She is alert and oriented to person, place, and time.      Gait: Gait normal.   Psychiatric:         Mood and Affect: Mood normal.         Behavior: Behavior normal.           Assessment:     1. Encounter for subsequent annual wellness visit (AWV) in Medicare patient    2. Encounter for preventive health examination    3. Type 2 diabetes mellitus without complication, without long-term current use of insulin  - Ambulatory referral/consult to Ophthalmology; Future    4. Essential hypertension    5. Spondylosis without myelopathy or radiculopathy, lumbar region    6. " Osteoarthrosis multiple sites, not specified as generalized    7. BMI 32.0-32.9,adult    8. Constipation due to opioid therapy    9. Hyperlipidemia, unspecified hyperlipidemia type    10. Hip pain, right    11. Encounter for immunization  - varicella-zoster gE-AS01B, PF, (SHINGRIX, PF,) 50 mcg/0.5 mL injection; Inject 0.5 mLs into the muscle once. for 1 dose  Dispense: 1 each; Refill: 0    12. Screening mammogram, encounter for  - Mammo Digital Screening Bilat; Future  - Mammo Digital Screening Bilat         Plan:    Referrals:   Eye exam  Mammogram     Advised to call office if does not hear from anyone with referral appt within 2-3 weeks to check on status of referral. Voiced understanding.    Advised patient to continue medications as prescribed,to monitor glucose and blood pressure as directed, continue heart health diabetic diet,exercise as directed,educational material provided.      Discussed and provided with a screening schedule and personal prevention plan in accordance with USPSTF age appropriate recommendations and Medicare screening guidelines.   Education, counseling, and referrals were provided as needed.  After Visit Summary printed and given to patient which includes written education and a list of any referrals if indicated.     F/u plan for yearly AWV.    Signature: Lizzette Meadows DNP, FNP-C

## 2023-08-03 NOTE — TELEPHONE ENCOUNTER
----- Message from Lizzette Meadows DNP, BANDARP-C sent at 7/28/2023  8:09 AM CDT -----  Please notify of results. Continue diabetic diet and medications as prescribed.

## 2023-08-04 DIAGNOSIS — E11.9 TYPE 2 DIABETES MELLITUS WITHOUT COMPLICATION, WITHOUT LONG-TERM CURRENT USE OF INSULIN: ICD-10-CM

## 2023-08-07 ENCOUNTER — TELEPHONE (OUTPATIENT)
Dept: PRIMARY CARE CLINIC | Facility: CLINIC | Age: 68
End: 2023-08-07
Payer: MEDICARE

## 2023-08-07 RX ORDER — CALCIUM CITRATE/VITAMIN D3 200MG-6.25
TABLET ORAL 2 TIMES DAILY
Qty: 200 STRIP | Refills: 1 | Status: SHIPPED | OUTPATIENT
Start: 2023-08-07 | End: 2023-09-08

## 2023-08-08 ENCOUNTER — OFFICE VISIT (OUTPATIENT)
Dept: SURGERY | Facility: CLINIC | Age: 68
End: 2023-08-08
Payer: MEDICARE

## 2023-08-08 ENCOUNTER — HOSPITAL ENCOUNTER (OUTPATIENT)
Dept: RADIOLOGY | Facility: HOSPITAL | Age: 68
Discharge: HOME | End: 2023-08-08
Attending: STUDENT IN AN ORGANIZED HEALTH CARE EDUCATION/TRAINING PROGRAM
Payer: MEDICARE

## 2023-08-08 VITALS — WEIGHT: 183 LBS | HEART RATE: 59 BPM | OXYGEN SATURATION: 97 % | BODY MASS INDEX: 32.43 KG/M2 | HEIGHT: 63 IN

## 2023-08-08 DIAGNOSIS — R22.41 LEG MASS, RIGHT: Primary | ICD-10-CM

## 2023-08-08 DIAGNOSIS — R22.41 LEG MASS, RIGHT: ICD-10-CM

## 2023-08-08 DIAGNOSIS — S71.102D OPEN WOUND OF LEFT THIGH, SUBSEQUENT ENCOUNTER: ICD-10-CM

## 2023-08-08 PROCEDURE — 3008F PR BODY MASS INDEX (BMI) DOCUMENTED: ICD-10-PCS | Mod: CPTII,,, | Performed by: STUDENT IN AN ORGANIZED HEALTH CARE EDUCATION/TRAINING PROGRAM

## 2023-08-08 PROCEDURE — 76881 US EXTREMITY NON VASCULAR COMPLETE RIGHT: ICD-10-PCS | Mod: 26,RT,, | Performed by: SURGERY

## 2023-08-08 PROCEDURE — 76881 US COMPL JOINT R-T W/IMG: CPT | Mod: 26,RT,, | Performed by: SURGERY

## 2023-08-08 PROCEDURE — 3288F FALL RISK ASSESSMENT DOCD: CPT | Mod: CPTII,,, | Performed by: STUDENT IN AN ORGANIZED HEALTH CARE EDUCATION/TRAINING PROGRAM

## 2023-08-08 PROCEDURE — 1159F MED LIST DOCD IN RCRD: CPT | Mod: CPTII,,, | Performed by: STUDENT IN AN ORGANIZED HEALTH CARE EDUCATION/TRAINING PROGRAM

## 2023-08-08 PROCEDURE — 76881 US COMPL JOINT R-T W/IMG: CPT | Mod: TC,RT

## 2023-08-08 PROCEDURE — 99213 PR OFFICE/OUTPT VISIT, EST, LEVL III, 20-29 MIN: ICD-10-PCS | Mod: S$PBB,,, | Performed by: STUDENT IN AN ORGANIZED HEALTH CARE EDUCATION/TRAINING PROGRAM

## 2023-08-08 PROCEDURE — 1101F PT FALLS ASSESS-DOCD LE1/YR: CPT | Mod: CPTII,,, | Performed by: STUDENT IN AN ORGANIZED HEALTH CARE EDUCATION/TRAINING PROGRAM

## 2023-08-08 PROCEDURE — 1101F PR PT FALLS ASSESS DOC 0-1 FALLS W/OUT INJ PAST YR: ICD-10-PCS | Mod: CPTII,,, | Performed by: STUDENT IN AN ORGANIZED HEALTH CARE EDUCATION/TRAINING PROGRAM

## 2023-08-08 PROCEDURE — 3008F BODY MASS INDEX DOCD: CPT | Mod: CPTII,,, | Performed by: STUDENT IN AN ORGANIZED HEALTH CARE EDUCATION/TRAINING PROGRAM

## 2023-08-08 PROCEDURE — 1159F PR MEDICATION LIST DOCUMENTED IN MEDICAL RECORD: ICD-10-PCS | Mod: CPTII,,, | Performed by: STUDENT IN AN ORGANIZED HEALTH CARE EDUCATION/TRAINING PROGRAM

## 2023-08-08 PROCEDURE — 3052F HG A1C>EQUAL 8.0%<EQUAL 9.0%: CPT | Mod: CPTII,,, | Performed by: STUDENT IN AN ORGANIZED HEALTH CARE EDUCATION/TRAINING PROGRAM

## 2023-08-08 PROCEDURE — 3288F PR FALLS RISK ASSESSMENT DOCUMENTED: ICD-10-PCS | Mod: CPTII,,, | Performed by: STUDENT IN AN ORGANIZED HEALTH CARE EDUCATION/TRAINING PROGRAM

## 2023-08-08 PROCEDURE — 3052F PR MOST RECENT HEMOGLOBIN A1C LEVEL 8.0 - < 9.0%: ICD-10-PCS | Mod: CPTII,,, | Performed by: STUDENT IN AN ORGANIZED HEALTH CARE EDUCATION/TRAINING PROGRAM

## 2023-08-08 PROCEDURE — 99215 OFFICE O/P EST HI 40 MIN: CPT | Mod: PBBFAC,25 | Performed by: STUDENT IN AN ORGANIZED HEALTH CARE EDUCATION/TRAINING PROGRAM

## 2023-08-08 PROCEDURE — 99213 OFFICE O/P EST LOW 20 MIN: CPT | Mod: S$PBB,,, | Performed by: STUDENT IN AN ORGANIZED HEALTH CARE EDUCATION/TRAINING PROGRAM

## 2023-08-08 RX ORDER — SODIUM CHLORIDE 9 MG/ML
INJECTION, SOLUTION INTRAVENOUS CONTINUOUS
Status: CANCELLED | OUTPATIENT
Start: 2023-08-08

## 2023-08-08 NOTE — H&P (VIEW-ONLY)
Subjective     Patient ID: Selena Proctor is a 67 y.o. female.    Chief Complaint: Follow-up (US this am)    67-year-old  female presents to the clinic for 1 week postop status post excision of left thigh wound with closure with rotational flap.  Patient has had some increased drainage from the EVANGELINA drain.  Denies any chest pain/shortness of breath, nausea/vomiting/diarrhea, fever/chills.    4/18:  Presents for re-evaluation of left inner thigh wound.  The area has had little more dehiscence with separation of staple line; the wound has increased with granulation tissue about 50% of the wound coverage, followed by some fat necrosis at other areas; no purulent drainage or signs of infection; patient was placed on empirical antibiotics previously.  Denies any chest pain/shortness of breath, nausea/vomiting/diarrhea, fever/chills.    4/25:  Wound is doing well; patient is cleaning with Vashe and placing Silvadene and fat necrosis almost completely resolved; healthy bed of granulation tissue growing.  Patient concerned there may be a staple left.  Denies any chest pain/shortness of breath, nausea/vomiting/diarrhea, fever/chills.    5/9:  Area healing well.  Patient states she is having intermittent pain 10/10, but currently much improved.  Patient placing Silvadene to wound.  Denies any chest pain/shortness of breath, nausea/vomiting/diarrhea, fever/chills.    5/30:  Patient doing well, applying Silvadene to area and pain better controlled.  Presents for PuraPly application.  Denies any chest pain/shortness of breath, nausea/vomiting/diarrhea, fever/chills.    6/6:  Patient doing well.  Dressing intact.  Dressing removed and area is healing very well.  Increased epithelialization across wound.  Denies any chest pain/shortness of breath, nausea/vomiting/diarrhea, fever/chills.    6/20:  Patient doing well.  Epithelialization across wound at 95%.  Denies any chest pain/shortness of breath,  nausea/vomiting/diarrhea, fever/chills.    6/27:  Patient doing well.  Epithelialization across 99% of wound with small medial area with scab formation covering a spot of 0.3 x 0.3 cm.  Denies any chest pain/shortness of breath, nausea/vomiting/diarrhea, fever/chills.    7/18:  Patient doing well; small opening on wound with granulation tissue and starting to heal.  Patient complain of mass on her right leg that she wants evaluated.  Denies any chest pain/shortness of breath, nausea/vomiting/diarrhea, fever/chills.    8/8:  Patient doing well; epithelialization happened over small 0.3 cm area of wound.  Patient presents for ultrasound of the right leg soft tissue mass.  Denies any chest pain/shortness of breath, nausea/vomiting/diarrhea, fever/chills.      Review of Systems   Constitutional: Negative.    HENT: Negative.     Eyes: Negative.    Respiratory:  Negative for shortness of breath.    Cardiovascular:  Negative for chest pain.   Gastrointestinal:  Negative for abdominal distention, abdominal pain, blood in stool, change in bowel habit and change in bowel habit.   Genitourinary: Negative.  Negative for hematuria.   Musculoskeletal:  Negative for myalgias.   Neurological:  Negative for dizziness and weakness.   Psychiatric/Behavioral: Negative.            Objective     Physical Exam  Constitutional:       General: She is not in acute distress.     Appearance: Normal appearance.   HENT:      Head: Normocephalic.   Cardiovascular:      Rate and Rhythm: Normal rate.   Pulmonary:      Effort: Pulmonary effort is normal. No respiratory distress.   Abdominal:      General: There is no distension.      Tenderness: There is no abdominal tenderness.   Musculoskeletal:         General: Normal range of motion.        Legs:       Comments: Wound measured 9 x 1.5 x 0.3 cm; With 99% Epithelialization covering wound; small areas of 0.3x0.3cm at medial edge with scab coverage.    10 x 10 cm fatty tumor on the right anterior  thigh   Skin:     General: Skin is warm.      Coloration: Skin is not jaundiced.   Neurological:      General: No focal deficit present.      Mental Status: She is alert and oriented to person, place, and time.      Cranial Nerves: No cranial nerve deficit.            Assessment and Plan   Continue dressing changes to left inner thigh.  Set up for excision of right thigh fatty tumor.  If left thigh wound has not healed, we will biopsy area.  Patient will need CBC, BMP prior to procedure.    Problem List Items Addressed This Visit          Orthopedic    Open wound of left thigh     Other Visit Diagnoses       Leg mass, right    -  Primary

## 2023-08-08 NOTE — PATIENT INSTRUCTIONS
[facility]      Your surgery is scheduled for 8/18 at [facility] on the ground floor of the Ambulatory building. You should arrive at 5:30 at the Ambulatory Care Center located at [address].                                                                                                                                                                                                                                                                                                                                                           Preoperative Instructions        Your pre-op lab work will be on 8/16 on the 1st floor of the [facility] building.                                                                                                                                             Day of Surgery Instructions      Bring a list of all your medications with you the day of your surgery. You can also give the list to your doctor or nurse during your final clinic appointment before surgery.    Stop taking all herbal medications 14 days prior to surgery.  Stop drinking alcoholic beverages for 24 hours before surgery. Do not drink alcohol for 24 hours after surgery.  Eat a light supper on the night before your surgery.  Do not eat any solid foods or drink any liquids after 12:00 AM (midnight). This includes gum, hard candy, mints, and chewing tobacco.  Medications: Take any medications specified with a small sip of water the morning of your surgery.  Brush your teeth: You may brush your teeth and rinse your mouth. Do not swallow any water or toothpaste.  Clothing: A button front shirt and loose-fitting clothes are the most comfortable before and after surgery. We also recommend low-heeled shoes.  Hair: Avoid buns, ponytails, or hairpieces at the back of the head. Remove or avoid any clips, pins or bands that bind hair. Do not use hairspray. Before going to surgery, you will  need to remove any wigs or hairpieces.  We will cover your hair during surgery. Your privacy regarding personal appearance will be respected.  Fingernails: Please be sure to remove all nail polish before you arrive for surgery. We understand that tips, wraps, gels, etc., are expensive; however, we ask these products to be removed from at least one finger on each hand. Your fingertips are used to accurately monitor your oxygen level during surgery by a device called an oximeter.  Glasses and Contact Lenses: Wear glasses when possible. If contact lenses must be worn, bring a lens case and solution. If glasses are worn, bring a case for them.  Hearing Aids: If you rely on a hearing aid, wear it to the hospital on the day of surgery. This will ensure you can hear and understand everything we need to communicate with you.  Valuables: Jewelry, including body piercings, Dentures, money, and credit cards should be left at home. [facility] is not responsible for valuables that are not secured in our surgery center.  Makeup, Perfume, Creams, Lotions and Deodorants: Do not use any of these products on the day of surgery. Remove false eyelashes prior to surgery.  Implanted Medical Devices: If you have an implanted device, such as a pacemaker or AICD, bring the device information card (if you have it) with you.  Medical Equipment: If you have been fitted for a brace to wear after surgery or you have been given crutches, bring those with you to the surgery center.  Shower: Take a shower with Hibiclens® (chlorhexidine) (available over the counter). This reduces the chance of infection. PLEASE USE CHLORHEXIDINE WASH THE NIGHT BEFORE SURGERY AND THE MORNING OF SURGERY.      If you are diabetic      Follow the diabetic medicine instructions you received during your pre-operative visit.  DO NOT take your insulin or diabetic medications the morning of surgery.  When you arrive at the surgical center, be sure to tell the nurse you are  diabetic.            Other Items to bring with you and know      Insurance card  Identification card such as 's license, passport, or other picture ID  Copy of your advance directives  List of medications and allergies, if not already provided  Name and phone number of person to contact if your condition changes significantly. YOU CANNOT DRIVE YOURSELF HOME FROM THE HOSPITAL THE DAY OF SURGERY.  PLEASE UNDERSTAND THAT OUR OFFICE DOES NOT GIVE PATHOLOGY RESULTS OR TEST RESULTS OVER THE PHONE. THIS WILL BE DISCUSSED WITH YOU ON YOUR FOLLOW UP APPOINTMENT.        Medication instructions:  You may take blood pressure medication with a small drink of water the morning of surgery.    The following blood sugar medications have to be stopped prior to surgery:    Metformin, Glucovance, Metaglip, Fortamet, Glucophage, Riomet, Avandamet, Glimepiride    IF YOU ARE ON ANY OF THESE BLOOD THINNERS, MAKE SURE YOUR PHYSICIAN IS AWARE.  Eliquis/Apixaban         Wafarin/Coumadin,Jantoven  Xarelto/Rivaroxaban   Pletal/Cilostazol  Plavix/Clopidogrel                                                              Pradaxa/Dibigatran        Alcohol and Surgery  We want to help you prepare for and recover from surgery as quickly and safely as possible. Be open and honest with your provider about how many drinks you have per day. Excessive alcohol use is defined as drinking more than three drinks per day. It can affect the outcome of your surgery. Binge drinking (consuming large amounts of alcohol infrequently, such as on weekends) can also affect the outcome of your surgery.  Alcohol withdrawal  If you drink more than three drinks a day, you could have a complication, called alcohol withdrawal, after surgery.  Alcohol withdrawal is a set of symptoms that people have when they suddenly stop drinking after using alcohol  for a long time. During withdrawal, a person's central nervous system overreacts. This can cause mild symptoms such as  shakiness, sweating or hallucinating. It can also cause other more serious side effects. If not treated properly, alcohol withdrawal can cause potentially life-threatening complications after surgery. This can include tremors, seizures, hallucinations, delirium tremors, and even death. Untreated alcohol withdrawal often leads to a longer stay in the hospital, potentially in the Intensive Care Unit.  Chronic heavy drinking also can interfere with several organ systems and biochemical processes in the body.  This interference can cause serious, even life-threatening complications.  Your care team can offer alcohol withdrawal treatment to help:  Decrease the risk of seizures and delirium tremors after surgery  Decrease the risk we will need to restrain you for your own safety or the safety of others  Decrease your risk of falling after surgery  Reduce the use of potent sedative medications  Reduce the time you stay in the hospital after surgery  Reduce the time you might spend on a mechanical ventilator to help you breathe  Lower incidence of organ failure and biochemical complications  Talk to a member of your care team or your primary care physician about your alcohol use if you feel you may be at risk of any of these complications.        Smoking and Surgery  Quitting smoking is extremely important for a successful surgery and recovery. Cigarette smoking compromises your immune system. This increases your risk of an infection after surgery. Quitting the habit before surgery will decrease the surgical risks associated with smoking.

## 2023-08-16 DIAGNOSIS — E78.5 HYPERLIPIDEMIA, UNSPECIFIED HYPERLIPIDEMIA TYPE: ICD-10-CM

## 2023-08-16 RX ORDER — EZETIMIBE 10 MG/1
TABLET ORAL
Qty: 90 TABLET | Refills: 2 | Status: SHIPPED | OUTPATIENT
Start: 2023-08-16

## 2023-08-16 RX ORDER — SPIRONOLACTONE 25 MG/1
TABLET ORAL
Qty: 90 TABLET | Refills: 0 | Status: SHIPPED | OUTPATIENT
Start: 2023-08-16 | End: 2024-02-12 | Stop reason: SDUPTHER

## 2023-08-16 RX ORDER — FUROSEMIDE 40 MG/1
TABLET ORAL
Qty: 90 TABLET | Refills: 0 | Status: SHIPPED | OUTPATIENT
Start: 2023-08-16 | End: 2024-04-01

## 2023-08-18 ENCOUNTER — ANESTHESIA EVENT (OUTPATIENT)
Dept: SURGERY | Facility: HOSPITAL | Age: 68
End: 2023-08-18
Payer: MEDICARE

## 2023-08-18 ENCOUNTER — ANESTHESIA (OUTPATIENT)
Dept: SURGERY | Facility: HOSPITAL | Age: 68
End: 2023-08-18
Payer: MEDICARE

## 2023-08-18 ENCOUNTER — HOSPITAL ENCOUNTER (OUTPATIENT)
Facility: HOSPITAL | Age: 68
Discharge: HOME | End: 2023-08-21
Attending: STUDENT IN AN ORGANIZED HEALTH CARE EDUCATION/TRAINING PROGRAM | Admitting: STUDENT IN AN ORGANIZED HEALTH CARE EDUCATION/TRAINING PROGRAM
Payer: MEDICARE

## 2023-08-18 VITALS
HEART RATE: 56 BPM | OXYGEN SATURATION: 91 % | HEIGHT: 63 IN | SYSTOLIC BLOOD PRESSURE: 126 MMHG | BODY MASS INDEX: 32.43 KG/M2 | WEIGHT: 183 LBS | TEMPERATURE: 99 F | RESPIRATION RATE: 17 BRPM | DIASTOLIC BLOOD PRESSURE: 69 MMHG

## 2023-08-18 DIAGNOSIS — S71.102D OPEN WOUND OF LEFT THIGH, SUBSEQUENT ENCOUNTER: ICD-10-CM

## 2023-08-18 DIAGNOSIS — R22.41 LEG MASS, RIGHT: ICD-10-CM

## 2023-08-18 LAB
GLUCOSE SERPL-MCNC: 161 MG/DL (ref 70–105)
GLUCOSE SERPL-MCNC: 170 MG/DL (ref 70–105)

## 2023-08-18 PROCEDURE — 27000655: Performed by: ANESTHESIOLOGY

## 2023-08-18 PROCEDURE — D9220A PRA ANESTHESIA: ICD-10-PCS | Mod: ANES,,, | Performed by: ANESTHESIOLOGY

## 2023-08-18 PROCEDURE — 88313 SPECIAL STAINS GROUP 2: CPT | Mod: 26,XU,, | Performed by: PATHOLOGY

## 2023-08-18 PROCEDURE — 71000033 HC RECOVERY, INTIAL HOUR: Performed by: STUDENT IN AN ORGANIZED HEALTH CARE EDUCATION/TRAINING PROGRAM

## 2023-08-18 PROCEDURE — 63600175 PHARM REV CODE 636 W HCPCS: Performed by: STUDENT IN AN ORGANIZED HEALTH CARE EDUCATION/TRAINING PROGRAM

## 2023-08-18 PROCEDURE — 71000016 HC POSTOP RECOV ADDL HR: Performed by: STUDENT IN AN ORGANIZED HEALTH CARE EDUCATION/TRAINING PROGRAM

## 2023-08-18 PROCEDURE — 37000008 HC ANESTHESIA 1ST 15 MINUTES: Performed by: STUDENT IN AN ORGANIZED HEALTH CARE EDUCATION/TRAINING PROGRAM

## 2023-08-18 PROCEDURE — 88311 SURGICAL PATHOLOGY: ICD-10-PCS | Mod: 26,,, | Performed by: PATHOLOGY

## 2023-08-18 PROCEDURE — D9220A PRA ANESTHESIA: ICD-10-PCS | Mod: CRNA,,, | Performed by: NURSE ANESTHETIST, CERTIFIED REGISTERED

## 2023-08-18 PROCEDURE — 11402 PR EXC SKIN BENIG 1.1-2 CM TRUNK,ARM,LEG: ICD-10-PCS | Mod: 51,,, | Performed by: STUDENT IN AN ORGANIZED HEALTH CARE EDUCATION/TRAINING PROGRAM

## 2023-08-18 PROCEDURE — 27000510 HC BLANKET BAIR HUGGER ANY SIZE: Performed by: ANESTHESIOLOGY

## 2023-08-18 PROCEDURE — 36000706: Performed by: STUDENT IN AN ORGANIZED HEALTH CARE EDUCATION/TRAINING PROGRAM

## 2023-08-18 PROCEDURE — 25000003 PHARM REV CODE 250: Performed by: STUDENT IN AN ORGANIZED HEALTH CARE EDUCATION/TRAINING PROGRAM

## 2023-08-18 PROCEDURE — 63600175 PHARM REV CODE 636 W HCPCS: Performed by: NURSE ANESTHETIST, CERTIFIED REGISTERED

## 2023-08-18 PROCEDURE — 88313 SURGICAL PATHOLOGY: ICD-10-PCS | Mod: 26,XU,, | Performed by: PATHOLOGY

## 2023-08-18 PROCEDURE — 88313 SPECIAL STAINS GROUP 2: CPT | Mod: TC,SUR | Performed by: STUDENT IN AN ORGANIZED HEALTH CARE EDUCATION/TRAINING PROGRAM

## 2023-08-18 PROCEDURE — 37000009 HC ANESTHESIA EA ADD 15 MINS: Performed by: STUDENT IN AN ORGANIZED HEALTH CARE EDUCATION/TRAINING PROGRAM

## 2023-08-18 PROCEDURE — 88304 TISSUE EXAM BY PATHOLOGIST: CPT | Mod: 26,,, | Performed by: PATHOLOGY

## 2023-08-18 PROCEDURE — D9220A PRA ANESTHESIA: Mod: ANES,,, | Performed by: ANESTHESIOLOGY

## 2023-08-18 PROCEDURE — 27337 EXC THIGH/KNEE LES SC 3 CM/>: CPT | Mod: RT,,, | Performed by: STUDENT IN AN ORGANIZED HEALTH CARE EDUCATION/TRAINING PROGRAM

## 2023-08-18 PROCEDURE — 11403 PR EXC SKIN BENIG 2.1-3 CM TRUNK,ARM,LEG: ICD-10-PCS | Mod: 51,,, | Performed by: STUDENT IN AN ORGANIZED HEALTH CARE EDUCATION/TRAINING PROGRAM

## 2023-08-18 PROCEDURE — 82962 GLUCOSE BLOOD TEST: CPT

## 2023-08-18 PROCEDURE — 25000003 PHARM REV CODE 250: Performed by: NURSE ANESTHETIST, CERTIFIED REGISTERED

## 2023-08-18 PROCEDURE — 11403 EXC TR-EXT B9+MARG 2.1-3CM: CPT | Mod: 51,,, | Performed by: STUDENT IN AN ORGANIZED HEALTH CARE EDUCATION/TRAINING PROGRAM

## 2023-08-18 PROCEDURE — 11402 EXC TR-EXT B9+MARG 1.1-2 CM: CPT | Mod: 51,,, | Performed by: STUDENT IN AN ORGANIZED HEALTH CARE EDUCATION/TRAINING PROGRAM

## 2023-08-18 PROCEDURE — 88311 DECALCIFY TISSUE: CPT | Mod: 26,,, | Performed by: PATHOLOGY

## 2023-08-18 PROCEDURE — 36000707: Performed by: STUDENT IN AN ORGANIZED HEALTH CARE EDUCATION/TRAINING PROGRAM

## 2023-08-18 PROCEDURE — 27000177 HC AIRWAY, LARYNGEAL MASK: Performed by: ANESTHESIOLOGY

## 2023-08-18 PROCEDURE — 88305 SURGICAL PATHOLOGY: ICD-10-PCS | Mod: 26,XU,, | Performed by: PATHOLOGY

## 2023-08-18 PROCEDURE — 88305 TISSUE EXAM BY PATHOLOGIST: CPT | Mod: 26,XU,, | Performed by: PATHOLOGY

## 2023-08-18 PROCEDURE — 27337 PR EXCISON TUMOR SOFT TISSUE THIGH/KNEE SUBQ 3+CM: ICD-10-PCS | Mod: RT,,, | Performed by: STUDENT IN AN ORGANIZED HEALTH CARE EDUCATION/TRAINING PROGRAM

## 2023-08-18 PROCEDURE — D9220A PRA ANESTHESIA: Mod: CRNA,,, | Performed by: NURSE ANESTHETIST, CERTIFIED REGISTERED

## 2023-08-18 PROCEDURE — 27000716 HC OXISENSOR PROBE, ANY SIZE: Performed by: ANESTHESIOLOGY

## 2023-08-18 PROCEDURE — 71000015 HC POSTOP RECOV 1ST HR: Performed by: STUDENT IN AN ORGANIZED HEALTH CARE EDUCATION/TRAINING PROGRAM

## 2023-08-18 PROCEDURE — 88304 SURGICAL PATHOLOGY: ICD-10-PCS | Mod: 26,,, | Performed by: PATHOLOGY

## 2023-08-18 RX ORDER — EPHEDRINE SULFATE 50 MG/ML
INJECTION, SOLUTION INTRAVENOUS
Status: DISCONTINUED | OUTPATIENT
Start: 2023-08-18 | End: 2023-08-18

## 2023-08-18 RX ORDER — DEXAMETHASONE SODIUM PHOSPHATE 4 MG/ML
INJECTION, SOLUTION INTRA-ARTICULAR; INTRALESIONAL; INTRAMUSCULAR; INTRAVENOUS; SOFT TISSUE
Status: DISCONTINUED | OUTPATIENT
Start: 2023-08-18 | End: 2023-08-18

## 2023-08-18 RX ORDER — SODIUM CHLORIDE 9 MG/ML
INJECTION, SOLUTION INTRAVENOUS CONTINUOUS
Status: DISCONTINUED | OUTPATIENT
Start: 2023-08-18 | End: 2023-08-21 | Stop reason: HOSPADM

## 2023-08-18 RX ORDER — SODIUM CHLORIDE, SODIUM LACTATE, POTASSIUM CHLORIDE, CALCIUM CHLORIDE 600; 310; 30; 20 MG/100ML; MG/100ML; MG/100ML; MG/100ML
125 INJECTION, SOLUTION INTRAVENOUS CONTINUOUS
Status: DISCONTINUED | OUTPATIENT
Start: 2023-08-18 | End: 2023-08-21 | Stop reason: HOSPADM

## 2023-08-18 RX ORDER — HYDROMORPHONE HYDROCHLORIDE 2 MG/ML
INJECTION, SOLUTION INTRAMUSCULAR; INTRAVENOUS; SUBCUTANEOUS
Status: DISCONTINUED | OUTPATIENT
Start: 2023-08-18 | End: 2023-08-18

## 2023-08-18 RX ORDER — MORPHINE SULFATE 10 MG/ML
4 INJECTION INTRAMUSCULAR; INTRAVENOUS; SUBCUTANEOUS EVERY 5 MIN PRN
Status: DISCONTINUED | OUTPATIENT
Start: 2023-08-18 | End: 2023-08-21 | Stop reason: HOSPADM

## 2023-08-18 RX ORDER — FENTANYL CITRATE 50 UG/ML
INJECTION, SOLUTION INTRAMUSCULAR; INTRAVENOUS
Status: DISCONTINUED | OUTPATIENT
Start: 2023-08-18 | End: 2023-08-18

## 2023-08-18 RX ORDER — ONDANSETRON 2 MG/ML
4 INJECTION INTRAMUSCULAR; INTRAVENOUS DAILY PRN
Status: DISCONTINUED | OUTPATIENT
Start: 2023-08-18 | End: 2023-08-21 | Stop reason: HOSPADM

## 2023-08-18 RX ORDER — MEPERIDINE HYDROCHLORIDE 25 MG/ML
25 INJECTION INTRAMUSCULAR; INTRAVENOUS; SUBCUTANEOUS EVERY 10 MIN PRN
Status: ACTIVE | OUTPATIENT
Start: 2023-08-18 | End: 2023-08-18

## 2023-08-18 RX ORDER — HYDROCODONE BITARTRATE AND ACETAMINOPHEN 5; 325 MG/1; MG/1
1 TABLET ORAL EVERY 6 HOURS PRN
Qty: 15 TABLET | Refills: 0 | Status: SHIPPED | OUTPATIENT
Start: 2023-08-18 | End: 2023-08-25

## 2023-08-18 RX ORDER — LIDOCAINE HYDROCHLORIDE 20 MG/ML
INJECTION, SOLUTION EPIDURAL; INFILTRATION; INTRACAUDAL; PERINEURAL
Status: DISCONTINUED | OUTPATIENT
Start: 2023-08-18 | End: 2023-08-18

## 2023-08-18 RX ORDER — ONDANSETRON 2 MG/ML
INJECTION INTRAMUSCULAR; INTRAVENOUS
Status: DISCONTINUED | OUTPATIENT
Start: 2023-08-18 | End: 2023-08-18

## 2023-08-18 RX ORDER — DOCUSATE SODIUM 100 MG/1
100 CAPSULE, LIQUID FILLED ORAL 2 TIMES DAILY
Qty: 28 CAPSULE | Refills: 0 | Status: SHIPPED | OUTPATIENT
Start: 2023-08-18

## 2023-08-18 RX ORDER — HYDROMORPHONE HYDROCHLORIDE 2 MG/ML
0.5 INJECTION, SOLUTION INTRAMUSCULAR; INTRAVENOUS; SUBCUTANEOUS EVERY 5 MIN PRN
Status: DISCONTINUED | OUTPATIENT
Start: 2023-08-18 | End: 2023-08-21 | Stop reason: HOSPADM

## 2023-08-18 RX ORDER — MIDAZOLAM HYDROCHLORIDE 1 MG/ML
INJECTION INTRAMUSCULAR; INTRAVENOUS
Status: DISCONTINUED | OUTPATIENT
Start: 2023-08-18 | End: 2023-08-18

## 2023-08-18 RX ORDER — PROPOFOL 10 MG/ML
VIAL (ML) INTRAVENOUS
Status: DISCONTINUED | OUTPATIENT
Start: 2023-08-18 | End: 2023-08-18

## 2023-08-18 RX ORDER — DIPHENHYDRAMINE HYDROCHLORIDE 50 MG/ML
25 INJECTION INTRAMUSCULAR; INTRAVENOUS EVERY 6 HOURS PRN
Status: DISCONTINUED | OUTPATIENT
Start: 2023-08-18 | End: 2023-08-21 | Stop reason: HOSPADM

## 2023-08-18 RX ADMIN — EPHEDRINE SULFATE 10 MG: 50 INJECTION INTRAVENOUS at 08:08

## 2023-08-18 RX ADMIN — PROPOFOL 50 MG: 10 INJECTION, EMULSION INTRAVENOUS at 07:08

## 2023-08-18 RX ADMIN — FENTANYL CITRATE 50 MCG: 50 INJECTION INTRAMUSCULAR; INTRAVENOUS at 07:08

## 2023-08-18 RX ADMIN — ONDANSETRON 4 MG: 2 INJECTION INTRAMUSCULAR; INTRAVENOUS at 08:08

## 2023-08-18 RX ADMIN — SODIUM CHLORIDE: 9 INJECTION, SOLUTION INTRAVENOUS at 07:08

## 2023-08-18 RX ADMIN — CEFAZOLIN 2 G: 2 INJECTION, POWDER, FOR SOLUTION INTRAMUSCULAR; INTRAVENOUS at 07:08

## 2023-08-18 RX ADMIN — PROPOFOL 50 MG: 10 INJECTION, EMULSION INTRAVENOUS at 08:08

## 2023-08-18 RX ADMIN — LIDOCAINE HYDROCHLORIDE 50 MG: 20 INJECTION, SOLUTION INTRAVENOUS at 07:08

## 2023-08-18 RX ADMIN — HYDROMORPHONE HYDROCHLORIDE 0.4 MG: 2 INJECTION, SOLUTION INTRAMUSCULAR; INTRAVENOUS; SUBCUTANEOUS at 08:08

## 2023-08-18 RX ADMIN — DEXAMETHASONE SODIUM PHOSPHATE 4 MG: 4 INJECTION, SOLUTION INTRA-ARTICULAR; INTRALESIONAL; INTRAMUSCULAR; INTRAVENOUS; SOFT TISSUE at 08:08

## 2023-08-18 RX ADMIN — HYDROMORPHONE HYDROCHLORIDE 0.2 MG: 2 INJECTION, SOLUTION INTRAMUSCULAR; INTRAVENOUS; SUBCUTANEOUS at 08:08

## 2023-08-18 RX ADMIN — MIDAZOLAM 2 MG: 1 INJECTION INTRAMUSCULAR; INTRAVENOUS at 07:08

## 2023-08-18 NOTE — OR NURSING
0919 REC'D TO PACU IN STABLE [place]D. PT SLEEPING, WAKES TO VOICE. CONNECTED TO BP CUFF, EKG LEADS & SPO2 MONITORS. VS STABLE  PT RESTING WELL, NO DISTRESS NOTED @ THIS TIME. WILL CONT TO MONITOR.      0950 TRANSFERRED PT BACK TO OUT PT ROOM 4 IN STABLE [place]D. BEDSIDE  REPORT GIVEN TO SHALONDA KEARNS. NO DISTRESS NOTED @  THIS TIME. VS STABLE  96%  58  126/69

## 2023-08-18 NOTE — ANESTHESIA POSTPROCEDURE EVALUATION
Anesthesia Post Evaluation    Patient: Selena Proctor    Procedure(s) Performed: Procedure(s) (LRB):  Excision right leg fatty tumor (Right)  BIOPSY OR EXCISION, LESION left thigh and abdomen    Final Anesthesia Type: general      Patient location during evaluation: PACU  Patient participation: Yes- Able to Participate  Level of consciousness: awake and sedated  Post-procedure vital signs: reviewed and stable  Pain management: adequate  Airway patency: patent    PONV status at discharge: No PONV  Anesthetic complications: no      Cardiovascular status: blood pressure returned to baseline  Respiratory status: unassisted  Hydration status: euvolemic  Follow-up not needed.          Vitals Value Taken Time   /66 08/18/23 1101   Temp 37.2 °C (98.9 °F) 08/18/23 0923   Pulse 57 08/18/23 1101   Resp 17 08/18/23 0950   SpO2 94 % 08/18/23 1101   Vitals shown include unvalidated device data.      Event Time   Out of Recovery 09:50:00         Pain/Daren Score: Daren Score: 10 (8/18/2023  9:58 AM)

## 2023-08-18 NOTE — TRANSFER OF CARE
"Anesthesia Transfer of Care Note    Patient: Selena Proctor    Procedure(s) Performed: Procedure(s) (LRB):  Excision right leg fatty tumor (Right)  BIOPSY OR EXCISION, LESION left thigh and abdomen    Patient location: PACU    Anesthesia Type: general    Transport from OR: Transported from OR on 6-10 L/min O2 by face mask with adequate spontaneous ventilation    Post pain: adequate analgesia    Post assessment: no apparent anesthetic complications    Post vital signs: stable    Level of consciousness: awake and responds to stimulation    Nausea/Vomiting: no nausea/vomiting    Complications: none    Transfer of care protocol was followedComments: [id number]      Last vitals:   Visit Vitals  /67 (BP Location: Left arm, Patient Position: Lying)   Pulse (!) 55   Temp 37.2 °C (98.9 °F)   Resp 16   Ht 5' 3" (1.6 m)   Wt 83 kg (183 lb)   SpO2 97%   Breastfeeding No   BMI 32.42 kg/m²     "

## 2023-08-18 NOTE — ANESTHESIA PROCEDURE NOTES
Intubation    Date/Time: 8/18/2023 7:49 AM    Performed by: Susie Stoddard CRNA  Authorized by: Kentrell Weber MD    Intubation:     Induction:  Inhalational - mask    Intubated:  Postinduction    Mask Ventilation:  Easy mask    Attempts:  1    Attempted By:  CRNA    Difficult Airway Encountered?: No      Complications:  None    Airway Device:  Supraglottic airway/LMA    Airway Device Size:  4.0    Style/Cuff Inflation:  Cuffed    Secured at:  The lips    Placement Verified By:  Capnometry    Complicating Factors:  None    Findings Post-Intubation:  BS equal bilateral and atraumatic/condition of teeth unchanged  Notes:      Mask induction, then restarted PIV, then IV induction to place LMA

## 2023-08-18 NOTE — ANESTHESIA PREPROCEDURE EVALUATION
08/18/2023  Selena Proctor is a 67 y.o., female.      Pre-op Assessment    I have reviewed the Patient Summary Reports.     I have reviewed the Nursing Notes. I have reviewed the NPO Status.   I have reviewed the Medications.     Review of Systems  Anesthesia Hx:  No problems with previous Anesthesia    Social:  Non-Smoker, No Alcohol Use    Hematology/Oncology:  Hematology Normal   Oncology Normal     EENT/Dental:EENT/Dental Normal   Cardiovascular:   Hypertension CAD      Pulmonary:  Pulmonary Normal    Renal/:  Renal/ Normal     Hepatic/GI:   GERD    Musculoskeletal:   Arthritis     Neurological:  Neurology Normal    Endocrine:   Diabetes, poorly controlled, type 2  Obesity / BMI > 30  Dermatological:  Skin Normal    Psych:  Psychiatric Normal           Physical Exam  General: Well nourished    Airway:  Mallampati: II / II  Mouth Opening: Normal  TM Distance: > 6 cm  Tongue: Normal  Neck ROM: Normal ROM    Chest/Lungs:  Clear to auscultation, Normal Respiratory Rate    Heart:  Rate: Normal  Rhythm: Regular Rhythm        Anesthesia Plan  Type of Anesthesia, risks & benefits discussed:    Anesthesia Type: Gen Supraglottic Airway  Intra-op Monitoring Plan: Standard ASA Monitors  Post Op Pain Control Plan: multimodal analgesia  Induction:  IV  Informed Consent: Informed consent signed with the Patient and all parties understand the risks and agree with anesthesia plan.  All questions answered.   ASA Score: 2  Day of Surgery Review of History & Physical: H&P Update referred to the surgeon/provider.I have interviewed and examined the patient. I have reviewed the patient's H&P dated: There are no significant changes.     Ready For Surgery From Anesthesia Perspective.     .

## 2023-08-18 NOTE — OP NOTE
[facility]  Surgery Department  Operative Note    SUMMARY     Date of Procedure: 8/18/2023     Procedure: Procedure(s) (LRB):  Excision right leg fatty tumor (Right)  BIOPSY OR EXCISION, LESION left thigh and abdomen     Surgeon(s) and Role:     * Andrez Norman,  - Primary    Assisting Surgeon: None    Pre-Operative Diagnosis: Leg mass, right [R22.41]  Open wound of left thigh, subsequent encounter [S71.102D]    Post-Operative Diagnosis: Post-Op Diagnosis Codes:     * Leg mass, right [R22.41]     * Open wound of left thigh, subsequent encounter [S71.102D]    Anesthesia: General    Operative Findings (including complications, if any): right thigh fatty tumor [id number]; left thigh ulceration 1x1x0.3cm; left abdomen ulceration 2x0.5x0.3cm    Description of Technical Procedures:  Patient was taken the operating room and placed on the operating table in the supine position.  Patient underwent general anesthesia per the anesthesia team.  The right leg, left leg and abdomen were then prepped and draped in usual sterile fashion.  We localize around the site of the right thigh from the fatty tumor with 0.25% Marcaine plain.  We then made a 6 cm transverse incision with a 15 blade scalpel and dissected down through subcutaneous tissue with electrocautery, controlling bleeding as we went with electrocautery and 3-0 Vicryl.  We found a fatty tumor and circumferentially dissected and excised the tumor.  It measured 6 x 5 x 2 cm we sent this to pathology.  We irrigated the cavity and suctioned clear, bleeding controlled electrocautery.  We then closed the subcutaneous tissue with multiple 3-0 Vicryl subcutaneous stitches.  We then approximated the deep dermal layer with multiple 3-0 Vicryl deep dermal sutures and approximated the skin with a 4-0 Monocryl running subcuticular stitch.  The skin was cleaned and dried Mastisol Steri-Strips applied.  We then turned our attention to the left inner thigh  wound.  There was an ulceration measuring approximately 1 by 1 x 0.3 cm.  We made a 2 cm elliptical incision around this with a 15 blade scalpel and dissected down through subcutaneous tissue and circumferentially excise the lesion and sent this to pathology.  Bleeding controlled electrocautery.  We irrigated and suctioned clear.  We then approximated the skin with multiple 2-0 nylon horizontal mattress sutures and simple sutures.  Sterile dressing applied.  We then turned our attention to the left abdomen.  We localized around this site and then made a 3 cm elliptical incision for the 15 blade scalpel dissected down to subcutaneous tissue; the lesion measured 2 x 0.5 x 0.3 cm.  We circumferentially transected the lesion and sent this to pathology.  Bleeding controlled electrocautery.  We irrigated saline and suctioned clear.  We then approximated with 2-0 nylon horizontal mattress and simple sutures.  The skin was cleaned and dried and sterile dressing applied.  Patient was awakened, extubated and taken the PACU in stable condition.  Patient tolerated procedure well.      Estimated Blood Loss (EBL): 5 mL           Implants: * No implants in log *    Specimens:   Specimen (24h ago, onward)       Start     Ordered    08/18/23 3391  Surgical Pathology  RELEASE UPON ORDERING         08/18/23 1628                            Condition: Good    Disposition: PACU - hemodynamically stable.    Attestation: I was present and scrubbed for the entire procedure.

## 2023-08-30 NOTE — PROGRESS NOTES
Subjective:         Patient ID: eSlena Proctor is a 67 y.o. female.    Chief Complaint: Hip Pain, Joint Pain, and Back Pain      Pain  This is a chronic problem. The current episode started more than 1 year ago. The problem occurs daily. The problem has been unchanged. Associated symptoms include arthralgias and neck pain. Pertinent negatives include no anorexia, change in bowel habit, coughing, diaphoresis, fever, sore throat, vertigo or vomiting.     Review of Systems   Constitutional:  Negative for activity change, appetite change, diaphoresis, fever and unexpected weight change.   HENT:  Negative for drooling, ear discharge, ear pain, facial swelling, nosebleeds, sore throat, trouble swallowing, voice change and goiter.    Eyes:  Negative for photophobia, pain, discharge, redness and visual disturbance.   Respiratory:  Negative for apnea, cough, choking, chest tightness, shortness of breath, wheezing and stridor.    Cardiovascular:  Negative for palpitations and leg swelling.   Gastrointestinal:  Negative for abdominal distention, anorexia, change in bowel habit, diarrhea, rectal pain, vomiting, fecal incontinence and change in bowel habit.   Endocrine: Negative for cold intolerance, heat intolerance, polydipsia, polyphagia and polyuria.   Genitourinary:  Negative for flank pain, frequency and hot flashes.   Musculoskeletal:  Positive for arthralgias, back pain and neck pain.   Integumentary:  Negative for color change and pallor.   Allergic/Immunologic: Negative for immunocompromised state.   Neurological:  Negative for dizziness, vertigo, seizures, syncope, facial asymmetry, speech difficulty, light-headedness, coordination difficulties, memory loss and coordination difficulties.   Hematological:  Negative for adenopathy. Does not bruise/bleed easily.   Psychiatric/Behavioral:  Negative for agitation, behavioral problems, confusion, decreased concentration, dysphoric mood, hallucinations, self-injury and  suicidal ideas. The patient is not nervous/anxious and is not hyperactive.            Past Medical History:   Diagnosis Date    Acid reflux     Coronary arteriosclerosis     Diabetes     Diabetes mellitus, type 2     Hypertension     Skin cancer     left leg    Spondylosis without myelopathy or radiculopathy, lumbar region      Past Surgical History:   Procedure Laterality Date    APPENDECTOMY      BIOPSY OF LESION Left 3/29/2023    Procedure: BIOPSY, LESION;  Surgeon: Andrez Norman DO;  Location: [facility] OR;  Service: General;  Laterality: Left;  excisional biospy of left thigh with rotational flap    BIOPSY OR EXCISION, LESION  2023    Procedure: BIOPSY OR EXCISION, LESION left thigh and abdomen;  Surgeon: Andrez Norman DO;  Location: [facility] OR;  Service: General;;    BLOCK, NERVE, OBTURATOR Right 2019    Right Femoral/Obturator Nerve Block  Dr Headley     SECTION      x2    CORONARY ARTERY BYPASS GRAFT      KNEE ARTHROPLASTY Right     KNEE ARTHROSCOPY Left     LIPOMA RESECTION Right 2023    Procedure: Excision right leg fatty tumor;  Surgeon: Andrez Norman DO;  Location: [facility];  Service: General;  Laterality: Right;    SKIN GRAFT      Left Thigh    TOTAL HIP ARTHROPLASTY Bilateral     TUBAL LIGATION       Social History     Socioeconomic History    Marital status: Legally     Number of children: 6   Occupational History    Occupation: retired   Tobacco Use    Smoking status: Never     Passive exposure: Current (.)    Smokeless tobacco: Never   Substance and Sexual Activity    Alcohol use: Not Currently    Drug use: Yes     Types: Hydrocodone    Sexual activity: Not Currently     Social Determinants of Health     Financial Resource Strain: Low Risk  (2021)    Overall Financial Resource Strain (CARDIA)     Difficulty of Paying Living Expenses: Not very hard   Food Insecurity: No Food Insecurity (2021)    Hunger Vital Sign     Worried About  "Running Out of Food in the Last Year: Never true     Ran Out of Food in the Last Year: Never true   Transportation Needs: No Transportation Needs (7/28/2021)    PRAPARE - Transportation     Lack of Transportation (Medical): No     Lack of Transportation (Non-Medical): No   Physical Activity: Insufficiently Active (7/28/2021)    Exercise Vital Sign     Days of Exercise per Week: 3 days     Minutes of Exercise per Session: 10 min   Stress: No Stress Concern Present (7/28/2021)    [employer] [place] [employer] - Occupational Stress Questionnaire     Feeling of Stress : Not at all   Social Connections: Moderately Isolated (7/28/2021)    Social Connection and Isolation Panel [NHANES]     Frequency of Communication with Friends and Family: Three times a week     Frequency of Social Gatherings with Friends and Family: Twice a week     Attends Restorationism Services: More than 4 times per year     Active Member of Clubs or Organizations: No     Attends Club or Organization Meetings: Never     Marital Status:    Housing Stability: Low Risk  (7/28/2021)    Housing Stability Vital Sign     Unable to Pay for Housing in the Last Year: No     Number of Places Lived in the Last Year: 1     Unstable Housing in the Last Year: No     Family History   Problem Relation Age of Onset    Hypertension Mother     Heart disease Mother     Diabetes Sister     Hypertension Sister     Hypertension Brother     Alcohol abuse Father     Cancer Father     Hypertension Son     Mental illness Maternal Aunt      Review of patient's allergies indicates:   Allergen Reactions    Betadine [povidone-iodine] Itching        Objective:  Vitals:    08/31/23 0748   BP: 138/68   Pulse: 62   Resp: 20   Weight: 83.9 kg (185 lb)   Height: 5' 3" (1.6 m)   PainSc:   9         Physical Exam  Vitals and nursing note reviewed. Exam conducted with a chaperone present.   Constitutional:       General: She is awake. She is not in acute distress.     " Appearance: Normal appearance. She is not ill-appearing, toxic-appearing or diaphoretic.   HENT:      Head: Normocephalic and atraumatic.      Nose: Nose normal.      Mouth/Throat:      Mouth: Mucous membranes are moist.      Pharynx: Oropharynx is clear.   Eyes:      Conjunctiva/sclera: Conjunctivae normal.      Pupils: Pupils are equal, round, and reactive to light.   Cardiovascular:      Rate and Rhythm: Normal rate.   Pulmonary:      Effort: Pulmonary effort is normal. No respiratory distress.   Abdominal:      Palpations: Abdomen is soft.      Tenderness: There is no guarding.   Musculoskeletal:         General: Normal range of motion.      Right shoulder: Tenderness present.      Left shoulder: Tenderness present.      Cervical back: Normal range of motion and neck supple. No rigidity.      Thoracic back: Tenderness present.      Lumbar back: Tenderness present.      Right hip: Tenderness present.      Left hip: Tenderness present.   Skin:     General: Skin is warm and dry.      Coloration: Skin is not jaundiced or pale.   Neurological:      General: No focal deficit present.      Mental Status: She is alert and oriented to person, place, and time. Mental status is at baseline.      Cranial Nerves: No cranial nerve deficit (II-XII).   Psychiatric:         Mood and Affect: Mood normal.         Behavior: Behavior normal. Behavior is cooperative.         Thought Content: Thought content normal.           US Extremity Non Vascular Complete Right  Narrative: EXAMINATION:  US EXTREMITY NON VASCULAR COMPLETE RIGHT    CLINICAL HISTORY:  Localized swelling, mass and lump, right lower limb    TECHNIQUE:  Color-flow duplex utilized    COMPARISON:  None    FINDINGS:  Solid soft tissue mass in right anterior thigh measuring 7.9 x 2.4 x 5.0 cm  Impression: Soft tissue mass as above, patient has had previous rotational flap in the superior per physician's note    TECHNOLOGIST: Sandra Gerber (RT) (VS) RVT    Electronically  signed by: Joan Williamson  Date:    08/09/2023  Time:    09:54       Admission on 08/18/2023, Discharged on 08/21/2023   Component Date Value Ref Range Status    POC Glucose 08/18/2023 170 (H)  70 - 105 mg/dL Final    Case Report 08/18/2023    Final                    Value:Surgical Pathology                                Case: [id number]                                   Authorizing Provider:  Andrez Norman DO       Collected:           08/18/2023 08:21 AM          Ordering Location:     [facility] -     Received:            08/18/2023 09:29 AM                                 Periop Services                                                              Pathologist:           Elio Barajas MD                                                      Specimens:   A) - Thigh, Right, right thigh fatty tumor                                                          B) - Thigh, Left, left inner thigh lesion                                                           C) - Abdomen, left abdominal lesion                                                        Final Diagnosis 08/18/2023    Final                    Value:This result contains rich text formatting which cannot be displayed here.    Comments 08/18/2023    Final                    Value:This result contains rich text formatting which cannot be displayed here.    Gross Description 08/18/2023    Final                    Value:This result contains rich text formatting which cannot be displayed here.    Microscopic Description 08/18/2023    Final                    Value:This result contains rich text formatting which cannot be displayed here.    Laboratory Notes 08/18/2023    Final                    Value:This result contains rich text formatting which cannot be displayed here.    POC Glucose 08/18/2023 161 (H)  70 - 105 mg/dL Final   Lab Visit on 08/16/2023   Component Date Value Ref Range Status    Sodium 08/16/2023 139  136 - 145 mmol/L Final     Potassium 08/16/2023 3.5  3.5 - 5.1 mmol/L Final    Chloride 08/16/2023 105  98 - 107 mmol/L Final    CO2 08/16/2023 27  21 - 32 mmol/L Final    Anion Gap 08/16/2023 11  7 - 16 mmol/L Final    Glucose 08/16/2023 113 (H)  74 - 106 mg/dL Final    BUN 08/16/2023 19 (H)  7 - 18 mg/dL Final    Creatinine 08/16/2023 1.00  0.55 - 1.02 mg/dL Final    BUN/Creatinine Ratio 08/16/2023 19  6 - 20 Final    Calcium 08/16/2023 9.5  8.5 - 10.1 mg/dL Final    eGFR 08/16/2023 61  >=60 mL/min/1.73m2 Final    WBC 08/16/2023 5.75  4.50 - 11.00 K/uL Final    RBC 08/16/2023 5.18  4.20 - 5.40 M/uL Final    Hemoglobin 08/16/2023 14.2  12.0 - 16.0 g/dL Final    Hematocrit 08/16/2023 44.7  38.0 - 47.0 % Final    MCV 08/16/2023 86.3  80.0 - 96.0 fL Final    MCH 08/16/2023 27.4  27.0 - 31.0 pg Final    MCHC 08/16/2023 31.8 (L)  32.0 - 36.0 g/dL Final    RDW 08/16/2023 15.3 (H)  11.5 - 14.5 % Final    Platelet Count 08/16/2023 240  150 - 400 K/uL Final    MPV 08/16/2023 12.1  9.4 - 12.4 fL Final    Neutrophils % 08/16/2023 50.4 (L)  53.0 - 65.0 % Final    Lymphocytes % 08/16/2023 33.9  27.0 - 41.0 % Final    Monocytes % 08/16/2023 11.5 (H)  2.0 - 6.0 % Final    Eosinophils % 08/16/2023 2.6  1.0 - 4.0 % Final    Basophils % 08/16/2023 0.9  0.0 - 1.0 % Final    Immature Granulocytes % 08/16/2023 0.7 (H)  0.0 - 0.4 % Final    nRBC, Auto 08/16/2023 0.0  <=0.0 % Final    Neutrophils, Abs 08/16/2023 2.90  1.80 - 7.70 K/uL Final    Lymphocytes, Absolute 08/16/2023 1.95  1.00 - 4.80 K/uL Final    Monocytes, Absolute 08/16/2023 0.66  0.00 - 0.80 K/uL Final    Eosinophils, Absolute 08/16/2023 0.15  0.00 - 0.50 K/uL Final    Basophils, Absolute 08/16/2023 0.05  0.00 - 0.20 K/uL Final    Immature Granulocytes, Absolute 08/16/2023 0.04  0.00 - 0.04 K/uL Final    nRBC, Absolute 08/16/2023 0.00  <=0.00 x10e3/uL Final    Diff Type 08/16/2023 Auto   Final   Patient Outreach on 08/03/2023   Component Date Value Ref Range Status    BMD Recommendation External  08/15/2022 No follow-up frequency specified   Final    BCS Recommendation External 08/15/2022 No follow-up frequency specified   Final   Office Visit on 07/27/2023   Component Date Value Ref Range Status    Sodium 07/27/2023 140  136 - 145 mmol/L Final    Potassium 07/27/2023 3.8  3.5 - 5.1 mmol/L Final    Chloride 07/27/2023 108 (H)  98 - 107 mmol/L Final    CO2 07/27/2023 22  21 - 32 mmol/L Final    Anion Gap 07/27/2023 14  7 - 16 mmol/L Final    Glucose 07/27/2023 170 (H)  74 - 106 mg/dL Final    BUN 07/27/2023 19 (H)  7 - 18 mg/dL Final    Creatinine 07/27/2023 1.08 (H)  0.55 - 1.02 mg/dL Final    BUN/Creatinine Ratio 07/27/2023 18  6 - 20 Final    Calcium 07/27/2023 9.1  8.5 - 10.1 mg/dL Final    Total Protein 07/27/2023 7.7  6.4 - 8.2 g/dL Final    Albumin 07/27/2023 3.3 (L)  3.5 - 5.0 g/dL Final    Globulin 07/27/2023 4.4 (H)  2.0 - 4.0 g/dL Final    A/G Ratio 07/27/2023 0.8   Final    Bilirubin, Total 07/27/2023 0.6  >0.0 - 1.2 mg/dL Final    Alk Phos 07/27/2023 61  55 - 142 U/L Final    ALT 07/27/2023 18  13 - 56 U/L Final    AST 07/27/2023 15  15 - 37 U/L Final    eGFR 07/27/2023 56 (L)  >=60 mL/min/1.73m2 Final    Hemoglobin A1C 07/27/2023 8.1 (H)  4.5 - 6.6 % Final    Estimated Average Glucose 07/27/2023 184  mg/dL Final    WBC 07/27/2023 5.95  4.50 - 11.00 K/uL Final    RBC 07/27/2023 4.92  4.20 - 5.40 M/uL Final    Hemoglobin 07/27/2023 13.5  12.0 - 16.0 g/dL Final    Hematocrit 07/27/2023 44.1  38.0 - 47.0 % Final    MCV 07/27/2023 89.6  80.0 - 96.0 fL Final    MCH 07/27/2023 27.4  27.0 - 31.0 pg Final    MCHC 07/27/2023 30.6 (L)  32.0 - 36.0 g/dL Final    RDW 07/27/2023 16.2 (H)  11.5 - 14.5 % Final    Platelet Count 07/27/2023 247  150 - 400 K/uL Final    MPV 07/27/2023 11.9  9.4 - 12.4 fL Final    Neutrophils % 07/27/2023 52.9 (L)  53.0 - 65.0 % Final    Lymphocytes % 07/27/2023 33.1  27.0 - 41.0 % Final    Monocytes % 07/27/2023 10.8 (H)  2.0 - 6.0 % Final    Eosinophils % 07/27/2023 2.4  1.0 -  4.0 % Final    Basophils % 07/27/2023 0.5  0.0 - 1.0 % Final    Immature Granulocytes % 07/27/2023 0.3  0.0 - 0.4 % Final    nRBC, Auto 07/27/2023 0.0  <=0.0 % Final    Neutrophils, Abs 07/27/2023 3.15  1.80 - 7.70 K/uL Final    Lymphocytes, Absolute 07/27/2023 1.97  1.00 - 4.80 K/uL Final    Monocytes, Absolute 07/27/2023 0.64  0.00 - 0.80 K/uL Final    Eosinophils, Absolute 07/27/2023 0.14  0.00 - 0.50 K/uL Final    Basophils, Absolute 07/27/2023 0.03  0.00 - 0.20 K/uL Final    Immature Granulocytes, Absolute 07/27/2023 0.02  0.00 - 0.04 K/uL Final    nRBC, Absolute 07/27/2023 0.00  <=0.00 x10e3/uL Final    Diff Type 07/27/2023 Auto   Final   Office Visit on 06/06/2023   Component Date Value Ref Range Status    POC Amphetamines 06/06/2023 Negative  Negative, Inconclusive Final    POC Barbiturates 06/06/2023 Negative  Negative, Inconclusive Final    POC Benzodiazepines 06/06/2023 Negative  Negative, Inconclusive Final    POC Cocaine 06/06/2023 Negative  Negative, Inconclusive Final    POC THC 06/06/2023 Negative  Negative, Inconclusive Final    POC Methadone 06/06/2023 Negative  Negative, Inconclusive Final    POC Methamphetamine 06/06/2023 Negative  Negative, Inconclusive Final    POC Opiates 06/06/2023 Negative  Negative, Inconclusive Final    POC Oxycodone 06/06/2023 Negative  Negative, Inconclusive Final    POC Phencyclidine 06/06/2023 Negative  Negative, Inconclusive Final    POC Methylenedioxymethamphetamine * 06/06/2023 Negative  Negative, Inconclusive Final    POC Tricyclic Antidepressants 06/06/2023 Negative  Negative, Inconclusive Final    POC Buprenorphine 06/06/2023 Negative   Final     Acceptable 06/06/2023 Yes   Final    POC Temperature (Urine) 06/06/2023 90   Final   Office Visit on 04/27/2023   Component Date Value Ref Range Status    Hemoglobin A1C 04/27/2023 7.8 (H)  4.5 - 6.6 % Final    Estimated Average Glucose 04/27/2023 174  mg/dL Final    Triglycerides 04/27/2023 153 (H)  35 -  150 mg/dL Final    Cholesterol 04/27/2023 164  0 - 200 mg/dL Final    HDL Cholesterol 04/27/2023 47  40 - 60 mg/dL Final    Cholesterol/HDL Ratio (Risk Factor) 04/27/2023 3.5   Final    Non-HDL 04/27/2023 117  mg/dL Final    LDL Calculated 04/27/2023 86  mg/dL Final    LDL/HDL 04/27/2023 1.8   Final    VLDL 04/27/2023 31  mg/dL Final    Sodium 04/27/2023 139  136 - 145 mmol/L Final    Potassium 04/27/2023 3.4 (L)  3.5 - 5.1 mmol/L Final    Chloride 04/27/2023 106  98 - 107 mmol/L Final    CO2 04/27/2023 25  21 - 32 mmol/L Final    Anion Gap 04/27/2023 11  7 - 16 mmol/L Final    Glucose 04/27/2023 122 (H)  74 - 106 mg/dL Final    BUN 04/27/2023 18  7 - 18 mg/dL Final    Creatinine 04/27/2023 0.79  0.55 - 1.02 mg/dL Final    BUN/Creatinine Ratio 04/27/2023 23 (H)  6 - 20 Final    Calcium 04/27/2023 9.3  8.5 - 10.1 mg/dL Final    Total Protein 04/27/2023 7.8  6.4 - 8.2 g/dL Final    Albumin 04/27/2023 3.3 (L)  3.5 - 5.0 g/dL Final    Globulin 04/27/2023 4.5 (H)  2.0 - 4.0 g/dL Final    A/G Ratio 04/27/2023 0.7   Final    Bilirubin, Total 04/27/2023 0.5  >0.0 - 1.2 mg/dL Final    Alk Phos 04/27/2023 67  55 - 142 U/L Final    ALT 04/27/2023 28  13 - 56 U/L Final    AST 04/27/2023 17  15 - 37 U/L Final    eGFR 04/27/2023 81  >=60 mL/min/1.73m² Final    WBC 04/27/2023 5.32  4.50 - 11.00 K/uL Final    RBC 04/27/2023 4.92  4.20 - 5.40 M/uL Final    Hemoglobin 04/27/2023 13.1  12.0 - 16.0 g/dL Final    Hematocrit 04/27/2023 44.2  38.0 - 47.0 % Final    MCV 04/27/2023 89.8  80.0 - 96.0 fL Final    MCH 04/27/2023 26.6 (L)  27.0 - 31.0 pg Final    MCHC 04/27/2023 29.6 (L)  32.0 - 36.0 g/dL Final    RDW 04/27/2023 15.8 (H)  11.5 - 14.5 % Final    Platelet Count 04/27/2023 298  150 - 400 K/uL Final    MPV 04/27/2023 10.6  9.4 - 12.4 fL Final    Neutrophils % 04/27/2023 53.6  53.0 - 65.0 % Final    Lymphocytes % 04/27/2023 32.7  27.0 - 41.0 % Final    Monocytes % 04/27/2023 10.3 (H)  2.0 - 6.0 % Final    Eosinophils % 04/27/2023  2.4  1.0 - 4.0 % Final    Basophils % 04/27/2023 0.6  0.0 - 1.0 % Final    Immature Granulocytes % 04/27/2023 0.4  0.0 - 0.4 % Final    nRBC, Auto 04/27/2023 0.0  <=0.0 % Final    Neutrophils, Abs 04/27/2023 2.85  1.80 - 7.70 K/uL Final    Lymphocytes, Absolute 04/27/2023 1.74  1.00 - 4.80 K/uL Final    Monocytes, Absolute 04/27/2023 0.55  0.00 - 0.80 K/uL Final    Eosinophils, Absolute 04/27/2023 0.13  0.00 - 0.50 K/uL Final    Basophils, Absolute 04/27/2023 0.03  0.00 - 0.20 K/uL Final    Immature Granulocytes, Absolute 04/27/2023 0.02  0.00 - 0.04 K/uL Final    nRBC, Absolute 04/27/2023 0.00  <=0.00 x10e3/uL Final    Diff Type 04/27/2023 Auto   Final    Color, UA 04/27/2023 Yellow   Final    Spec Grav UA 04/27/2023 1.020   Final    pH, UA 04/27/2023 7.0   Final    WBC, UA 04/27/2023 NEGATIVE   Final    Nitrite, UA 04/27/2023 NEGATIVE   Final    Protein, POC 04/27/2023 NEGATIVE   Final    Glucose, UA 04/27/2023 500   Final    Ketones, UA 04/27/2023 NEGATIVE   Final    Bilirubin, POC 04/27/2023 NEGATIVE   Final    Urobilinogen, UA 04/27/2023 0.2   Final    Blood, UA 04/27/2023 NEGATIVE   Final   Admission on 03/29/2023, Discharged on 03/29/2023   Component Date Value Ref Range Status    POC Glucose 03/29/2023 122 (H)  70 - 105 mg/dL Final    Case Report 03/29/2023    Final                    Value:Surgical Pathology                                Case: [id number]                                   Authorizing Provider:  Andrez Norman DO        Collected:           03/29/2023 12:35 PM          Ordering Location:     [facility] -     Received:            03/29/2023 01:44 PM                                 Periop Services                                                              Pathologist:           Elio Barajas MD                                                       Specimen:    Ulcer, chronic left thigh ulcer, short suture superior, long suture posterior              Final Diagnosis  03/29/2023    Final                    Value:This result contains rich text formatting which cannot be displayed here.    Comments 03/29/2023    Final                    Value:This result contains rich text formatting which cannot be displayed here.    Gross Description 03/29/2023    Final                    Value:This result contains rich text formatting which cannot be displayed here.    Microscopic Description 03/29/2023    Final                    Value:This result contains rich text formatting which cannot be displayed here.    Laboratory Notes 03/29/2023    Final                    Value:This result contains rich text formatting which cannot be displayed here.    POC Glucose 03/29/2023 114 (H)  70 - 105 mg/dL Final   Lab Visit on 03/27/2023   Component Date Value Ref Range Status    Sodium 03/27/2023 141  136 - 145 mmol/L Final    Potassium 03/27/2023 3.9  3.5 - 5.1 mmol/L Final    Chloride 03/27/2023 105  98 - 107 mmol/L Final    CO2 03/27/2023 27  21 - 32 mmol/L Final    Anion Gap 03/27/2023 13  7 - 16 mmol/L Final    Glucose 03/27/2023 111 (H)  74 - 106 mg/dL Final    BUN 03/27/2023 21 (H)  7 - 18 mg/dL Final    Creatinine 03/27/2023 0.89  0.55 - 1.02 mg/dL Final    BUN/Creatinine Ratio 03/27/2023 24 (H)  6 - 20 Final    Calcium 03/27/2023 9.4  8.5 - 10.1 mg/dL Final    eGFR 03/27/2023 70  >=60 mL/min/1.73m² Final    WBC 03/27/2023 6.47  4.50 - 11.00 K/uL Final    RBC 03/27/2023 5.10  4.20 - 5.40 M/uL Final    Hemoglobin 03/27/2023 13.8  12.0 - 16.0 g/dL Final    Hematocrit 03/27/2023 45.0  38.0 - 47.0 % Final    MCV 03/27/2023 88.2  80.0 - 96.0 fL Final    MCH 03/27/2023 27.1  27.0 - 31.0 pg Final    MCHC 03/27/2023 30.7 (L)  32.0 - 36.0 g/dL Final    RDW 03/27/2023 15.3 (H)  11.5 - 14.5 % Final    Platelet Count 03/27/2023 250  150 - 400 K/uL Final    MPV 03/27/2023 11.0  9.4 - 12.4 fL Final    Neutrophils % 03/27/2023 49.7 (L)  53.0 - 65.0 % Final    Lymphocytes % 03/27/2023 35.9  27.0 - 41.0 % Final     Monocytes % 03/27/2023 11.3 (H)  2.0 - 6.0 % Final    Eosinophils % 03/27/2023 2.0  1.0 - 4.0 % Final    Basophils % 03/27/2023 0.6  0.0 - 1.0 % Final    Immature Granulocytes % 03/27/2023 0.5 (H)  0.0 - 0.4 % Final    nRBC, Auto 03/27/2023 0.0  <=0.0 % Final    Neutrophils, Abs 03/27/2023 3.22  1.80 - 7.70 K/uL Final    Lymphocytes, Absolute 03/27/2023 2.32  1.00 - 4.80 K/uL Final    Monocytes, Absolute 03/27/2023 0.73  0.00 - 0.80 K/uL Final    Eosinophils, Absolute 03/27/2023 0.13  0.00 - 0.50 K/uL Final    Basophils, Absolute 03/27/2023 0.04  0.00 - 0.20 K/uL Final    Immature Granulocytes, Absolute 03/27/2023 0.03  0.00 - 0.04 K/uL Final    nRBC, Absolute 03/27/2023 0.00  <=0.00 x10e3/uL Final    Diff Type 03/27/2023 Auto   Final   Office Visit on 03/06/2023   Component Date Value Ref Range Status    POC Amphetamines 03/06/2023 Negative  Negative, Inconclusive Final    POC Barbiturates 03/06/2023 Negative  Negative, Inconclusive Final    POC Benzodiazepines 03/06/2023 Negative  Negative, Inconclusive Final    POC Cocaine 03/06/2023 Negative  Negative, Inconclusive Final    POC THC 03/06/2023 Negative  Negative, Inconclusive Final    POC Methadone 03/06/2023 Negative  Negative, Inconclusive Final    POC Methamphetamine 03/06/2023 Negative  Negative, Inconclusive Final    POC Opiates 03/06/2023 Presumptive Positive (A)  Negative, Inconclusive Final    POC Oxycodone 03/06/2023 Negative  Negative, Inconclusive Final    POC Phencyclidine 03/06/2023 Negative  Negative, Inconclusive Final    POC Methylenedioxymethamphetamine * 03/06/2023 Negative  Negative, Inconclusive Final    POC Tricyclic Antidepressants 03/06/2023 Negative  Negative, Inconclusive Final    POC Buprenorphine 03/06/2023 Negative   Final     Acceptable 03/06/2023 Yes   Final    POC Temperature (Urine) 03/06/2023 92   Final         No orders of the defined types were placed in this encounter.      Requested Prescriptions     Pending  Prescriptions Disp Refills    HYDROcodone-acetaminophen (NORCO)  mg per tablet 90 tablet 0     Sig: Take 1 tablet by mouth every 8 (eight) hours.    HYDROcodone-acetaminophen (NORCO)  mg per tablet 90 tablet 0     Sig: Take 1 tablet by mouth every 8 (eight) hours.       Assessment:     1. Hip pain, right    2. Osteoarthrosis multiple sites, not specified as generalized    3. Spondylosis without myelopathy or radiculopathy, lumbar region         A's of Opioid Risk Assessment  Activity:Patient can perform ADL.   Analgesia:Patients pain is partially controlled by current medication. Patient has tried OTC medications such as Tylenol and Ibuprofen with out relief.   Adverse Effects: Patient denies constipation or sedation.  Aberrant Behavior: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

## 2023-08-31 ENCOUNTER — OFFICE VISIT (OUTPATIENT)
Dept: PAIN MEDICINE | Facility: CLINIC | Age: 68
End: 2023-08-31
Payer: MEDICARE

## 2023-08-31 VITALS
HEIGHT: 63 IN | RESPIRATION RATE: 20 BRPM | WEIGHT: 185 LBS | HEART RATE: 62 BPM | SYSTOLIC BLOOD PRESSURE: 138 MMHG | DIASTOLIC BLOOD PRESSURE: 68 MMHG | BODY MASS INDEX: 32.78 KG/M2

## 2023-08-31 DIAGNOSIS — M89.49 OSTEOARTHROSIS MULTIPLE SITES, NOT SPECIFIED AS GENERALIZED: Chronic | ICD-10-CM

## 2023-08-31 DIAGNOSIS — M47.816 SPONDYLOSIS WITHOUT MYELOPATHY OR RADICULOPATHY, LUMBAR REGION: Chronic | ICD-10-CM

## 2023-08-31 DIAGNOSIS — Z79.899 ENCOUNTER FOR LONG-TERM (CURRENT) USE OF OTHER MEDICATIONS: ICD-10-CM

## 2023-08-31 DIAGNOSIS — M25.551 HIP PAIN, RIGHT: Primary | Chronic | ICD-10-CM

## 2023-08-31 PROCEDURE — 3288F FALL RISK ASSESSMENT DOCD: CPT | Mod: CPTII,,, | Performed by: PHYSICIAN ASSISTANT

## 2023-08-31 PROCEDURE — 3052F HG A1C>EQUAL 8.0%<EQUAL 9.0%: CPT | Mod: CPTII,,, | Performed by: PHYSICIAN ASSISTANT

## 2023-08-31 PROCEDURE — 1125F AMNT PAIN NOTED PAIN PRSNT: CPT | Mod: CPTII,,, | Performed by: PHYSICIAN ASSISTANT

## 2023-08-31 PROCEDURE — 3078F DIAST BP <80 MM HG: CPT | Mod: CPTII,,, | Performed by: PHYSICIAN ASSISTANT

## 2023-08-31 PROCEDURE — 3075F SYST BP GE 130 - 139MM HG: CPT | Mod: CPTII,,, | Performed by: PHYSICIAN ASSISTANT

## 2023-08-31 PROCEDURE — 3008F BODY MASS INDEX DOCD: CPT | Mod: CPTII,,, | Performed by: PHYSICIAN ASSISTANT

## 2023-08-31 PROCEDURE — 3078F PR MOST RECENT DIASTOLIC BLOOD PRESSURE < 80 MM HG: ICD-10-PCS | Mod: CPTII,,, | Performed by: PHYSICIAN ASSISTANT

## 2023-08-31 PROCEDURE — 99214 PR OFFICE/OUTPT VISIT, EST, LEVL IV, 30-39 MIN: ICD-10-PCS | Mod: S$PBB,,, | Performed by: PHYSICIAN ASSISTANT

## 2023-08-31 PROCEDURE — 3008F PR BODY MASS INDEX (BMI) DOCUMENTED: ICD-10-PCS | Mod: CPTII,,, | Performed by: PHYSICIAN ASSISTANT

## 2023-08-31 PROCEDURE — 99999PBSHW POCT URINE DRUG SCREEN PRESUMP: Mod: PBBFAC,,,

## 2023-08-31 PROCEDURE — 1101F PT FALLS ASSESS-DOCD LE1/YR: CPT | Mod: CPTII,,, | Performed by: PHYSICIAN ASSISTANT

## 2023-08-31 PROCEDURE — 1159F MED LIST DOCD IN RCRD: CPT | Mod: CPTII,,, | Performed by: PHYSICIAN ASSISTANT

## 2023-08-31 PROCEDURE — 80305 DRUG TEST PRSMV DIR OPT OBS: CPT | Mod: PBBFAC | Performed by: PHYSICIAN ASSISTANT

## 2023-08-31 PROCEDURE — 3075F PR MOST RECENT SYSTOLIC BLOOD PRESS GE 130-139MM HG: ICD-10-PCS | Mod: CPTII,,, | Performed by: PHYSICIAN ASSISTANT

## 2023-08-31 PROCEDURE — 3288F PR FALLS RISK ASSESSMENT DOCUMENTED: ICD-10-PCS | Mod: CPTII,,, | Performed by: PHYSICIAN ASSISTANT

## 2023-08-31 PROCEDURE — 99999PBSHW POCT URINE DRUG SCREEN PRESUMP: ICD-10-PCS | Mod: PBBFAC,,,

## 2023-08-31 PROCEDURE — 1101F PR PT FALLS ASSESS DOC 0-1 FALLS W/OUT INJ PAST YR: ICD-10-PCS | Mod: CPTII,,, | Performed by: PHYSICIAN ASSISTANT

## 2023-08-31 PROCEDURE — 99215 OFFICE O/P EST HI 40 MIN: CPT | Mod: PBBFAC | Performed by: PHYSICIAN ASSISTANT

## 2023-08-31 PROCEDURE — 3052F PR MOST RECENT HEMOGLOBIN A1C LEVEL 8.0 - < 9.0%: ICD-10-PCS | Mod: CPTII,,, | Performed by: PHYSICIAN ASSISTANT

## 2023-08-31 PROCEDURE — 1159F PR MEDICATION LIST DOCUMENTED IN MEDICAL RECORD: ICD-10-PCS | Mod: CPTII,,, | Performed by: PHYSICIAN ASSISTANT

## 2023-08-31 PROCEDURE — 99214 OFFICE O/P EST MOD 30 MIN: CPT | Mod: S$PBB,,, | Performed by: PHYSICIAN ASSISTANT

## 2023-08-31 PROCEDURE — 1125F PR PAIN SEVERITY QUANTIFIED, PAIN PRESENT: ICD-10-PCS | Mod: CPTII,,, | Performed by: PHYSICIAN ASSISTANT

## 2023-08-31 RX ORDER — HYDROCODONE BITARTRATE AND ACETAMINOPHEN 10; 325 MG/1; MG/1
1 TABLET ORAL EVERY 8 HOURS
Qty: 90 TABLET | Refills: 0 | Status: SHIPPED | OUTPATIENT
Start: 2023-09-15 | End: 2023-10-15

## 2023-08-31 RX ORDER — HYDROCODONE BITARTRATE AND ACETAMINOPHEN 10; 325 MG/1; MG/1
1 TABLET ORAL EVERY 8 HOURS
Qty: 90 TABLET | Refills: 0 | Status: SHIPPED | OUTPATIENT
Start: 2023-10-17 | End: 2023-11-28 | Stop reason: SDUPTHER

## 2023-08-31 RX ORDER — HYDROCODONE BITARTRATE AND ACETAMINOPHEN 10; 325 MG/1; MG/1
1 TABLET ORAL EVERY 8 HOURS
Qty: 90 TABLET | Refills: 0 | Status: SHIPPED | OUTPATIENT
Start: 2023-11-16 | End: 2023-11-28 | Stop reason: SDUPTHER

## 2023-09-05 ENCOUNTER — OFFICE VISIT (OUTPATIENT)
Dept: SURGERY | Facility: CLINIC | Age: 68
End: 2023-09-05
Payer: MEDICARE

## 2023-09-05 DIAGNOSIS — S71.102D OPEN WOUND OF LEFT THIGH, SUBSEQUENT ENCOUNTER: ICD-10-CM

## 2023-09-05 DIAGNOSIS — R22.41 LEG MASS, RIGHT: Primary | ICD-10-CM

## 2023-09-05 PROCEDURE — 3052F HG A1C>EQUAL 8.0%<EQUAL 9.0%: CPT | Mod: CPTII,,, | Performed by: STUDENT IN AN ORGANIZED HEALTH CARE EDUCATION/TRAINING PROGRAM

## 2023-09-05 PROCEDURE — 99214 OFFICE O/P EST MOD 30 MIN: CPT | Mod: PBBFAC | Performed by: STUDENT IN AN ORGANIZED HEALTH CARE EDUCATION/TRAINING PROGRAM

## 2023-09-05 PROCEDURE — 1159F PR MEDICATION LIST DOCUMENTED IN MEDICAL RECORD: ICD-10-PCS | Mod: CPTII,,, | Performed by: STUDENT IN AN ORGANIZED HEALTH CARE EDUCATION/TRAINING PROGRAM

## 2023-09-05 PROCEDURE — 1159F MED LIST DOCD IN RCRD: CPT | Mod: CPTII,,, | Performed by: STUDENT IN AN ORGANIZED HEALTH CARE EDUCATION/TRAINING PROGRAM

## 2023-09-05 PROCEDURE — 99024 POSTOP FOLLOW-UP VISIT: CPT | Mod: ,,, | Performed by: STUDENT IN AN ORGANIZED HEALTH CARE EDUCATION/TRAINING PROGRAM

## 2023-09-05 PROCEDURE — 3052F PR MOST RECENT HEMOGLOBIN A1C LEVEL 8.0 - < 9.0%: ICD-10-PCS | Mod: CPTII,,, | Performed by: STUDENT IN AN ORGANIZED HEALTH CARE EDUCATION/TRAINING PROGRAM

## 2023-09-05 PROCEDURE — 99024 PR POST-OP FOLLOW-UP VISIT: ICD-10-PCS | Mod: ,,, | Performed by: STUDENT IN AN ORGANIZED HEALTH CARE EDUCATION/TRAINING PROGRAM

## 2023-09-05 NOTE — PROGRESS NOTES
Subjective     Patient ID: Selena Proctor is a 67 y.o. female.    Chief Complaint: Follow-up    67-year-old  female presents to the clinic for 1 week postop status post excision of left thigh wound with closure with rotational flap.  Patient has had some increased drainage from the EVANGELINA drain.  Denies any chest pain/shortness of breath, nausea/vomiting/diarrhea, fever/chills.    4/18:  Presents for re-evaluation of left inner thigh wound.  The area has had little more dehiscence with separation of staple line; the wound has increased with granulation tissue about 50% of the wound coverage, followed by some fat necrosis at other areas; no purulent drainage or signs of infection; patient was placed on empirical antibiotics previously.  Denies any chest pain/shortness of breath, nausea/vomiting/diarrhea, fever/chills.    4/25:  Wound is doing well; patient is cleaning with Vashe and placing Silvadene and fat necrosis almost completely resolved; healthy bed of granulation tissue growing.  Patient concerned there may be a staple left.  Denies any chest pain/shortness of breath, nausea/vomiting/diarrhea, fever/chills.    5/9:  Area healing well.  Patient states she is having intermittent pain 10/10, but currently much improved.  Patient placing Silvadene to wound.  Denies any chest pain/shortness of breath, nausea/vomiting/diarrhea, fever/chills.    5/30:  Patient doing well, applying Silvadene to area and pain better controlled.  Presents for PuraPly application.  Denies any chest pain/shortness of breath, nausea/vomiting/diarrhea, fever/chills.    6/6:  Patient doing well.  Dressing intact.  Dressing removed and area is healing very well.  Increased epithelialization across wound.  Denies any chest pain/shortness of breath, nausea/vomiting/diarrhea, fever/chills.    6/20:  Patient doing well.  Epithelialization across wound at 95%.  Denies any chest pain/shortness of breath, nausea/vomiting/diarrhea,  fever/chills.    6/27:  Patient doing well.  Epithelialization across 99% of wound with small medial area with scab formation covering a spot of 0.3 x 0.3 cm.  Denies any chest pain/shortness of breath, nausea/vomiting/diarrhea, fever/chills.    7/18:  Patient doing well; small opening on wound with granulation tissue and starting to heal.  Patient complain of mass on her right leg that she wants evaluated.  Denies any chest pain/shortness of breath, nausea/vomiting/diarrhea, fever/chills.    8/8:  Patient doing well; epithelialization happened over small 0.3 cm area of wound.  Patient presents for ultrasound of the right leg soft tissue mass.  Denies any chest pain/shortness of breath, nausea/vomiting/diarrhea, fever/chills.    9/5:  Patient presents for postop evaluation status post excision no right leg mass, left leg biopsy and left abdomen biopsy.  Patient doing well.  States the incisions have slightly opened, like previously.  Denies any chest pain/shortness of breath, nausea/vomiting/diarrhea, fever/chills.      Review of Systems   Constitutional: Negative.    HENT: Negative.     Eyes: Negative.    Respiratory:  Negative for shortness of breath.    Cardiovascular:  Negative for chest pain.   Gastrointestinal:  Negative for abdominal distention, abdominal pain, blood in stool, change in bowel habit and change in bowel habit.   Genitourinary: Negative.  Negative for hematuria.   Musculoskeletal:  Negative for myalgias.   Neurological:  Negative for dizziness and weakness.   Psychiatric/Behavioral: Negative.            Objective     Physical Exam  Constitutional:       General: She is not in acute distress.     Appearance: Normal appearance.   HENT:      Head: Normocephalic.   Cardiovascular:      Rate and Rhythm: Normal rate.   Pulmonary:      Effort: Pulmonary effort is normal. No respiratory distress.   Abdominal:      General: There is no distension.      Tenderness: There is no abdominal tenderness.    Musculoskeletal:         General: Normal range of motion.        Legs:       Comments: Right leg incision with slight opening with some biofilm measured 2.5 x 0.5 x 0.2 cm; no signs of infection    Left leg with a small 0.5 x 0.5 cm opening with granulation tissue present; no signs of infection.      Abdominal incision measured 1.5 x 0.5 by 0.2 cm; no signs of infection or drainage with granulation tissue present.   Skin:     General: Skin is warm.      Coloration: Skin is not jaundiced.   Neurological:      General: No focal deficit present.      Mental Status: She is alert and oriented to person, place, and time.      Cranial Nerves: No cranial nerve deficit.            Assessment and Plan     Clean with soap and water daily, rinse with Vashe and then apply Silvadene to all 3 areas.  Patient to follow back up in 3 weeks    Problem List Items Addressed This Visit          Orthopedic    Open wound of left thigh     Other Visit Diagnoses       Leg mass, right    -  Primary

## 2023-09-07 DIAGNOSIS — E11.9 TYPE 2 DIABETES MELLITUS WITHOUT COMPLICATION, WITHOUT LONG-TERM CURRENT USE OF INSULIN: ICD-10-CM

## 2023-09-08 RX ORDER — OMEPRAZOLE 40 MG/1
CAPSULE, DELAYED RELEASE ORAL
Qty: 90 CAPSULE | Refills: 0 | Status: SHIPPED | OUTPATIENT
Start: 2023-09-08 | End: 2024-02-08

## 2023-09-25 RX ORDER — ISOPROPYL ALCOHOL 70 ML/100ML
SWAB TOPICAL
Qty: 100 EACH | Refills: 1 | Status: SHIPPED | OUTPATIENT
Start: 2023-09-25 | End: 2023-12-07

## 2023-09-26 ENCOUNTER — OFFICE VISIT (OUTPATIENT)
Dept: SURGERY | Facility: CLINIC | Age: 68
End: 2023-09-26
Payer: MEDICARE

## 2023-09-26 DIAGNOSIS — S71.102D OPEN WOUND OF LEFT THIGH, SUBSEQUENT ENCOUNTER: Primary | ICD-10-CM

## 2023-09-26 DIAGNOSIS — S31.109D OPEN WOUND OF ABDOMEN, SUBSEQUENT ENCOUNTER: ICD-10-CM

## 2023-09-26 DIAGNOSIS — S71.101D OPEN WOUND OF RIGHT THIGH, SUBSEQUENT ENCOUNTER: ICD-10-CM

## 2023-09-26 PROCEDURE — 1159F PR MEDICATION LIST DOCUMENTED IN MEDICAL RECORD: ICD-10-PCS | Mod: CPTII,,, | Performed by: STUDENT IN AN ORGANIZED HEALTH CARE EDUCATION/TRAINING PROGRAM

## 2023-09-26 PROCEDURE — 99214 OFFICE O/P EST MOD 30 MIN: CPT | Mod: PBBFAC | Performed by: STUDENT IN AN ORGANIZED HEALTH CARE EDUCATION/TRAINING PROGRAM

## 2023-09-26 PROCEDURE — 99024 POSTOP FOLLOW-UP VISIT: CPT | Mod: S$PBB,,, | Performed by: STUDENT IN AN ORGANIZED HEALTH CARE EDUCATION/TRAINING PROGRAM

## 2023-09-26 PROCEDURE — 99024 PR POST-OP FOLLOW-UP VISIT: ICD-10-PCS | Mod: S$PBB,,, | Performed by: STUDENT IN AN ORGANIZED HEALTH CARE EDUCATION/TRAINING PROGRAM

## 2023-09-26 PROCEDURE — 3052F PR MOST RECENT HEMOGLOBIN A1C LEVEL 8.0 - < 9.0%: ICD-10-PCS | Mod: CPTII,,, | Performed by: STUDENT IN AN ORGANIZED HEALTH CARE EDUCATION/TRAINING PROGRAM

## 2023-09-26 PROCEDURE — 3052F HG A1C>EQUAL 8.0%<EQUAL 9.0%: CPT | Mod: CPTII,,, | Performed by: STUDENT IN AN ORGANIZED HEALTH CARE EDUCATION/TRAINING PROGRAM

## 2023-09-26 PROCEDURE — 1159F MED LIST DOCD IN RCRD: CPT | Mod: CPTII,,, | Performed by: STUDENT IN AN ORGANIZED HEALTH CARE EDUCATION/TRAINING PROGRAM

## 2023-09-26 NOTE — PROGRESS NOTES
Subjective     Patient ID: Selena Proctor is a 68 y.o. female.    Chief Complaint: Follow-up ((R)(L) leg wound ck, (L) abdomen lower pain)    67-year-old  female presents to the clinic for 1 week postop status post excision of left thigh wound with closure with rotational flap.  Patient has had some increased drainage from the EVANGELINA drain.  Denies any chest pain/shortness of breath, nausea/vomiting/diarrhea, fever/chills.    4/18:  Presents for re-evaluation of left inner thigh wound.  The area has had little more dehiscence with separation of staple line; the wound has increased with granulation tissue about 50% of the wound coverage, followed by some fat necrosis at other areas; no purulent drainage or signs of infection; patient was placed on empirical antibiotics previously.  Denies any chest pain/shortness of breath, nausea/vomiting/diarrhea, fever/chills.    4/25:  Wound is doing well; patient is cleaning with Vashe and placing Silvadene and fat necrosis almost completely resolved; healthy bed of granulation tissue growing.  Patient concerned there may be a staple left.  Denies any chest pain/shortness of breath, nausea/vomiting/diarrhea, fever/chills.    5/9:  Area healing well.  Patient states she is having intermittent pain 10/10, but currently much improved.  Patient placing Silvadene to wound.  Denies any chest pain/shortness of breath, nausea/vomiting/diarrhea, fever/chills.    5/30:  Patient doing well, applying Silvadene to area and pain better controlled.  Presents for PuraPly application.  Denies any chest pain/shortness of breath, nausea/vomiting/diarrhea, fever/chills.    6/6:  Patient doing well.  Dressing intact.  Dressing removed and area is healing very well.  Increased epithelialization across wound.  Denies any chest pain/shortness of breath, nausea/vomiting/diarrhea, fever/chills.    6/20:  Patient doing well.  Epithelialization across wound at 95%.  Denies any chest  pain/shortness of breath, nausea/vomiting/diarrhea, fever/chills.    6/27:  Patient doing well.  Epithelialization across 99% of wound with small medial area with scab formation covering a spot of 0.3 x 0.3 cm.  Denies any chest pain/shortness of breath, nausea/vomiting/diarrhea, fever/chills.    7/18:  Patient doing well; small opening on wound with granulation tissue and starting to heal.  Patient complain of mass on her right leg that she wants evaluated.  Denies any chest pain/shortness of breath, nausea/vomiting/diarrhea, fever/chills.    8/8:  Patient doing well; epithelialization happened over small 0.3 cm area of wound.  Patient presents for ultrasound of the right leg soft tissue mass.  Denies any chest pain/shortness of breath, nausea/vomiting/diarrhea, fever/chills.    9/5:  Patient presents for postop evaluation status post excision no right leg mass, left leg biopsy and left abdomen biopsy.  Patient doing well.  States the incisions have slightly opened, like previously.  Denies any chest pain/shortness of breath, nausea/vomiting/diarrhea, fever/chills.    9/26:  Patient presents for wound re-evaluation.  Doing well.  States the abdominal wound is sore and itches.  Denies any chest pain/shortness of breath, nausea/vomiting/diarrhea, fever/chills.      Review of Systems   Constitutional: Negative.    HENT: Negative.     Eyes: Negative.    Respiratory:  Negative for shortness of breath.    Cardiovascular:  Negative for chest pain.   Gastrointestinal:  Negative for abdominal distention, abdominal pain, blood in stool and change in bowel habit.   Genitourinary: Negative.  Negative for hematuria.   Musculoskeletal:  Negative for myalgias.   Neurological:  Negative for dizziness and weakness.   Psychiatric/Behavioral: Negative.            Objective     Physical Exam  Constitutional:       General: She is not in acute distress.     Appearance: Normal appearance.   HENT:      Head: Normocephalic.    Cardiovascular:      Rate and Rhythm: Normal rate.   Pulmonary:      Effort: Pulmonary effort is normal. No respiratory distress.   Abdominal:      General: There is no distension.      Tenderness: There is no abdominal tenderness.   Musculoskeletal:         General: Normal range of motion.        Legs:       Comments: Right leg incision with slight opening with some biofilm measured 2.5 x 0.5cm; no signs of infection    Left leg with a small 0.5 x 0.5 cm opening with granulation tissue present; no signs of infection.      Abdominal incision measured 1 x 0.5 by 0.1 cm; no signs of infection or drainage with granulation tissue present.   Skin:     General: Skin is warm.      Coloration: Skin is not jaundiced.   Neurological:      General: No focal deficit present.      Mental Status: She is alert and oriented to person, place, and time.      Cranial Nerves: No cranial nerve deficit.            Assessment and Plan     Cleaned in clinic; Clean with soap and water daily, rinse with Vashe and then apply Silvadene to all 3 areas.  Patient to follow back up in 4 weeks.  I explained to the patient that she may need go to wound care weekly, but she did not want to do that.  We will re-evaluate 4 weeks and if no improvement, she will need to start to go to wound care weekly    Problem List Items Addressed This Visit          Orthopedic    Open wound of left thigh - Primary     Other Visit Diagnoses       Open wound of right thigh, subsequent encounter        Open wound of abdomen, subsequent encounter

## 2023-10-08 DIAGNOSIS — E11.9 TYPE 2 DIABETES MELLITUS WITHOUT COMPLICATION, WITHOUT LONG-TERM CURRENT USE OF INSULIN: ICD-10-CM

## 2023-10-09 RX ORDER — LANCETS
EACH MISCELLANEOUS
Qty: 200 EACH | Refills: 10 | Status: SHIPPED | OUTPATIENT
Start: 2023-10-09

## 2023-10-24 ENCOUNTER — OFFICE VISIT (OUTPATIENT)
Dept: SURGERY | Facility: CLINIC | Age: 68
End: 2023-10-24
Payer: MEDICARE

## 2023-10-24 VITALS — HEIGHT: 63 IN | BODY MASS INDEX: 33.13 KG/M2 | WEIGHT: 187 LBS

## 2023-10-24 DIAGNOSIS — S71.102D OPEN WOUND OF LEFT THIGH, SUBSEQUENT ENCOUNTER: Primary | ICD-10-CM

## 2023-10-24 PROCEDURE — 3052F HG A1C>EQUAL 8.0%<EQUAL 9.0%: CPT | Mod: CPTII,,, | Performed by: STUDENT IN AN ORGANIZED HEALTH CARE EDUCATION/TRAINING PROGRAM

## 2023-10-24 PROCEDURE — 99024 PR POST-OP FOLLOW-UP VISIT: ICD-10-PCS | Mod: S$PBB,,, | Performed by: STUDENT IN AN ORGANIZED HEALTH CARE EDUCATION/TRAINING PROGRAM

## 2023-10-24 PROCEDURE — 99024 POSTOP FOLLOW-UP VISIT: CPT | Mod: S$PBB,,, | Performed by: STUDENT IN AN ORGANIZED HEALTH CARE EDUCATION/TRAINING PROGRAM

## 2023-10-24 PROCEDURE — 3008F BODY MASS INDEX DOCD: CPT | Mod: CPTII,,, | Performed by: STUDENT IN AN ORGANIZED HEALTH CARE EDUCATION/TRAINING PROGRAM

## 2023-10-24 PROCEDURE — 3052F PR MOST RECENT HEMOGLOBIN A1C LEVEL 8.0 - < 9.0%: ICD-10-PCS | Mod: CPTII,,, | Performed by: STUDENT IN AN ORGANIZED HEALTH CARE EDUCATION/TRAINING PROGRAM

## 2023-10-24 PROCEDURE — 1159F PR MEDICATION LIST DOCUMENTED IN MEDICAL RECORD: ICD-10-PCS | Mod: CPTII,,, | Performed by: STUDENT IN AN ORGANIZED HEALTH CARE EDUCATION/TRAINING PROGRAM

## 2023-10-24 PROCEDURE — 99214 OFFICE O/P EST MOD 30 MIN: CPT | Mod: PBBFAC | Performed by: STUDENT IN AN ORGANIZED HEALTH CARE EDUCATION/TRAINING PROGRAM

## 2023-10-24 PROCEDURE — 1159F MED LIST DOCD IN RCRD: CPT | Mod: CPTII,,, | Performed by: STUDENT IN AN ORGANIZED HEALTH CARE EDUCATION/TRAINING PROGRAM

## 2023-10-24 PROCEDURE — 3008F PR BODY MASS INDEX (BMI) DOCUMENTED: ICD-10-PCS | Mod: CPTII,,, | Performed by: STUDENT IN AN ORGANIZED HEALTH CARE EDUCATION/TRAINING PROGRAM

## 2023-10-25 NOTE — PROGRESS NOTES
Subjective     Patient ID: Selena Proctor is a 68 y.o. female.    Chief Complaint: Follow-up (Wound ck )    67-year-old  female presents to the clinic for 1 week postop status post excision of left thigh wound with closure with rotational flap.  Patient has had some increased drainage from the EVANGELINA drain.  Denies any chest pain/shortness of breath, nausea/vomiting/diarrhea, fever/chills.    4/18:  Presents for re-evaluation of left inner thigh wound.  The area has had little more dehiscence with separation of staple line; the wound has increased with granulation tissue about 50% of the wound coverage, followed by some fat necrosis at other areas; no purulent drainage or signs of infection; patient was placed on empirical antibiotics previously.  Denies any chest pain/shortness of breath, nausea/vomiting/diarrhea, fever/chills.    4/25:  Wound is doing well; patient is cleaning with Vashe and placing Silvadene and fat necrosis almost completely resolved; healthy bed of granulation tissue growing.  Patient concerned there may be a staple left.  Denies any chest pain/shortness of breath, nausea/vomiting/diarrhea, fever/chills.    5/9:  Area healing well.  Patient states she is having intermittent pain 10/10, but currently much improved.  Patient placing Silvadene to wound.  Denies any chest pain/shortness of breath, nausea/vomiting/diarrhea, fever/chills.    5/30:  Patient doing well, applying Silvadene to area and pain better controlled.  Presents for PuraPly application.  Denies any chest pain/shortness of breath, nausea/vomiting/diarrhea, fever/chills.    6/6:  Patient doing well.  Dressing intact.  Dressing removed and area is healing very well.  Increased epithelialization across wound.  Denies any chest pain/shortness of breath, nausea/vomiting/diarrhea, fever/chills.    6/20:  Patient doing well.  Epithelialization across wound at 95%.  Denies any chest pain/shortness of breath,  nausea/vomiting/diarrhea, fever/chills.    6/27:  Patient doing well.  Epithelialization across 99% of wound with small medial area with scab formation covering a spot of 0.3 x 0.3 cm.  Denies any chest pain/shortness of breath, nausea/vomiting/diarrhea, fever/chills.    7/18:  Patient doing well; small opening on wound with granulation tissue and starting to heal.  Patient complain of mass on her right leg that she wants evaluated.  Denies any chest pain/shortness of breath, nausea/vomiting/diarrhea, fever/chills.    8/8:  Patient doing well; epithelialization happened over small 0.3 cm area of wound.  Patient presents for ultrasound of the right leg soft tissue mass.  Denies any chest pain/shortness of breath, nausea/vomiting/diarrhea, fever/chills.    9/5:  Patient presents for postop evaluation status post excision no right leg mass, left leg biopsy and left abdomen biopsy.  Patient doing well.  States the incisions have slightly opened, like previously.  Denies any chest pain/shortness of breath, nausea/vomiting/diarrhea, fever/chills.    9/26:  Patient presents for wound re-evaluation.  Doing well.  States the abdominal wound is sore and itches.  Denies any chest pain/shortness of breath, nausea/vomiting/diarrhea, fever/chills.    10/25:  Patient presents for wound re-evaluation.  Doing well.  States the wounds almost completely resolved.  Denies any chest pain/shortness of breath, nausea/vomiting/diarrhea, fever/chills.      Review of Systems   Constitutional: Negative.    HENT: Negative.     Eyes: Negative.    Respiratory:  Negative for shortness of breath.    Cardiovascular:  Negative for chest pain.   Gastrointestinal:  Negative for abdominal distention, abdominal pain, blood in stool and change in bowel habit.   Genitourinary: Negative.  Negative for hematuria.   Musculoskeletal:  Negative for myalgias.   Neurological:  Negative for dizziness and weakness.   Psychiatric/Behavioral: Negative.             Objective     Physical Exam  Constitutional:       General: She is not in acute distress.     Appearance: Normal appearance.   HENT:      Head: Normocephalic.   Cardiovascular:      Rate and Rhythm: Normal rate.   Pulmonary:      Effort: Pulmonary effort is normal. No respiratory distress.   Abdominal:      General: There is no distension.      Tenderness: There is no abdominal tenderness.   Musculoskeletal:         General: Normal range of motion.        Legs:       Comments: Right leg incision with complete granulation and epithelialization; no signs of infection    Left leg with a small 0.1x0.1 cm opening with granulation tissue present; no signs of infection.      Abdominal incision healed   Skin:     General: Skin is warm.      Coloration: Skin is not jaundiced.   Neurological:      General: No focal deficit present.      Mental Status: She is alert and oriented to person, place, and time.      Cranial Nerves: No cranial nerve deficit.            Assessment and Plan     Areas healed.  Left thigh almost completely healed; patient to continue to do dressing changes; no need for further follow-up.  If she needs more biopsies or excisions on the tissue that has had previous grafting, I recommend patient following up with Plastic surgery.    Problem List Items Addressed This Visit          Orthopedic    Open wound of left thigh - Primary

## 2023-11-01 ENCOUNTER — OFFICE VISIT (OUTPATIENT)
Dept: PRIMARY CARE CLINIC | Facility: CLINIC | Age: 68
End: 2023-11-01
Payer: MEDICARE

## 2023-11-01 VITALS
OXYGEN SATURATION: 97 % | HEIGHT: 63 IN | DIASTOLIC BLOOD PRESSURE: 85 MMHG | RESPIRATION RATE: 18 BRPM | BODY MASS INDEX: 31.93 KG/M2 | SYSTOLIC BLOOD PRESSURE: 134 MMHG | WEIGHT: 180.19 LBS | HEART RATE: 60 BPM | TEMPERATURE: 98 F

## 2023-11-01 DIAGNOSIS — E78.5 HYPERLIPIDEMIA, UNSPECIFIED HYPERLIPIDEMIA TYPE: ICD-10-CM

## 2023-11-01 DIAGNOSIS — E11.9 TYPE 2 DIABETES MELLITUS WITHOUT COMPLICATION, WITHOUT LONG-TERM CURRENT USE OF INSULIN: Primary | ICD-10-CM

## 2023-11-01 DIAGNOSIS — I10 ESSENTIAL HYPERTENSION: ICD-10-CM

## 2023-11-01 DIAGNOSIS — Z23 ENCOUNTER FOR IMMUNIZATION: ICD-10-CM

## 2023-11-01 LAB
ALBUMIN SERPL BCP-MCNC: 3.4 G/DL (ref 3.5–5)
ALBUMIN/GLOB SERPL: 0.8 {RATIO}
ALP SERPL-CCNC: 56 U/L (ref 55–142)
ALT SERPL W P-5'-P-CCNC: 27 U/L (ref 13–56)
ANION GAP SERPL CALCULATED.3IONS-SCNC: 10 MMOL/L (ref 7–16)
AST SERPL W P-5'-P-CCNC: 20 U/L (ref 15–37)
BASOPHILS # BLD AUTO: 0.05 K/UL (ref 0–0.2)
BASOPHILS NFR BLD AUTO: 0.8 % (ref 0–1)
BILIRUB SERPL-MCNC: 0.6 MG/DL (ref ?–1.2)
BUN SERPL-MCNC: 18 MG/DL (ref 7–18)
BUN/CREAT SERPL: 18 (ref 6–20)
CALCIUM SERPL-MCNC: 9.4 MG/DL (ref 8.5–10.1)
CHLORIDE SERPL-SCNC: 107 MMOL/L (ref 98–107)
CHOLEST SERPL-MCNC: 171 MG/DL (ref 0–200)
CHOLEST/HDLC SERPL: 3.5 {RATIO}
CO2 SERPL-SCNC: 26 MMOL/L (ref 21–32)
CREAT SERPL-MCNC: 0.98 MG/DL (ref 0.55–1.02)
CREAT UR-MCNC: 58 MG/DL (ref 28–219)
DIFFERENTIAL METHOD BLD: ABNORMAL
EGFR (NO RACE VARIABLE) (RUSH/TITUS): 62 ML/MIN/1.73M2
EOSINOPHIL # BLD AUTO: 0.11 K/UL (ref 0–0.5)
EOSINOPHIL NFR BLD AUTO: 1.7 % (ref 1–4)
ERYTHROCYTE [DISTWIDTH] IN BLOOD BY AUTOMATED COUNT: 16.1 % (ref 11.5–14.5)
EST. AVERAGE GLUCOSE BLD GHB EST-MCNC: 154 MG/DL
GLOBULIN SER-MCNC: 4.5 G/DL (ref 2–4)
GLUCOSE SERPL-MCNC: 121 MG/DL (ref 74–106)
HBA1C MFR BLD HPLC: 7.2 % (ref 4.5–6.6)
HCT VFR BLD AUTO: 46.4 % (ref 38–47)
HDLC SERPL-MCNC: 49 MG/DL (ref 40–60)
HGB BLD-MCNC: 14.1 G/DL (ref 12–16)
IMM GRANULOCYTES # BLD AUTO: 0.02 K/UL (ref 0–0.04)
IMM GRANULOCYTES NFR BLD: 0.3 % (ref 0–0.4)
LDLC SERPL CALC-MCNC: 91 MG/DL
LDLC/HDLC SERPL: 1.9 {RATIO}
LYMPHOCYTES # BLD AUTO: 1.92 K/UL (ref 1–4.8)
LYMPHOCYTES NFR BLD AUTO: 30.4 % (ref 27–41)
MCH RBC QN AUTO: 27.5 PG (ref 27–31)
MCHC RBC AUTO-ENTMCNC: 30.4 G/DL (ref 32–36)
MCV RBC AUTO: 90.6 FL (ref 80–96)
MICROALBUMIN UR-MCNC: 0.8 MG/DL (ref 0–2.8)
MICROALBUMIN/CREAT RATIO PNL UR: 13.8 MG/G (ref 0–30)
MONOCYTES # BLD AUTO: 0.75 K/UL (ref 0–0.8)
MONOCYTES NFR BLD AUTO: 11.9 % (ref 2–6)
MPC BLD CALC-MCNC: 11.7 FL (ref 9.4–12.4)
NEUTROPHILS # BLD AUTO: 3.46 K/UL (ref 1.8–7.7)
NEUTROPHILS NFR BLD AUTO: 54.9 % (ref 53–65)
NONHDLC SERPL-MCNC: 122 MG/DL
NRBC # BLD AUTO: 0 X10E3/UL
NRBC, AUTO (.00): 0 %
PLATELET # BLD AUTO: 249 K/UL (ref 150–400)
POTASSIUM SERPL-SCNC: 3.4 MMOL/L (ref 3.5–5.1)
PROT SERPL-MCNC: 7.9 G/DL (ref 6.4–8.2)
RBC # BLD AUTO: 5.12 M/UL (ref 4.2–5.4)
SODIUM SERPL-SCNC: 140 MMOL/L (ref 136–145)
TRIGL SERPL-MCNC: 154 MG/DL (ref 35–150)
VLDLC SERPL-MCNC: 31 MG/DL
WBC # BLD AUTO: 6.31 K/UL (ref 4.5–11)

## 2023-11-01 PROCEDURE — 1126F PR PAIN SEVERITY QUANTIFIED, NO PAIN PRESENT: ICD-10-PCS | Mod: ,,, | Performed by: NURSE PRACTITIONER

## 2023-11-01 PROCEDURE — 99213 PR OFFICE/OUTPT VISIT, EST, LEVL III, 20-29 MIN: ICD-10-PCS | Mod: ,,, | Performed by: NURSE PRACTITIONER

## 2023-11-01 PROCEDURE — 3052F PR MOST RECENT HEMOGLOBIN A1C LEVEL 8.0 - < 9.0%: ICD-10-PCS | Mod: ,,, | Performed by: NURSE PRACTITIONER

## 2023-11-01 PROCEDURE — 83036 HEMOGLOBIN A1C: ICD-10-PCS | Mod: ,,, | Performed by: CLINICAL MEDICAL LABORATORY

## 2023-11-01 PROCEDURE — 3288F PR FALLS RISK ASSESSMENT DOCUMENTED: ICD-10-PCS | Mod: ,,, | Performed by: NURSE PRACTITIONER

## 2023-11-01 PROCEDURE — 3052F HG A1C>EQUAL 8.0%<EQUAL 9.0%: CPT | Mod: ,,, | Performed by: NURSE PRACTITIONER

## 2023-11-01 PROCEDURE — 3079F PR MOST RECENT DIASTOLIC BLOOD PRESSURE 80-89 MM HG: ICD-10-PCS | Mod: ,,, | Performed by: NURSE PRACTITIONER

## 2023-11-01 PROCEDURE — 85025 COMPLETE CBC W/AUTO DIFF WBC: CPT | Mod: ,,, | Performed by: CLINICAL MEDICAL LABORATORY

## 2023-11-01 PROCEDURE — 1160F RVW MEDS BY RX/DR IN RCRD: CPT | Mod: ,,, | Performed by: NURSE PRACTITIONER

## 2023-11-01 PROCEDURE — 99213 OFFICE O/P EST LOW 20 MIN: CPT | Mod: ,,, | Performed by: NURSE PRACTITIONER

## 2023-11-01 PROCEDURE — 80061 LIPID PANEL: ICD-10-PCS | Mod: ,,, | Performed by: CLINICAL MEDICAL LABORATORY

## 2023-11-01 PROCEDURE — 1159F MED LIST DOCD IN RCRD: CPT | Mod: ,,, | Performed by: NURSE PRACTITIONER

## 2023-11-01 PROCEDURE — 82043 UR ALBUMIN QUANTITATIVE: CPT | Mod: ,,, | Performed by: CLINICAL MEDICAL LABORATORY

## 2023-11-01 PROCEDURE — 1160F PR REVIEW ALL MEDS BY PRESCRIBER/CLIN PHARMACIST DOCUMENTED: ICD-10-PCS | Mod: ,,, | Performed by: NURSE PRACTITIONER

## 2023-11-01 PROCEDURE — 1126F AMNT PAIN NOTED NONE PRSNT: CPT | Mod: ,,, | Performed by: NURSE PRACTITIONER

## 2023-11-01 PROCEDURE — 3075F SYST BP GE 130 - 139MM HG: CPT | Mod: ,,, | Performed by: NURSE PRACTITIONER

## 2023-11-01 PROCEDURE — 82570 MICROALBUMIN / CREATININE RATIO URINE: ICD-10-PCS | Mod: ,,, | Performed by: CLINICAL MEDICAL LABORATORY

## 2023-11-01 PROCEDURE — 1159F PR MEDICATION LIST DOCUMENTED IN MEDICAL RECORD: ICD-10-PCS | Mod: ,,, | Performed by: NURSE PRACTITIONER

## 2023-11-01 PROCEDURE — 3079F DIAST BP 80-89 MM HG: CPT | Mod: ,,, | Performed by: NURSE PRACTITIONER

## 2023-11-01 PROCEDURE — 82043 MICROALBUMIN / CREATININE RATIO URINE: ICD-10-PCS | Mod: ,,, | Performed by: CLINICAL MEDICAL LABORATORY

## 2023-11-01 PROCEDURE — 80053 COMPREHENSIVE METABOLIC PANEL: ICD-10-PCS | Mod: ,,, | Performed by: CLINICAL MEDICAL LABORATORY

## 2023-11-01 PROCEDURE — G0008 FLU VACCINE (QUAD) GREATER THAN OR EQUAL TO 3YO PRESERVATIVE FREE IM: ICD-10-PCS | Mod: ,,, | Performed by: NURSE PRACTITIONER

## 2023-11-01 PROCEDURE — 80061 LIPID PANEL: CPT | Mod: ,,, | Performed by: CLINICAL MEDICAL LABORATORY

## 2023-11-01 PROCEDURE — 3075F PR MOST RECENT SYSTOLIC BLOOD PRESS GE 130-139MM HG: ICD-10-PCS | Mod: ,,, | Performed by: NURSE PRACTITIONER

## 2023-11-01 PROCEDURE — 90686 IIV4 VACC NO PRSV 0.5 ML IM: CPT | Mod: ,,, | Performed by: NURSE PRACTITIONER

## 2023-11-01 PROCEDURE — 3288F FALL RISK ASSESSMENT DOCD: CPT | Mod: ,,, | Performed by: NURSE PRACTITIONER

## 2023-11-01 PROCEDURE — 3008F PR BODY MASS INDEX (BMI) DOCUMENTED: ICD-10-PCS | Mod: ,,, | Performed by: NURSE PRACTITIONER

## 2023-11-01 PROCEDURE — 3008F BODY MASS INDEX DOCD: CPT | Mod: ,,, | Performed by: NURSE PRACTITIONER

## 2023-11-01 PROCEDURE — 83036 HEMOGLOBIN GLYCOSYLATED A1C: CPT | Mod: ,,, | Performed by: CLINICAL MEDICAL LABORATORY

## 2023-11-01 PROCEDURE — 90686 FLU VACCINE (QUAD) GREATER THAN OR EQUAL TO 3YO PRESERVATIVE FREE IM: ICD-10-PCS | Mod: ,,, | Performed by: NURSE PRACTITIONER

## 2023-11-01 PROCEDURE — 80053 COMPREHEN METABOLIC PANEL: CPT | Mod: ,,, | Performed by: CLINICAL MEDICAL LABORATORY

## 2023-11-01 PROCEDURE — 82570 ASSAY OF URINE CREATININE: CPT | Mod: ,,, | Performed by: CLINICAL MEDICAL LABORATORY

## 2023-11-01 PROCEDURE — 1101F PT FALLS ASSESS-DOCD LE1/YR: CPT | Mod: ,,, | Performed by: NURSE PRACTITIONER

## 2023-11-01 PROCEDURE — G0008 ADMIN INFLUENZA VIRUS VAC: HCPCS | Mod: ,,, | Performed by: NURSE PRACTITIONER

## 2023-11-01 PROCEDURE — 85025 CBC WITH DIFFERENTIAL: ICD-10-PCS | Mod: ,,, | Performed by: CLINICAL MEDICAL LABORATORY

## 2023-11-01 PROCEDURE — 1101F PR PT FALLS ASSESS DOC 0-1 FALLS W/OUT INJ PAST YR: ICD-10-PCS | Mod: ,,, | Performed by: NURSE PRACTITIONER

## 2023-11-01 NOTE — PATIENT INSTRUCTIONS
"Patient Education       Diabetes and Diet   The Basics   Written by the doctors and editors at [facility]   Why is diet important in diabetes? -- Diet is important because it is part of diabetes treatment. Many people need to change what they eat and how much they eat to help treat their diabetes. It is important for people to treat their diabetes so that they:  Keep their blood sugar at or near a normal level  Prevent long-term problems, such as heart or kidney problems, that can happen in people with diabetes  Changing your diet can also help treat obesity, high blood pressure, and high cholesterol. These conditions can affect people with diabetes and can lead to future problems, such as heart attacks or strokes.  Who will work with me to change my diet? -- Your doctor or nurse will work with you to make a food plan to change your diet. They might also recommend that you work with a "dietitian." A dietitian is an expert on food and eating.  Do I need to eat at the same times every day? -- When and how often you should eat depends, in part, on the diabetes medicines you take. For example:  People who take about the same amount of insulin at the same time each day (called a "fixed regimen") should eat meals at the same times. This is also true for people who take pills that increase insulin levels, such as sulfonylureas. Eating meals at the same time every day helps prevent low blood sugar.  People who adjust the dose and timing of their insulin each day (called a "flexible regimen") do not always have to eat meals at the same time. That's because they can time their insulin dose for before they plan to eat, and also adjust the dose for how much they plan to eat.  People who take medicines that don't usually cause low blood sugar, such as metformin, don't have to eat meals at the same time every day.  What do I need to think about when planning what to eat? -- Our bodies break down the food we eat into small pieces " "called carbohydrates, proteins, and fats.  When planning what to eat, people with diabetes need to think about:  Carbohydrates (or "carbs") - Carbohydrates, which are sugars that our bodies use for energy, can raise a person's blood sugar level. Your doctor, nurse, or dietitian will tell you how many carbohydrates you should eat at each meal or snack. Foods that have carbohydrates include:  Bread, pasta, and rice  Vegetables and fruits  Dairy foods  Foods and drinks with added sugar  It is best to get your carbohydrates from fruits, vegetables, whole grains, and low-fat milk. It is best to avoid drinks with added sugar, like soda, juices, and sports drinks.   Protein - Your doctor, nurse, or dietitian will tell you how much protein you should eat each day. It is best to eat lean meats, fish, eggs, beans, peas, soy products, nuts, and seeds.  Fats - The type of fat you eat is more important than the amount of fat. "Saturated" and "trans" fats can increase your risk for heart problems, like a heart attack.  Foods that have saturated fats include meat, butter, cheese, and ice cream.  Foods that have trans fats include processed food with "partially hydrogenated oils" on the ingredient list. This may include fried foods, store bought cookies, muffins, pies, and cakes.  "Monounsaturated" and "polyunsaturated" fats are better for you. Foods with these types of fat include fish, avocado, olive oil, and nuts.  Calories - People need to eat a certain amount of calories each day to keep their weight the same. People who are overweight and want to lose weight need to eat fewer calories each day.  Fiber - Eating foods with a lot of fiber can help control a person's blood sugar level. Foods that have a lot of fiber include apples, green beans, peas, beans, lentils, nuts, oatmeal, and whole grains.  Salt - People who have high blood pressure should not eat foods that contain a lot of salt (also called sodium). People with high " blood pressure should also eat healthy foods, such as fruits, vegetables, and low-fat dairy foods.  Alcohol - Having more than 1 drink (for women) or 2 drinks (for men) a day can raise blood sugar levels. Also, drinks that have fruit juice or soda in them can raise blood sugar levels.  What can I do if I need to lose weight? -- If you need to lose weight, you can:  Exercise - Try to get at least 30 minutes of physical activity a day, most days of the week. Even gentle exercise, like walking, is good for your health. Some people with diabetes need to change their medicine dose before they exercise. They might also need to check their blood sugar levels before and after exercising.  Eat fewer calories - Your doctor, nurse, or dietitian can tell you how many calories you should eat each day in order to lose weight.  If you are worried about your weight, size, or shape, talk with your doctor, nurse, or dietitian. They can help you make changes to improve your health.  Can I eat the same foods as my family? -- Yes. You do not need to eat special foods if you have diabetes. You and your family can eat the same foods. Changing your diet is mostly about eating healthy foods and not eating too much.  What are the other parts of diabetes treatment? -- Besides changing your diet, the other parts of diabetes treatment are:  Exercise  Medicines  Some people with diabetes need to learn how to match their diet and exercise with their medicine dose. For example, people who use insulin might need to choose the dose of insulin they give themselves. To choose their dose, they need to think about:  What they plan to eat at the next meal  How much exercise they plan to do  What their blood sugar level is  If the diet and exercise do not match the medicine dose, a person's blood sugar level can get too low or too high. Blood sugar levels that are too low or too high can cause problems.  All topics are updated as new evidence becomes  available and our peer review process is complete.  This topic retrieved from [email or web address] on: Sep 21, 2021.  Topic 51043 Version 7.0  Release: 29.4.2 - C29.263  © 2021 UpToDate, Inc. and/or its affiliates. All rights reserved.  Consumer Information Use and Disclaimer   This information is not specific medical advice and does not replace information you receive from your health care provider. This is only a brief summary of general information. It does NOT include all information about conditions, illnesses, injuries, tests, procedures, treatments, therapies, discharge instructions or life-style choices that may apply to you. You must talk with your health care provider for complete information about your health and treatment options. This information should not be used to decide whether or not to accept your health care provider's advice, instructions or recommendations. Only your health care provider has the knowledge and training to provide advice that is right for you. The use of this information is governed by the [employer] End User License Agreement, available at [email or web address] use of [employer] content is governed by the [employer] Terms of Use. ©2021 UpToDate, Inc. All rights reserved.  Copyright   © 2021 UpToDate, Inc. and/or its affiliates. All rights reserved.  Patient Education       Controlling Your Blood Pressure Through Lifestyle   The Basics   Written by the doctors and editors at [email or web address]   What does my lifestyle have to do with my blood pressure? -- The things you do and the foods you eat have a big effect on your blood pressure and your overall health. Following the right lifestyle can:  Lower your blood pressure or keep you from getting high blood pressure in the first place  Reduce your need for blood pressure medicines  Make medicines for high blood pressure work better, if you do take them  Lower the chances that you'll have a heart attack or stroke,  "or develop kidney disease  Which lifestyle choices will help lower my blood pressure? -- Here's what you can do:  Lose weight (if you are overweight)  Choose a diet rich in fruits, vegetables, and low-fat dairy products, and low in meats, sweets, and refined grains  Eat less salt (sodium)  Do something active for at least 30 minutes a day on most days of the week  Limit the amount of alcohol you drink  If you have high blood pressure, it's also very important to quit smoking (if you smoke). Quitting smoking might not bring your blood pressure down. But it will lower the chances that you'll have a heart attack or stroke, and it will help you feel better and live longer.  Start low and go slow -- The changes listed above might sound like a lot, but don't worry. You don't have to change everything all at once. The key to improving your lifestyle is to "start low and go slow." Choose 1 small, specific thing to change and try doing it for a while. If it works for you, keep doing it until it becomes a habit. If it doesn't, don't give up. Choose something else to change and see how that goes.  Let's say, for example, that you would like to improve your diet. If you're the type of person who eats cheeseburgers and French fries all the time, you can't switch to eating just salads from one day to the next. When people try to make changes like that, they often fail. Then they feel frustrated and tend to give up. So instead of trying to change everything about your diet in 1 day, change 1 or 2 small things about your diet and give yourself time to get used to those changes. For instance, keep the cheeseburger but give up the French fries. Or eat the same things but cut your portions in half.  As you find things that you are able to change and stick with, keep adding new changes. In time, you will see that you can actually change a lot. You just have to get used to the changes slowly.  Lose weight -- When people think about " "losing weight, they sometimes make it more complicated than it really is. To lose weight, you have to either eat less or move more. If you do both of those things, it's even better. But there is no single weight-loss diet or activity that's better than any other. When it comes to weight loss, the most effective plan is the one that you'll stick with.  Improve your diet -- There is no single diet that is right for everyone. But in general, a healthy diet can include:  Lots of fruits, vegetables, and whole grains  Some beans, peas, lentils, chickpeas, and similar foods  Some nuts, such as walnuts, almonds, and peanuts  Fat-free or low-fat milk and milk products  Some fish  To have a healthy diet, it's also important to limit or avoid sugar, sweets, meats, and refined grains. (Refined grains are found in white bread, white rice, most forms of pasta, and most packaged "snack" foods.)  Reduce salt -- Many people think that eating a low-sodium diet means avoiding the salt shaker and not adding salt when cooking. The truth is, not adding salt at the table or when you cook will only help a little. Almost all of the sodium you eat is already in the food you buy at the grocery store or at restaurants (figure 1).  The most important thing you can do to cut down on sodium is to eat less processed food. That means that you should avoid most foods that are sold in cans, boxes, jars, and bags. You should also eat in restaurants less often.  To reduce the amount of sodium you get, buy fresh or fresh-frozen fruits, vegetables, and meats. (Fresh-frozen foods have had nothing added to them before freezing.) Then you can make meals at home, from scratch, with these ingredients.  As with the other changes, don't try to cut out salt all at once. Instead, choose 1 or 2 foods that have a lot of sodium and try to replace them with low-sodium choices. When you get used to those low-sodium options, find another food or 2 to change. Then keep " "going, until all the foods you eat are sodium-free or low in sodium.  Become more active -- If you want to be more active, you don't have to go to the gym or get all sweaty. It is possible to increase your activity level while doing everyday things you enjoy. Walking, gardening, and dancing are just a few of the things that you might try. As with all the other changes, the key is not to do too much too fast. If you don't do any activity now, start by walking for just a few minutes every other day. Do that for a few weeks. If you stick with it, try doing it for longer. But if you find that you don't like walking, try a different activity.  Drink less alcohol -- If you are a woman, do not have more than 1 "standard drink" of alcohol a day. If you are a man, do not have more than 2. A "standard drink" is:  A can or bottle that has 12 ounces of beer  A glass that has 5 ounces of wine  A shot that has 1.5 ounces of whiskey  Where should I start? -- If you want to improve your lifestyle, start by making the changes that you think would be easiest for you. If you used to exercise and just got out of the habit, maybe it would be easy for you to start exercising again. Or if you actually like cooking meals from scratch, maybe the first thing you should focus on is eating home-cooked meals that are low in sodium.  Whatever you tackle first, choose specific, realistic goals, and give yourself a deadline. For example, do not decide that you are going to "exercise more." Instead, decide that you are going to walk for 10 minutes on Monday, Wednesday, and Friday, and that you are going to do this for the next 2 weeks.  When lifestyle changes are too general, people have a hard time following through.  Now go. You can do it!  All topics are updated as new evidence becomes available and our peer review process is complete.  This topic retrieved from [employer] on: Sep 21, 2021.  Topic 04098 Version 8.0  Release: 29.4.2 - C29.263  © " 2021 UpToDate, Inc. and/or its affiliates. All rights reserved.  figure 1: Sources of sodium in your diet     Graphic 03082 Version 2.0    Consumer Information Use and Disclaimer   This information is not specific medical advice and does not replace information you receive from your health care provider. This is only a brief summary of general information. It does NOT include all information about conditions, illnesses, injuries, tests, procedures, treatments, therapies, discharge instructions or life-style choices that may apply to you. You must talk with your health care provider for complete information about your health and treatment options. This information should not be used to decide whether or not to accept your health care provider's advice, instructions or recommendations. Only your health care provider has the knowledge and training to provide advice that is right for you. The use of this information is governed by the [employer] End User License Agreement, available at [email or web address]://[email or web address] use of [employer] content is governed by the [employer] Terms of Use. ©2021 UpToDate, Inc. All rights reserved.  Copyright   © 2021 UpToDate, Inc. and/or its affiliates. All rights reserved.

## 2023-11-01 NOTE — PROGRESS NOTES
[facility]  Primary Care       PATIENT NAME: Selena Proctor   : 1955    AGE: 68 y.o. DATE: 2023    MRN: [id number]        Reason for Visit / Chief Complaint:  Diabetes, Hypertension, and Hyperlipidemia     Subjective:     HPI: Patient here for routine follow up; states she has been feeling well. States she checks her blood sugar twice a day; states it runs around 140s to 150s.     Patient saw Dr. Norman on 10/24/2023 for left thigh wound. Patient states the wound is almost healed.     Diabetes  Pertinent negatives for hypoglycemia include no headaches. Pertinent negatives for diabetes include no chest pain and no fatigue.   Hypertension  Pertinent negatives include no chest pain, headaches or shortness of breath.   Hyperlipidemia  Pertinent negatives include no chest pain or shortness of breath.          Review of Systems: Review of Systems   Constitutional: Negative.  Negative for chills, fatigue and fever.   HENT: Negative.     Eyes: Negative.    Respiratory: Negative.  Negative for cough, chest tightness and shortness of breath.    Cardiovascular: Negative.  Negative for chest pain.   Gastrointestinal: Negative.  Negative for abdominal pain, constipation, diarrhea, nausea and vomiting.   Endocrine: Negative.    Genitourinary: Negative.  Negative for dysuria.   Musculoskeletal: Negative.  Negative for gait problem.   Skin:  Negative for rash.        Patient states left thigh wound is almost healed   Neurological:  Negative for numbness and headaches.   Hematological: Negative.    Psychiatric/Behavioral: Negative.            Review of patient's allergies indicates:   Allergen Reactions    Betadine [povidone-iodine] Itching        Med List:  Current Outpatient Medications on File Prior to Visit   Medication Sig Dispense Refill    glipiZIDE (GLUCOTROL) 2.5 MG TR24 Take 1 tablet (2.5 mg total) by mouth daily with breakfast. 90 tablet 3    JARDIANCE 10 mg tablet TAKE 1 TABLET EVERY DAY 90  tablet 1    SAXagliptin-metformin (KOMBIGLYZE XR) 2.5-1,000 mg TM24 Take 1 tablet by mouth once daily. 90 tablet 3    ACCU-CHEK SOFT DEV LANCETS Kit USE AS DIRECTED 100 each 1    alcohol swabs (DROPSAFE ALCOHOL PREP PADS) PadM USE AS DIRECTED 100 each 1    aspirin (ECOTRIN) 81 MG EC tablet Take 81 mg by mouth once daily.       blood sugar diagnostic (ACCU-CHEK DONNA PLUS TEST STRP) Strp Use as directed 200 strip 0    blood-glucose meter (ACCU-CHEK GUIDE GLUCOSE METER) Misc Use as directed 1 each 0    diclofenac sodium (VOLTAREN ARTHRITIS PAIN) 1 % Gel Apply 2 g topically 4 (four) times daily. Apply to knees, elbows, joints as needed 10 each 2    docusate sodium (COLACE) 100 MG capsule Take 1 capsule (100 mg total) by mouth 2 (two) times daily. 28 capsule 0    docusate sodium (COLACE) 100 MG capsule Take 1 capsule (100 mg total) by mouth 2 (two) times daily. 28 capsule 0    ezetimibe (ZETIA) 10 mg tablet TAKE 1 TABLET EVERY DAY 90 tablet 2    furosemide (LASIX) 40 MG tablet TAKE 1 TABLET EVERY DAY AS NEEDED 90 tablet 0    gabapentin (NEURONTIN) 300 MG capsule Take 1 capsule (300 mg total) by mouth 3 (three) times daily. 90 capsule 11    HYDROcodone-acetaminophen (NORCO)  mg per tablet Take 1 tablet by mouth every 8 (eight) hours. 90 tablet 0    [START ON 11/16/2023] HYDROcodone-acetaminophen (NORCO)  mg per tablet Take 1 tablet by mouth every 8 (eight) hours. 90 tablet 0    lancets (ACCU-CHEK SOFTCLIX LANCETS) Misc USE AS DIRECTED 200 each 10    lancets (TRUEPLUS LANCETS) 33 gauge Misc USE AS DIRECTED 200 each 0    losartan-hydrochlorothiazide 100-25 mg (HYZAAR) 100-25 mg per tablet TAKE 1 TABLET EVERY DAY 90 tablet 2    metoprolol succinate (TOPROL-XL) 100 MG 24 hr tablet TAKE 1 TABLET EVERY DAY 90 tablet 2    montelukast (SINGULAIR) 10 mg tablet TAKE 1 TABLET EVERY DAY AS NEEDED 90 tablet 2    mupirocin (BACTROBAN) 2 % ointment Apply topically once daily. (Patient not taking: Reported on 8/3/2023) 30 g  12    naloxone (NARCAN) 4 mg/actuation Spry PUT 1 SPRAY IN 1 NOSTRIL WAIT 2 MINUTES THEN REPEAT DOSE IN THE OTHER NOSTRIL      NIFEdipine (PROCARDIA-XL) 90 MG (OSM) 24 hr tablet TAKE 1 TABLET EVERY DAY 90 tablet 1    omeprazole (PRILOSEC) 40 MG capsule TAKE 1 CAPSULE EVERY DAY 90 capsule 0    pitavastatin calcium (LIVALO) 4 mg Tab Take one tablet by mouth three times per week on Monday, Wednesday, Friday. 90 tablet 2    potassium chloride (MICRO-K) 10 MEQ CpSR TAKE 2 CAPSULES TWICE DAILY 360 capsule 1    silver sulfADIAZINE 1% (SILVADENE) 1 % cream Apply topically once daily. 1000 g 11    spironolactone (ALDACTONE) 25 MG tablet TAKE 1 TABLET EVERY DAY 90 tablet 0    TRUE METRIX GLUCOSE METER kit USE AS DIRECTED (Patient not taking: Reported on 8/3/2023) 180 each 2     No current facility-administered medications on file prior to visit.       Medical/Social/Family History:  Past Medical History:   Diagnosis Date    Acid reflux     Coronary arteriosclerosis     Diabetes     Diabetes mellitus, type 2     Hypertension     Skin cancer     left leg    Spondylosis without myelopathy or radiculopathy, lumbar region       Social History     Tobacco Use   Smoking Status Never    Passive exposure: Current (.)   Smokeless Tobacco Never      Social History     Substance and Sexual Activity   Alcohol Use Not Currently       Family History   Problem Relation Age of Onset    Hypertension Mother     Heart disease Mother     Diabetes Sister     Hypertension Sister     Hypertension Brother     Alcohol abuse Father     Cancer Father     Hypertension Son     Mental illness Maternal Aunt       Past Surgical History:   Procedure Laterality Date    APPENDECTOMY      BIOPSY OF LESION Left 3/29/2023    Procedure: BIOPSY, LESION;  Surgeon: Andrez Norman DO;  Location: [facility];  Service: General;  Laterality: Left;  excisional biospy of left thigh with rotational flap    BIOPSY OR EXCISION, LESION  8/18/2023    Procedure: BIOPSY  "OR EXCISION, LESION left thigh and abdomen;  Surgeon: Andrez Norman DO;  Location: [facility] OR;  Service: General;;    BLOCK, NERVE, OBTURATOR Right 2019    Right Femoral/Obturator Nerve Block  Dr Headley     SECTION      x2    CORONARY ARTERY BYPASS GRAFT      KNEE ARTHROPLASTY Right     KNEE ARTHROSCOPY Left     LIPOMA RESECTION Right 2023    Procedure: Excision right leg fatty tumor;  Surgeon: Andrez Norman DO;  Location: [facility] OR;  Service: General;  Laterality: Right;    SKIN GRAFT      Left Thigh    TOTAL HIP ARTHROPLASTY Bilateral     TUBAL LIGATION        Immunization History   Administered Date(s) Administered    COVID-19 MRNA, LN-S PF (MODERNA HALF 0.25 ML DOSE) 10/29/2021    COVID-19, MRNA, LN-S, PF (MODERNA FULL 0.5 ML DOSE) 2021, 02/10/2021, 03/10/2021    Influenza - Quadrivalent - PF *Preferred* (6 months and older) 2022, 2023    Pneumococcal Conjugate - 13 Valent 2017    Pneumococcal Conjugate - 20 Valent 2022    Tdap 2022          Objective:      Vitals:    23 0851   BP: 134/85   BP Location: Left arm   Patient Position: Sitting   BP Method: Large (Automatic)   Pulse: 60   Resp: 18   Temp: 97.6 °F (36.4 °C)   TempSrc: Oral   SpO2: 97%   Weight: 81.7 kg (180 lb 3.2 oz)   Height: 5' 3" (1.6 m)     Body mass index is 31.92 kg/m².     Physical Exam: Physical Exam  Vitals and nursing note reviewed.   Constitutional:       General: She is not in acute distress.     Appearance: Normal appearance. She is not ill-appearing, toxic-appearing or diaphoretic.   HENT:      Head: Normocephalic.      Mouth/Throat:      Mouth: Mucous membranes are moist.   Eyes:      Pupils: Pupils are equal, round, and reactive to light.   Cardiovascular:      Rate and Rhythm: Normal rate and regular rhythm.      Heart sounds: Normal heart sounds.   Pulmonary:      Effort: Pulmonary effort is normal.      Breath sounds: Normal breath sounds.   Abdominal:      " General: Bowel sounds are normal.      Palpations: Abdomen is soft.   Musculoskeletal:         General: Normal range of motion.      Cervical back: Normal range of motion.   Skin:     General: Skin is warm and dry.   Neurological:      Mental Status: She is alert and oriented to person, place, and time.      Gait: Gait normal.   Psychiatric:         Mood and Affect: Mood normal.                Assessment:          ICD-10-CM ICD-9-CM   1. Type 2 diabetes mellitus without complication, without long-term current use of insulin  E11.9 250.00   2. Essential hypertension  I10 401.9   3. Hyperlipidemia, unspecified hyperlipidemia type  E78.5 272.4   4. Encounter for immunization  Z23 V03.89        Plan:       Type 2 diabetes mellitus without complication, without long-term current use of insulin  -     Hemoglobin A1C; Future; Expected date: 11/01/2023  -     Microalbumin/Creatinine Ratio, Urine    Essential hypertension  -     CBC Auto Differential; Future; Expected date: 11/01/2023  -     Comprehensive Metabolic Panel; Future; Expected date: 11/01/2023  -     Microalbumin/Creatinine Ratio, Urine    Hyperlipidemia, unspecified hyperlipidemia type  -     Lipid Panel; Future; Expected date: 11/01/2023    Encounter for immunization  -     Influenza - Quadrivalent *Preferred* (6 months+) (PF)          New & refilled meds:  Requested Prescriptions      No prescriptions requested or ordered in this encounter       Follow up in about 3 months (around 2/1/2024), or if symptoms worsen or fail to improve, for Hypertension, diabetes.     Patient Instructions   Patient Education       Diabetes and Diet   The Basics   Written by the doctors and editors at [facility]   Why is diet important in diabetes? -- Diet is important because it is part of diabetes treatment. Many people need to change what they eat and how much they eat to help treat their diabetes. It is important for people to treat their diabetes so that they:  Keep their blood  "sugar at or near a normal level  Prevent long-term problems, such as heart or kidney problems, that can happen in people with diabetes  Changing your diet can also help treat obesity, high blood pressure, and high cholesterol. These conditions can affect people with diabetes and can lead to future problems, such as heart attacks or strokes.  Who will work with me to change my diet? -- Your doctor or nurse will work with you to make a food plan to change your diet. They might also recommend that you work with a "dietitian." A dietitian is an expert on food and eating.  Do I need to eat at the same times every day? -- When and how often you should eat depends, in part, on the diabetes medicines you take. For example:  People who take about the same amount of insulin at the same time each day (called a "fixed regimen") should eat meals at the same times. This is also true for people who take pills that increase insulin levels, such as sulfonylureas. Eating meals at the same time every day helps prevent low blood sugar.  People who adjust the dose and timing of their insulin each day (called a "flexible regimen") do not always have to eat meals at the same time. That's because they can time their insulin dose for before they plan to eat, and also adjust the dose for how much they plan to eat.  People who take medicines that don't usually cause low blood sugar, such as metformin, don't have to eat meals at the same time every day.  What do I need to think about when planning what to eat? -- Our bodies break down the food we eat into small pieces called carbohydrates, proteins, and fats.  When planning what to eat, people with diabetes need to think about:  Carbohydrates (or "carbs") - Carbohydrates, which are sugars that our bodies use for energy, can raise a person's blood sugar level. Your doctor, nurse, or dietitian will tell you how many carbohydrates you should eat at each meal or snack. Foods that have " "carbohydrates include:  Bread, pasta, and rice  Vegetables and fruits  Dairy foods  Foods and drinks with added sugar  It is best to get your carbohydrates from fruits, vegetables, whole grains, and low-fat milk. It is best to avoid drinks with added sugar, like soda, juices, and sports drinks.   Protein - Your doctor, nurse, or dietitian will tell you how much protein you should eat each day. It is best to eat lean meats, fish, eggs, beans, peas, soy products, nuts, and seeds.  Fats - The type of fat you eat is more important than the amount of fat. "Saturated" and "trans" fats can increase your risk for heart problems, like a heart attack.  Foods that have saturated fats include meat, butter, cheese, and ice cream.  Foods that have trans fats include processed food with "partially hydrogenated oils" on the ingredient list. This may include fried foods, store bought cookies, muffins, pies, and cakes.  "Monounsaturated" and "polyunsaturated" fats are better for you. Foods with these types of fat include fish, avocado, olive oil, and nuts.  Calories - People need to eat a certain amount of calories each day to keep their weight the same. People who are overweight and want to lose weight need to eat fewer calories each day.  Fiber - Eating foods with a lot of fiber can help control a person's blood sugar level. Foods that have a lot of fiber include apples, green beans, peas, beans, lentils, nuts, oatmeal, and whole grains.  Salt - People who have high blood pressure should not eat foods that contain a lot of salt (also called sodium). People with high blood pressure should also eat healthy foods, such as fruits, vegetables, and low-fat dairy foods.  Alcohol - Having more than 1 drink (for women) or 2 drinks (for men) a day can raise blood sugar levels. Also, drinks that have fruit juice or soda in them can raise blood sugar levels.  What can I do if I need to lose weight? -- If you need to lose weight, you " can:  Exercise - Try to get at least 30 minutes of physical activity a day, most days of the week. Even gentle exercise, like walking, is good for your health. Some people with diabetes need to change their medicine dose before they exercise. They might also need to check their blood sugar levels before and after exercising.  Eat fewer calories - Your doctor, nurse, or dietitian can tell you how many calories you should eat each day in order to lose weight.  If you are worried about your weight, size, or shape, talk with your doctor, nurse, or dietitian. They can help you make changes to improve your health.  Can I eat the same foods as my family? -- Yes. You do not need to eat special foods if you have diabetes. You and your family can eat the same foods. Changing your diet is mostly about eating healthy foods and not eating too much.  What are the other parts of diabetes treatment? -- Besides changing your diet, the other parts of diabetes treatment are:  Exercise  Medicines  Some people with diabetes need to learn how to match their diet and exercise with their medicine dose. For example, people who use insulin might need to choose the dose of insulin they give themselves. To choose their dose, they need to think about:  What they plan to eat at the next meal  How much exercise they plan to do  What their blood sugar level is  If the diet and exercise do not match the medicine dose, a person's blood sugar level can get too low or too high. Blood sugar levels that are too low or too high can cause problems.  All topics are updated as new evidence becomes available and our peer review process is complete.  This topic retrieved from [employer] on: Sep 21, 2021.  Topic 93482 Version 7.0  Release: 29.4.2 - C29.263  © 2021 UpToDate, Inc. and/or its affiliates. All rights reserved.  Consumer Information Use and Disclaimer   This information is not specific medical advice and does not replace information you receive from  your health care provider. This is only a brief summary of general information. It does NOT include all information about conditions, illnesses, injuries, tests, procedures, treatments, therapies, discharge instructions or life-style choices that may apply to you. You must talk with your health care provider for complete information about your health and treatment options. This information should not be used to decide whether or not to accept your health care provider's advice, instructions or recommendations. Only your health care provider has the knowledge and training to provide advice that is right for you. The use of this information is governed by the [employer] End User License Agreement, available at [email or web address]/solutions/[employer].sm/about/craig.The use of [employer] content is governed by the [employer] Terms of Use. ©2021 UpToDate, Inc. All rights reserved.  Copyright   © 2021 UpToDate, Inc. and/or its affiliates. All rights reserved.  Patient Education       Controlling Your Blood Pressure Through Lifestyle   The Basics   Written by the doctors and editors at [email or web address]   What does my lifestyle have to do with my blood pressure? -- The things you do and the foods you eat have a big effect on your blood pressure and your overall health. Following the right lifestyle can:  Lower your blood pressure or keep you from getting high blood pressure in the first place  Reduce your need for blood pressure medicines  Make medicines for high blood pressure work better, if you do take them  Lower the chances that you'll have a heart attack or stroke, or develop kidney disease  Which lifestyle choices will help lower my blood pressure? -- Here's what you can do:  Lose weight (if you are overweight)  Choose a diet rich in fruits, vegetables, and low-fat dairy products, and low in meats, sweets, and refined grains  Eat less salt (sodium)  Do something active for at least 30 minutes a day on most days of the  "week  Limit the amount of alcohol you drink  If you have high blood pressure, it's also very important to quit smoking (if you smoke). Quitting smoking might not bring your blood pressure down. But it will lower the chances that you'll have a heart attack or stroke, and it will help you feel better and live longer.  Start low and go slow -- The changes listed above might sound like a lot, but don't worry. You don't have to change everything all at once. The key to improving your lifestyle is to "start low and go slow." Choose 1 small, specific thing to change and try doing it for a while. If it works for you, keep doing it until it becomes a habit. If it doesn't, don't give up. Choose something else to change and see how that goes.  Let's say, for example, that you would like to improve your diet. If you're the type of person who eats cheeseburgers and French fries all the time, you can't switch to eating just salads from one day to the next. When people try to make changes like that, they often fail. Then they feel frustrated and tend to give up. So instead of trying to change everything about your diet in 1 day, change 1 or 2 small things about your diet and give yourself time to get used to those changes. For instance, keep the cheeseburger but give up the French fries. Or eat the same things but cut your portions in half.  As you find things that you are able to change and stick with, keep adding new changes. In time, you will see that you can actually change a lot. You just have to get used to the changes slowly.  Lose weight -- When people think about losing weight, they sometimes make it more complicated than it really is. To lose weight, you have to either eat less or move more. If you do both of those things, it's even better. But there is no single weight-loss diet or activity that's better than any other. When it comes to weight loss, the most effective plan is the one that you'll stick with.  Improve " "your diet -- There is no single diet that is right for everyone. But in general, a healthy diet can include:  Lots of fruits, vegetables, and whole grains  Some beans, peas, lentils, chickpeas, and similar foods  Some nuts, such as walnuts, almonds, and peanuts  Fat-free or low-fat milk and milk products  Some fish  To have a healthy diet, it's also important to limit or avoid sugar, sweets, meats, and refined grains. (Refined grains are found in white bread, white rice, most forms of pasta, and most packaged "snack" foods.)  Reduce salt -- Many people think that eating a low-sodium diet means avoiding the salt shaker and not adding salt when cooking. The truth is, not adding salt at the table or when you cook will only help a little. Almost all of the sodium you eat is already in the food you buy at the grocery store or at restaurants (figure 1).  The most important thing you can do to cut down on sodium is to eat less processed food. That means that you should avoid most foods that are sold in cans, boxes, jars, and bags. You should also eat in restaurants less often.  To reduce the amount of sodium you get, buy fresh or fresh-frozen fruits, vegetables, and meats. (Fresh-frozen foods have had nothing added to them before freezing.) Then you can make meals at home, from scratch, with these ingredients.  As with the other changes, don't try to cut out salt all at once. Instead, choose 1 or 2 foods that have a lot of sodium and try to replace them with low-sodium choices. When you get used to those low-sodium options, find another food or 2 to change. Then keep going, until all the foods you eat are sodium-free or low in sodium.  Become more active -- If you want to be more active, you don't have to go to the gym or get all sweaty. It is possible to increase your activity level while doing everyday things you enjoy. Walking, gardening, and dancing are just a few of the things that you might try. As with all the other " "changes, the key is not to do too much too fast. If you don't do any activity now, start by walking for just a few minutes every other day. Do that for a few weeks. If you stick with it, try doing it for longer. But if you find that you don't like walking, try a different activity.  Drink less alcohol -- If you are a woman, do not have more than 1 "standard drink" of alcohol a day. If you are a man, do not have more than 2. A "standard drink" is:  A can or bottle that has 12 ounces of beer  A glass that has 5 ounces of wine  A shot that has 1.5 ounces of whiskey  Where should I start? -- If you want to improve your lifestyle, start by making the changes that you think would be easiest for you. If you used to exercise and just got out of the habit, maybe it would be easy for you to start exercising again. Or if you actually like cooking meals from scratch, maybe the first thing you should focus on is eating home-cooked meals that are low in sodium.  Whatever you tackle first, choose specific, realistic goals, and give yourself a deadline. For example, do not decide that you are going to "exercise more." Instead, decide that you are going to walk for 10 minutes on Monday, Wednesday, and Friday, and that you are going to do this for the next 2 weeks.  When lifestyle changes are too general, people have a hard time following through.  Now go. You can do it!  All topics are updated as new evidence becomes available and our peer review process is complete.  This topic retrieved from [employer] on: Sep 21, 2021.  Topic 86272 Version 8.0  Release: 29.4.2 - C29.263  © 2021 UpToDate, Inc. and/or its affiliates. All rights reserved.  figure 1: Sources of sodium in your diet     Graphic 90249 Version 2.0    Consumer Information Use and Disclaimer   This information is not specific medical advice and does not replace information you receive from your health care provider. This is only a brief summary of general information. It " does NOT include all information about conditions, illnesses, injuries, tests, procedures, treatments, therapies, discharge instructions or life-style choices that may apply to you. You must talk with your health care provider for complete information about your health and treatment options. This information should not be used to decide whether or not to accept your health care provider's advice, instructions or recommendations. Only your health care provider has the knowledge and training to provide advice that is right for you. The use of this information is governed by the [employer] End User License Agreement, available at https://www.Friend.ly/en/solutions/[employer]/about/craig.The use of "[employer] content is governed by the "[employer] Terms of Use. ©2021 UpToDate, Inc. All rights reserved.  Copyright   © 2021 UpToDate, Inc. and/or its affiliates. All rights reserved.           Signature: Lizzette Meadows DNP, MORA-C

## 2023-11-08 ENCOUNTER — TELEPHONE (OUTPATIENT)
Dept: PRIMARY CARE CLINIC | Facility: CLINIC | Age: 68
End: 2023-11-08
Payer: MEDICARE

## 2023-11-09 ENCOUNTER — TELEPHONE (OUTPATIENT)
Dept: PRIMARY CARE CLINIC | Facility: CLINIC | Age: 68
End: 2023-11-09
Payer: MEDICARE

## 2023-11-09 NOTE — TELEPHONE ENCOUNTER
----- Message from Lizzette Meadows DNP, FNP-C sent at 11/6/2023  4:42 PM CST -----  Please notify of results.    Triglycerides are a little elevated; needs to avoid fried foods, highly processed foods.     Potassium is a little low; continue to take the potassium 10 meq po twice a day.

## 2023-11-15 DIAGNOSIS — E87.6 HYPOKALEMIA: ICD-10-CM

## 2023-11-15 DIAGNOSIS — E11.9 TYPE 2 DIABETES MELLITUS WITHOUT COMPLICATION, WITHOUT LONG-TERM CURRENT USE OF INSULIN: ICD-10-CM

## 2023-11-15 RX ORDER — EMPAGLIFLOZIN 10 MG/1
TABLET, FILM COATED ORAL
Qty: 90 TABLET | Refills: 10 | Status: SHIPPED | OUTPATIENT
Start: 2023-11-15

## 2023-11-15 RX ORDER — POTASSIUM CHLORIDE 750 MG/1
CAPSULE, EXTENDED RELEASE ORAL
Qty: 360 CAPSULE | Refills: 2 | Status: SHIPPED | OUTPATIENT
Start: 2023-11-15
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
## 2023-11-30 ENCOUNTER — OFFICE VISIT (OUTPATIENT)
Dept: PAIN MEDICINE | Facility: CLINIC | Age: 68
End: 2023-11-30
Payer: MEDICARE

## 2023-11-30 VITALS
DIASTOLIC BLOOD PRESSURE: 74 MMHG | SYSTOLIC BLOOD PRESSURE: 141 MMHG | HEART RATE: 56 BPM | RESPIRATION RATE: 18 BRPM | WEIGHT: 182 LBS | HEIGHT: 64 IN | BODY MASS INDEX: 31.07 KG/M2

## 2023-11-30 DIAGNOSIS — M25.551 HIP PAIN, RIGHT: Primary | Chronic | ICD-10-CM

## 2023-11-30 DIAGNOSIS — Z79.899 ENCOUNTER FOR LONG-TERM (CURRENT) USE OF OTHER MEDICATIONS: ICD-10-CM

## 2023-11-30 DIAGNOSIS — M47.816 SPONDYLOSIS WITHOUT MYELOPATHY OR RADICULOPATHY, LUMBAR REGION: Chronic | ICD-10-CM

## 2023-11-30 DIAGNOSIS — M89.49 OSTEOARTHROSIS MULTIPLE SITES, NOT SPECIFIED AS GENERALIZED: Chronic | ICD-10-CM

## 2023-11-30 LAB
CTP QC/QA: YES
POC (AMP) AMPHETAMINE: NEGATIVE
POC (BAR) BARBITURATES: NEGATIVE
POC (BUP) BUPRENORPHINE: NEGATIVE
POC (BZO) BENZODIAZEPINES: NEGATIVE
POC (COC) COCAINE: NEGATIVE
POC (MDMA) METHYLENEDIOXYMETHAMPHETAMINE 3,4: NEGATIVE
POC (MET) METHAMPHETAMINE: NEGATIVE
POC (MOP) OPIATES: ABNORMAL
POC (MTD) METHADONE: NEGATIVE
POC (OXY) OXYCODONE: NEGATIVE
POC (PCP) PHENCYCLIDINE: NEGATIVE
POC (TCA) TRICYCLIC ANTIDEPRESSANTS: NEGATIVE
POC TEMPERATURE (URINE): 94
POC THC: NEGATIVE

## 2023-11-30 PROCEDURE — 99214 PR OFFICE/OUTPT VISIT, EST, LEVL IV, 30-39 MIN: ICD-10-PCS | Mod: S$PBB,,, | Performed by: PHYSICIAN ASSISTANT

## 2023-11-30 PROCEDURE — 99214 OFFICE O/P EST MOD 30 MIN: CPT | Mod: S$PBB,,, | Performed by: PHYSICIAN ASSISTANT

## 2023-11-30 PROCEDURE — 1125F PR PAIN SEVERITY QUANTIFIED, PAIN PRESENT: ICD-10-PCS | Mod: CPTII,,, | Performed by: PHYSICIAN ASSISTANT

## 2023-11-30 PROCEDURE — 3008F BODY MASS INDEX DOCD: CPT | Mod: CPTII,,, | Performed by: PHYSICIAN ASSISTANT

## 2023-11-30 PROCEDURE — 3288F FALL RISK ASSESSMENT DOCD: CPT | Mod: CPTII,,, | Performed by: PHYSICIAN ASSISTANT

## 2023-11-30 PROCEDURE — 3051F HG A1C>EQUAL 7.0%<8.0%: CPT | Mod: CPTII,,, | Performed by: PHYSICIAN ASSISTANT

## 2023-11-30 PROCEDURE — 3008F PR BODY MASS INDEX (BMI) DOCUMENTED: ICD-10-PCS | Mod: CPTII,,, | Performed by: PHYSICIAN ASSISTANT

## 2023-11-30 PROCEDURE — 99999PBSHW POCT URINE DRUG SCREEN PRESUMP: Mod: PBBFAC,,,

## 2023-11-30 PROCEDURE — 1125F AMNT PAIN NOTED PAIN PRSNT: CPT | Mod: CPTII,,, | Performed by: PHYSICIAN ASSISTANT

## 2023-11-30 PROCEDURE — 80305 DRUG TEST PRSMV DIR OPT OBS: CPT | Mod: PBBFAC | Performed by: PHYSICIAN ASSISTANT

## 2023-11-30 PROCEDURE — 1101F PR PT FALLS ASSESS DOC 0-1 FALLS W/OUT INJ PAST YR: ICD-10-PCS | Mod: CPTII,,, | Performed by: PHYSICIAN ASSISTANT

## 2023-11-30 PROCEDURE — 3078F PR MOST RECENT DIASTOLIC BLOOD PRESSURE < 80 MM HG: ICD-10-PCS | Mod: CPTII,,, | Performed by: PHYSICIAN ASSISTANT

## 2023-11-30 PROCEDURE — 1101F PT FALLS ASSESS-DOCD LE1/YR: CPT | Mod: CPTII,,, | Performed by: PHYSICIAN ASSISTANT

## 2023-11-30 PROCEDURE — 3078F DIAST BP <80 MM HG: CPT | Mod: CPTII,,, | Performed by: PHYSICIAN ASSISTANT

## 2023-11-30 PROCEDURE — 3077F PR MOST RECENT SYSTOLIC BLOOD PRESSURE >= 140 MM HG: ICD-10-PCS | Mod: CPTII,,, | Performed by: PHYSICIAN ASSISTANT

## 2023-11-30 PROCEDURE — 99999PBSHW POCT URINE DRUG SCREEN PRESUMP: ICD-10-PCS | Mod: PBBFAC,,,

## 2023-11-30 PROCEDURE — 1159F PR MEDICATION LIST DOCUMENTED IN MEDICAL RECORD: ICD-10-PCS | Mod: CPTII,,, | Performed by: PHYSICIAN ASSISTANT

## 2023-11-30 PROCEDURE — 99215 OFFICE O/P EST HI 40 MIN: CPT | Mod: PBBFAC | Performed by: PHYSICIAN ASSISTANT

## 2023-11-30 PROCEDURE — 3066F NEPHROPATHY DOC TX: CPT | Mod: CPTII,,, | Performed by: PHYSICIAN ASSISTANT

## 2023-11-30 PROCEDURE — 3077F SYST BP >= 140 MM HG: CPT | Mod: CPTII,,, | Performed by: PHYSICIAN ASSISTANT

## 2023-11-30 PROCEDURE — 3288F PR FALLS RISK ASSESSMENT DOCUMENTED: ICD-10-PCS | Mod: CPTII,,, | Performed by: PHYSICIAN ASSISTANT

## 2023-11-30 PROCEDURE — 1159F MED LIST DOCD IN RCRD: CPT | Mod: CPTII,,, | Performed by: PHYSICIAN ASSISTANT

## 2023-11-30 PROCEDURE — 3066F PR DOCUMENTATION OF TREATMENT FOR NEPHROPATHY: ICD-10-PCS | Mod: CPTII,,, | Performed by: PHYSICIAN ASSISTANT

## 2023-11-30 PROCEDURE — 3051F PR MOST RECENT HEMOGLOBIN A1C LEVEL 7.0 - < 8.0%: ICD-10-PCS | Mod: CPTII,,, | Performed by: PHYSICIAN ASSISTANT

## 2023-11-30 PROCEDURE — 3061F NEG MICROALBUMINURIA REV: CPT | Mod: CPTII,,, | Performed by: PHYSICIAN ASSISTANT

## 2023-11-30 PROCEDURE — 3061F PR NEG MICROALBUMINURIA RESULT DOCUMENTED/REVIEW: ICD-10-PCS | Mod: CPTII,,, | Performed by: PHYSICIAN ASSISTANT

## 2023-11-30 RX ORDER — HYDROCODONE BITARTRATE AND ACETAMINOPHEN 10; 325 MG/1; MG/1
1 TABLET ORAL EVERY 8 HOURS
Qty: 90 TABLET | Refills: 0 | Status: SHIPPED | OUTPATIENT
Start: 2024-01-15 | End: 2024-03-04 | Stop reason: SDUPTHER

## 2023-11-30 RX ORDER — HYDROCODONE BITARTRATE AND ACETAMINOPHEN 10; 325 MG/1; MG/1
1 TABLET ORAL EVERY 8 HOURS
Qty: 90 TABLET | Refills: 0 | Status: SHIPPED | OUTPATIENT
Start: 2024-02-14 | End: 2024-03-04 | Stop reason: SDUPTHER

## 2023-11-30 RX ORDER — HYDROCODONE BITARTRATE AND ACETAMINOPHEN 10; 325 MG/1; MG/1
1 TABLET ORAL EVERY 8 HOURS
Qty: 90 TABLET | Refills: 0 | Status: SHIPPED | OUTPATIENT
Start: 2023-12-16 | End: 2024-01-15

## 2023-12-07 RX ORDER — ISOPROPYL ALCOHOL 70 ML/100ML
SWAB TOPICAL
Qty: 100 EACH | Refills: 3 | Status: SHIPPED | OUTPATIENT
Start: 2023-12-07

## 2023-12-08 DIAGNOSIS — I10 ESSENTIAL HYPERTENSION: ICD-10-CM

## 2023-12-08 RX ORDER — NIFEDIPINE 90 MG/1
TABLET, EXTENDED RELEASE ORAL
Qty: 90 TABLET | Refills: 3 | Status: SHIPPED | OUTPATIENT
Start: 2023-12-08

## 2024-01-01 NOTE — BRIEF OP NOTE
[facility] - Periop Services  Brief Operative Note    Surgery Date: 8/18/2023     Surgeon(s) and Role:     * Andrez Norman DO - Primary    Assisting Surgeon: None    Pre-op Diagnosis:  Leg mass, right [R22.41]  Open wound of left thigh, subsequent encounter [S71.102D]    Post-op Diagnosis:  Post-Op Diagnosis Codes:     * Leg mass, right [R22.41]     * Open wound of left thigh, subsequent encounter [S71.102D]    Procedure(s) (LRB):  Excision right leg fatty tumor (Right)  BIOPSY OR EXCISION, LESION left thigh and abdomen    Anesthesia: General    Operative Findings: right thigh fatty tumor; left thigh ulceration; left abdomen ulceration    Estimated Blood Loss: 5 mL         Specimens:   Specimen (24h ago, onward)       Start     Ordered    08/18/23 0850  Surgical Pathology  RELEASE UPON ORDERING         08/18/23 0850                      Discharge Note    OUTCOME: Patient tolerated treatment/procedure well without complication and is now ready for discharge.    DISPOSITION: Home or Self Care    FINAL DIAGNOSIS:  right thigh fatty tumor; left thigh skin ulceration; left abdomen skin ulceration    FOLLOWUP: In clinic    DISCHARGE INSTRUCTIONS:    Discharge Procedure Orders   Diet Adult Regular     Remove dressing in 48 hours   Order Comments: Change dressings in 48 hours; ok to shower at that time. Do no scrub. Keep clean and covered with dressing daily. No swimming or tub baths     Activity as tolerated        Yes

## 2024-01-09 DIAGNOSIS — Z71.89 COMPLEX CARE COORDINATION: ICD-10-CM

## 2024-01-15 DIAGNOSIS — E11.9 TYPE 2 DIABETES MELLITUS WITHOUT COMPLICATION, WITHOUT LONG-TERM CURRENT USE OF INSULIN: ICD-10-CM

## 2024-01-17 RX ORDER — BLOOD SUGAR DIAGNOSTIC
STRIP MISCELLANEOUS
Qty: 200 STRIP | Refills: 3 | Status: SHIPPED | OUTPATIENT
Start: 2024-01-17

## 2024-02-08 DIAGNOSIS — E11.9 TYPE 2 DIABETES MELLITUS WITHOUT COMPLICATION, WITHOUT LONG-TERM CURRENT USE OF INSULIN: Primary | ICD-10-CM

## 2024-02-08 RX ORDER — OMEPRAZOLE 40 MG/1
CAPSULE, DELAYED RELEASE ORAL
Qty: 90 CAPSULE | Refills: 0 | Status: SHIPPED | OUTPATIENT
Start: 2024-02-08 | End: 2024-04-23

## 2024-02-08 RX ORDER — LANCETS 26 GAUGE
EACH MISCELLANEOUS
Qty: 100 EACH | Refills: 1 | Status: SHIPPED | OUTPATIENT
Start: 2024-02-08 | End: 2024-04-17

## 2024-02-12 RX ORDER — SPIRONOLACTONE 25 MG/1
25 TABLET ORAL DAILY
Qty: 90 TABLET | Refills: 0 | Status: SHIPPED | OUTPATIENT
Start: 2024-02-12

## 2024-02-12 RX ORDER — MONTELUKAST SODIUM 10 MG/1
10 TABLET ORAL
Qty: 90 TABLET | Refills: 0 | Status: SHIPPED | OUTPATIENT
Start: 2024-02-12

## 2024-02-16 DIAGNOSIS — E11.9 TYPE 2 DIABETES MELLITUS WITHOUT COMPLICATION, WITHOUT LONG-TERM CURRENT USE OF INSULIN: Primary | ICD-10-CM

## 2024-02-16 DIAGNOSIS — E11.65 TYPE 2 DIABETES MELLITUS WITH HYPERGLYCEMIA, WITHOUT LONG-TERM CURRENT USE OF INSULIN: ICD-10-CM

## 2024-02-16 RX ORDER — MONTELUKAST SODIUM 10 MG/1
TABLET ORAL
Qty: 90 TABLET | Refills: 0 | Status: SHIPPED | OUTPATIENT
Start: 2024-02-16

## 2024-02-16 RX ORDER — SPIRONOLACTONE 25 MG/1
TABLET ORAL
Qty: 90 TABLET | Refills: 0 | Status: SHIPPED | OUTPATIENT
Start: 2024-02-16

## 2024-02-16 NOTE — TELEPHONE ENCOUNTER
----- Message from Angela Meyers sent at 2/16/2024 10:36 AM CST -----  Pt does not want soft click lancets that was sent in she wants the fast click sent to [facility]

## 2024-02-19 RX ORDER — LANCETS
1 EACH MISCELLANEOUS
Qty: 200 EACH | Refills: 0 | Status: SHIPPED | OUTPATIENT
Start: 2024-02-19

## 2024-02-26 DIAGNOSIS — E11.9 TYPE 2 DIABETES MELLITUS WITHOUT COMPLICATION, WITHOUT LONG-TERM CURRENT USE OF INSULIN: ICD-10-CM

## 2024-02-26 RX ORDER — SAXAGLIPTIN AND METFORMIN HYDROCHLORIDE 1000; 2.5 MG/1; MG/1
1 TABLET, FILM COATED, EXTENDED RELEASE ORAL
Qty: 90 TABLET | Refills: 0 | Status: SHIPPED | OUTPATIENT
Start: 2024-02-26 | End: 2024-04-16
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
## 2024-03-06 ENCOUNTER — OFFICE VISIT (OUTPATIENT)
Dept: PAIN MEDICINE | Facility: CLINIC | Age: 69
End: 2024-03-06
Payer: MEDICARE

## 2024-03-06 VITALS
HEIGHT: 63 IN | RESPIRATION RATE: 18 BRPM | WEIGHT: 182 LBS | BODY MASS INDEX: 32.25 KG/M2 | DIASTOLIC BLOOD PRESSURE: 74 MMHG | HEART RATE: 59 BPM | SYSTOLIC BLOOD PRESSURE: 148 MMHG

## 2024-03-06 DIAGNOSIS — M47.816 SPONDYLOSIS WITHOUT MYELOPATHY OR RADICULOPATHY, LUMBAR REGION: Primary | Chronic | ICD-10-CM

## 2024-03-06 DIAGNOSIS — M89.49 OSTEOARTHROSIS MULTIPLE SITES, NOT SPECIFIED AS GENERALIZED: Chronic | ICD-10-CM

## 2024-03-06 DIAGNOSIS — Z79.899 ENCOUNTER FOR LONG-TERM (CURRENT) USE OF OTHER MEDICATIONS: ICD-10-CM

## 2024-03-06 DIAGNOSIS — M25.551 HIP PAIN, RIGHT: Chronic | ICD-10-CM

## 2024-03-06 PROCEDURE — 1159F MED LIST DOCD IN RCRD: CPT | Mod: CPTII,,, | Performed by: PHYSICIAN ASSISTANT

## 2024-03-06 PROCEDURE — 3078F DIAST BP <80 MM HG: CPT | Mod: CPTII,,, | Performed by: PHYSICIAN ASSISTANT

## 2024-03-06 PROCEDURE — 3008F BODY MASS INDEX DOCD: CPT | Mod: CPTII,,, | Performed by: PHYSICIAN ASSISTANT

## 2024-03-06 PROCEDURE — 1101F PT FALLS ASSESS-DOCD LE1/YR: CPT | Mod: CPTII,,, | Performed by: PHYSICIAN ASSISTANT

## 2024-03-06 PROCEDURE — 99214 OFFICE O/P EST MOD 30 MIN: CPT | Mod: S$PBB,,, | Performed by: PHYSICIAN ASSISTANT

## 2024-03-06 PROCEDURE — 99999PBSHW POCT URINE DRUG SCREEN PRESUMP: Mod: PBBFAC,,,

## 2024-03-06 PROCEDURE — 1125F AMNT PAIN NOTED PAIN PRSNT: CPT | Mod: CPTII,,, | Performed by: PHYSICIAN ASSISTANT

## 2024-03-06 PROCEDURE — 99215 OFFICE O/P EST HI 40 MIN: CPT | Mod: PBBFAC | Performed by: PHYSICIAN ASSISTANT

## 2024-03-06 PROCEDURE — 3077F SYST BP >= 140 MM HG: CPT | Mod: CPTII,,, | Performed by: PHYSICIAN ASSISTANT

## 2024-03-06 PROCEDURE — 80305 DRUG TEST PRSMV DIR OPT OBS: CPT | Mod: PBBFAC | Performed by: PHYSICIAN ASSISTANT

## 2024-03-06 PROCEDURE — 3288F FALL RISK ASSESSMENT DOCD: CPT | Mod: CPTII,,, | Performed by: PHYSICIAN ASSISTANT

## 2024-03-06 PROCEDURE — G0481 DRUG TEST DEF 8-14 CLASSES: HCPCS | Mod: ,,, | Performed by: CLINICAL MEDICAL LABORATORY

## 2024-03-06 RX ORDER — HYDROCODONE BITARTRATE AND ACETAMINOPHEN 10; 325 MG/1; MG/1
1 TABLET ORAL EVERY 8 HOURS
Qty: 90 TABLET | Refills: 0 | Status: SHIPPED | OUTPATIENT
Start: 2024-03-13 | End: 2024-04-12

## 2024-03-06 RX ORDER — HYDROCODONE BITARTRATE AND ACETAMINOPHEN 10; 325 MG/1; MG/1
1 TABLET ORAL EVERY 8 HOURS
Qty: 90 TABLET | Refills: 0 | Status: SHIPPED | OUTPATIENT
Start: 2024-04-12 | End: 2024-05-12

## 2024-03-06 RX ORDER — HYDROCODONE BITARTRATE AND ACETAMINOPHEN 10; 325 MG/1; MG/1
1 TABLET ORAL EVERY 8 HOURS
Qty: 90 TABLET | Refills: 0 | Status: SHIPPED | OUTPATIENT
Start: 2024-05-11 | End: 2024-06-10

## 2024-03-11 LAB
6-ACETYLMORPHINE, URINE (RUSH): NEGATIVE 10 NG/ML
7-AMINOCLONAZEPAM, URINE (RUSH): NEGATIVE 25 NG/ML
A-HYDROXYALPRAZOLAM, URINE (RUSH): NEGATIVE 25 NG/ML
ACETYL FENTANYL, URINE (RUSH): NEGATIVE 2.5 NG/ML
ACETYL NORFENTANYL OXALATE, URINE (RUSH): NEGATIVE 5 NG/ML
AMPHET UR QL SCN: NEGATIVE
BENZOYLECGONINE, URINE (RUSH): NEGATIVE 100 NG/ML
BUPRENORPHINE UR QL SCN: NEGATIVE 25 NG/ML
CODEINE, URINE (RUSH): NEGATIVE 25 NG/ML
CREAT UR-MCNC: 36 MG/DL (ref 28–219)
EDDP, URINE (RUSH): NEGATIVE 25 NG/ML
FENTANYL, URINE (RUSH): NEGATIVE 2.5 NG/ML
HYDROCODONE, URINE (RUSH): 110.6 25 NG/ML
HYDROMORPHONE, URINE (RUSH): 61.9 25 NG/ML
LORAZEPAM, URINE (RUSH): NEGATIVE 25 NG/ML
METHADONE UR QL SCN: NEGATIVE 25 NG/ML
METHAMPHET UR QL SCN: NEGATIVE
MORPHINE, URINE (RUSH): NEGATIVE 25 NG/ML
NORBUPRENORPHINE, URINE (RUSH): NEGATIVE 25 NG/ML
NORDIAZEPAM, URINE (RUSH): NEGATIVE 25 NG/ML
NORFENTANYL OXALATE, URINE (RUSH): NEGATIVE 5 NG/ML
NORHYDROCODONE, URINE (RUSH): 137 50 NG/ML
NOROXYCODONE HCL, URINE (RUSH): NEGATIVE 50 NG/ML
OXAZEPAM, URINE (RUSH): NEGATIVE 25 NG/ML
OXYCODONE UR QL SCN: NEGATIVE 25 NG/ML
OXYMORPHONE, URINE (RUSH): NEGATIVE 25 NG/ML
PH UR STRIP: 6.5 PH UNITS
SP GR UR STRIP: 1.02
TAPENTADOL, URINE (RUSH): NEGATIVE 25 NG/ML
TEMAZEPAM, URINE (RUSH): NEGATIVE 25 NG/ML
THC-COOH, URINE (RUSH): NEGATIVE 25 NG/ML
TRAMADOL, URINE (RUSH): NEGATIVE 100 NG/ML

## 2024-04-01 RX ORDER — FUROSEMIDE 40 MG/1
TABLET ORAL
Qty: 90 TABLET | Refills: 3 | Status: SHIPPED | OUTPATIENT
Start: 2024-04-01

## 2024-04-15 ENCOUNTER — OFFICE VISIT (OUTPATIENT)
Dept: PRIMARY CARE CLINIC | Facility: CLINIC | Age: 69
End: 2024-04-15
Payer: MEDICARE

## 2024-04-15 VITALS
RESPIRATION RATE: 18 BRPM | HEART RATE: 53 BPM | TEMPERATURE: 97 F | WEIGHT: 181.81 LBS | SYSTOLIC BLOOD PRESSURE: 136 MMHG | BODY MASS INDEX: 32.21 KG/M2 | DIASTOLIC BLOOD PRESSURE: 81 MMHG | OXYGEN SATURATION: 98 % | HEIGHT: 63 IN

## 2024-04-15 DIAGNOSIS — I10 ESSENTIAL HYPERTENSION: ICD-10-CM

## 2024-04-15 DIAGNOSIS — E78.5 HYPERLIPIDEMIA, UNSPECIFIED HYPERLIPIDEMIA TYPE: ICD-10-CM

## 2024-04-15 DIAGNOSIS — E11.9 TYPE 2 DIABETES MELLITUS WITHOUT COMPLICATION, WITHOUT LONG-TERM CURRENT USE OF INSULIN: Primary | ICD-10-CM

## 2024-04-15 LAB
ALBUMIN SERPL BCP-MCNC: 3.5 G/DL (ref 3.5–5)
ALBUMIN/GLOB SERPL: 0.8 {RATIO}
ALP SERPL-CCNC: 61 U/L (ref 55–142)
ALT SERPL W P-5'-P-CCNC: 23 U/L (ref 13–56)
ANION GAP SERPL CALCULATED.3IONS-SCNC: 12 MMOL/L (ref 7–16)
AST SERPL W P-5'-P-CCNC: 16 U/L (ref 15–37)
BASOPHILS # BLD AUTO: 0.05 K/UL (ref 0–0.2)
BASOPHILS NFR BLD AUTO: 1 % (ref 0–1)
BILIRUB SERPL-MCNC: 0.5 MG/DL (ref ?–1.2)
BUN SERPL-MCNC: 18 MG/DL (ref 7–18)
BUN/CREAT SERPL: 16 (ref 6–20)
CALCIUM SERPL-MCNC: 9.7 MG/DL (ref 8.5–10.1)
CHLORIDE SERPL-SCNC: 107 MMOL/L (ref 98–107)
CO2 SERPL-SCNC: 23 MMOL/L (ref 21–32)
CREAT SERPL-MCNC: 1.13 MG/DL (ref 0.55–1.02)
DIFFERENTIAL METHOD BLD: ABNORMAL
EGFR (NO RACE VARIABLE) (RUSH/TITUS): 52 ML/MIN/1.73M2
EOSINOPHIL # BLD AUTO: 0.2 K/UL (ref 0–0.5)
EOSINOPHIL NFR BLD AUTO: 4 % (ref 1–4)
ERYTHROCYTE [DISTWIDTH] IN BLOOD BY AUTOMATED COUNT: 15.5 % (ref 11.5–14.5)
EST. AVERAGE GLUCOSE BLD GHB EST-MCNC: 194 MG/DL
GLOBULIN SER-MCNC: 4.3 G/DL (ref 2–4)
GLUCOSE SERPL-MCNC: 167 MG/DL (ref 74–106)
HBA1C MFR BLD HPLC: 8.4 % (ref 4.5–6.6)
HCT VFR BLD AUTO: 46 % (ref 38–47)
HGB BLD-MCNC: 14.1 G/DL (ref 12–16)
IMM GRANULOCYTES # BLD AUTO: 0.03 K/UL (ref 0–0.04)
IMM GRANULOCYTES NFR BLD: 0.6 % (ref 0–0.4)
LYMPHOCYTES # BLD AUTO: 1.93 K/UL (ref 1–4.8)
LYMPHOCYTES NFR BLD AUTO: 38.8 % (ref 27–41)
MCH RBC QN AUTO: 27.4 PG (ref 27–31)
MCHC RBC AUTO-ENTMCNC: 30.7 G/DL (ref 32–36)
MCV RBC AUTO: 89.5 FL (ref 80–96)
MONOCYTES # BLD AUTO: 0.55 K/UL (ref 0–0.8)
MONOCYTES NFR BLD AUTO: 11 % (ref 2–6)
MPC BLD CALC-MCNC: 11.5 FL (ref 9.4–12.4)
NEUTROPHILS # BLD AUTO: 2.22 K/UL (ref 1.8–7.7)
NEUTROPHILS NFR BLD AUTO: 44.6 % (ref 53–65)
NRBC # BLD AUTO: 0 X10E3/UL
NRBC, AUTO (.00): 0 %
PLATELET # BLD AUTO: 248 K/UL (ref 150–400)
POTASSIUM SERPL-SCNC: 3.6 MMOL/L (ref 3.5–5.1)
PROT SERPL-MCNC: 7.8 G/DL (ref 6.4–8.2)
RBC # BLD AUTO: 5.14 M/UL (ref 4.2–5.4)
SODIUM SERPL-SCNC: 138 MMOL/L (ref 136–145)
WBC # BLD AUTO: 4.98 K/UL (ref 4.5–11)

## 2024-04-15 PROCEDURE — 3008F BODY MASS INDEX DOCD: CPT | Mod: ,,, | Performed by: NURSE PRACTITIONER

## 2024-04-15 PROCEDURE — 1126F AMNT PAIN NOTED NONE PRSNT: CPT | Mod: ,,, | Performed by: NURSE PRACTITIONER

## 2024-04-15 PROCEDURE — 80053 COMPREHEN METABOLIC PANEL: CPT | Mod: ,,, | Performed by: CLINICAL MEDICAL LABORATORY

## 2024-04-15 PROCEDURE — 83036 HEMOGLOBIN GLYCOSYLATED A1C: CPT | Mod: ,,, | Performed by: CLINICAL MEDICAL LABORATORY

## 2024-04-15 PROCEDURE — 3079F DIAST BP 80-89 MM HG: CPT | Mod: ,,, | Performed by: NURSE PRACTITIONER

## 2024-04-15 PROCEDURE — 3288F FALL RISK ASSESSMENT DOCD: CPT | Mod: ,,, | Performed by: NURSE PRACTITIONER

## 2024-04-15 PROCEDURE — 99213 OFFICE O/P EST LOW 20 MIN: CPT | Mod: ,,, | Performed by: NURSE PRACTITIONER

## 2024-04-15 PROCEDURE — 1160F RVW MEDS BY RX/DR IN RCRD: CPT | Mod: ,,, | Performed by: NURSE PRACTITIONER

## 2024-04-15 PROCEDURE — 1101F PT FALLS ASSESS-DOCD LE1/YR: CPT | Mod: ,,, | Performed by: NURSE PRACTITIONER

## 2024-04-15 PROCEDURE — 3075F SYST BP GE 130 - 139MM HG: CPT | Mod: ,,, | Performed by: NURSE PRACTITIONER

## 2024-04-15 PROCEDURE — 1159F MED LIST DOCD IN RCRD: CPT | Mod: ,,, | Performed by: NURSE PRACTITIONER

## 2024-04-15 PROCEDURE — 85025 COMPLETE CBC W/AUTO DIFF WBC: CPT | Mod: ,,, | Performed by: CLINICAL MEDICAL LABORATORY

## 2024-04-15 NOTE — PATIENT INSTRUCTIONS
"Patient Education       Diabetes and Diet   The Basics   Written by the doctors and editors at [facility]   Why is diet important in diabetes? -- Diet is important because it is part of diabetes treatment. Many people need to change what they eat and how much they eat to help treat their diabetes. It is important for people to treat their diabetes so that they:  Keep their blood sugar at or near a normal level  Prevent long-term problems, such as heart or kidney problems, that can happen in people with diabetes  Changing your diet can also help treat obesity, high blood pressure, and high cholesterol. These conditions can affect people with diabetes and can lead to future problems, such as heart attacks or strokes.  Who will work with me to change my diet? -- Your doctor or nurse will work with you to make a food plan to change your diet. They might also recommend that you work with a "dietitian." A dietitian is an expert on food and eating.  Do I need to eat at the same times every day? -- When and how often you should eat depends, in part, on the diabetes medicines you take. For example:  People who take about the same amount of insulin at the same time each day (called a "fixed regimen") should eat meals at the same times. This is also true for people who take pills that increase insulin levels, such as sulfonylureas. Eating meals at the same time every day helps prevent low blood sugar.  People who adjust the dose and timing of their insulin each day (called a "flexible regimen") do not always have to eat meals at the same time. That's because they can time their insulin dose for before they plan to eat, and also adjust the dose for how much they plan to eat.  People who take medicines that don't usually cause low blood sugar, such as metformin, don't have to eat meals at the same time every day.  What do I need to think about when planning what to eat? -- Our bodies break down the food we eat into small pieces " "called carbohydrates, proteins, and fats.  When planning what to eat, people with diabetes need to think about:  Carbohydrates (or "carbs") - Carbohydrates, which are sugars that our bodies use for energy, can raise a person's blood sugar level. Your doctor, nurse, or dietitian will tell you how many carbohydrates you should eat at each meal or snack. Foods that have carbohydrates include:  Bread, pasta, and rice  Vegetables and fruits  Dairy foods  Foods and drinks with added sugar  It is best to get your carbohydrates from fruits, vegetables, whole grains, and low-fat milk. It is best to avoid drinks with added sugar, like soda, juices, and sports drinks.   Protein - Your doctor, nurse, or dietitian will tell you how much protein you should eat each day. It is best to eat lean meats, fish, eggs, beans, peas, soy products, nuts, and seeds.  Fats - The type of fat you eat is more important than the amount of fat. "Saturated" and "trans" fats can increase your risk for heart problems, like a heart attack.  Foods that have saturated fats include meat, butter, cheese, and ice cream.  Foods that have trans fats include processed food with "partially hydrogenated oils" on the ingredient list. This may include fried foods, store bought cookies, muffins, pies, and cakes.  "Monounsaturated" and "polyunsaturated" fats are better for you. Foods with these types of fat include fish, avocado, olive oil, and nuts.  Calories - People need to eat a certain amount of calories each day to keep their weight the same. People who are overweight and want to lose weight need to eat fewer calories each day.  Fiber - Eating foods with a lot of fiber can help control a person's blood sugar level. Foods that have a lot of fiber include apples, green beans, peas, beans, lentils, nuts, oatmeal, and whole grains.  Salt - People who have high blood pressure should not eat foods that contain a lot of salt (also called sodium). People with high " blood pressure should also eat healthy foods, such as fruits, vegetables, and low-fat dairy foods.  Alcohol - Having more than 1 drink (for women) or 2 drinks (for men) a day can raise blood sugar levels. Also, drinks that have fruit juice or soda in them can raise blood sugar levels.  What can I do if I need to lose weight? -- If you need to lose weight, you can:  Exercise - Try to get at least 30 minutes of physical activity a day, most days of the week. Even gentle exercise, like walking, is good for your health. Some people with diabetes need to change their medicine dose before they exercise. They might also need to check their blood sugar levels before and after exercising.  Eat fewer calories - Your doctor, nurse, or dietitian can tell you how many calories you should eat each day in order to lose weight.  If you are worried about your weight, size, or shape, talk with your doctor, nurse, or dietitian. They can help you make changes to improve your health.  Can I eat the same foods as my family? -- Yes. You do not need to eat special foods if you have diabetes. You and your family can eat the same foods. Changing your diet is mostly about eating healthy foods and not eating too much.  What are the other parts of diabetes treatment? -- Besides changing your diet, the other parts of diabetes treatment are:  Exercise  Medicines  Some people with diabetes need to learn how to match their diet and exercise with their medicine dose. For example, people who use insulin might need to choose the dose of insulin they give themselves. To choose their dose, they need to think about:  What they plan to eat at the next meal  How much exercise they plan to do  What their blood sugar level is  If the diet and exercise do not match the medicine dose, a person's blood sugar level can get too low or too high. Blood sugar levels that are too low or too high can cause problems.  All topics are updated as new evidence becomes  available and our peer review process is complete.  This topic retrieved from [employer] on: Sep 21, 2021.  Topic 79937 Version 7.0  Release: 29.4.2 - C29.263  © 2021 UpToDate, Inc. and/or its affiliates. All rights reserved.  Consumer Information Use and Disclaimer   This information is not specific medical advice and does not replace information you receive from your health care provider. This is only a brief summary of general information. It does NOT include all information about conditions, illnesses, injuries, tests, procedures, treatments, therapies, discharge instructions or life-style choices that may apply to you. You must talk with your health care provider for complete information about your health and treatment options. This information should not be used to decide whether or not to accept your health care provider's advice, instructions or recommendations. Only your health care provider has the knowledge and training to provide advice that is right for you. The use of this information is governed by the [employer] End User License Agreement, available at [email or web address]://[email or web address]/en/solutions/BPT/about/[email or web address].The use of [employer] content is governed by the [employer] Terms of Use. ©2021 UpToDate, Inc. All rights reserved.  Copyright   © 2021 UpToDate, Inc. and/or its affiliates. All rights reserved.  Patient Education       Controlling Your Blood Pressure Through Lifestyle   The Basics   Written by the doctors and editors at [employer]   What does my lifestyle have to do with my blood pressure? -- The things you do and the foods you eat have a big effect on your blood pressure and your overall health. Following the right lifestyle can:  Lower your blood pressure or keep you from getting high blood pressure in the first place  Reduce your need for blood pressure medicines  Make medicines for high blood pressure work better, if you do take them  Lower the chances that you'll have a heart attack or stroke,  "or develop kidney disease  Which lifestyle choices will help lower my blood pressure? -- Here's what you can do:  Lose weight (if you are overweight)  Choose a diet rich in fruits, vegetables, and low-fat dairy products, and low in meats, sweets, and refined grains  Eat less salt (sodium)  Do something active for at least 30 minutes a day on most days of the week  Limit the amount of alcohol you drink  If you have high blood pressure, it's also very important to quit smoking (if you smoke). Quitting smoking might not bring your blood pressure down. But it will lower the chances that you'll have a heart attack or stroke, and it will help you feel better and live longer.  Start low and go slow -- The changes listed above might sound like a lot, but don't worry. You don't have to change everything all at once. The key to improving your lifestyle is to "start low and go slow." Choose 1 small, specific thing to change and try doing it for a while. If it works for you, keep doing it until it becomes a habit. If it doesn't, don't give up. Choose something else to change and see how that goes.  Let's say, for example, that you would like to improve your diet. If you're the type of person who eats cheeseburgers and French fries all the time, you can't switch to eating just salads from one day to the next. When people try to make changes like that, they often fail. Then they feel frustrated and tend to give up. So instead of trying to change everything about your diet in 1 day, change 1 or 2 small things about your diet and give yourself time to get used to those changes. For instance, keep the cheeseburger but give up the French fries. Or eat the same things but cut your portions in half.  As you find things that you are able to change and stick with, keep adding new changes. In time, you will see that you can actually change a lot. You just have to get used to the changes slowly.  Lose weight -- When people think about " "losing weight, they sometimes make it more complicated than it really is. To lose weight, you have to either eat less or move more. If you do both of those things, it's even better. But there is no single weight-loss diet or activity that's better than any other. When it comes to weight loss, the most effective plan is the one that you'll stick with.  Improve your diet -- There is no single diet that is right for everyone. But in general, a healthy diet can include:  Lots of fruits, vegetables, and whole grains  Some beans, peas, lentils, chickpeas, and similar foods  Some nuts, such as walnuts, almonds, and peanuts  Fat-free or low-fat milk and milk products  Some fish  To have a healthy diet, it's also important to limit or avoid sugar, sweets, meats, and refined grains. (Refined grains are found in white bread, white rice, most forms of pasta, and most packaged "snack" foods.)  Reduce salt -- Many people think that eating a low-sodium diet means avoiding the salt shaker and not adding salt when cooking. The truth is, not adding salt at the table or when you cook will only help a little. Almost all of the sodium you eat is already in the food you buy at the grocery store or at restaurants (figure 1).  The most important thing you can do to cut down on sodium is to eat less processed food. That means that you should avoid most foods that are sold in cans, boxes, jars, and bags. You should also eat in restaurants less often.  To reduce the amount of sodium you get, buy fresh or fresh-frozen fruits, vegetables, and meats. (Fresh-frozen foods have had nothing added to them before freezing.) Then you can make meals at home, from scratch, with these ingredients.  As with the other changes, don't try to cut out salt all at once. Instead, choose 1 or 2 foods that have a lot of sodium and try to replace them with low-sodium choices. When you get used to those low-sodium options, find another food or 2 to change. Then keep " "going, until all the foods you eat are sodium-free or low in sodium.  Become more active -- If you want to be more active, you don't have to go to the gym or get all sweaty. It is possible to increase your activity level while doing everyday things you enjoy. Walking, gardening, and dancing are just a few of the things that you might try. As with all the other changes, the key is not to do too much too fast. If you don't do any activity now, start by walking for just a few minutes every other day. Do that for a few weeks. If you stick with it, try doing it for longer. But if you find that you don't like walking, try a different activity.  Drink less alcohol -- If you are a woman, do not have more than 1 "standard drink" of alcohol a day. If you are a man, do not have more than 2. A "standard drink" is:  A can or bottle that has 12 ounces of beer  A glass that has 5 ounces of wine  A shot that has 1.5 ounces of whiskey  Where should I start? -- If you want to improve your lifestyle, start by making the changes that you think would be easiest for you. If you used to exercise and just got out of the habit, maybe it would be easy for you to start exercising again. Or if you actually like cooking meals from scratch, maybe the first thing you should focus on is eating home-cooked meals that are low in sodium.  Whatever you tackle first, choose specific, realistic goals, and give yourself a deadline. For example, do not decide that you are going to "exercise more." Instead, decide that you are going to walk for 10 minutes on Monday, Wednesday, and Friday, and that you are going to do this for the next 2 weeks.  When lifestyle changes are too general, people have a hard time following through.  Now go. You can do it!  All topics are updated as new evidence becomes available and our peer review process is complete.  This topic retrieved from [employer] on: Sep 21, 2021.  Topic 77018 Version 8.0  Release: 29.4.2 - C29.263  © " 2021 UpToDate, Inc. and/or its affiliates. All rights reserved.  figure 1: Sources of sodium in your diet     Graphic 86442 Version 2.0    Consumer Information Use and Disclaimer   This information is not specific medical advice and does not replace information you receive from your health care provider. This is only a brief summary of general information. It does NOT include all information about conditions, illnesses, injuries, tests, procedures, treatments, therapies, discharge instructions or life-style choices that may apply to you. You must talk with your health care provider for complete information about your health and treatment options. This information should not be used to decide whether or not to accept your health care provider's advice, instructions or recommendations. Only your health care provider has the knowledge and training to provide advice that is right for you. The use of this information is governed by the [employer] End User License Agreement, available at https://[email or web address]/en/solutions/[employer]/about/craig.The use of [employer] content is governed by the [employer] Terms of Use. ©2021 UpToDate, Inc. All rights reserved.  Copyright   © 2021 UpToDate, Inc. and/or its affiliates. All rights reserved.

## 2024-04-15 NOTE — PROGRESS NOTES
[facility]  Primary Care       PATIENT NAME: Selena Proctor   : 1955    AGE: 68 y.o. DATE: 04/15/2024    MRN: [id number]        Reason for Visit / Chief Complaint:  Diabetes (BLOOD SUGAR 158) and Hypertension     Subjective:     HPI: Patient here for routine follow up. Has history of Diabetes and HTN. Patient states her insurance will not pay for Kombiglyze XR but states it will pay for Janumet. Discuss with patient we will review lab results  (Hgb A1C) to see if med is needed. Patient states she checks her blood sugar sometimes twice a day. States it has been running in the 150s. Patient has history of cardiac bypass; states she had an echo done about one month ago. Sees Dr. Mayco Parnell  (cardiology) in [place], [place]. Patient states she has an appointment with her cardiologist in May, 2024.            Review of Systems: Review of Systems   Constitutional:  Negative for chills, fatigue and fever.   Respiratory:  Negative for cough, chest tightness and shortness of breath.    Cardiovascular:  Negative for chest pain, palpitations and leg swelling.   Gastrointestinal:  Negative for abdominal pain, constipation, diarrhea, nausea and vomiting.   Genitourinary:  Negative for dysuria.   Musculoskeletal:  Negative for gait problem.   Skin:  Negative for rash.   Neurological:  Negative for numbness and headaches.          Review of patient's allergies indicates:   Allergen Reactions    Betadine [povidone-iodine] Itching        Med List:  Current Outpatient Medications on File Prior to Visit   Medication Sig Dispense Refill    ACCU-CHEK GUIDE TEST STRIPS Strp USE AS DIRECTED 200 strip 3    alcohol swabs (DROPSAFE ALCOHOL PREP PADS) PadM USE AS DIRECTED 100 each 3    aspirin (ECOTRIN) 81 MG EC tablet Take 81 mg by mouth once daily.       blood-glucose meter (ACCU-CHEK GUIDE GLUCOSE METER) Misc Use as directed 1 each 0    diclofenac sodium (VOLTAREN ARTHRITIS PAIN) 1 % Gel Apply 2 g topically 4  (four) times daily. Apply to knees, elbows, joints as needed 10 each 2    docusate sodium (COLACE) 100 MG capsule Take 1 capsule (100 mg total) by mouth 2 (two) times daily. 28 capsule 0    docusate sodium (COLACE) 100 MG capsule Take 1 capsule (100 mg total) by mouth 2 (two) times daily. 28 capsule 0    ezetimibe (ZETIA) 10 mg tablet TAKE 1 TABLET EVERY DAY 90 tablet 2    furosemide (LASIX) 40 MG tablet TAKE 1 TABLET EVERY DAY AS NEEDED 90 tablet 3    gabapentin (NEURONTIN) 300 MG capsule Take 1 capsule (300 mg total) by mouth 3 (three) times daily. 90 capsule 11    glipiZIDE (GLUCOTROL) 2.5 MG TR24 Take 1 tablet (2.5 mg total) by mouth daily with breakfast. 90 tablet 3    HYDROcodone-acetaminophen (NORCO)  mg per tablet Take 1 tablet by mouth every 8 (eight) hours. 90 tablet 0    [START ON 5/11/2024] HYDROcodone-acetaminophen (NORCO)  mg per tablet Take 1 tablet by mouth every 8 (eight) hours. 90 tablet 0    JARDIANCE 10 mg tablet TAKE 1 TABLET EVERY DAY 90 tablet 10    lancets (ACCU-CHEK FASTCLIX LANCET DRUM) Misc 1 each by Misc.(Non-Drug; Combo Route) route 2 (two) times daily before meals. 200 each 0    lancets (ACCU-CHEK SOFTCLIX LANCETS) Misc USE AS DIRECTED 200 each 10    lancets (TRUEPLUS LANCETS) 33 gauge Misc USE AS DIRECTED 200 each 0    lancing device with lancets (ACCU-CHEK SOFT DEV LANCETS) Kit USE AS DIRECTED 100 each 1    losartan-hydrochlorothiazide 100-25 mg (HYZAAR) 100-25 mg per tablet TAKE 1 TABLET EVERY DAY 90 tablet 2    metoprolol succinate (TOPROL-XL) 100 MG 24 hr tablet TAKE 1 TABLET EVERY DAY 90 tablet 2    montelukast (SINGULAIR) 10 mg tablet TAKE 1 TABLET EVERY DAY AS NEEDED 90 tablet 0    montelukast (SINGULAIR) 10 mg tablet Take 1 tablet (10 mg total) by mouth as needed (Allergies). 90 tablet 0    mupirocin (BACTROBAN) 2 % ointment Apply topically once daily. (Patient not taking: Reported on 8/3/2023) 30 g 12    naloxone (NARCAN) 4 mg/actuation Spry PUT 1 SPRAY IN 1  NOSTRIL WAIT 2 MINUTES THEN REPEAT DOSE IN THE OTHER NOSTRIL      NIFEdipine (PROCARDIA-XL) 90 MG (OSM) 24 hr tablet TAKE 1 TABLET EVERY DAY 90 tablet 3    omeprazole (PRILOSEC) 40 MG capsule TAKE 1 CAPSULE EVERY DAY 90 capsule 0    pitavastatin calcium (LIVALO) 4 mg Tab Take one tablet by mouth three times per week on Monday, Wednesday, Friday. 90 tablet 2    potassium chloride (MICRO-K) 10 MEQ CpSR TAKE 2 CAPSULES TWICE DAILY 360 capsule 2    SAXagliptin-metformin (KOMBIGLYZE XR) 2.5-1,000 mg TM24 TAKE 1 TABLET EVERY DAY 90 tablet 0    silver sulfADIAZINE 1% (SILVADENE) 1 % cream Apply topically once daily. 1000 g 11    spironolactone (ALDACTONE) 25 MG tablet TAKE 1 TABLET EVERY DAY 90 tablet 0    spironolactone (ALDACTONE) 25 MG tablet Take 1 tablet (25 mg total) by mouth once daily. 90 tablet 0    TRUE METRIX GLUCOSE METER kit USE AS DIRECTED (Patient not taking: Reported on 8/3/2023) 180 each 2     No current facility-administered medications on file prior to visit.       Medical/Social/Family History:  Past Medical History:   Diagnosis Date    Acid reflux     Coronary arteriosclerosis     Diabetes     Diabetes mellitus, type 2     Hypertension     Skin cancer     left leg    Spondylosis without myelopathy or radiculopathy, lumbar region       Social History     Tobacco Use   Smoking Status Never    Passive exposure: Current (.)   Smokeless Tobacco Never      Social History     Substance and Sexual Activity   Alcohol Use Not Currently       Family History   Problem Relation Name Age of Onset    Hypertension Mother      Heart disease Mother      Diabetes Sister      Hypertension Sister      Hypertension Brother      Alcohol abuse Father      Cancer Father      Hypertension Son      Mental illness Maternal Aunt        Past Surgical History:   Procedure Laterality Date    APPENDECTOMY      BIOPSY OF LESION Left 3/29/2023    Procedure: BIOPSY, LESION;  Surgeon: Andrez Norman DO;  Location: [facility] OR;   "Service: General;  Laterality: Left;  excisional biospy of left thigh with rotational flap    BIOPSY OR EXCISION, LESION  2023    Procedure: BIOPSY OR EXCISION, LESION left thigh and abdomen;  Surgeon: Andrez Norman DO;  Location: [facility] [place] OR;  Service: General;;    BLOCK, NERVE, OBTURATOR Right 2019    Right Femoral/Obturator Nerve Block  Dr Headley     SECTION      x2    CORONARY ARTERY BYPASS GRAFT      KNEE ARTHROPLASTY Right     KNEE ARTHROSCOPY Left     LIPOMA RESECTION Right 2023    Procedure: Excision right leg fatty tumor;  Surgeon: Andrez Norman DO;  Location: [facility] [place] OR;  Service: General;  Laterality: Right;    SKIN GRAFT      Left Thigh    TOTAL HIP ARTHROPLASTY Bilateral     TUBAL LIGATION        Immunization History   Administered Date(s) Administered    COVID-19 MRNA, LN-S PF (MODERNA HALF 0.25 ML DOSE) 10/29/2021    COVID-19, MRNA, LN-S, PF (MODERNA FULL 0.5 ML DOSE) 2021, 02/10/2021, 03/10/2021    Influenza - Quadrivalent - PF *Preferred* (6 months and older) 2022, 2023    Pneumococcal Conjugate - 13 Valent 2017    Pneumococcal Conjugate - 20 Valent 2022    Tdap 2022          Objective:      Vitals:    04/15/24 0820   BP: 136/81   BP Location: Left arm   Patient Position: Sitting   BP Method: Large (Manual)   Pulse: (!) 52   Resp: 18   Temp: 97.3 °F (36.3 °C)   TempSrc: Oral   SpO2: 98%   Weight: 82.5 kg (181 lb 12.8 oz)   Height: 5' 3" (1.6 m)     Body mass index is 32.2 kg/m².     Physical Exam: Physical Exam  Vitals and nursing note reviewed.   Constitutional:       General: She is not in acute distress.     Appearance: Normal appearance. She is not ill-appearing, toxic-appearing or diaphoretic.   HENT:      Head: Normocephalic.      Mouth/Throat:      Mouth: Mucous membranes are moist.   Eyes:      Extraocular Movements: Extraocular movements intact.      Conjunctiva/sclera: Conjunctivae normal.      Pupils: Pupils are equal, " round, and reactive to light.   Cardiovascular:      Rate and Rhythm: Regular rhythm. Bradycardia present.      Heart sounds: Normal heart sounds.      Comments: Patient has mild swelling to ankles  Pulmonary:      Effort: Pulmonary effort is normal. No respiratory distress.      Breath sounds: Normal breath sounds. No stridor. No wheezing, rhonchi or rales.   Abdominal:      General: Bowel sounds are normal. There is no distension.      Palpations: Abdomen is soft.      Tenderness: There is no abdominal tenderness.   Musculoskeletal:         General: Normal range of motion.      Cervical back: Normal range of motion and neck supple.   Skin:     General: Skin is warm and dry.   Neurological:      General: No focal deficit present.      Mental Status: She is alert and oriented to person, place, and time.      Gait: Gait normal.   Psychiatric:         Mood and Affect: Mood normal.         Behavior: Behavior normal.            Protective Sensation (w/ 10 gram monofilament):  Right: Intact  Left: Intact    Visual Inspection:  Normal -  Bilateral    Pedal Pulses:   Right: Present  Left: Present    Posterior Tibialis Pulses:   Right:Present  Left: Present     Assessment:          ICD-10-CM ICD-9-CM   1. Type 2 diabetes mellitus without complication, without long-term current use of insulin  E11.9 250.00   2. Essential hypertension  I10 401.9   3. Hyperlipidemia, unspecified hyperlipidemia type  E78.5 272.4        Plan:       Type 2 diabetes mellitus without complication, without long-term current use of insulin  -     POCT glucose  -     Hemoglobin A1C; Future; Expected date: 04/15/2024    Essential hypertension  -     CBC Auto Differential; Future; Expected date: 04/15/2024  -     Comprehensive Metabolic Panel; Future; Expected date: 04/15/2024    Hyperlipidemia, unspecified hyperlipidemia type      Follow up in 3 months for HTN, DM, Hyperlipidemia,labs    RTC PRN    Patient to sign a medical record to have cardiology  "notes received    Patient to check her heart rate at home; notify if dizzy, feels faint.     New & refilled meds:  Requested Prescriptions      No prescriptions requested or ordered in this encounter           Patient Instructions   Patient Education       Diabetes and Diet   The Basics   Written by the doctors and editors at [facility]   Why is diet important in diabetes? -- Diet is important because it is part of diabetes treatment. Many people need to change what they eat and how much they eat to help treat their diabetes. It is important for people to treat their diabetes so that they:  Keep their blood sugar at or near a normal level  Prevent long-term problems, such as heart or kidney problems, that can happen in people with diabetes  Changing your diet can also help treat obesity, high blood pressure, and high cholesterol. These conditions can affect people with diabetes and can lead to future problems, such as heart attacks or strokes.  Who will work with me to change my diet? -- Your doctor or nurse will work with you to make a food plan to change your diet. They might also recommend that you work with a "dietitian." A dietitian is an expert on food and eating.  Do I need to eat at the same times every day? -- When and how often you should eat depends, in part, on the diabetes medicines you take. For example:  People who take about the same amount of insulin at the same time each day (called a "fixed regimen") should eat meals at the same times. This is also true for people who take pills that increase insulin levels, such as sulfonylureas. Eating meals at the same time every day helps prevent low blood sugar.  People who adjust the dose and timing of their insulin each day (called a "flexible regimen") do not always have to eat meals at the same time. That's because they can time their insulin dose for before they plan to eat, and also adjust the dose for how much they plan to eat.  People who take medicines " "that don't usually cause low blood sugar, such as metformin, don't have to eat meals at the same time every day.  What do I need to think about when planning what to eat? -- Our bodies break down the food we eat into small pieces called carbohydrates, proteins, and fats.  When planning what to eat, people with diabetes need to think about:  Carbohydrates (or "carbs") - Carbohydrates, which are sugars that our bodies use for energy, can raise a person's blood sugar level. Your doctor, nurse, or dietitian will tell you how many carbohydrates you should eat at each meal or snack. Foods that have carbohydrates include:  Bread, pasta, and rice  Vegetables and fruits  Dairy foods  Foods and drinks with added sugar  It is best to get your carbohydrates from fruits, vegetables, whole grains, and low-fat milk. It is best to avoid drinks with added sugar, like soda, juices, and sports drinks.   Protein - Your doctor, nurse, or dietitian will tell you how much protein you should eat each day. It is best to eat lean meats, fish, eggs, beans, peas, soy products, nuts, and seeds.  Fats - The type of fat you eat is more important than the amount of fat. "Saturated" and "trans" fats can increase your risk for heart problems, like a heart attack.  Foods that have saturated fats include meat, butter, cheese, and ice cream.  Foods that have trans fats include processed food with "partially hydrogenated oils" on the ingredient list. This may include fried foods, store bought cookies, muffins, pies, and cakes.  "Monounsaturated" and "polyunsaturated" fats are better for you. Foods with these types of fat include fish, avocado, olive oil, and nuts.  Calories - People need to eat a certain amount of calories each day to keep their weight the same. People who are overweight and want to lose weight need to eat fewer calories each day.  Fiber - Eating foods with a lot of fiber can help control a person's blood sugar level. Foods that have a " lot of fiber include apples, green beans, peas, beans, lentils, nuts, oatmeal, and whole grains.  Salt - People who have high blood pressure should not eat foods that contain a lot of salt (also called sodium). People with high blood pressure should also eat healthy foods, such as fruits, vegetables, and low-fat dairy foods.  Alcohol - Having more than 1 drink (for women) or 2 drinks (for men) a day can raise blood sugar levels. Also, drinks that have fruit juice or soda in them can raise blood sugar levels.  What can I do if I need to lose weight? -- If you need to lose weight, you can:  Exercise - Try to get at least 30 minutes of physical activity a day, most days of the week. Even gentle exercise, like walking, is good for your health. Some people with diabetes need to change their medicine dose before they exercise. They might also need to check their blood sugar levels before and after exercising.  Eat fewer calories - Your doctor, nurse, or dietitian can tell you how many calories you should eat each day in order to lose weight.  If you are worried about your weight, size, or shape, talk with your doctor, nurse, or dietitian. They can help you make changes to improve your health.  Can I eat the same foods as my family? -- Yes. You do not need to eat special foods if you have diabetes. You and your family can eat the same foods. Changing your diet is mostly about eating healthy foods and not eating too much.  What are the other parts of diabetes treatment? -- Besides changing your diet, the other parts of diabetes treatment are:  Exercise  Medicines  Some people with diabetes need to learn how to match their diet and exercise with their medicine dose. For example, people who use insulin might need to choose the dose of insulin they give themselves. To choose their dose, they need to think about:  What they plan to eat at the next meal  How much exercise they plan to do  What their blood sugar level is  If the  diet and exercise do not match the medicine dose, a person's blood sugar level can get too low or too high. Blood sugar levels that are too low or too high can cause problems.  All topics are updated as new evidence becomes available and our peer review process is complete.  This topic retrieved from [employer] on: Sep 21, 2021.  Topic 04008 Version 7.0  Release: 29.4.2 - C29.263  © 2021 UpToDate, Inc. and/or its affiliates. All rights reserved.  Consumer Information Use and Disclaimer   This information is not specific medical advice and does not replace information you receive from your health care provider. This is only a brief summary of general information. It does NOT include all information about conditions, illnesses, injuries, tests, procedures, treatments, therapies, discharge instructions or life-style choices that may apply to you. You must talk with your health care provider for complete information about your health and treatment options. This information should not be used to decide whether or not to accept your health care provider's advice, instructions or recommendations. Only your health care provider has the knowledge and training to provide advice that is right for you. The use of this information is governed by the [employer] End User License Agreement, available at https://www.[employer]/en/solutions/[employer]/about/craig.The use of [employer] content is governed by the [employer] Terms of Use. ©2021 UpToDate, Inc. All rights reserved.  Copyright   © 2021 UpToDate, Inc. and/or its affiliates. All rights reserved.  Patient Education       Controlling Your Blood Pressure Through Lifestyle   The Basics   Written by the doctors and editors at [employer]   What does my lifestyle have to do with my blood pressure? -- The things you do and the foods you eat have a big effect on your blood pressure and your overall health. Following the right lifestyle can:  Lower your blood pressure or keep you from  "getting high blood pressure in the first place  Reduce your need for blood pressure medicines  Make medicines for high blood pressure work better, if you do take them  Lower the chances that you'll have a heart attack or stroke, or develop kidney disease  Which lifestyle choices will help lower my blood pressure? -- Here's what you can do:  Lose weight (if you are overweight)  Choose a diet rich in fruits, vegetables, and low-fat dairy products, and low in meats, sweets, and refined grains  Eat less salt (sodium)  Do something active for at least 30 minutes a day on most days of the week  Limit the amount of alcohol you drink  If you have high blood pressure, it's also very important to quit smoking (if you smoke). Quitting smoking might not bring your blood pressure down. But it will lower the chances that you'll have a heart attack or stroke, and it will help you feel better and live longer.  Start low and go slow -- The changes listed above might sound like a lot, but don't worry. You don't have to change everything all at once. The key to improving your lifestyle is to "start low and go slow." Choose 1 small, specific thing to change and try doing it for a while. If it works for you, keep doing it until it becomes a habit. If it doesn't, don't give up. Choose something else to change and see how that goes.  Let's say, for example, that you would like to improve your diet. If you're the type of person who eats cheeseburgers and French fries all the time, you can't switch to eating just salads from one day to the next. When people try to make changes like that, they often fail. Then they feel frustrated and tend to give up. So instead of trying to change everything about your diet in 1 day, change 1 or 2 small things about your diet and give yourself time to get used to those changes. For instance, keep the cheeseburger but give up the French fries. Or eat the same things but cut your portions in half.  As you " "find things that you are able to change and stick with, keep adding new changes. In time, you will see that you can actually change a lot. You just have to get used to the changes slowly.  Lose weight -- When people think about losing weight, they sometimes make it more complicated than it really is. To lose weight, you have to either eat less or move more. If you do both of those things, it's even better. But there is no single weight-loss diet or activity that's better than any other. When it comes to weight loss, the most effective plan is the one that you'll stick with.  Improve your diet -- There is no single diet that is right for everyone. But in general, a healthy diet can include:  Lots of fruits, vegetables, and whole grains  Some beans, peas, lentils, chickpeas, and similar foods  Some nuts, such as walnuts, almonds, and peanuts  Fat-free or low-fat milk and milk products  Some fish  To have a healthy diet, it's also important to limit or avoid sugar, sweets, meats, and refined grains. (Refined grains are found in white bread, white rice, most forms of pasta, and most packaged "snack" foods.)  Reduce salt -- Many people think that eating a low-sodium diet means avoiding the salt shaker and not adding salt when cooking. The truth is, not adding salt at the table or when you cook will only help a little. Almost all of the sodium you eat is already in the food you buy at the grocery store or at restaurants (figure 1).  The most important thing you can do to cut down on sodium is to eat less processed food. That means that you should avoid most foods that are sold in cans, boxes, jars, and bags. You should also eat in restaurants less often.  To reduce the amount of sodium you get, buy fresh or fresh-frozen fruits, vegetables, and meats. (Fresh-frozen foods have had nothing added to them before freezing.) Then you can make meals at home, from scratch, with these ingredients.  As with the other changes, don't " "try to cut out salt all at once. Instead, choose 1 or 2 foods that have a lot of sodium and try to replace them with low-sodium choices. When you get used to those low-sodium options, find another food or 2 to change. Then keep going, until all the foods you eat are sodium-free or low in sodium.  Become more active -- If you want to be more active, you don't have to go to the gym or get all sweaty. It is possible to increase your activity level while doing everyday things you enjoy. Walking, gardening, and dancing are just a few of the things that you might try. As with all the other changes, the key is not to do too much too fast. If you don't do any activity now, start by walking for just a few minutes every other day. Do that for a few weeks. If you stick with it, try doing it for longer. But if you find that you don't like walking, try a different activity.  Drink less alcohol -- If you are a woman, do not have more than 1 "standard drink" of alcohol a day. If you are a man, do not have more than 2. A "standard drink" is:  A can or bottle that has 12 ounces of beer  A glass that has 5 ounces of wine  A shot that has 1.5 ounces of whiskey  Where should I start? -- If you want to improve your lifestyle, start by making the changes that you think would be easiest for you. If you used to exercise and just got out of the habit, maybe it would be easy for you to start exercising again. Or if you actually like cooking meals from scratch, maybe the first thing you should focus on is eating home-cooked meals that are low in sodium.  Whatever you tackle first, choose specific, realistic goals, and give yourself a deadline. For example, do not decide that you are going to "exercise more." Instead, decide that you are going to walk for 10 minutes on Monday, Wednesday, and Friday, and that you are going to do this for the next 2 weeks.  When lifestyle changes are too general, people have a hard time following through.  Now " go. You can do it!  All topics are updated as new evidence becomes available and our peer review process is complete.  This topic retrieved from [employer] on: Sep 21, 2021.  Topic 64612 Version 8.0  Release: 29.4.2 - C29.263  © 2021 UpToDate, Inc. and/or its affiliates. All rights reserved.  figure 1: Sources of sodium in your diet     Graphic 05967 Version 2.0    Consumer Information Use and Disclaimer   This information is not specific medical advice and does not replace information you receive from your health care provider. This is only a brief summary of general information. It does NOT include all information about conditions, illnesses, injuries, tests, procedures, treatments, therapies, discharge instructions or life-style choices that may apply to you. You must talk with your health care provider for complete information about your health and treatment options. This information should not be used to decide whether or not to accept your health care provider's advice, instructions or recommendations. Only your health care provider has the knowledge and training to provide advice that is right for you. The use of this information is governed by the [employer] End User License Agreement, available at https://www.Ensenda.[employer]/en/solutions/Fleep/about/craig.The use of [employer] content is governed by the [employer] Terms of Use. ©2021 UpToDate, Inc. All rights reserved.  Copyright   © 2021 UpToDate, Inc. and/or its affiliates. All rights reserved.           Signature: Lizzette Meadows DNP, NORMAC

## 2024-04-16 ENCOUNTER — TELEPHONE (OUTPATIENT)
Dept: PRIMARY CARE CLINIC | Facility: CLINIC | Age: 69
End: 2024-04-16
Payer: MEDICARE

## 2024-04-16 DIAGNOSIS — E11.9 TYPE 2 DIABETES MELLITUS WITHOUT COMPLICATION, WITHOUT LONG-TERM CURRENT USE OF INSULIN: ICD-10-CM

## 2024-04-16 DIAGNOSIS — E11.9 TYPE 2 DIABETES MELLITUS WITHOUT COMPLICATION, WITHOUT LONG-TERM CURRENT USE OF INSULIN: Primary | ICD-10-CM

## 2024-04-16 RX ORDER — SITAGLIPTIN AND METFORMIN HYDROCHLORIDE 1000; 50 MG/1; MG/1
1 TABLET, FILM COATED, EXTENDED RELEASE ORAL DAILY
Qty: 90 TABLET | Refills: 2 | Status: SHIPPED | OUTPATIENT
Start: 2024-04-16

## 2024-04-16 NOTE — TELEPHONE ENCOUNTER
"Chief Complaint  Hypertension (pt feeling ok follow up bp ) and Abnormal Lab (platelets are abnormal and not sure if needs hematology doctor )    Subjective          Zheng Lopze presents to [facility]  History of Present Illness  HTN -well-controlled on current meds.  No cardiovascular symptoms.  Doing well.  Adherent    Erectile dysfunction well controlled on symptom to fill, uses rarely without any adverse effects.  Using about 60 mg as needed    Benign prostate hyperplasia well-controlled as above.    R wrist proximal carpectomy, but he hs had worsening arthritis, he has almost felt handicpped, he has be offered a fusion but not sure if he would like to do this.  Wondering how much this would affect his daily life.  He is currently most affected when lifting weights or groceries.  It is also impaired with playing golf.  It is hard to carry things in his right hand.  Otherwise he can eat and write without difficulty.  He feels that his quality of life is good.    Objective   Vital Signs:   /76   Pulse 54   Temp 98 °F (36.7 °C)   Ht 175.3 cm (69\")   Wt 90.7 kg (200 lb)   SpO2 98%   BMI 29.53 kg/m²     Physical Exam  Vitals and nursing note reviewed.   Constitutional:       General: He is not in acute distress.     Appearance: He is well-developed.   HENT:      Head: Normocephalic.      Nose: Nose normal.   Cardiovascular:      Rate and Rhythm: Normal rate and regular rhythm.      Heart sounds: Normal heart sounds. No murmur heard.     Pulmonary:      Effort: Pulmonary effort is normal. No respiratory distress.      Breath sounds: Normal breath sounds.   Musculoskeletal:         General: Normal range of motion.   Skin:     General: Skin is warm and dry.      Findings: No rash.   Neurological:      Mental Status: He is alert and oriented to person, place, and time.   Psychiatric:         Behavior: Behavior normal.         Thought Content: Thought content normal.         " ----- Message from Lizzette Meadows DNP, FNP-C sent at 4/16/2024  2:34 PM CDT -----  Please notify of results.    Hgb A1C is 8.4, diabetes not controlled. Janumet sent in to the pharmacy since insurance would not pay for Kombiglyze.    Judgment: Judgment normal.        Result Review :   The following data was reviewed by: Zoya Newman MD on 06/15/2021:  CMP    CMP 12/8/20 6/8/21   Glucose 104 (A) 95   BUN 13 15   Creatinine 1.05 1.02   eGFR Non  Am 69 72   eGFR African Am 84 87   Sodium 136 138   Potassium 5.2 4.8   Chloride 99 100   Calcium 9.4 9.7   Total Protein 7.1 7.1   Albumin 4.50 4.60   Globulin 2.6 2.5   Total Bilirubin 0.6 1.0   Alkaline Phosphatase 95 95   AST (SGOT) 28 25   ALT (SGPT) 27 25   (A) Abnormal value       Comments are available for some flowsheets but are not being displayed.           CBC    CBC 12/8/20 12/22/20 6/8/21   WBC 4.49 6.89 4.96   RBC 4.73 4.42 4.77   Hemoglobin 15.4 15.0 16.1   Hematocrit 44.3 42.5 47.0   MCV 93.7 96.2 98.5 (A)   MCH 32.6 33.9 (A) 33.8 (A)   MCHC 34.8 35.3 34.3   RDW 12.7 12.8 12.1 (A)   Platelets CANCELED 218 106 (A)   (A) Abnormal value       Comments are available for some flowsheets but are not being displayed.                     Assessment and Plan    Diagnoses and all orders for this visit:    1. Thrombocythemia (CMS/HCC) (Primary)  -     Ambulatory Referral to Hematology    2. Essential hypertension  -     lisinopril (PRINIVIL,ZESTRIL) 10 MG tablet; Take 1 tablet by mouth Daily.  Dispense: 90 tablet; Refill: 1  -     metoprolol succinate XL (TOPROL-XL) 25 MG 24 hr tablet; Take 1 tablet by mouth Daily.  Dispense: 90 tablet; Refill: 1  -     Comprehensive Metabolic Panel; Future  -     CBC & Differential; Future    3. Cardiac enlargement  -     metoprolol succinate XL (TOPROL-XL) 25 MG 24 hr tablet; Take 1 tablet by mouth Daily.  Dispense: 90 tablet; Refill: 1    4. Benign prostatic hyperplasia with urinary hesitancy  -     tamsulosin (FLOMAX) 0.4 MG capsule 24 hr capsule; Take 1 capsule by mouth Daily.  Dispense: 90 capsule; Refill: 1    5. ED (erectile dysfunction) of organic origin  -     sildenafil (REVATIO) 20 MG tablet; Take 2 tablets by mouth As Needed (erectile  dysfunction).  Dispense: 20 tablet; Refill: 3  -     CBC & Differential; Future    6. Arthritis of right wrist    7. Hyperglycemia  -     Hemoglobin A1c; Future    8. Screening PSA (prostate specific antigen)  -     PSA Screen; Future    9. Hypovitaminosis D  -     Vitamin D 25 Hydroxy; Future    10. Familial hypercholesterolemia  -     Lipid Panel; Future    Other orders  -     esomeprazole (nexIUM) 40 MG capsule; Take 1 capsule by mouth Every Morning Before Breakfast.  Dispense: 90 capsule; Refill: 1    Pleasant 70-year-old male here for hypertension follow-up which is well controlled, hyperglycemia which has improved significantly with dietary changes, BPH and erectile dysfunction which are stable and well-controlled on current meds.  We discussed arthritis of the right wrist with recommendations to consider fusion if unable to perform daily tasks in life.  For now it looks like he will continue with ice and stretching.  Unfortunately no NSAIDs with his recent findings of thrombocytopenia.  When looking over the past 5 years his platelet counts have yoyoed between normal levels and platelet counts below 150.  I do think would be helpful to see a hematologist for further evaluation.  Referral placed as above.  Patient with no symptoms of bleeding.  He will avoid NSAIDs and aspirin for now.    Follow-up in 6 months for annual exam, fasting labs done prior with ordered as above.      Follow Up   Return in about 6 months (around 12/15/2021), or if symptoms worsen or fail to improve, for Annual Exam with fasting labs prior.  Patient was given instructions and counseling regarding his condition or for health maintenance advice. Please see specific information pulled into the AVS if appropriate.  Medical assistant and I wore mask and eyewear protection during entire encounter.  Patient wore mask.    Zoya Newman MD

## 2024-04-17 RX ORDER — LANCETS 26 GAUGE
EACH MISCELLANEOUS
Qty: 100 EACH | Refills: 3 | Status: SHIPPED | OUTPATIENT
Start: 2024-04-17

## 2024-04-23 RX ORDER — OMEPRAZOLE 40 MG/1
CAPSULE, DELAYED RELEASE ORAL
Qty: 90 CAPSULE | Refills: 1 | Status: SHIPPED | OUTPATIENT
Start: 2024-04-23

## 2024-05-01 RX ORDER — ISOPROPYL ALCOHOL 70 ML/100ML
SWAB TOPICAL
Qty: 100 EACH | Refills: 3 | Status: SHIPPED | OUTPATIENT
Start: 2024-05-01

## 2024-05-10 DIAGNOSIS — I10 ESSENTIAL HYPERTENSION: ICD-10-CM

## 2024-05-13 RX ORDER — LOSARTAN POTASSIUM AND HYDROCHLOROTHIAZIDE 25; 100 MG/1; MG/1
TABLET ORAL
Qty: 90 TABLET | Refills: 3 | Status: SHIPPED | OUTPATIENT
Start: 2024-05-13

## 2024-05-13 RX ORDER — METOPROLOL SUCCINATE 100 MG/1
TABLET, EXTENDED RELEASE ORAL
Qty: 90 TABLET | Refills: 3 | Status: SHIPPED | OUTPATIENT
Start: 2024-05-13

## 2024-05-31 NOTE — PROGRESS NOTES
Disclaimer: This note has been generated using voice-recognition software. There may be typographical errors that have been missed during proof-reading        Patient ID: Selena Proctor is a 68 y.o. female.      Chief Complaint: Hip Pain (right)       68-year-old female returns for re-evaluation of chronic bilateral right hip pain,  status post  right  total hip arthroplasty  and revision.  Her main has remained chronic  and  hip injections failed to provide relief.  Norco has provided some improvement over the years.  She is unable to tolerate NSAIDs due to coronary artery disease. She declines surgical evaluation.  She returns today for medication refill.                    Pain Assessment  Pain Assessment: 0-10  Pain Score:   9  Pain Location: Hip  Pain Orientation: Right  Pain Descriptors: Sharp  Pain Frequency: Constant/continuous  Pain Onset: Awakened from sleep  Clinical Progression: Not changed  Aggravating Factors: Walking, Bending  Pain Intervention(s): Medication (See eMAR), Home medication, Heat applied      A's of Opioid Risk Assessment  Activity:Patient can perform ADL.   Analgesia:Patients pain is  controlled by current medication.   Adverse Effects: Patient denies constipation or sedation.  Aberrant Behavior:  reviewed with no aberrant drug seeking/taking behavior.      Patient denies any suicidal or homicidal ideations    Physical Therapy/Home Exercise: yes      US Extremity Non Vascular Complete Right  Narrative: EXAMINATION:  US EXTREMITY NON VASCULAR COMPLETE RIGHT    CLINICAL HISTORY:  Localized swelling, mass and lump, right lower limb    TECHNIQUE:  Color-flow duplex utilized    COMPARISON:  None    FINDINGS:  Solid soft tissue mass in right anterior thigh measuring 7.9 x 2.4 x 5.0 cm  Impression: Soft tissue mass as above, patient has had previous rotational flap in the superior per physician's note    TECHNOLOGIST: Sandra Gerber (RT) (VS) RVT    Electronically signed by: Joan  Williamson  Date:    2023  Time:    09:54      Review of Systems   Constitutional: Negative.    HENT: Negative.     Eyes: Negative.    Respiratory: Negative.     Cardiovascular: Negative.    Gastrointestinal: Negative.    Endocrine: Negative.    Genitourinary: Negative.    Musculoskeletal: Negative.         Bilateral hip pain   Integumentary:  Negative.   Neurological: Negative.    Hematological: Negative.    Psychiatric/Behavioral:  Positive for sleep disturbance.              Past Medical History:   Diagnosis Date    Acid reflux     Coronary arteriosclerosis     Diabetes     Diabetes mellitus, type 2     Hypertension     Skin cancer     left leg    Spondylosis without myelopathy or radiculopathy, lumbar region      Past Surgical History:   Procedure Laterality Date    APPENDECTOMY      BIOPSY OF LESION Left 3/29/2023    Procedure: BIOPSY, LESION;  Surgeon: Andrez Norman DO;  Location: [facility] OR;  Service: General;  Laterality: Left;  excisional biospy of left thigh with rotational flap    BIOPSY OR EXCISION, LESION  2023    Procedure: BIOPSY OR EXCISION, LESION left thigh and abdomen;  Surgeon: Andrez Norman DO;  Location: [facility] OR;  Service: General;;    BLOCK, NERVE, OBTURATOR Right 2019    Right Femoral/Obturator Nerve Block  Dr Headley     SECTION      x2    CORONARY ARTERY BYPASS GRAFT      KNEE ARTHROPLASTY Right     KNEE ARTHROSCOPY Left     LIPOMA RESECTION Right 2023    Procedure: Excision right leg fatty tumor;  Surgeon: Andrez Norman DO;  Location: [facility] OR;  Service: General;  Laterality: Right;    SKIN GRAFT      Left Thigh    TOTAL HIP ARTHROPLASTY Bilateral     TUBAL LIGATION       Social History     Socioeconomic History    Marital status: Legally     Number of children: 6   Occupational History    Occupation: retired   Tobacco Use    Smoking status: Never     Passive exposure: Current (.)    Smokeless tobacco: Never   Substance and Sexual  Activity    Alcohol use: Not Currently    Drug use: Yes     Types: Hydrocodone    Sexual activity: Not Currently     Social Determinants of Health     Financial Resource Strain: Low Risk  (7/28/2021)    Overall Financial Resource Strain (CARDIA)     Difficulty of Paying Living Expenses: Not very hard   Food Insecurity: No Food Insecurity (7/28/2021)    Hunger Vital Sign     Worried About Running Out of Food in the Last Year: Never true     Ran Out of Food in the Last Year: Never true   Transportation Needs: No Transportation Needs (7/28/2021)    PRAPARE - Transportation     Lack of Transportation (Medical): No     Lack of Transportation (Non-Medical): No   Physical Activity: Insufficiently Active (7/28/2021)    Exercise Vital Sign     Days of Exercise per Week: 3 days     Minutes of Exercise per Session: 10 min   Stress: No Stress Concern Present (7/28/2021)    [employer] [place] - Occupational Stress Questionnaire     Feeling of Stress : Not at all   Housing Stability: Low Risk  (7/28/2021)    Housing Stability Vital Sign     Unable to Pay for Housing in the Last Year: No     Number of Places Lived in the Last Year: 1     Unstable Housing in the Last Year: No     Family History   Problem Relation Name Age of Onset    Hypertension Mother      Heart disease Mother      Diabetes Sister      Hypertension Sister      Hypertension Brother      Alcohol abuse Father      Cancer Father      Hypertension Son      Mental illness Maternal Aunt       Review of patient's allergies indicates:   Allergen Reactions    Betadine [povidone-iodine] Itching         Objective:  Vitals:    06/03/24 0907   BP: 126/70   Pulse: (!) 56   Resp: 18          Physical Exam  Vitals and nursing note reviewed.   Constitutional:       General: She is not in acute distress.     Appearance: Normal appearance. She is not ill-appearing, toxic-appearing or diaphoretic.   HENT:      Head: Normocephalic and atraumatic.      Nose: Nose  normal.      Mouth/Throat:      Mouth: Mucous membranes are moist.   Eyes:      Extraocular Movements: Extraocular movements intact.      Pupils: Pupils are equal, round, and reactive to light.   Cardiovascular:      Rate and Rhythm: Normal rate and regular rhythm.      Heart sounds: Normal heart sounds.   Pulmonary:      Effort: Pulmonary effort is normal. No respiratory distress.      Breath sounds: Normal breath sounds. No stridor. No wheezing or rhonchi.   Abdominal:      General: Bowel sounds are normal.      Palpations: Abdomen is soft.   Musculoskeletal:         General: No swelling or deformity.      Cervical back: Normal and normal range of motion. No spasms or tenderness. No pain with movement. Normal range of motion.      Thoracic back: Normal.      Lumbar back: No spasms, tenderness or bony tenderness. Normal range of motion. Negative right straight leg raise test and negative left straight leg raise test. No scoliosis.      Right hip: Tenderness present. Decreased range of motion.      Left hip: Tenderness present. Decreased range of motion.      Right lower leg: No edema.      Left lower leg: No edema.   Skin:     General: Skin is warm.   Neurological:      General: No focal deficit present.      Mental Status: She is alert and oriented to person, place, and time. Mental status is at baseline.      Cranial Nerves: No cranial nerve deficit.      Sensory: Sensation is intact. No sensory deficit.      Motor: No weakness.      Coordination: Coordination normal.      Gait: Gait normal.      Deep Tendon Reflexes: Reflexes are normal and symmetric.   Psychiatric:         Mood and Affect: Mood normal.         Behavior: Behavior normal.           Assessment:      1. Hip pain, right    2. Osteoarthrosis multiple sites, not specified as generalized    3. Spondylosis without myelopathy or radiculopathy, lumbar region    4. Encounter for long-term (current) use of other medications            Plan:  1.  reviewed  2.Addiction, Dependency, Tolerance, Opioid abuse-misuse, Death, Diversion Discussed. Overdose reversal drug Naloxone discussed.Patient is prescribed opiates for chronic nonmalignant pain pathology.  Patient is receiving opiates which require greater than a 72 hour supply of therapy.  Patient was educated on potential dependency associated with long-term opioid use as well as decreasing efficacy with prolonged use.  Patient was advised of risks, benefits and side effects and how to utilize each medication.  Patient was also informed that any deviation from therapy protocol will  lead to discontinuation of opiates.  It is reasonable to prescribe opioid analgesics for patient based on positive response to opioid medications, lack of side effects and  limited aberrant behavior.    3.Refill/Continue medications for pain control and function       Requested Prescriptions     Signed Prescriptions Disp Refills    HYDROcodone-acetaminophen (NORCO)  mg per tablet 90 tablet 0     Sig: Take 1 tablet by mouth every 8 (eight) hours as needed for Pain (pain).    HYDROcodone-acetaminophen (NORCO)  mg per tablet 90 tablet 0     Sig: Take 1 tablet by mouth every 8 (eight) hours as needed for Pain (pain).    HYDROcodone-acetaminophen (NORCO)  mg per tablet 90 tablet 0     Sig: Take 1 tablet by mouth every 8 (eight) hours as needed for Pain (pain).    naloxone (NARCAN) 4 mg/actuation Spry 1 each 0     Si spray (4 mg total) by Nasal route once. for 1 dose     4. Urine drug screen point of care obtained and consistent with prescribed medications and medication refill date    Orders Placed This Encounter   Procedures    POCT Urine Drug Screen Presump     Interpretive Information:     Negative:  No drug detected at the cut off level.   Positive:  This result represents presumptive positive for the   tested drug, other substances may yield a positive response other   than the analyte of interest. This result  should be utilized for   diagnostic purpose only. Confirmation testing will be performed upon physician request only.        5. Patient defers physical therapy, surgical evaluation or  nerve block injections  6. Follow with ZACHARIAH Lipscomb in 3 months for re-evaluation and medication refill         report:  Reviewed and consistent with medication use as prescribed.      The total time spent for evaluation and management on 06/04/2024 including reviewing separately obtained history, performing a medically appropriate exam and evaluation, documenting clinical information in the health record, independently interpreting results and communicating them to the patient/family/caregiver, and ordering medications/tests/procedures was between 15-29 minutes.    The above plan and management options were discussed at length with patient. Patient is in agreement with the above and verbalized understanding. It will be communicated with the referring physician via electronic record, fax, or mail.

## 2024-06-03 ENCOUNTER — OFFICE VISIT (OUTPATIENT)
Dept: PAIN MEDICINE | Facility: CLINIC | Age: 69
End: 2024-06-03
Payer: MEDICARE

## 2024-06-03 VITALS
HEIGHT: 63 IN | WEIGHT: 185 LBS | SYSTOLIC BLOOD PRESSURE: 126 MMHG | DIASTOLIC BLOOD PRESSURE: 70 MMHG | RESPIRATION RATE: 18 BRPM | HEART RATE: 56 BPM | BODY MASS INDEX: 32.78 KG/M2

## 2024-06-03 DIAGNOSIS — Z79.899 ENCOUNTER FOR LONG-TERM (CURRENT) USE OF OTHER MEDICATIONS: ICD-10-CM

## 2024-06-03 DIAGNOSIS — M89.49 OSTEOARTHROSIS MULTIPLE SITES, NOT SPECIFIED AS GENERALIZED: Chronic | ICD-10-CM

## 2024-06-03 DIAGNOSIS — M47.816 SPONDYLOSIS WITHOUT MYELOPATHY OR RADICULOPATHY, LUMBAR REGION: Chronic | ICD-10-CM

## 2024-06-03 DIAGNOSIS — M25.551 HIP PAIN, RIGHT: Primary | Chronic | ICD-10-CM

## 2024-06-03 PROCEDURE — 1125F AMNT PAIN NOTED PAIN PRSNT: CPT | Mod: CPTII,,, | Performed by: PAIN MEDICINE

## 2024-06-03 PROCEDURE — 99215 OFFICE O/P EST HI 40 MIN: CPT | Mod: PBBFAC | Performed by: PAIN MEDICINE

## 2024-06-03 PROCEDURE — 3288F FALL RISK ASSESSMENT DOCD: CPT | Mod: CPTII,,, | Performed by: PAIN MEDICINE

## 2024-06-03 PROCEDURE — 3052F HG A1C>EQUAL 8.0%<EQUAL 9.0%: CPT | Mod: CPTII,,, | Performed by: PAIN MEDICINE

## 2024-06-03 PROCEDURE — 3074F SYST BP LT 130 MM HG: CPT | Mod: CPTII,,, | Performed by: PAIN MEDICINE

## 2024-06-03 PROCEDURE — 1159F MED LIST DOCD IN RCRD: CPT | Mod: CPTII,,, | Performed by: PAIN MEDICINE

## 2024-06-03 PROCEDURE — 1101F PT FALLS ASSESS-DOCD LE1/YR: CPT | Mod: CPTII,,, | Performed by: PAIN MEDICINE

## 2024-06-03 PROCEDURE — 3078F DIAST BP <80 MM HG: CPT | Mod: CPTII,,, | Performed by: PAIN MEDICINE

## 2024-06-03 PROCEDURE — 99214 OFFICE O/P EST MOD 30 MIN: CPT | Mod: S$PBB,,, | Performed by: PAIN MEDICINE

## 2024-06-03 PROCEDURE — 99999PBSHW POCT URINE DRUG SCREEN PRESUMP: Mod: PBBFAC,,,

## 2024-06-03 PROCEDURE — 80305 DRUG TEST PRSMV DIR OPT OBS: CPT | Mod: PBBFAC | Performed by: PAIN MEDICINE

## 2024-06-03 PROCEDURE — 3008F BODY MASS INDEX DOCD: CPT | Mod: CPTII,,, | Performed by: PAIN MEDICINE

## 2024-06-03 RX ORDER — NALOXONE HYDROCHLORIDE 4 MG/.1ML
1 SPRAY NASAL ONCE
Qty: 1 EACH | Refills: 0 | Status: SHIPPED | OUTPATIENT
Start: 2024-06-03 | End: 2024-06-03

## 2024-06-03 RX ORDER — HYDROCODONE BITARTRATE AND ACETAMINOPHEN 10; 325 MG/1; MG/1
1 TABLET ORAL EVERY 8 HOURS PRN
Qty: 90 TABLET | Refills: 0 | Status: SHIPPED | OUTPATIENT
Start: 2024-08-09 | End: 2024-09-08

## 2024-06-03 RX ORDER — HYDROCODONE BITARTRATE AND ACETAMINOPHEN 10; 325 MG/1; MG/1
1 TABLET ORAL EVERY 8 HOURS PRN
Qty: 90 TABLET | Refills: 0 | Status: SHIPPED | OUTPATIENT
Start: 2024-06-10 | End: 2024-07-10

## 2024-06-03 RX ORDER — HYDROCODONE BITARTRATE AND ACETAMINOPHEN 10; 325 MG/1; MG/1
1 TABLET ORAL EVERY 8 HOURS PRN
Qty: 90 TABLET | Refills: 0 | Status: SHIPPED | OUTPATIENT
Start: 2024-07-10 | End: 2024-08-09

## 2024-08-05 ENCOUNTER — OFFICE VISIT (OUTPATIENT)
Dept: PRIMARY CARE CLINIC | Facility: CLINIC | Age: 69
End: 2024-08-05
Payer: MEDICARE

## 2024-08-05 VITALS
RESPIRATION RATE: 20 BRPM | HEART RATE: 55 BPM | HEIGHT: 63 IN | BODY MASS INDEX: 32.07 KG/M2 | WEIGHT: 181 LBS | OXYGEN SATURATION: 97 % | SYSTOLIC BLOOD PRESSURE: 128 MMHG | TEMPERATURE: 98 F | DIASTOLIC BLOOD PRESSURE: 81 MMHG

## 2024-08-05 DIAGNOSIS — Z12.11 SCREENING FOR MALIGNANT NEOPLASM OF COLON: ICD-10-CM

## 2024-08-05 DIAGNOSIS — Z00.00 ENCOUNTER FOR SUBSEQUENT ANNUAL WELLNESS VISIT (AWV) IN MEDICARE PATIENT: Primary | ICD-10-CM

## 2024-08-05 DIAGNOSIS — M89.49 OSTEOARTHROSIS MULTIPLE SITES, NOT SPECIFIED AS GENERALIZED: Chronic | ICD-10-CM

## 2024-08-05 DIAGNOSIS — E11.9 TYPE 2 DIABETES MELLITUS WITHOUT COMPLICATION, WITHOUT LONG-TERM CURRENT USE OF INSULIN: ICD-10-CM

## 2024-08-05 DIAGNOSIS — Z12.31 SCREENING MAMMOGRAM, ENCOUNTER FOR: ICD-10-CM

## 2024-08-05 DIAGNOSIS — M25.551 HIP PAIN, RIGHT: ICD-10-CM

## 2024-08-05 DIAGNOSIS — I10 ESSENTIAL HYPERTENSION: ICD-10-CM

## 2024-08-05 DIAGNOSIS — M47.816 SPONDYLOSIS WITHOUT MYELOPATHY OR RADICULOPATHY, LUMBAR REGION: ICD-10-CM

## 2024-08-05 PROBLEM — T40.2X5A CONSTIPATION DUE TO OPIOID THERAPY: Status: RESOLVED | Noted: 2022-10-26 | Resolved: 2024-08-05

## 2024-08-05 PROBLEM — S71.102A OPEN WOUND OF LEFT THIGH: Status: RESOLVED | Noted: 2022-06-27 | Resolved: 2024-08-05

## 2024-08-05 PROBLEM — M19.90 ARTHRITIS: Status: RESOLVED | Noted: 2022-10-26 | Resolved: 2024-08-05

## 2024-08-05 PROBLEM — K59.03 CONSTIPATION DUE TO OPIOID THERAPY: Status: RESOLVED | Noted: 2022-10-26 | Resolved: 2024-08-05

## 2024-08-05 PROBLEM — M79.652 PAIN IN LEFT THIGH: Status: RESOLVED | Noted: 2022-06-27 | Resolved: 2024-08-05

## 2024-08-05 LAB
ALBUMIN SERPL BCP-MCNC: 3.5 G/DL (ref 3.5–5)
ALBUMIN/GLOB SERPL: 0.9 {RATIO}
ALP SERPL-CCNC: 58 U/L (ref 55–142)
ALT SERPL W P-5'-P-CCNC: 17 U/L (ref 13–56)
ANION GAP SERPL CALCULATED.3IONS-SCNC: 12 MMOL/L (ref 7–16)
AST SERPL W P-5'-P-CCNC: 10 U/L (ref 15–37)
BASOPHILS # BLD AUTO: 0.04 K/UL (ref 0–0.2)
BASOPHILS NFR BLD AUTO: 0.7 % (ref 0–1)
BILIRUB SERPL-MCNC: 0.3 MG/DL (ref ?–1.2)
BUN SERPL-MCNC: 19 MG/DL (ref 7–18)
BUN/CREAT SERPL: 17 (ref 6–20)
CALCIUM SERPL-MCNC: 9.7 MG/DL (ref 8.5–10.1)
CHLORIDE SERPL-SCNC: 106 MMOL/L (ref 98–107)
CHOLEST SERPL-MCNC: 169 MG/DL (ref 0–200)
CHOLEST/HDLC SERPL: 3.7 {RATIO}
CO2 SERPL-SCNC: 25 MMOL/L (ref 21–32)
CREAT SERPL-MCNC: 1.15 MG/DL (ref 0.55–1.02)
DIFFERENTIAL METHOD BLD: ABNORMAL
EGFR (NO RACE VARIABLE) (RUSH/TITUS): 51 ML/MIN/1.73M2
EOSINOPHIL # BLD AUTO: 0.13 K/UL (ref 0–0.5)
EOSINOPHIL NFR BLD AUTO: 2.4 % (ref 1–4)
ERYTHROCYTE [DISTWIDTH] IN BLOOD BY AUTOMATED COUNT: 15.4 % (ref 11.5–14.5)
EST. AVERAGE GLUCOSE BLD GHB EST-MCNC: 177 MG/DL
GLOBULIN SER-MCNC: 4.1 G/DL (ref 2–4)
GLUCOSE SERPL-MCNC: 171 MG/DL (ref 74–106)
HBA1C MFR BLD HPLC: 7.8 % (ref 4.5–6.6)
HCT VFR BLD AUTO: 43.5 % (ref 38–47)
HDLC SERPL-MCNC: 46 MG/DL (ref 40–60)
HGB BLD-MCNC: 13.9 G/DL (ref 12–16)
IMM GRANULOCYTES # BLD AUTO: 0.02 K/UL (ref 0–0.04)
IMM GRANULOCYTES NFR BLD: 0.4 % (ref 0–0.4)
LDLC SERPL CALC-MCNC: 88 MG/DL
LDLC/HDLC SERPL: 1.9 {RATIO}
LYMPHOCYTES # BLD AUTO: 1.99 K/UL (ref 1–4.8)
LYMPHOCYTES NFR BLD AUTO: 37.3 % (ref 27–41)
MCH RBC QN AUTO: 27.3 PG (ref 27–31)
MCHC RBC AUTO-ENTMCNC: 32 G/DL (ref 32–36)
MCV RBC AUTO: 85.5 FL (ref 80–96)
MONOCYTES # BLD AUTO: 0.56 K/UL (ref 0–0.8)
MONOCYTES NFR BLD AUTO: 10.5 % (ref 2–6)
MPC BLD CALC-MCNC: 11.7 FL (ref 9.4–12.4)
NEUTROPHILS # BLD AUTO: 2.6 K/UL (ref 1.8–7.7)
NEUTROPHILS NFR BLD AUTO: 48.7 % (ref 53–65)
NONHDLC SERPL-MCNC: 123 MG/DL
NRBC # BLD AUTO: 0 X10E3/UL
NRBC, AUTO (.00): 0 %
PLATELET # BLD AUTO: 243 K/UL (ref 150–400)
POTASSIUM SERPL-SCNC: 3.9 MMOL/L (ref 3.5–5.1)
PROT SERPL-MCNC: 7.6 G/DL (ref 6.4–8.2)
RBC # BLD AUTO: 5.09 M/UL (ref 4.2–5.4)
SODIUM SERPL-SCNC: 139 MMOL/L (ref 136–145)
TRIGL SERPL-MCNC: 174 MG/DL (ref 35–150)
VLDLC SERPL-MCNC: 35 MG/DL
WBC # BLD AUTO: 5.34 K/UL (ref 4.5–11)

## 2024-08-05 PROCEDURE — 3074F SYST BP LT 130 MM HG: CPT | Mod: ,,, | Performed by: NURSE PRACTITIONER

## 2024-08-05 PROCEDURE — 83036 HEMOGLOBIN GLYCOSYLATED A1C: CPT | Mod: ,,, | Performed by: CLINICAL MEDICAL LABORATORY

## 2024-08-05 PROCEDURE — 80053 COMPREHEN METABOLIC PANEL: CPT | Mod: ,,, | Performed by: CLINICAL MEDICAL LABORATORY

## 2024-08-05 PROCEDURE — 1101F PT FALLS ASSESS-DOCD LE1/YR: CPT | Mod: ,,, | Performed by: NURSE PRACTITIONER

## 2024-08-05 PROCEDURE — 1170F FXNL STATUS ASSESSED: CPT | Mod: ,,, | Performed by: NURSE PRACTITIONER

## 2024-08-05 PROCEDURE — 3079F DIAST BP 80-89 MM HG: CPT | Mod: ,,, | Performed by: NURSE PRACTITIONER

## 2024-08-05 PROCEDURE — 1158F ADVNC CARE PLAN TLK DOCD: CPT | Mod: ,,, | Performed by: NURSE PRACTITIONER

## 2024-08-05 PROCEDURE — G0439 PPPS, SUBSEQ VISIT: HCPCS | Mod: ,,, | Performed by: NURSE PRACTITIONER

## 2024-08-05 PROCEDURE — 3288F FALL RISK ASSESSMENT DOCD: CPT | Mod: ,,, | Performed by: NURSE PRACTITIONER

## 2024-08-05 PROCEDURE — 1125F AMNT PAIN NOTED PAIN PRSNT: CPT | Mod: ,,, | Performed by: NURSE PRACTITIONER

## 2024-08-05 PROCEDURE — 1160F RVW MEDS BY RX/DR IN RCRD: CPT | Mod: ,,, | Performed by: NURSE PRACTITIONER

## 2024-08-05 PROCEDURE — 3052F HG A1C>EQUAL 8.0%<EQUAL 9.0%: CPT | Mod: ,,, | Performed by: NURSE PRACTITIONER

## 2024-08-05 PROCEDURE — 85025 COMPLETE CBC W/AUTO DIFF WBC: CPT | Mod: ,,, | Performed by: CLINICAL MEDICAL LABORATORY

## 2024-08-05 PROCEDURE — 1159F MED LIST DOCD IN RCRD: CPT | Mod: ,,, | Performed by: NURSE PRACTITIONER

## 2024-08-05 PROCEDURE — 80061 LIPID PANEL: CPT | Mod: ,,, | Performed by: CLINICAL MEDICAL LABORATORY

## 2024-08-05 RX ORDER — SITAGLIPTIN AND METFORMIN HYDROCHLORIDE 1000; 50 MG/1; MG/1
1 TABLET, FILM COATED, EXTENDED RELEASE ORAL DAILY
Qty: 90 TABLET | Refills: 3 | Status: SHIPPED | OUTPATIENT
Start: 2024-08-05

## 2024-08-05 RX ORDER — DIOSMIN COMPLEX NO.1 630 MG
1 TABLET ORAL DAILY
COMMUNITY

## 2024-08-06 ENCOUNTER — TELEPHONE (OUTPATIENT)
Dept: PRIMARY CARE CLINIC | Facility: CLINIC | Age: 69
End: 2024-08-06
Payer: MEDICARE

## 2024-08-07 DIAGNOSIS — E11.9 TYPE 2 DIABETES MELLITUS WITHOUT COMPLICATION, WITHOUT LONG-TERM CURRENT USE OF INSULIN: ICD-10-CM

## 2024-08-08 RX ORDER — BLOOD SUGAR DIAGNOSTIC
STRIP MISCELLANEOUS
Qty: 200 STRIP | Refills: 3 | Status: SHIPPED | OUTPATIENT
Start: 2024-08-08

## 2024-08-09 DIAGNOSIS — Z71.89 COMPLEX CARE COORDINATION: ICD-10-CM

## 2024-08-16 ENCOUNTER — PATIENT OUTREACH (OUTPATIENT)
Dept: PRIMARY CARE CLINIC | Facility: CLINIC | Age: 69
End: 2024-08-16
Payer: MEDICARE

## 2024-08-16 LAB — BCS RECOMMENDATION EXT: NORMAL
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
## 2024-08-24 DIAGNOSIS — E87.6 HYPOKALEMIA: ICD-10-CM

## 2024-08-26 RX ORDER — POTASSIUM CHLORIDE 750 MG/1
CAPSULE, EXTENDED RELEASE ORAL
Qty: 360 CAPSULE | Refills: 3 | Status: SHIPPED | OUTPATIENT
Start: 2024-08-26

## 2024-08-27 ENCOUNTER — OFFICE VISIT (OUTPATIENT)
Dept: PRIMARY CARE CLINIC | Facility: CLINIC | Age: 69
End: 2024-08-27
Payer: MEDICARE

## 2024-08-27 VITALS
RESPIRATION RATE: 18 BRPM | WEIGHT: 181.38 LBS | OXYGEN SATURATION: 96 % | TEMPERATURE: 98 F | HEIGHT: 63 IN | BODY MASS INDEX: 32.14 KG/M2 | HEART RATE: 63 BPM | SYSTOLIC BLOOD PRESSURE: 138 MMHG | DIASTOLIC BLOOD PRESSURE: 84 MMHG

## 2024-08-27 DIAGNOSIS — N18.31 CHRONIC KIDNEY DISEASE, STAGE 3A: ICD-10-CM

## 2024-08-27 DIAGNOSIS — E11.65 TYPE 2 DIABETES MELLITUS WITH HYPERGLYCEMIA, WITHOUT LONG-TERM CURRENT USE OF INSULIN: Primary | ICD-10-CM

## 2024-08-27 DIAGNOSIS — I10 ESSENTIAL HYPERTENSION: ICD-10-CM

## 2024-08-27 DIAGNOSIS — K21.9 GASTROESOPHAGEAL REFLUX DISEASE, UNSPECIFIED WHETHER ESOPHAGITIS PRESENT: ICD-10-CM

## 2024-08-27 DIAGNOSIS — E78.5 HYPERLIPIDEMIA, UNSPECIFIED HYPERLIPIDEMIA TYPE: ICD-10-CM

## 2024-08-27 DIAGNOSIS — J30.89 SEASONAL ALLERGIC RHINITIS DUE TO OTHER ALLERGIC TRIGGER: ICD-10-CM

## 2024-08-27 LAB — GLUCOSE SERPL-MCNC: 184 MG/DL (ref 70–110)

## 2024-08-27 PROCEDURE — 3075F SYST BP GE 130 - 139MM HG: CPT | Mod: ,,, | Performed by: NURSE PRACTITIONER

## 2024-08-27 PROCEDURE — 99213 OFFICE O/P EST LOW 20 MIN: CPT | Mod: ,,, | Performed by: NURSE PRACTITIONER

## 2024-08-27 PROCEDURE — 3288F FALL RISK ASSESSMENT DOCD: CPT | Mod: ,,, | Performed by: NURSE PRACTITIONER

## 2024-08-27 PROCEDURE — 1159F MED LIST DOCD IN RCRD: CPT | Mod: ,,, | Performed by: NURSE PRACTITIONER

## 2024-08-27 PROCEDURE — 1126F AMNT PAIN NOTED NONE PRSNT: CPT | Mod: ,,, | Performed by: NURSE PRACTITIONER

## 2024-08-27 PROCEDURE — 1160F RVW MEDS BY RX/DR IN RCRD: CPT | Mod: ,,, | Performed by: NURSE PRACTITIONER

## 2024-08-27 PROCEDURE — 3079F DIAST BP 80-89 MM HG: CPT | Mod: ,,, | Performed by: NURSE PRACTITIONER

## 2024-08-27 PROCEDURE — 3008F BODY MASS INDEX DOCD: CPT | Mod: ,,, | Performed by: NURSE PRACTITIONER

## 2024-08-27 PROCEDURE — 3051F HG A1C>EQUAL 7.0%<8.0%: CPT | Mod: ,,, | Performed by: NURSE PRACTITIONER

## 2024-08-27 PROCEDURE — 1101F PT FALLS ASSESS-DOCD LE1/YR: CPT | Mod: ,,, | Performed by: NURSE PRACTITIONER

## 2024-08-27 RX ORDER — DIOSMIN COMPLEX NO.1 630 MG
1 TABLET ORAL DAILY
Qty: 90 TABLET | Refills: 2 | Status: CANCELLED | OUTPATIENT
Start: 2024-08-27

## 2024-08-27 RX ORDER — OMEPRAZOLE 40 MG/1
40 CAPSULE, DELAYED RELEASE ORAL DAILY
Qty: 90 CAPSULE | Refills: 2 | Status: SHIPPED | OUTPATIENT
Start: 2024-08-27

## 2024-08-27 RX ORDER — EZETIMIBE 10 MG/1
10 TABLET ORAL DAILY
Qty: 90 TABLET | Refills: 2 | Status: SHIPPED | OUTPATIENT
Start: 2024-08-27

## 2024-08-27 RX ORDER — MONTELUKAST SODIUM 10 MG/1
10 TABLET ORAL DAILY PRN
Qty: 90 TABLET | Refills: 2 | Status: SHIPPED | OUTPATIENT
Start: 2024-08-27

## 2024-08-27 RX ORDER — LANCETS
1 EACH MISCELLANEOUS
Qty: 200 EACH | Refills: 2 | Status: SHIPPED | OUTPATIENT
Start: 2024-08-27

## 2024-08-27 NOTE — PROGRESS NOTES
[facility]  [address] [place] [address]  Ph: [phone] Lizzette Meadows DNP, FNP-C  [facility]  Primary Care       PATIENT NAME: Selena Proctor   : 1955    AGE: 68 y.o. DATE: 2024    MRN: [id number]        Reason for Visit / Chief Complaint:  Diabetes ( Pt has eaten blood sugar 184), Hyperlipidemia, and Hypertension     Subjective:     HPI: Patient here for routine follow up visit. States she needs med refills.     Has Diabetes and HTN. States she checks her blood sugar twice a day; states it runs 150-180. States she has not been following a diabetic diet. States she has been taking her diabetic medication. Denies any chest pain or shortness of breath; states to wound to left thigh has healed. States she has been discharged from wound center.     Patient sees [facility] in [place], [place].  States she had samples of Vasculera; states she received them in 2024 from her heart doctor in [place], [place]. Patient to call her cardiac provider to verify if he wants her to continue the medication.            Review of Systems: Review of Systems   Constitutional:  Negative for chills, fatigue and fever.   Respiratory:  Negative for cough, chest tightness and shortness of breath.    Cardiovascular:  Negative for chest pain.   Gastrointestinal:  Negative for abdominal pain, constipation, diarrhea, nausea and vomiting.   Genitourinary:  Negative for dysuria.   Musculoskeletal:  Negative for gait problem.   Skin:  Negative for rash.   Neurological:  Negative for headaches.          Review of patient's allergies indicates:   Allergen Reactions    Betadine [povidone-iodine] Itching        Med List:  Current Outpatient Medications on File Prior to Visit   Medication Sig Dispense Refill    ACCU-CHEK GUIDE TEST STRIPS Strp TEST BLOOD SUGAR TWO TIMES DAILY 200 strip 3    alcohol swabs (DROPSAFE ALCOHOL PREP PADS) PadM USE AS DIRECTED 100 each  3    aspirin (ECOTRIN) 81 MG EC tablet Take 81 mg by mouth once daily.       blood-glucose meter (ACCU-CHEK GUIDE GLUCOSE METER) Misc Use as directed 1 each 0    diclofenac sodium (VOLTAREN ARTHRITIS PAIN) 1 % Gel Apply 2 g topically 4 (four) times daily. Apply to knees, elbows, joints as needed 10 each 2    furosemide (LASIX) 40 MG tablet TAKE 1 TABLET EVERY DAY AS NEEDED 90 tablet 3    HYDROcodone-acetaminophen (NORCO)  mg per tablet Take 1 tablet by mouth every 8 (eight) hours as needed for Pain (pain). 90 tablet 0    JARDIANCE 10 mg tablet TAKE 1 TABLET EVERY DAY 90 tablet 10    lancets (ACCU-CHEK SOFTCLIX LANCETS) Misc USE AS DIRECTED 200 each 10    lancets (TRUEPLUS LANCETS) 33 gauge Misc USE AS DIRECTED 200 each 0    losartan-hydrochlorothiazide 100-25 mg (HYZAAR) 100-25 mg per tablet TAKE 1 TABLET EVERY DAY 90 tablet 3    metoprolol succinate (TOPROL-XL) 100 MG 24 hr tablet TAKE 1 TABLET EVERY DAY 90 tablet 3    montelukast (SINGULAIR) 10 mg tablet Take 1 tablet (10 mg total) by mouth as needed (Allergies). 90 tablet 0    naloxone (NARCAN) 4 mg/actuation Spry PUT 1 SPRAY IN 1 NOSTRIL WAIT 2 MINUTES THEN REPEAT DOSE IN THE OTHER NOSTRIL      NIFEdipine (PROCARDIA-XL) 90 MG (OSM) 24 hr tablet TAKE 1 TABLET EVERY DAY 90 tablet 3    pitavastatin calcium (LIVALO) 4 mg Tab Take one tablet by mouth three times per week on Monday, Wednesday, Friday. 90 tablet 2    potassium chloride (MICRO-K) 10 MEQ CpSR TAKE 2 CAPSULES TWICE DAILY 360 capsule 3    SITagliptan-metformin (JANUMET XR) 50-1,000 mg TM24 Take 1 tablet by mouth once daily. 90 tablet 3    spironolactone (ALDACTONE) 25 MG tablet Take 1 tablet (25 mg total) by mouth once daily. 90 tablet 0    [DISCONTINUED] ezetimibe (ZETIA) 10 mg tablet TAKE 1 TABLET EVERY DAY 90 tablet 2    [DISCONTINUED] lancets (ACCU-CHEK FASTCLIX LANCET DRUM) Misc 1 each by Misc.(Non-Drug; Combo Route) route 2 (two) times daily before meals. 200 each 0    [DISCONTINUED] omeprazole  (PRILOSEC) 40 MG capsule TAKE 1 CAPSULE EVERY DAY 90 capsule 1    diosmin complex no.1 (VASCULERA) 630 mg Tab Take 1 tablet by mouth Daily. Samples from JOE[facility]- [facility]      docusate sodium (COLACE) 100 MG capsule Take 1 capsule (100 mg total) by mouth 2 (two) times daily. (Patient not taking: Reported on 8/5/2024) 28 capsule 0    docusate sodium (COLACE) 100 MG capsule Take 1 capsule (100 mg total) by mouth 2 (two) times daily. (Patient not taking: Reported on 8/5/2024) 28 capsule 0    gabapentin (NEURONTIN) 300 MG capsule Take 1 capsule (300 mg total) by mouth 3 (three) times daily. (Patient not taking: Reported on 8/5/2024) 90 capsule 11    glipiZIDE (GLUCOTROL) 2.5 MG TR24 Take 1 tablet (2.5 mg total) by mouth daily with breakfast. 90 tablet 3    lancing device with lancets (ACCU-CHEK SOFT DEV LANCETS) Kit USE AS DIRECTED 100 each 3    mupirocin (BACTROBAN) 2 % ointment Apply topically once daily. (Patient not taking: Reported on 8/3/2023) 30 g 12    silver sulfADIAZINE 1% (SILVADENE) 1 % cream Apply topically once daily. (Patient not taking: Reported on 8/5/2024) 1000 g 11    spironolactone (ALDACTONE) 25 MG tablet TAKE 1 TABLET EVERY DAY (Patient not taking: Reported on 8/5/2024) 90 tablet 0    TRUE METRIX GLUCOSE METER kit USE AS DIRECTED (Patient not taking: Reported on 8/3/2023) 180 each 2    [DISCONTINUED] montelukast (SINGULAIR) 10 mg tablet TAKE 1 TABLET EVERY DAY AS NEEDED (Patient not taking: Reported on 8/27/2024) 90 tablet 0     No current facility-administered medications on file prior to visit.       Medical/Social/Family History:  Past Medical History:   Diagnosis Date    Acid reflux     Coronary arteriosclerosis     Diabetes     Diabetes mellitus, type 2     Hypertension     Skin cancer     left leg    Spondylosis without myelopathy or radiculopathy, lumbar region       Social History     Tobacco Use   Smoking Status Never    Passive exposure: Current  (.)   Smokeless Tobacco Never      Social History     Substance and Sexual Activity   Alcohol Use Not Currently       Family History   Problem Relation Name Age of Onset    Hypertension Mother      Heart disease Mother      Diabetes Sister      Hypertension Sister      Hypertension Brother      Alcohol abuse Father      Cancer Father      Hypertension Son      Mental illness Maternal Aunt        Past Surgical History:   Procedure Laterality Date    APPENDECTOMY      BIOPSY OF LESION Left 3/29/2023    Procedure: BIOPSY, LESION;  Surgeon: Andrez Norman DO;  Location: [facility] OR;  Service: General;  Laterality: Left;  excisional biospy of left thigh with rotational flap    BIOPSY OR EXCISION, LESION  2023    Procedure: BIOPSY OR EXCISION, LESION left thigh and abdomen;  Surgeon: Andrez Norman DO;  Location: [facility] OR;  Service: General;;    BLOCK, NERVE, OBTURATOR Right 2019    Right Femoral/Obturator Nerve Block  Dr Headley     SECTION      x2    CORONARY ARTERY BYPASS GRAFT      KNEE ARTHROPLASTY Right     KNEE ARTHROSCOPY Left     LIPOMA RESECTION Right 2023    Procedure: Excision right leg fatty tumor;  Surgeon: Andrez Norman DO;  Location: [facility] OR;  Service: General;  Laterality: Right;    SKIN GRAFT      Left Thigh    TOTAL HIP ARTHROPLASTY Bilateral     TUBAL LIGATION        Immunization History   Administered Date(s) Administered    COVID-19 MRNA, LN-S PF (MODERNA HALF 0.25 ML DOSE) 10/29/2021    COVID-19, MRNA, LN-S, PF (MODERNA FULL 0.5 ML DOSE) 2021, 02/10/2021, 03/10/2021    Influenza - Quadrivalent - PF *Preferred* (6 months and older) 2022, 2023    Pneumococcal Conjugate - 13 Valent 2017    Pneumococcal Conjugate - 20 Valent 2022    Tdap 2022    Zoster Recombinant 2023, 2023          Objective:      Vitals:    24 0905 24 0945   BP: (!) 143/90 138/84   BP Location: Right arm Right arm   Patient  "Position: Sitting Sitting   BP Method: Large (Automatic) Large (Manual)   Pulse: 63    Resp: 18    Temp: 98.1 °F (36.7 °C)    TempSrc: Oral    SpO2: 96%    Weight: 82.3 kg (181 lb 6.4 oz)    Height: 5' 3" (1.6 m)      Body mass index is 32.13 kg/m².     Physical Exam: Physical Exam  Vitals and nursing note reviewed.   Constitutional:       General: She is not in acute distress.     Appearance: Normal appearance. She is not ill-appearing, toxic-appearing or diaphoretic.   HENT:      Head: Normocephalic.      Mouth/Throat:      Mouth: Mucous membranes are moist.   Eyes:      Extraocular Movements: Extraocular movements intact.      Conjunctiva/sclera: Conjunctivae normal.      Pupils: Pupils are equal, round, and reactive to light.   Cardiovascular:      Rate and Rhythm: Normal rate and regular rhythm.      Heart sounds: Normal heart sounds.   Pulmonary:      Effort: No respiratory distress.      Breath sounds: Normal breath sounds. No stridor. No wheezing, rhonchi or rales.   Abdominal:      General: Bowel sounds are normal. There is no distension.      Palpations: Abdomen is soft.      Tenderness: There is no abdominal tenderness.   Musculoskeletal:         General: Normal range of motion.      Cervical back: Normal range of motion and neck supple.   Skin:     General: Skin is warm and dry.   Neurological:      Mental Status: She is alert and oriented to person, place, and time.      Gait: Gait normal.   Psychiatric:         Mood and Affect: Mood normal.         Behavior: Behavior normal.                Assessment:          ICD-10-CM ICD-9-CM   1. Type 2 diabetes mellitus with hyperglycemia, without long-term current use of insulin  E11.65 250.00     790.29   2. Essential hypertension  I10 401.9   3. Chronic kidney disease, stage 3a  N18.31 585.3   4. Hyperlipidemia, unspecified hyperlipidemia type  E78.5 272.4   5. Seasonal allergic rhinitis due to other allergic trigger  J30.89 477.8   6. Gastroesophageal reflux " disease, unspecified whether esophagitis present  K21.9 530.81        Plan:       Type 2 diabetes mellitus with hyperglycemia, without long-term current use of insulin  -     POCT glucose  -     lancets (ACCU-CHEK FASTCLIX LANCET DRUM) Misc; 1 each by Misc.(Non-Drug; Combo Route) route 2 (two) times daily before meals.  Dispense: 200 each; Refill: 2    Essential hypertension        - Continue current plan of care    Chronic kidney disease, stage 3a        - continue current plan of care    Hyperlipidemia, unspecified hyperlipidemia type  -     ezetimibe (ZETIA) 10 mg tablet; Take 1 tablet (10 mg total) by mouth once daily.  Dispense: 90 tablet; Refill: 2    Seasonal allergic rhinitis due to other allergic trigger  -     montelukast (SINGULAIR) 10 mg tablet; Take 1 tablet (10 mg total) by mouth daily as needed (allergic rhinitis).  Dispense: 90 tablet; Refill: 2    Gastroesophageal reflux disease, unspecified whether esophagitis present  -     omeprazole (PRILOSEC) 40 MG capsule; Take 1 capsule (40 mg total) by mouth once daily.  Dispense: 90 capsule; Refill: 2      Component      Latest Ref Rng 2024   POC Glucose      70 - 110 MG/ !         New & refilled meds:  Requested Prescriptions     Signed Prescriptions Disp Refills    lancets (ACCU-CHEK FASTCLIX LANCET DRUM) [facility] [place] [facility] 200 each 2     Si each by Misc.(Non-Drug; Combo Route) route 2 (two) times daily before meals.    ezetimibe (ZETIA) 10 mg tablet 90 tablet 2     Sig: Take 1 tablet (10 mg total) by mouth once daily.    omeprazole (PRILOSEC) 40 MG capsule 90 capsule 2     Sig: Take 1 capsule (40 mg total) by mouth once daily.    montelukast (SINGULAIR) 10 mg tablet 90 tablet 2     Sig: Take 1 tablet (10 mg total) by mouth daily as needed (allergic rhinitis).       Follow up in about 3 months (around 2024), or if symptoms worsen or fail to improve, for diabetes, Hypertension.     Patient Instructions   Patient Education       Controlling Your  "Blood Pressure Through Lifestyle   The Basics   Written by the doctors and editors at [facility]   What does my lifestyle have to do with my blood pressure? -- The things you do and the foods you eat have a big effect on your blood pressure and your overall health. Following the right lifestyle can:  Lower your blood pressure or keep you from getting high blood pressure in the first place  Reduce your need for blood pressure medicines  Make medicines for high blood pressure work better, if you do take them  Lower the chances that you'll have a heart attack or stroke, or develop kidney disease  Which lifestyle choices will help lower my blood pressure? -- Here's what you can do:  Lose weight (if you are overweight)  Choose a diet rich in fruits, vegetables, and low-fat dairy products, and low in meats, sweets, and refined grains  Eat less salt (sodium)  Do something active for at least 30 minutes a day on most days of the week  Limit the amount of alcohol you drink  If you have high blood pressure, it's also very important to quit smoking (if you smoke). Quitting smoking might not bring your blood pressure down. But it will lower the chances that you'll have a heart attack or stroke, and it will help you feel better and live longer.  Start low and go slow -- The changes listed above might sound like a lot, but don't worry. You don't have to change everything all at once. The key to improving your lifestyle is to "start low and go slow." Choose 1 small, specific thing to change and try doing it for a while. If it works for you, keep doing it until it becomes a habit. If it doesn't, don't give up. Choose something else to change and see how that goes.  Let's say, for example, that you would like to improve your diet. If you're the type of person who eats cheeseburgers and French fries all the time, you can't switch to eating just salads from one day to the next. When people try to make changes like that, they often fail. " "Then they feel frustrated and tend to give up. So instead of trying to change everything about your diet in 1 day, change 1 or 2 small things about your diet and give yourself time to get used to those changes. For instance, keep the cheeseburger but give up the French fries. Or eat the same things but cut your portions in half.  As you find things that you are able to change and stick with, keep adding new changes. In time, you will see that you can actually change a lot. You just have to get used to the changes slowly.  Lose weight -- When people think about losing weight, they sometimes make it more complicated than it really is. To lose weight, you have to either eat less or move more. If you do both of those things, it's even better. But there is no single weight-loss diet or activity that's better than any other. When it comes to weight loss, the most effective plan is the one that you'll stick with.  Improve your diet -- There is no single diet that is right for everyone. But in general, a healthy diet can include:  Lots of fruits, vegetables, and whole grains  Some beans, peas, lentils, chickpeas, and similar foods  Some nuts, such as walnuts, almonds, and peanuts  Fat-free or low-fat milk and milk products  Some fish  To have a healthy diet, it's also important to limit or avoid sugar, sweets, meats, and refined grains. (Refined grains are found in white bread, white rice, most forms of pasta, and most packaged "snack" foods.)  Reduce salt -- Many people think that eating a low-sodium diet means avoiding the salt shaker and not adding salt when cooking. The truth is, not adding salt at the table or when you cook will only help a little. Almost all of the sodium you eat is already in the food you buy at the grocery store or at restaurants (figure 1).  The most important thing you can do to cut down on sodium is to eat less processed food. That means that you should avoid most foods that are sold in cans, " "boxes, jars, and bags. You should also eat in restaurants less often.  To reduce the amount of sodium you get, buy fresh or fresh-frozen fruits, vegetables, and meats. (Fresh-frozen foods have had nothing added to them before freezing.) Then you can make meals at home, from scratch, with these ingredients.  As with the other changes, don't try to cut out salt all at once. Instead, choose 1 or 2 foods that have a lot of sodium and try to replace them with low-sodium choices. When you get used to those low-sodium options, find another food or 2 to change. Then keep going, until all the foods you eat are sodium-free or low in sodium.  Become more active -- If you want to be more active, you don't have to go to the gym or get all sweaty. It is possible to increase your activity level while doing everyday things you enjoy. Walking, gardening, and dancing are just a few of the things that you might try. As with all the other changes, the key is not to do too much too fast. If you don't do any activity now, start by walking for just a few minutes every other day. Do that for a few weeks. If you stick with it, try doing it for longer. But if you find that you don't like walking, try a different activity.  Drink less alcohol -- If you are a woman, do not have more than 1 "standard drink" of alcohol a day. If you are a man, do not have more than 2. A "standard drink" is:  A can or bottle that has 12 ounces of beer  A glass that has 5 ounces of wine  A shot that has 1.5 ounces of whiskey  Where should I start? -- If you want to improve your lifestyle, start by making the changes that you think would be easiest for you. If you used to exercise and just got out of the habit, maybe it would be easy for you to start exercising again. Or if you actually like cooking meals from scratch, maybe the first thing you should focus on is eating home-cooked meals that are low in sodium.  Whatever you tackle first, choose specific, realistic " "goals, and give yourself a deadline. For example, do not decide that you are going to "exercise more." Instead, decide that you are going to walk for 10 minutes on Monday, Wednesday, and Friday, and that you are going to do this for the next 2 weeks.  When lifestyle changes are too general, people have a hard time following through.  Now go. You can do it!  All topics are updated as new evidence becomes available and our peer review process is complete.  This topic retrieved from [employer] on: Sep 21, 2021.  Topic 01340 Version 8.0  Release: 29.4.2 - C29.263  © 2021 UpToDate, Inc. and/or its affiliates. All rights reserved.  figure 1: Sources of sodium in your diet     Graphic 81162 Version 2.0    Consumer Information Use and Disclaimer   This information is not specific medical advice and does not replace information you receive from your health care provider. This is only a brief summary of general information. It does NOT include all information about conditions, illnesses, injuries, tests, procedures, treatments, therapies, discharge instructions or life-style choices that may apply to you. You must talk with your health care provider for complete information about your health and treatment options. This information should not be used to decide whether or not to accept your health care provider's advice, instructions or recommendations. Only your health care provider has the knowledge and training to provide advice that is right for you. The use of this information is governed by the "[employer] End User License Agreement, available at https://www.Scali.Keyword Rockstar/en/solutions/WhistleTalk/[email or web address]/craig.The use of [employer] content is governed by the [employer] Terms of Use. ©2021 UpToDate, Inc. All rights reserved.  Copyright   © 2021 UpToDate, Inc. and/or its affiliates. All rights reserved.  Patient Education       Diabetes and Diet   The Basics   Written by the doctors and editors at [employer]   Why is diet important in " "diabetes? -- Diet is important because it is part of diabetes treatment. Many people need to change what they eat and how much they eat to help treat their diabetes. It is important for people to treat their diabetes so that they:  Keep their blood sugar at or near a normal level  Prevent long-term problems, such as heart or kidney problems, that can happen in people with diabetes  Changing your diet can also help treat obesity, high blood pressure, and high cholesterol. These conditions can affect people with diabetes and can lead to future problems, such as heart attacks or strokes.  Who will work with me to change my diet? -- Your doctor or nurse will work with you to make a food plan to change your diet. They might also recommend that you work with a "dietitian." A dietitian is an expert on food and eating.  Do I need to eat at the same times every day? -- When and how often you should eat depends, in part, on the diabetes medicines you take. For example:  People who take about the same amount of insulin at the same time each day (called a "fixed regimen") should eat meals at the same times. This is also true for people who take pills that increase insulin levels, such as sulfonylureas. Eating meals at the same time every day helps prevent low blood sugar.  People who adjust the dose and timing of their insulin each day (called a "flexible regimen") do not always have to eat meals at the same time. That's because they can time their insulin dose for before they plan to eat, and also adjust the dose for how much they plan to eat.  People who take medicines that don't usually cause low blood sugar, such as metformin, don't have to eat meals at the same time every day.  What do I need to think about when planning what to eat? -- Our bodies break down the food we eat into small pieces called carbohydrates, proteins, and fats.  When planning what to eat, people with diabetes need to think about:  Carbohydrates (or " ""carbs") - Carbohydrates, which are sugars that our bodies use for energy, can raise a person's blood sugar level. Your doctor, nurse, or dietitian will tell you how many carbohydrates you should eat at each meal or snack. Foods that have carbohydrates include:  Bread, pasta, and rice  Vegetables and fruits  Dairy foods  Foods and drinks with added sugar  It is best to get your carbohydrates from fruits, vegetables, whole grains, and low-fat milk. It is best to avoid drinks with added sugar, like soda, juices, and sports drinks.   Protein - Your doctor, nurse, or dietitian will tell you how much protein you should eat each day. It is best to eat lean meats, fish, eggs, beans, peas, soy products, nuts, and seeds.  Fats - The type of fat you eat is more important than the amount of fat. "Saturated" and "trans" fats can increase your risk for heart problems, like a heart attack.  Foods that have saturated fats include meat, butter, cheese, and ice cream.  Foods that have trans fats include processed food with "partially hydrogenated oils" on the ingredient list. This may include fried foods, store bought cookies, muffins, pies, and cakes.  "Monounsaturated" and "polyunsaturated" fats are better for you. Foods with these types of fat include fish, avocado, olive oil, and nuts.  Calories - People need to eat a certain amount of calories each day to keep their weight the same. People who are overweight and want to lose weight need to eat fewer calories each day.  Fiber - Eating foods with a lot of fiber can help control a person's blood sugar level. Foods that have a lot of fiber include apples, green beans, peas, beans, lentils, nuts, oatmeal, and whole grains.  Salt - People who have high blood pressure should not eat foods that contain a lot of salt (also called sodium). People with high blood pressure should also eat healthy foods, such as fruits, vegetables, and low-fat dairy foods.  Alcohol - Having more than 1 " drink (for women) or 2 drinks (for men) a day can raise blood sugar levels. Also, drinks that have fruit juice or soda in them can raise blood sugar levels.  What can I do if I need to lose weight? -- If you need to lose weight, you can:  Exercise - Try to get at least 30 minutes of physical activity a day, most days of the week. Even gentle exercise, like walking, is good for your health. Some people with diabetes need to change their medicine dose before they exercise. They might also need to check their blood sugar levels before and after exercising.  Eat fewer calories - Your doctor, nurse, or dietitian can tell you how many calories you should eat each day in order to lose weight.  If you are worried about your weight, size, or shape, talk with your doctor, nurse, or dietitian. They can help you make changes to improve your health.  Can I eat the same foods as my family? -- Yes. You do not need to eat special foods if you have diabetes. You and your family can eat the same foods. Changing your diet is mostly about eating healthy foods and not eating too much.  What are the other parts of diabetes treatment? -- Besides changing your diet, the other parts of diabetes treatment are:  Exercise  Medicines  Some people with diabetes need to learn how to match their diet and exercise with their medicine dose. For example, people who use insulin might need to choose the dose of insulin they give themselves. To choose their dose, they need to think about:  What they plan to eat at the next meal  How much exercise they plan to do  What their blood sugar level is  If the diet and exercise do not match the medicine dose, a person's blood sugar level can get too low or too high. Blood sugar levels that are too low or too high can cause problems.  All topics are updated as new evidence becomes available and our peer review process is complete.  This topic retrieved from [employer] on: Sep 21, 2021.  Topic 32859 Version  7.0  Release: 29.4.2 - C29.263  © 2021 UpToDate, Inc. and/or its affiliates. All rights reserved.  Consumer Information Use and Disclaimer   This information is not specific medical advice and does not replace information you receive from your health care provider. This is only a brief summary of general information. It does NOT include all information about conditions, illnesses, injuries, tests, procedures, treatments, therapies, discharge instructions or life-style choices that may apply to you. You must talk with your health care provider for complete information about your health and treatment options. This information should not be used to decide whether or not to accept your health care provider's advice, instructions or recommendations. Only your health care provider has the knowledge and training to provide advice that is right for you. The use of this information is governed by the [employer] End User License Agreement, available at https://www.[employer]/en/solutions/Appwapp/about/craig.The use of for[MD] content is governed by the for[MD] Terms of Use. ©2021 UpToDate, Inc. All rights reserved.  Copyright   © 2021 UpToDate, Inc. and/or its affiliates. All rights reserved.           Signature: Lizzette Meadows DNP, BANDARP-C

## 2024-09-03 ENCOUNTER — OFFICE VISIT (OUTPATIENT)
Dept: PAIN MEDICINE | Facility: CLINIC | Age: 69
End: 2024-09-03
Payer: MEDICARE

## 2024-09-03 VITALS
RESPIRATION RATE: 18 BRPM | WEIGHT: 182 LBS | SYSTOLIC BLOOD PRESSURE: 151 MMHG | BODY MASS INDEX: 32.25 KG/M2 | DIASTOLIC BLOOD PRESSURE: 82 MMHG | HEART RATE: 53 BPM | HEIGHT: 63 IN

## 2024-09-03 DIAGNOSIS — Z79.899 ENCOUNTER FOR LONG-TERM (CURRENT) USE OF OTHER MEDICATIONS: ICD-10-CM

## 2024-09-03 DIAGNOSIS — M89.49 OSTEOARTHROSIS MULTIPLE SITES, NOT SPECIFIED AS GENERALIZED: Chronic | ICD-10-CM

## 2024-09-03 DIAGNOSIS — M25.551 HIP PAIN, RIGHT: Chronic | ICD-10-CM

## 2024-09-03 DIAGNOSIS — M47.816 SPONDYLOSIS WITHOUT MYELOPATHY OR RADICULOPATHY, LUMBAR REGION: Primary | Chronic | ICD-10-CM

## 2024-09-03 PROCEDURE — 1101F PT FALLS ASSESS-DOCD LE1/YR: CPT | Mod: CPTII,,, | Performed by: PHYSICIAN ASSISTANT

## 2024-09-03 PROCEDURE — 99999PBSHW POCT URINE DRUG SCREEN PRESUMP: Mod: PBBFAC,,,

## 2024-09-03 PROCEDURE — 96372 THER/PROPH/DIAG INJ SC/IM: CPT | Mod: PBBFAC | Performed by: PHYSICIAN ASSISTANT

## 2024-09-03 PROCEDURE — 3008F BODY MASS INDEX DOCD: CPT | Mod: CPTII,,, | Performed by: PHYSICIAN ASSISTANT

## 2024-09-03 PROCEDURE — 99215 OFFICE O/P EST HI 40 MIN: CPT | Mod: PBBFAC,25 | Performed by: PHYSICIAN ASSISTANT

## 2024-09-03 PROCEDURE — 99214 OFFICE O/P EST MOD 30 MIN: CPT | Mod: S$PBB,25,, | Performed by: PHYSICIAN ASSISTANT

## 2024-09-03 PROCEDURE — 3079F DIAST BP 80-89 MM HG: CPT | Mod: CPTII,,, | Performed by: PHYSICIAN ASSISTANT

## 2024-09-03 PROCEDURE — 1125F AMNT PAIN NOTED PAIN PRSNT: CPT | Mod: CPTII,,, | Performed by: PHYSICIAN ASSISTANT

## 2024-09-03 PROCEDURE — 3288F FALL RISK ASSESSMENT DOCD: CPT | Mod: CPTII,,, | Performed by: PHYSICIAN ASSISTANT

## 2024-09-03 PROCEDURE — 80305 DRUG TEST PRSMV DIR OPT OBS: CPT | Mod: PBBFAC | Performed by: PHYSICIAN ASSISTANT

## 2024-09-03 PROCEDURE — 99999 PR PBB SHADOW E&M-EST. PATIENT-LVL V: CPT | Mod: PBBFAC,,, | Performed by: PHYSICIAN ASSISTANT

## 2024-09-03 PROCEDURE — 3077F SYST BP >= 140 MM HG: CPT | Mod: CPTII,,, | Performed by: PHYSICIAN ASSISTANT

## 2024-09-03 PROCEDURE — 3051F HG A1C>EQUAL 7.0%<8.0%: CPT | Mod: CPTII,,, | Performed by: PHYSICIAN ASSISTANT

## 2024-09-03 PROCEDURE — 99999PBSHW PR PBB SHADOW TECHNICAL ONLY FILED TO HB: Mod: PBBFAC,,,

## 2024-09-03 PROCEDURE — 1159F MED LIST DOCD IN RCRD: CPT | Mod: CPTII,,, | Performed by: PHYSICIAN ASSISTANT

## 2024-09-03 RX ORDER — HYDROCODONE BITARTRATE AND ACETAMINOPHEN 10; 325 MG/1; MG/1
1 TABLET ORAL EVERY 8 HOURS PRN
Qty: 90 TABLET | Refills: 0 | Status: SHIPPED | OUTPATIENT
Start: 2024-09-07 | End: 2024-10-07

## 2024-09-03 RX ORDER — DICLOFENAC SODIUM 10 MG/G
2 GEL TOPICAL 4 TIMES DAILY
Qty: 10 EACH | Refills: 2 | Status: SHIPPED | OUTPATIENT
Start: 2024-09-03

## 2024-09-03 RX ORDER — HYDROCODONE BITARTRATE AND ACETAMINOPHEN 10; 325 MG/1; MG/1
1 TABLET ORAL EVERY 8 HOURS PRN
Qty: 90 TABLET | Refills: 0 | Status: SHIPPED | OUTPATIENT
Start: 2024-10-08 | End: 2024-11-07

## 2024-09-03 RX ORDER — HYDROCODONE BITARTRATE AND ACETAMINOPHEN 10; 325 MG/1; MG/1
1 TABLET ORAL EVERY 8 HOURS PRN
Qty: 90 TABLET | Refills: 0 | Status: SHIPPED | OUTPATIENT
Start: 2024-11-07 | End: 2024-12-07

## 2024-09-03 RX ORDER — KETOROLAC TROMETHAMINE 30 MG/ML
30 INJECTION, SOLUTION INTRAMUSCULAR; INTRAVENOUS
Status: COMPLETED | OUTPATIENT
Start: 2024-09-03 | End: 2024-09-03

## 2024-09-03 RX ADMIN — KETOROLAC TROMETHAMINE 30 MG: 30 INJECTION, SOLUTION INTRAMUSCULAR at 08:09

## 2024-09-06 ENCOUNTER — TELEPHONE (OUTPATIENT)
Dept: PRIMARY CARE CLINIC | Facility: CLINIC | Age: 69
End: 2024-09-06
Payer: MEDICARE

## 2024-09-06 DIAGNOSIS — Z12.31 ENCOUNTER FOR SCREENING MAMMOGRAM FOR BREAST CANCER: Primary | ICD-10-CM

## 2024-09-06 NOTE — TELEPHONE ENCOUNTER
----- Message from Angela Meyers sent at 9/6/2024 11:25 AM CDT -----  Pt needs a order for a routine Mammogram sent to [facility]. Her appointment is scheduled for 09/09

## 2024-09-25 RX ORDER — ISOPROPYL ALCOHOL 70 ML/100ML
SWAB TOPICAL
Qty: 200 EACH | Refills: 3 | Status: SHIPPED | OUTPATIENT
Start: 2024-09-25

## 2024-10-06 DIAGNOSIS — I10 ESSENTIAL HYPERTENSION: ICD-10-CM

## 2024-10-08 RX ORDER — NIFEDIPINE 90 MG/1
TABLET, EXTENDED RELEASE ORAL
Qty: 90 TABLET | Refills: 3 | Status: SHIPPED | OUTPATIENT
Start: 2024-10-08

## 2024-11-07 RX ORDER — LANCETS
EACH MISCELLANEOUS
Qty: 200 EACH | Refills: 3 | Status: SHIPPED | OUTPATIENT
Start: 2024-11-07
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
## 2024-12-03 ENCOUNTER — OFFICE VISIT (OUTPATIENT)
Dept: PAIN MEDICINE | Facility: CLINIC | Age: 69
End: 2024-12-03
Payer: MEDICARE

## 2024-12-03 VITALS
DIASTOLIC BLOOD PRESSURE: 68 MMHG | BODY MASS INDEX: 32.96 KG/M2 | RESPIRATION RATE: 18 BRPM | SYSTOLIC BLOOD PRESSURE: 130 MMHG | WEIGHT: 186 LBS | HEART RATE: 63 BPM | HEIGHT: 63 IN

## 2024-12-03 DIAGNOSIS — M89.49 OSTEOARTHROSIS MULTIPLE SITES, NOT SPECIFIED AS GENERALIZED: Chronic | ICD-10-CM

## 2024-12-03 DIAGNOSIS — M47.816 SPONDYLOSIS WITHOUT MYELOPATHY OR RADICULOPATHY, LUMBAR REGION: Primary | Chronic | ICD-10-CM

## 2024-12-03 DIAGNOSIS — Z79.899 ENCOUNTER FOR LONG-TERM (CURRENT) USE OF MEDICATIONS: ICD-10-CM

## 2024-12-03 DIAGNOSIS — M25.551 HIP PAIN, RIGHT: Chronic | ICD-10-CM

## 2024-12-03 PROCEDURE — 99999PBSHW PR PBB SHADOW TECHNICAL ONLY FILED TO HB: Mod: PBBFAC,,,

## 2024-12-03 PROCEDURE — 80305 DRUG TEST PRSMV DIR OPT OBS: CPT | Mod: PBBFAC | Performed by: PHYSICIAN ASSISTANT

## 2024-12-03 PROCEDURE — 99214 OFFICE O/P EST MOD 30 MIN: CPT | Mod: S$PBB,25,, | Performed by: PHYSICIAN ASSISTANT

## 2024-12-03 PROCEDURE — 3051F HG A1C>EQUAL 7.0%<8.0%: CPT | Mod: CPTII,,, | Performed by: PHYSICIAN ASSISTANT

## 2024-12-03 PROCEDURE — 1159F MED LIST DOCD IN RCRD: CPT | Mod: CPTII,,, | Performed by: PHYSICIAN ASSISTANT

## 2024-12-03 PROCEDURE — 99999PBSHW POCT URINE DRUG SCREEN PRESUMP: Mod: PBBFAC,,,

## 2024-12-03 PROCEDURE — 99999 PR PBB SHADOW E&M-EST. PATIENT-LVL V: CPT | Mod: PBBFAC,,, | Performed by: PHYSICIAN ASSISTANT

## 2024-12-03 PROCEDURE — 3075F SYST BP GE 130 - 139MM HG: CPT | Mod: CPTII,,, | Performed by: PHYSICIAN ASSISTANT

## 2024-12-03 PROCEDURE — 96372 THER/PROPH/DIAG INJ SC/IM: CPT | Mod: PBBFAC | Performed by: PHYSICIAN ASSISTANT

## 2024-12-03 PROCEDURE — 3288F FALL RISK ASSESSMENT DOCD: CPT | Mod: CPTII,,, | Performed by: PHYSICIAN ASSISTANT

## 2024-12-03 PROCEDURE — 99215 OFFICE O/P EST HI 40 MIN: CPT | Mod: PBBFAC | Performed by: PHYSICIAN ASSISTANT

## 2024-12-03 PROCEDURE — 3078F DIAST BP <80 MM HG: CPT | Mod: CPTII,,, | Performed by: PHYSICIAN ASSISTANT

## 2024-12-03 PROCEDURE — 3008F BODY MASS INDEX DOCD: CPT | Mod: CPTII,,, | Performed by: PHYSICIAN ASSISTANT

## 2024-12-03 PROCEDURE — 1125F AMNT PAIN NOTED PAIN PRSNT: CPT | Mod: CPTII,,, | Performed by: PHYSICIAN ASSISTANT

## 2024-12-03 PROCEDURE — 1101F PT FALLS ASSESS-DOCD LE1/YR: CPT | Mod: CPTII,,, | Performed by: PHYSICIAN ASSISTANT

## 2024-12-03 RX ORDER — HYDROCODONE BITARTRATE AND ACETAMINOPHEN 10; 325 MG/1; MG/1
1 TABLET ORAL EVERY 8 HOURS PRN
Qty: 90 TABLET | Refills: 0 | Status: SHIPPED | OUTPATIENT
Start: 2024-12-07 | End: 2025-01-06

## 2024-12-03 RX ORDER — DICLOFENAC SODIUM 10 MG/G
2 GEL TOPICAL 4 TIMES DAILY
Qty: 10 EACH | Refills: 2 | Status: SHIPPED | OUTPATIENT
Start: 2024-12-03

## 2024-12-03 RX ORDER — HYDROCODONE BITARTRATE AND ACETAMINOPHEN 10; 325 MG/1; MG/1
1 TABLET ORAL EVERY 8 HOURS PRN
Qty: 90 TABLET | Refills: 0 | Status: SHIPPED | OUTPATIENT
Start: 2025-01-06 | End: 2025-02-05

## 2024-12-03 RX ORDER — KETOROLAC TROMETHAMINE 30 MG/ML
30 INJECTION, SOLUTION INTRAMUSCULAR; INTRAVENOUS
Status: COMPLETED | OUTPATIENT
Start: 2024-12-03 | End: 2024-12-03

## 2024-12-03 RX ORDER — HYDROCODONE BITARTRATE AND ACETAMINOPHEN 10; 325 MG/1; MG/1
1 TABLET ORAL EVERY 8 HOURS PRN
Qty: 90 TABLET | Refills: 0 | Status: SHIPPED | OUTPATIENT
Start: 2025-02-05 | End: 2025-03-07

## 2024-12-03 RX ADMIN — KETOROLAC TROMETHAMINE 30 MG: 60 INJECTION, SOLUTION INTRAMUSCULAR at 08:12

## 2024-12-18 LAB
LEFT EYE DM RETINOPATHY: NEGATIVE
RIGHT EYE DM RETINOPATHY: NEGATIVE

## 2025-01-06 ENCOUNTER — PATIENT OUTREACH (OUTPATIENT)
Dept: PRIMARY CARE CLINIC | Facility: CLINIC | Age: 70
End: 2025-01-06
Payer: MEDICARE

## 2025-01-07 ENCOUNTER — OFFICE VISIT (OUTPATIENT)
Dept: PRIMARY CARE CLINIC | Facility: CLINIC | Age: 70
End: 2025-01-07
Payer: MEDICARE

## 2025-01-07 VITALS
SYSTOLIC BLOOD PRESSURE: 119 MMHG | OXYGEN SATURATION: 98 % | HEIGHT: 63 IN | TEMPERATURE: 98 F | WEIGHT: 181 LBS | DIASTOLIC BLOOD PRESSURE: 76 MMHG | RESPIRATION RATE: 20 BRPM | HEART RATE: 60 BPM | BODY MASS INDEX: 32.07 KG/M2

## 2025-01-07 DIAGNOSIS — M47.816 SPONDYLOSIS WITHOUT MYELOPATHY OR RADICULOPATHY, LUMBAR REGION: Chronic | ICD-10-CM

## 2025-01-07 DIAGNOSIS — E11.9 TYPE 2 DIABETES MELLITUS WITHOUT COMPLICATION, WITHOUT LONG-TERM CURRENT USE OF INSULIN: ICD-10-CM

## 2025-01-07 DIAGNOSIS — I10 ESSENTIAL HYPERTENSION: ICD-10-CM

## 2025-01-07 DIAGNOSIS — K21.9 GASTROESOPHAGEAL REFLUX DISEASE, UNSPECIFIED WHETHER ESOPHAGITIS PRESENT: ICD-10-CM

## 2025-01-07 DIAGNOSIS — M89.49 OSTEOARTHROSIS MULTIPLE SITES, NOT SPECIFIED AS GENERALIZED: Chronic | ICD-10-CM

## 2025-01-07 DIAGNOSIS — M25.551 HIP PAIN, RIGHT: Chronic | ICD-10-CM

## 2025-01-07 DIAGNOSIS — N18.31 CHRONIC KIDNEY DISEASE, STAGE 3A: ICD-10-CM

## 2025-01-07 DIAGNOSIS — E11.65 TYPE 2 DIABETES MELLITUS WITH HYPERGLYCEMIA, WITHOUT LONG-TERM CURRENT USE OF INSULIN: Primary | ICD-10-CM

## 2025-01-07 LAB
ALBUMIN SERPL BCP-MCNC: 3.6 G/DL (ref 3.4–4.8)
ALBUMIN/GLOB SERPL: 1 {RATIO}
ALP SERPL-CCNC: 57 U/L (ref 40–150)
ALT SERPL W P-5'-P-CCNC: 13 U/L
ANION GAP SERPL CALCULATED.3IONS-SCNC: 15 MMOL/L (ref 7–16)
AST SERPL W P-5'-P-CCNC: 21 U/L (ref 5–34)
BASOPHILS # BLD AUTO: 0.06 K/UL (ref 0–0.2)
BASOPHILS NFR BLD AUTO: 1.1 % (ref 0–1)
BILIRUB SERPL-MCNC: 0.5 MG/DL
BUN SERPL-MCNC: 22 MG/DL (ref 10–20)
BUN/CREAT SERPL: 22 (ref 6–20)
CALCIUM SERPL-MCNC: 8.9 MG/DL (ref 8.4–10.2)
CHLORIDE SERPL-SCNC: 110 MMOL/L (ref 98–107)
CO2 SERPL-SCNC: 20 MMOL/L (ref 23–31)
CREAT SERPL-MCNC: 0.99 MG/DL (ref 0.55–1.02)
CREAT UR-MCNC: 53 MG/DL (ref 15–325)
DIFFERENTIAL METHOD BLD: ABNORMAL
EGFR (NO RACE VARIABLE) (RUSH/TITUS): 61 ML/MIN/1.73M2
EOSINOPHIL # BLD AUTO: 0.14 K/UL (ref 0–0.5)
EOSINOPHIL NFR BLD AUTO: 2.5 % (ref 1–4)
ERYTHROCYTE [DISTWIDTH] IN BLOOD BY AUTOMATED COUNT: 16.9 % (ref 11.5–14.5)
EST. AVERAGE GLUCOSE BLD GHB EST-MCNC: 180 MG/DL
GLOBULIN SER-MCNC: 3.5 G/DL (ref 2–4)
GLUCOSE SERPL-MCNC: 157 MG/DL (ref 82–115)
GLUCOSE SERPL-MCNC: 161 MG/DL (ref 70–110)
HBA1C MFR BLD HPLC: 7.9 %
HCT VFR BLD AUTO: 48.3 % (ref 38–47)
HGB BLD-MCNC: 14.1 G/DL (ref 12–16)
IMM GRANULOCYTES # BLD AUTO: 0.02 K/UL (ref 0–0.04)
IMM GRANULOCYTES NFR BLD: 0.4 % (ref 0–0.4)
LYMPHOCYTES # BLD AUTO: 1.39 K/UL (ref 1–4.8)
LYMPHOCYTES NFR BLD AUTO: 25 % (ref 27–41)
MCH RBC QN AUTO: 27.3 PG (ref 27–31)
MCHC RBC AUTO-ENTMCNC: 29.2 G/DL (ref 32–36)
MCV RBC AUTO: 93.6 FL (ref 80–96)
MICROALBUMIN UR-MCNC: 3.7 MG/DL
MICROALBUMIN/CREAT RATIO PNL UR: 69.8 MG/G (ref 0–30)
MONOCYTES # BLD AUTO: 0.55 K/UL (ref 0–0.8)
MONOCYTES NFR BLD AUTO: 9.9 % (ref 2–6)
MPC BLD CALC-MCNC: 11.8 FL (ref 9.4–12.4)
NEUTROPHILS # BLD AUTO: 3.4 K/UL (ref 1.8–7.7)
NEUTROPHILS NFR BLD AUTO: 61.1 % (ref 53–65)
NRBC # BLD AUTO: 0 X10E3/UL
NRBC, AUTO (.00): 0 %
PLATELET # BLD AUTO: 286 K/UL (ref 150–400)
POTASSIUM SERPL-SCNC: 4 MMOL/L (ref 3.5–5.1)
PROT SERPL-MCNC: 7.1 G/DL (ref 5.8–7.6)
RBC # BLD AUTO: 5.16 M/UL (ref 4.2–5.4)
SODIUM SERPL-SCNC: 141 MMOL/L (ref 136–145)
WBC # BLD AUTO: 5.56 K/UL (ref 4.5–11)

## 2025-01-07 PROCEDURE — 1159F MED LIST DOCD IN RCRD: CPT | Mod: ,,, | Performed by: NURSE PRACTITIONER

## 2025-01-07 PROCEDURE — 3074F SYST BP LT 130 MM HG: CPT | Mod: ,,, | Performed by: NURSE PRACTITIONER

## 2025-01-07 PROCEDURE — 80053 COMPREHEN METABOLIC PANEL: CPT | Mod: ,,, | Performed by: CLINICAL MEDICAL LABORATORY

## 2025-01-07 PROCEDURE — 3288F FALL RISK ASSESSMENT DOCD: CPT | Mod: ,,, | Performed by: NURSE PRACTITIONER

## 2025-01-07 PROCEDURE — 1126F AMNT PAIN NOTED NONE PRSNT: CPT | Mod: ,,, | Performed by: NURSE PRACTITIONER

## 2025-01-07 PROCEDURE — 83036 HEMOGLOBIN GLYCOSYLATED A1C: CPT | Mod: ,,, | Performed by: CLINICAL MEDICAL LABORATORY

## 2025-01-07 PROCEDURE — 1101F PT FALLS ASSESS-DOCD LE1/YR: CPT | Mod: ,,, | Performed by: NURSE PRACTITIONER

## 2025-01-07 PROCEDURE — 99214 OFFICE O/P EST MOD 30 MIN: CPT | Mod: ,,, | Performed by: NURSE PRACTITIONER

## 2025-01-07 PROCEDURE — 3008F BODY MASS INDEX DOCD: CPT | Mod: ,,, | Performed by: NURSE PRACTITIONER

## 2025-01-07 PROCEDURE — 82043 UR ALBUMIN QUANTITATIVE: CPT | Mod: ,,, | Performed by: CLINICAL MEDICAL LABORATORY

## 2025-01-07 PROCEDURE — 85025 COMPLETE CBC W/AUTO DIFF WBC: CPT | Mod: ,,, | Performed by: CLINICAL MEDICAL LABORATORY

## 2025-01-07 PROCEDURE — 1160F RVW MEDS BY RX/DR IN RCRD: CPT | Mod: ,,, | Performed by: NURSE PRACTITIONER

## 2025-01-07 PROCEDURE — 3078F DIAST BP <80 MM HG: CPT | Mod: ,,, | Performed by: NURSE PRACTITIONER

## 2025-01-07 PROCEDURE — 82570 ASSAY OF URINE CREATININE: CPT | Mod: ,,, | Performed by: CLINICAL MEDICAL LABORATORY

## 2025-01-07 RX ORDER — GLIPIZIDE 2.5 MG/1
2.5 TABLET, EXTENDED RELEASE ORAL
Qty: 90 TABLET | Refills: 3 | Status: SHIPPED | OUTPATIENT
Start: 2025-01-07 | End: 2026-01-07

## 2025-01-07 RX ORDER — LANCETS
1 EACH MISCELLANEOUS
Qty: 200 EACH | Refills: 2 | Status: SHIPPED | OUTPATIENT
Start: 2025-01-07

## 2025-01-07 RX ORDER — DICLOFENAC SODIUM 10 MG/G
2 GEL TOPICAL 4 TIMES DAILY
Qty: 10 EACH | Refills: 2 | Status: SHIPPED | OUTPATIENT
Start: 2025-01-07

## 2025-01-07 NOTE — PROGRESS NOTES
[facility]  [address] [place] [address]  Ph: [phone] Lizzette Meadows DNP, FNP-C  [facility]  Primary Care       PATIENT NAME: Selena Proctor   : 1955    AGE: 69 y.o. DATE: 2025    MRN: [id number]        Reason for Visit / Chief Complaint:  Hypertension, Hyperlipidemia, Gastroesophageal Reflux, and Diabetes (Pt has eaten blood sugar 161 . REFILL -Glipizide and Voltaren gel)     Subjective:     HPI: Patient here for routine follow up visit. Has Type 2 Diabetes, HTN, GERD, Hyperlipidemia.     Patient states she checks her blood sugar twice a day. States it ranges 180s to low 200s. States she has been eating rice with tomatoes; sometimes eats toast with jelly. Will discuss diabetic diet. States she snacks on chips; drinks Ginger ale.     Patient sees pain management (Dr. Lopez) for chronic bilateral hip pain; states she has history of bilateral hip replacement with right hip revision.     Patient states she sees cardiology (Dr. Parnell) in [place], [place].            Review of Systems: Review of Systems   Constitutional: Negative.  Negative for chills, fatigue and fever.   HENT: Negative.     Respiratory: Negative.  Negative for cough, chest tightness and shortness of breath.    Cardiovascular:  Negative for chest pain.   Gastrointestinal:  Negative for abdominal pain, constipation, diarrhea, nausea and vomiting.   Genitourinary:  Negative for dysuria.   Musculoskeletal:  Positive for arthralgias (chronic bilateral hip pain). Negative for gait problem.   Skin:  Negative for rash.   Neurological:  Negative for headaches.          Review of patient's allergies indicates:   Allergen Reactions    Betadine [povidone-iodine] Itching        Med List:  Current Outpatient Medications on File Prior to Visit   Medication Sig Dispense Refill    ACCU-CHEK GUIDE TEST STRIPS Strp TEST BLOOD SUGAR TWO TIMES DAILY 200 strip 3    alcohol swabs (DROPSAFE ALCOHOL PREP  PADS) PadM USE AS DIRECTED 200 each 3    aspirin (ECOTRIN) 81 MG EC tablet Take 81 mg by mouth once daily.       blood-glucose meter (ACCU-CHEK GUIDE GLUCOSE METER) Misc Use as directed 1 each 0    diosmin complex no.1 (VASCULERA) 630 mg Tab Take 1 tablet by mouth Daily. Samples from [facility]      ezetimibe (ZETIA) 10 mg tablet Take 1 tablet (10 mg total) by mouth once daily. 90 tablet 2    furosemide (LASIX) 40 MG tablet TAKE 1 TABLET EVERY DAY AS NEEDED 90 tablet 3    HYDROcodone-acetaminophen (NORCO)  mg per tablet Take 1 tablet by mouth every 8 (eight) hours as needed for Pain (pain). 90 tablet 0    JARDIANCE 10 mg tablet TAKE 1 TABLET EVERY DAY 90 tablet 10    losartan-hydrochlorothiazide 100-25 mg (HYZAAR) 100-25 mg per tablet TAKE 1 TABLET EVERY DAY 90 tablet 3    metoprolol succinate (TOPROL-XL) 100 MG 24 hr tablet TAKE 1 TABLET EVERY DAY 90 tablet 3    montelukast (SINGULAIR) 10 mg tablet Take 1 tablet (10 mg total) by mouth daily as needed (allergic rhinitis). 90 tablet 2    naloxone (NARCAN) 4 mg/actuation Spry PUT 1 SPRAY IN 1 NOSTRIL WAIT 2 MINUTES THEN REPEAT DOSE IN THE OTHER NOSTRIL      NIFEdipine (PROCARDIA-XL) 90 MG (OSM) 24 hr tablet TAKE 1 TABLET EVERY DAY 90 tablet 3    omeprazole (PRILOSEC) 40 MG capsule Take 1 capsule (40 mg total) by mouth once daily. 90 capsule 2    pitavastatin calcium (LIVALO) 4 mg Tab Take one tablet by mouth three times per week on Monday, Wednesday, Friday. 90 tablet 2    potassium chloride (MICRO-K) 10 MEQ CpSR TAKE 2 CAPSULES TWICE DAILY 360 capsule 3    spironolactone (ALDACTONE) 25 MG tablet Take 1 tablet (25 mg total) by mouth once daily. 90 tablet 0    [DISCONTINUED] diclofenac sodium (VOLTAREN ARTHRITIS PAIN) 1 % Gel Apply 2 g topically 4 (four) times daily. Apply to knees, elbows, joints as needed 10 each 2    [DISCONTINUED] HYDROcodone-acetaminophen (NORCO)  mg per tablet Take 1 tablet by mouth every 8  (eight) hours as needed for Pain (pain). 90 tablet 0    [DISCONTINUED] lancets (ACCU-CHEK FASTCLIX LANCET DRUM) Misc 1 each by Misc.(Non-Drug; Combo Route) route 2 (two) times daily before meals. 200 each 2    [DISCONTINUED] lancets (TRUEPLUS LANCETS) 33 gauge Misc USE AS DIRECTED 200 each 0    [DISCONTINUED] lancing device with lancets (ACCU-CHEK SOFT DEV LANCETS) Kit USE AS DIRECTED 100 each 3    gabapentin (NEURONTIN) 300 MG capsule Take 1 capsule (300 mg total) by mouth 3 (three) times daily. (Patient not taking: Reported on 8/5/2024) 90 capsule 11    SITagliptan-metformin (JANUMET XR) 50-1,000 mg TM24 Take 1 tablet by mouth once daily. 90 tablet 3    TRUE METRIX GLUCOSE METER kit USE AS DIRECTED (Patient not taking: Reported on 12/3/2024) 180 each 2    [DISCONTINUED] ACCU-CHEK SOFTCLIX LANCETS Misc USE AS DIRECTED (Patient not taking: Reported on 1/7/2025) 200 each 3    [DISCONTINUED] docusate sodium (COLACE) 100 MG capsule Take 1 capsule (100 mg total) by mouth 2 (two) times daily. (Patient not taking: Reported on 12/3/2024) 28 capsule 0    [DISCONTINUED] docusate sodium (COLACE) 100 MG capsule Take 1 capsule (100 mg total) by mouth 2 (two) times daily. (Patient not taking: Reported on 12/3/2024) 28 capsule 0    [DISCONTINUED] glipiZIDE (GLUCOTROL) 2.5 MG TR24 Take 1 tablet (2.5 mg total) by mouth daily with breakfast. 90 tablet 3    [DISCONTINUED] HYDROcodone-acetaminophen (NORCO)  mg per tablet Take 1 tablet by mouth every 8 (eight) hours as needed for Pain (pain). 90 tablet 0    [DISCONTINUED] lancets (ACCU-CHEK SOFTCLIX LANCETS) Misc USE AS DIRECTED (Patient not taking: Reported on 1/7/2025) 200 each 10    [DISCONTINUED] montelukast (SINGULAIR) 10 mg tablet Take 1 tablet (10 mg total) by mouth as needed (Allergies). (Patient not taking: Reported on 1/7/2025) 90 tablet 0    [DISCONTINUED] mupirocin (BACTROBAN) 2 % ointment Apply topically once daily. (Patient not taking: Reported on 12/3/2024) 30 g 12     [DISCONTINUED] silver sulfADIAZINE 1% (SILVADENE) 1 % cream Apply topically once daily. (Patient not taking: Reported on 12/3/2024) 1000 g 11    [DISCONTINUED] spironolactone (ALDACTONE) 25 MG tablet TAKE 1 TABLET EVERY DAY (Patient not taking: Reported on 12/3/2024) 90 tablet 0     No current facility-administered medications on file prior to visit.       Medical/Social/Family History:  Past Medical History:   Diagnosis Date    Acid reflux     Coronary arteriosclerosis     Diabetes     Diabetes mellitus, type 2     Hypertension     Skin cancer     left leg    Spondylosis without myelopathy or radiculopathy, lumbar region       Social History     Tobacco Use   Smoking Status Never    Passive exposure: Current (.)   Smokeless Tobacco Never      Social History     Substance and Sexual Activity   Alcohol Use Not Currently       Family History   Problem Relation Name Age of Onset    Hypertension Mother      Heart disease Mother      Diabetes Sister      Hypertension Sister      Hypertension Brother      Alcohol abuse Father      Cancer Father      Hypertension Son      Mental illness Maternal Aunt        Past Surgical History:   Procedure Laterality Date    APPENDECTOMY      BIOPSY OF LESION Left 3/29/2023    Procedure: BIOPSY, LESION;  Surgeon: Andrez Norman DO;  Location: [facility] OR;  Service: General;  Laterality: Left;  excisional biospy of left thigh with rotational flap    BIOPSY OR EXCISION, LESION  2023    Procedure: BIOPSY OR EXCISION, LESION left thigh and abdomen;  Surgeon: Andrez Norman DO;  Location: [facility] OR;  Service: General;;    BLOCK, NERVE, OBTURATOR Right 2019    Right Femoral/Obturator Nerve Block  Dr Headley     SECTION      x2    CORONARY ARTERY BYPASS GRAFT      KNEE ARTHROPLASTY Right     KNEE ARTHROSCOPY Left     LIPOMA RESECTION Right 2023    Procedure: Excision right leg fatty tumor;  Surgeon: Andrez Norman DO;  Location: [facility] OR;  Service:  "General;  Laterality: Right;    SKIN GRAFT      Left Thigh    TOTAL HIP ARTHROPLASTY Bilateral     TUBAL LIGATION        Immunization History   Administered Date(s) Administered    COVID-19 MRNA, LN-S PF (MODERNA HALF 0.25 ML DOSE) 10/29/2021    COVID-19, MRNA, LN-S, PF (MODERNA FULL 0.5 ML DOSE) 02/01/2021, 02/10/2021, 03/10/2021    Influenza - Quadrivalent - PF *Preferred* (6 months and older) 02/02/2022, 11/01/2023    Pneumococcal Conjugate - 13 Valent 12/04/2017    Pneumococcal Conjugate - 20 Valent 06/23/2022    Tdap 08/01/2022    Zoster Recombinant 08/03/2023, 11/09/2023          Objective:      Vitals:    01/07/25 0910   BP: 119/76   BP Location: Right arm   Patient Position: Sitting   Pulse: 60   Resp: 20   Temp: 97.8 °F (36.6 °C)   TempSrc: Oral   SpO2: 98%   Weight: 82.1 kg (181 lb)   Height: 5' 3" (1.6 m)     Body mass index is 32.06 kg/m².     Physical Exam: Physical Exam  Vitals and nursing note reviewed.   Constitutional:       General: She is not in acute distress.     Appearance: Normal appearance. She is not ill-appearing, toxic-appearing or diaphoretic.   HENT:      Head: Normocephalic.      Nose: Nose normal.      Mouth/Throat:      Mouth: Mucous membranes are moist.   Eyes:      Extraocular Movements: Extraocular movements intact.      Conjunctiva/sclera: Conjunctivae normal.      Pupils: Pupils are equal, round, and reactive to light.   Cardiovascular:      Rate and Rhythm: Normal rate and regular rhythm.      Heart sounds: Normal heart sounds.   Pulmonary:      Effort: Pulmonary effort is normal. No respiratory distress.      Breath sounds: Normal breath sounds. No stridor. No wheezing, rhonchi or rales.   Abdominal:      General: Bowel sounds are normal. There is no distension.      Palpations: Abdomen is soft.      Tenderness: There is no abdominal tenderness.   Musculoskeletal:         General: Normal range of motion.      Cervical back: Normal range of motion and neck supple.   Skin:     " General: Skin is warm and dry.   Neurological:      General: No focal deficit present.      Mental Status: She is alert and oriented to person, place, and time.      Gait: Gait normal.   Psychiatric:         Mood and Affect: Mood normal.         Behavior: Behavior normal.                Assessment:          ICD-10-CM ICD-9-CM   1. Type 2 diabetes mellitus with hyperglycemia, without long-term current use of insulin  E11.65 250.00     790.29   2. Essential hypertension  I10 401.9   3. Chronic kidney disease, stage 3a  N18.31 585.3   4. Gastroesophageal reflux disease, unspecified whether esophagitis present  K21.9 530.81   5. Type 2 diabetes mellitus without complication, without long-term current use of insulin  E11.9 250.00   6. Spondylosis without myelopathy or radiculopathy, lumbar region  M47.816 721.3   7. Osteoarthrosis multiple sites, not specified as generalized  M89.49 715.89   8. Hip pain, right  M25.551 719.45        Plan:       Type 2 diabetes mellitus with hyperglycemia, without long-term current use of insulin  -     CBC Auto Differential; Future; Expected date: 01/07/2025  -     Comprehensive Metabolic Panel; Future; Expected date: 01/07/2025  -     Hemoglobin A1C; Future; Expected date: 01/07/2025  -     lancets (ACCU-CHEK FASTCLIX LANCET DRUM) Misc; 1 each by Misc.(Non-Drug; Combo Route) route 2 (two) times daily before meals.  Dispense: 200 each; Refill: 2  -     Microalbumin/Creatinine Ratio, Urine  -     POCT glucose  Discuss diabetic diet and exercise      Essential hypertension  -     CBC Auto Differential; Future; Expected date: 01/07/2025  -     Comprehensive Metabolic Panel; Future; Expected date: 01/07/2025    Chronic kidney disease, stage 3a  -     Comprehensive Metabolic Panel; Future; Expected date: 01/07/2025    Gastroesophageal reflux disease, unspecified whether esophagitis present    Type 2 diabetes mellitus without complication, without long-term current use of insulin  -      glipiZIDE (GLUCOTROL) 2.5 MG TR24; Take 1 tablet (2.5 mg total) by mouth daily with breakfast.  Dispense: 90 tablet; Refill: 3    Spondylosis without myelopathy or radiculopathy, lumbar region  -     diclofenac sodium (VOLTAREN ARTHRITIS PAIN) 1 % Gel; Apply 2 g topically 4 (four) times daily. Apply to knees, elbows, joints as needed  Dispense: 10 each; Refill: 2    Osteoarthrosis multiple sites, not specified as generalized  -     diclofenac sodium (VOLTAREN ARTHRITIS PAIN) 1 % Gel; Apply 2 g topically 4 (four) times daily. Apply to knees, elbows, joints as needed  Dispense: 10 each; Refill: 2    Hip pain, right  -     diclofenac sodium (VOLTAREN ARTHRITIS PAIN) 1 % Gel; Apply 2 g topically 4 (four) times daily. Apply to knees, elbows, joints as needed  Dispense: 10 each; Refill: 2          New & refilled meds:  Requested Prescriptions     Signed Prescriptions Disp Refills    lancets (ACCU-CHEK FASTCLIX LANCET DRUM) Misc 200 each 2     Si each by Misc.(Non-Drug; Combo Route) route 2 (two) times daily before meals.    glipiZIDE (GLUCOTROL) 2.5 MG TR24 90 tablet 3     Sig: Take 1 tablet (2.5 mg total) by mouth daily with breakfast.    diclofenac sodium (VOLTAREN ARTHRITIS PAIN) 1 % Gel 10 each 2     Sig: Apply 2 g topically 4 (four) times daily. Apply to knees, elbows, joints as needed       Follow up in about 3 months (around 2025), or if symptoms worsen or fail to improve, for Hypertension, diabetes, hyperlipidemia, labs.     Patient Instructions   Patient Education       Controlling Your Blood Pressure Through Lifestyle   The Basics   Written by the doctors and editors at [facility]   What does my lifestyle have to do with my blood pressure? -- The things you do and the foods you eat have a big effect on your blood pressure and your overall health. Following the right lifestyle can:  Lower your blood pressure or keep you from getting high blood pressure in the first place  Reduce your need for blood pressure  "medicines  Make medicines for high blood pressure work better, if you do take them  Lower the chances that you'll have a heart attack or stroke, or develop kidney disease  Which lifestyle choices will help lower my blood pressure? -- Here's what you can do:  Lose weight (if you are overweight)  Choose a diet rich in fruits, vegetables, and low-fat dairy products, and low in meats, sweets, and refined grains  Eat less salt (sodium)  Do something active for at least 30 minutes a day on most days of the week  Limit the amount of alcohol you drink  If you have high blood pressure, it's also very important to quit smoking (if you smoke). Quitting smoking might not bring your blood pressure down. But it will lower the chances that you'll have a heart attack or stroke, and it will help you feel better and live longer.  Start low and go slow -- The changes listed above might sound like a lot, but don't worry. You don't have to change everything all at once. The key to improving your lifestyle is to "start low and go slow." Choose 1 small, specific thing to change and try doing it for a while. If it works for you, keep doing it until it becomes a habit. If it doesn't, don't give up. Choose something else to change and see how that goes.  Let's say, for example, that you would like to improve your diet. If you're the type of person who eats cheeseburgers and French fries all the time, you can't switch to eating just salads from one day to the next. When people try to make changes like that, they often fail. Then they feel frustrated and tend to give up. So instead of trying to change everything about your diet in 1 day, change 1 or 2 small things about your diet and give yourself time to get used to those changes. For instance, keep the cheeseburger but give up the French fries. Or eat the same things but cut your portions in half.  As you find things that you are able to change and stick with, keep adding new changes. In " "time, you will see that you can actually change a lot. You just have to get used to the changes slowly.  Lose weight -- When people think about losing weight, they sometimes make it more complicated than it really is. To lose weight, you have to either eat less or move more. If you do both of those things, it's even better. But there is no single weight-loss diet or activity that's better than any other. When it comes to weight loss, the most effective plan is the one that you'll stick with.  Improve your diet -- There is no single diet that is right for everyone. But in general, a healthy diet can include:  Lots of fruits, vegetables, and whole grains  Some beans, peas, lentils, chickpeas, and similar foods  Some nuts, such as walnuts, almonds, and peanuts  Fat-free or low-fat milk and milk products  Some fish  To have a healthy diet, it's also important to limit or avoid sugar, sweets, meats, and refined grains. (Refined grains are found in white bread, white rice, most forms of pasta, and most packaged "snack" foods.)  Reduce salt -- Many people think that eating a low-sodium diet means avoiding the salt shaker and not adding salt when cooking. The truth is, not adding salt at the table or when you cook will only help a little. Almost all of the sodium you eat is already in the food you buy at the grocery store or at restaurants (figure 1).  The most important thing you can do to cut down on sodium is to eat less processed food. That means that you should avoid most foods that are sold in cans, boxes, jars, and bags. You should also eat in restaurants less often.  To reduce the amount of sodium you get, buy fresh or fresh-frozen fruits, vegetables, and meats. (Fresh-frozen foods have had nothing added to them before freezing.) Then you can make meals at home, from scratch, with these ingredients.  As with the other changes, don't try to cut out salt all at once. Instead, choose 1 or 2 foods that have a lot of " "sodium and try to replace them with low-sodium choices. When you get used to those low-sodium options, find another food or 2 to change. Then keep going, until all the foods you eat are sodium-free or low in sodium.  Become more active -- If you want to be more active, you don't have to go to the gym or get all sweaty. It is possible to increase your activity level while doing everyday things you enjoy. Walking, gardening, and dancing are just a few of the things that you might try. As with all the other changes, the key is not to do too much too fast. If you don't do any activity now, start by walking for just a few minutes every other day. Do that for a few weeks. If you stick with it, try doing it for longer. But if you find that you don't like walking, try a different activity.  Drink less alcohol -- If you are a woman, do not have more than 1 "standard drink" of alcohol a day. If you are a man, do not have more than 2. A "standard drink" is:  A can or bottle that has 12 ounces of beer  A glass that has 5 ounces of wine  A shot that has 1.5 ounces of whiskey  Where should I start? -- If you want to improve your lifestyle, start by making the changes that you think would be easiest for you. If you used to exercise and just got out of the habit, maybe it would be easy for you to start exercising again. Or if you actually like cooking meals from scratch, maybe the first thing you should focus on is eating home-cooked meals that are low in sodium.  Whatever you tackle first, choose specific, realistic goals, and give yourself a deadline. For example, do not decide that you are going to "exercise more." Instead, decide that you are going to walk for 10 minutes on Monday, Wednesday, and Friday, and that you are going to do this for the next 2 weeks.  When lifestyle changes are too general, people have a hard time following through.  Now go. You can do it!  All topics are updated as new evidence becomes available and " our peer review process is complete.  This topic retrieved from [employer] on: Sep 21, 2021.  Topic 94726 Version 8.0  Release: 29.4.2 - C29.263  © 2021 UpToDate, Inc. and/or its affiliates. All rights reserved.  figure 1: Sources of sodium in your diet     Graphic 90259 Version 2.0    Consumer Information Use and Disclaimer   This information is not specific medical advice and does not replace information you receive from your health care provider. This is only a brief summary of general information. It does NOT include all information about conditions, illnesses, injuries, tests, procedures, treatments, therapies, discharge instructions or life-style choices that may apply to you. You must talk with your health care provider for complete information about your health and treatment options. This information should not be used to decide whether or not to accept your health care provider's advice, instructions or recommendations. Only your health care provider has the knowledge and training to provide advice that is right for you. The use of this information is governed by the [employer] End User License Agreement, available at https://www.[employer]/en/solutions/[employer]/about/craig.The use of [employer] content is governed by the [employer] Terms of Use. ©2021 UpToDate, Inc. All rights reserved.  Copyright   © 2021 UpToDate, Inc. and/or its affiliates. All rights reserved.  Patient Education       Diabetes and Diet   The Basics   Written by the doctors and editors at [employer]   Why is diet important in diabetes? -- Diet is important because it is part of diabetes treatment. Many people need to change what they eat and how much they eat to help treat their diabetes. It is important for people to treat their diabetes so that they:  Keep their blood sugar at or near a normal level  Prevent long-term problems, such as heart or kidney problems, that can happen in people with diabetes  Changing your diet can also help  "treat obesity, high blood pressure, and high cholesterol. These conditions can affect people with diabetes and can lead to future problems, such as heart attacks or strokes.  Who will work with me to change my diet? -- Your doctor or nurse will work with you to make a food plan to change your diet. They might also recommend that you work with a "dietitian." A dietitian is an expert on food and eating.  Do I need to eat at the same times every day? -- When and how often you should eat depends, in part, on the diabetes medicines you take. For example:  People who take about the same amount of insulin at the same time each day (called a "fixed regimen") should eat meals at the same times. This is also true for people who take pills that increase insulin levels, such as sulfonylureas. Eating meals at the same time every day helps prevent low blood sugar.  People who adjust the dose and timing of their insulin each day (called a "flexible regimen") do not always have to eat meals at the same time. That's because they can time their insulin dose for before they plan to eat, and also adjust the dose for how much they plan to eat.  People who take medicines that don't usually cause low blood sugar, such as metformin, don't have to eat meals at the same time every day.  What do I need to think about when planning what to eat? -- Our bodies break down the food we eat into small pieces called carbohydrates, proteins, and fats.  When planning what to eat, people with diabetes need to think about:  Carbohydrates (or "carbs") - Carbohydrates, which are sugars that our bodies use for energy, can raise a person's blood sugar level. Your doctor, nurse, or dietitian will tell you how many carbohydrates you should eat at each meal or snack. Foods that have carbohydrates include:  Bread, pasta, and rice  Vegetables and fruits  Dairy foods  Foods and drinks with added sugar  It is best to get your carbohydrates from fruits, vegetables, " "whole grains, and low-fat milk. It is best to avoid drinks with added sugar, like soda, juices, and sports drinks.   Protein - Your doctor, nurse, or dietitian will tell you how much protein you should eat each day. It is best to eat lean meats, fish, eggs, beans, peas, soy products, nuts, and seeds.  Fats - The type of fat you eat is more important than the amount of fat. "Saturated" and "trans" fats can increase your risk for heart problems, like a heart attack.  Foods that have saturated fats include meat, butter, cheese, and ice cream.  Foods that have trans fats include processed food with "partially hydrogenated oils" on the ingredient list. This may include fried foods, store bought cookies, muffins, pies, and cakes.  "Monounsaturated" and "polyunsaturated" fats are better for you. Foods with these types of fat include fish, avocado, olive oil, and nuts.  Calories - People need to eat a certain amount of calories each day to keep their weight the same. People who are overweight and want to lose weight need to eat fewer calories each day.  Fiber - Eating foods with a lot of fiber can help control a person's blood sugar level. Foods that have a lot of fiber include apples, green beans, peas, beans, lentils, nuts, oatmeal, and whole grains.  Salt - People who have high blood pressure should not eat foods that contain a lot of salt (also called sodium). People with high blood pressure should also eat healthy foods, such as fruits, vegetables, and low-fat dairy foods.  Alcohol - Having more than 1 drink (for women) or 2 drinks (for men) a day can raise blood sugar levels. Also, drinks that have fruit juice or soda in them can raise blood sugar levels.  What can I do if I need to lose weight? -- If you need to lose weight, you can:  Exercise - Try to get at least 30 minutes of physical activity a day, most days of the week. Even gentle exercise, like walking, is good for your health. Some people with diabetes need " to change their medicine dose before they exercise. They might also need to check their blood sugar levels before and after exercising.  Eat fewer calories - Your doctor, nurse, or dietitian can tell you how many calories you should eat each day in order to lose weight.  If you are worried about your weight, size, or shape, talk with your doctor, nurse, or dietitian. They can help you make changes to improve your health.  Can I eat the same foods as my family? -- Yes. You do not need to eat special foods if you have diabetes. You and your family can eat the same foods. Changing your diet is mostly about eating healthy foods and not eating too much.  What are the other parts of diabetes treatment? -- Besides changing your diet, the other parts of diabetes treatment are:  Exercise  Medicines  Some people with diabetes need to learn how to match their diet and exercise with their medicine dose. For example, people who use insulin might need to choose the dose of insulin they give themselves. To choose their dose, they need to think about:  What they plan to eat at the next meal  How much exercise they plan to do  What their blood sugar level is  If the diet and exercise do not match the medicine dose, a person's blood sugar level can get too low or too high. Blood sugar levels that are too low or too high can cause problems.  All topics are updated as new evidence becomes available and our peer review process is complete.  This topic retrieved from [employer] on: Sep 21, 2021.  Topic 42666 Version 7.0  Release: 29.4.2 - C29.263  © 2021 UpToDate, Inc. and/or its affiliates. All rights reserved.  Consumer Information Use and Disclaimer   This information is not specific medical advice and does not replace information you receive from your health care provider. This is only a brief summary of general information. It does NOT include all information about conditions, illnesses, injuries, tests, procedures, treatments,  therapies, discharge instructions or life-style choices that may apply to you. You must talk with your health care provider for complete information about your health and treatment options. This information should not be used to decide whether or not to accept your health care provider's advice, instructions or recommendations. Only your health care provider has the knowledge and training to provide advice that is right for you. The use of this information is governed by the [employer] End User License Agreement, available at https://www.[employer]/en/solutions/[employer]/about/craig.The use of [employer] content is governed by the [employer] Terms of Use. ©2021 UpToDate, Inc. All rights reserved.  Copyright   © 2021 UpToDate, Inc. and/or its affiliates. All rights reserved.           Signature: Lizzette Meadows DNP, FNP-C

## 2025-01-07 NOTE — PATIENT INSTRUCTIONS
"Patient Education       Controlling Your Blood Pressure Through Lifestyle   The Basics   Written by the doctors and editors at [facility]   What does my lifestyle have to do with my blood pressure? -- The things you do and the foods you eat have a big effect on your blood pressure and your overall health. Following the right lifestyle can:  Lower your blood pressure or keep you from getting high blood pressure in the first place  Reduce your need for blood pressure medicines  Make medicines for high blood pressure work better, if you do take them  Lower the chances that you'll have a heart attack or stroke, or develop kidney disease  Which lifestyle choices will help lower my blood pressure? -- Here's what you can do:  Lose weight (if you are overweight)  Choose a diet rich in fruits, vegetables, and low-fat dairy products, and low in meats, sweets, and refined grains  Eat less salt (sodium)  Do something active for at least 30 minutes a day on most days of the week  Limit the amount of alcohol you drink  If you have high blood pressure, it's also very important to quit smoking (if you smoke). Quitting smoking might not bring your blood pressure down. But it will lower the chances that you'll have a heart attack or stroke, and it will help you feel better and live longer.  Start low and go slow -- The changes listed above might sound like a lot, but don't worry. You don't have to change everything all at once. The key to improving your lifestyle is to "start low and go slow." Choose 1 small, specific thing to change and try doing it for a while. If it works for you, keep doing it until it becomes a habit. If it doesn't, don't give up. Choose something else to change and see how that goes.  Let's say, for example, that you would like to improve your diet. If you're the type of person who eats cheeseburgers and French fries all the time, you can't switch to eating just salads from one day to the next. When people try to " "make changes like that, they often fail. Then they feel frustrated and tend to give up. So instead of trying to change everything about your diet in 1 day, change 1 or 2 small things about your diet and give yourself time to get used to those changes. For instance, keep the cheeseburger but give up the French fries. Or eat the same things but cut your portions in half.  As you find things that you are able to change and stick with, keep adding new changes. In time, you will see that you can actually change a lot. You just have to get used to the changes slowly.  Lose weight -- When people think about losing weight, they sometimes make it more complicated than it really is. To lose weight, you have to either eat less or move more. If you do both of those things, it's even better. But there is no single weight-loss diet or activity that's better than any other. When it comes to weight loss, the most effective plan is the one that you'll stick with.  Improve your diet -- There is no single diet that is right for everyone. But in general, a healthy diet can include:  Lots of fruits, vegetables, and whole grains  Some beans, peas, lentils, chickpeas, and similar foods  Some nuts, such as walnuts, almonds, and peanuts  Fat-free or low-fat milk and milk products  Some fish  To have a healthy diet, it's also important to limit or avoid sugar, sweets, meats, and refined grains. (Refined grains are found in white bread, white rice, most forms of pasta, and most packaged "snack" foods.)  Reduce salt -- Many people think that eating a low-sodium diet means avoiding the salt shaker and not adding salt when cooking. The truth is, not adding salt at the table or when you cook will only help a little. Almost all of the sodium you eat is already in the food you buy at the grocery store or at restaurants (figure 1).  The most important thing you can do to cut down on sodium is to eat less processed food. That means that you should " "avoid most foods that are sold in cans, boxes, jars, and bags. You should also eat in restaurants less often.  To reduce the amount of sodium you get, buy fresh or fresh-frozen fruits, vegetables, and meats. (Fresh-frozen foods have had nothing added to them before freezing.) Then you can make meals at home, from scratch, with these ingredients.  As with the other changes, don't try to cut out salt all at once. Instead, choose 1 or 2 foods that have a lot of sodium and try to replace them with low-sodium choices. When you get used to those low-sodium options, find another food or 2 to change. Then keep going, until all the foods you eat are sodium-free or low in sodium.  Become more active -- If you want to be more active, you don't have to go to the gym or get all sweaty. It is possible to increase your activity level while doing everyday things you enjoy. Walking, gardening, and dancing are just a few of the things that you might try. As with all the other changes, the key is not to do too much too fast. If you don't do any activity now, start by walking for just a few minutes every other day. Do that for a few weeks. If you stick with it, try doing it for longer. But if you find that you don't like walking, try a different activity.  Drink less alcohol -- If you are a woman, do not have more than 1 "standard drink" of alcohol a day. If you are a man, do not have more than 2. A "standard drink" is:  A can or bottle that has 12 ounces of beer  A glass that has 5 ounces of wine  A shot that has 1.5 ounces of whiskey  Where should I start? -- If you want to improve your lifestyle, start by making the changes that you think would be easiest for you. If you used to exercise and just got out of the habit, maybe it would be easy for you to start exercising again. Or if you actually like cooking meals from scratch, maybe the first thing you should focus on is eating home-cooked meals that are low in sodium.  Whatever you " "tackle first, choose specific, realistic goals, and give yourself a deadline. For example, do not decide that you are going to "exercise more." Instead, decide that you are going to walk for 10 minutes on Monday, Wednesday, and Friday, and that you are going to do this for the next 2 weeks.  When lifestyle changes are too general, people have a hard time following through.  Now go. You can do it!  All topics are updated as new evidence becomes available and our peer review process is complete.  This topic retrieved from [employer] on: Sep 21, 2021.  Topic 54753 Version 8.0  Release: 29.4.2 - C29.263  © 2021 UpToDate, Inc. and/or its affiliates. All rights reserved.  figure 1: Sources of sodium in your diet     Graphic 55526 Version 2.0    Consumer Information Use and Disclaimer   This information is not specific medical advice and does not replace information you receive from your health care provider. This is only a brief summary of general information. It does NOT include all information about conditions, illnesses, injuries, tests, procedures, treatments, therapies, discharge instructions or life-style choices that may apply to you. You must talk with your health care provider for complete information about your health and treatment options. This information should not be used to decide whether or not to accept your health care provider's advice, instructions or recommendations. Only your health care provider has the knowledge and training to provide advice that is right for you. The use of this information is governed by the [employer] End User License Agreement, available at https://www.[employer]/en/solutions/Getting-in/about/craig.The use of [employer] content is governed by the [employer] Terms of Use. ©2021 UpToDate, Inc. All rights reserved.  Copyright   © 2021 UpToDate, Inc. and/or its affiliates. All rights reserved.  Patient Education       Diabetes and Diet   The Basics   Written by the doctors and editors " "at [employer]   Why is diet important in diabetes? -- Diet is important because it is part of diabetes treatment. Many people need to change what they eat and how much they eat to help treat their diabetes. It is important for people to treat their diabetes so that they:  Keep their blood sugar at or near a normal level  Prevent long-term problems, such as heart or kidney problems, that can happen in people with diabetes  Changing your diet can also help treat obesity, high blood pressure, and high cholesterol. These conditions can affect people with diabetes and can lead to future problems, such as heart attacks or strokes.  Who will work with me to change my diet? -- Your doctor or nurse will work with you to make a food plan to change your diet. They might also recommend that you work with a "dietitian." A dietitian is an expert on food and eating.  Do I need to eat at the same times every day? -- When and how often you should eat depends, in part, on the diabetes medicines you take. For example:  People who take about the same amount of insulin at the same time each day (called a "fixed regimen") should eat meals at the same times. This is also true for people who take pills that increase insulin levels, such as sulfonylureas. Eating meals at the same time every day helps prevent low blood sugar.  People who adjust the dose and timing of their insulin each day (called a "flexible regimen") do not always have to eat meals at the same time. That's because they can time their insulin dose for before they plan to eat, and also adjust the dose for how much they plan to eat.  People who take medicines that don't usually cause low blood sugar, such as metformin, don't have to eat meals at the same time every day.  What do I need to think about when planning what to eat? -- Our bodies break down the food we eat into small pieces called carbohydrates, proteins, and fats.  When planning what to eat, people with diabetes " "need to think about:  Carbohydrates (or "carbs") - Carbohydrates, which are sugars that our bodies use for energy, can raise a person's blood sugar level. Your doctor, nurse, or dietitian will tell you how many carbohydrates you should eat at each meal or snack. Foods that have carbohydrates include:  Bread, pasta, and rice  Vegetables and fruits  Dairy foods  Foods and drinks with added sugar  It is best to get your carbohydrates from fruits, vegetables, whole grains, and low-fat milk. It is best to avoid drinks with added sugar, like soda, juices, and sports drinks.   Protein - Your doctor, nurse, or dietitian will tell you how much protein you should eat each day. It is best to eat lean meats, fish, eggs, beans, peas, soy products, nuts, and seeds.  Fats - The type of fat you eat is more important than the amount of fat. "Saturated" and "trans" fats can increase your risk for heart problems, like a heart attack.  Foods that have saturated fats include meat, butter, cheese, and ice cream.  Foods that have trans fats include processed food with "partially hydrogenated oils" on the ingredient list. This may include fried foods, store bought cookies, muffins, pies, and cakes.  "Monounsaturated" and "polyunsaturated" fats are better for you. Foods with these types of fat include fish, avocado, olive oil, and nuts.  Calories - People need to eat a certain amount of calories each day to keep their weight the same. People who are overweight and want to lose weight need to eat fewer calories each day.  Fiber - Eating foods with a lot of fiber can help control a person's blood sugar level. Foods that have a lot of fiber include apples, green beans, peas, beans, lentils, nuts, oatmeal, and whole grains.  Salt - People who have high blood pressure should not eat foods that contain a lot of salt (also called sodium). People with high blood pressure should also eat healthy foods, such as fruits, vegetables, and low-fat dairy " foods.  Alcohol - Having more than 1 drink (for women) or 2 drinks (for men) a day can raise blood sugar levels. Also, drinks that have fruit juice or soda in them can raise blood sugar levels.  What can I do if I need to lose weight? -- If you need to lose weight, you can:  Exercise - Try to get at least 30 minutes of physical activity a day, most days of the week. Even gentle exercise, like walking, is good for your health. Some people with diabetes need to change their medicine dose before they exercise. They might also need to check their blood sugar levels before and after exercising.  Eat fewer calories - Your doctor, nurse, or dietitian can tell you how many calories you should eat each day in order to lose weight.  If you are worried about your weight, size, or shape, talk with your doctor, nurse, or dietitian. They can help you make changes to improve your health.  Can I eat the same foods as my family? -- Yes. You do not need to eat special foods if you have diabetes. You and your family can eat the same foods. Changing your diet is mostly about eating healthy foods and not eating too much.  What are the other parts of diabetes treatment? -- Besides changing your diet, the other parts of diabetes treatment are:  Exercise  Medicines  Some people with diabetes need to learn how to match their diet and exercise with their medicine dose. For example, people who use insulin might need to choose the dose of insulin they give themselves. To choose their dose, they need to think about:  What they plan to eat at the next meal  How much exercise they plan to do  What their blood sugar level is  If the diet and exercise do not match the medicine dose, a person's blood sugar level can get too low or too high. Blood sugar levels that are too low or too high can cause problems.  All topics are updated as new evidence becomes available and our peer review process is complete.  This topic retrieved from [employer] on: Sep  21, 2021.  Topic 45629 Version 7.0  Release: 29.4.2 - C29.263  © 2021 UpToDate, Inc. and/or its affiliates. All rights reserved.  Consumer Information Use and Disclaimer   This information is not specific medical advice and does not replace information you receive from your health care provider. This is only a brief summary of general information. It does NOT include all information about conditions, illnesses, injuries, tests, procedures, treatments, therapies, discharge instructions or life-style choices that may apply to you. You must talk with your health care provider for complete information about your health and treatment options. This information should not be used to decide whether or not to accept your health care provider's advice, instructions or recommendations. Only your health care provider has the knowledge and training to provide advice that is right for you. The use of this information is governed by the [employer] End User License Agreement, available at https://[email or web address]/en/solutions/[email or web address]/about/craig.The use of [employer] content is governed by the [employer] Terms of Use. ©2021 UpToDate, Inc. All rights reserved.  Copyright   © 2021 UpToDate, Inc. and/or its affiliates. All rights reserved.

## 2025-01-08 DIAGNOSIS — E11.9 TYPE 2 DIABETES MELLITUS WITHOUT COMPLICATION, WITHOUT LONG-TERM CURRENT USE OF INSULIN: ICD-10-CM

## 2025-01-08 RX ORDER — EMPAGLIFLOZIN 10 MG/1
TABLET, FILM COATED ORAL
Qty: 90 TABLET | Refills: 3 | Status: SHIPPED | OUTPATIENT
Start: 2025-01-08

## 2025-01-13 ENCOUNTER — TELEPHONE (OUTPATIENT)
Dept: PRIMARY CARE CLINIC | Facility: CLINIC | Age: 70
End: 2025-01-13
Payer: MEDICARE

## 2025-01-13 DIAGNOSIS — M47.816 SPONDYLOSIS WITHOUT MYELOPATHY OR RADICULOPATHY, LUMBAR REGION: Chronic | ICD-10-CM

## 2025-01-13 DIAGNOSIS — M25.551 HIP PAIN, RIGHT: Chronic | ICD-10-CM

## 2025-01-13 DIAGNOSIS — M89.49 OSTEOARTHROSIS MULTIPLE SITES, NOT SPECIFIED AS GENERALIZED: Chronic | ICD-10-CM

## 2025-01-13 NOTE — TELEPHONE ENCOUNTER
----- Message from Lizzette Meadows DNP, MORA-C sent at 1/9/2025  4:45 PM CST -----  Please notify of results.     GFR has improved.     BUN/creat elevated; needs to drink more water.     Urine microalbumin is elevated. She needs to keep a tight control on blood sugar and blood pressure.     Needs to be sure and take the Jardiance 10 mg po daily.     Needs to adhere to a diabetic diet.

## 2025-01-14 RX ORDER — DICLOFENAC SODIUM 10 MG/G
2 GEL TOPICAL 4 TIMES DAILY
Qty: 10 EACH | Refills: 2 | Status: SHIPPED | OUTPATIENT
Start: 2025-01-14

## 2025-01-21 RX ORDER — FUROSEMIDE 40 MG/1
TABLET ORAL
Qty: 90 TABLET | Refills: 3 | Status: SHIPPED | OUTPATIENT
Start: 2025-01-21

## 2025-02-19 RX ORDER — ISOPROPYL ALCOHOL 70 ML/100ML
SWAB TOPICAL
Qty: 200 EACH | Refills: 3 | Status: SHIPPED | OUTPATIENT
Start: 2025-02-19
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
## 2025-02-24 DIAGNOSIS — E11.9 TYPE 2 DIABETES MELLITUS WITHOUT COMPLICATION, WITHOUT LONG-TERM CURRENT USE OF INSULIN: ICD-10-CM

## 2025-02-25 RX ORDER — SPIRONOLACTONE 25 MG/1
25 TABLET ORAL
Qty: 90 TABLET | Refills: 1 | Status: SHIPPED | OUTPATIENT
Start: 2025-02-25

## 2025-02-26 DIAGNOSIS — I10 ESSENTIAL HYPERTENSION: ICD-10-CM

## 2025-02-26 RX ORDER — BLOOD SUGAR DIAGNOSTIC
STRIP MISCELLANEOUS
Qty: 200 STRIP | Refills: 3 | Status: SHIPPED | OUTPATIENT
Start: 2025-02-26

## 2025-02-26 RX ORDER — METOPROLOL SUCCINATE 100 MG/1
100 TABLET, EXTENDED RELEASE ORAL
Qty: 90 TABLET | Refills: 0 | Status: SHIPPED | OUTPATIENT
Start: 2025-02-26

## 2025-02-26 RX ORDER — DEXTROSE 4 G
TABLET,CHEWABLE ORAL
Qty: 1 EACH | Refills: 0 | Status: SHIPPED | OUTPATIENT
Start: 2025-02-26

## 2025-02-26 RX ORDER — LOSARTAN POTASSIUM AND HYDROCHLOROTHIAZIDE 25; 100 MG/1; MG/1
1 TABLET ORAL
Qty: 90 TABLET | Refills: 0 | Status: SHIPPED | OUTPATIENT
Start: 2025-02-26

## 2025-03-04 ENCOUNTER — OFFICE VISIT (OUTPATIENT)
Dept: PAIN MEDICINE | Facility: CLINIC | Age: 70
End: 2025-03-04
Payer: MEDICARE

## 2025-03-04 VITALS
SYSTOLIC BLOOD PRESSURE: 139 MMHG | HEART RATE: 69 BPM | BODY MASS INDEX: 32.6 KG/M2 | DIASTOLIC BLOOD PRESSURE: 85 MMHG | HEIGHT: 63 IN | RESPIRATION RATE: 18 BRPM | WEIGHT: 184 LBS

## 2025-03-04 DIAGNOSIS — Z79.899 ENCOUNTER FOR LONG-TERM (CURRENT) USE OF MEDICATIONS: ICD-10-CM

## 2025-03-04 DIAGNOSIS — M47.816 SPONDYLOSIS WITHOUT MYELOPATHY OR RADICULOPATHY, LUMBAR REGION: Primary | Chronic | ICD-10-CM

## 2025-03-04 DIAGNOSIS — M89.49 OSTEOARTHROSIS MULTIPLE SITES, NOT SPECIFIED AS GENERALIZED: Chronic | ICD-10-CM

## 2025-03-04 LAB

## 2025-03-04 PROCEDURE — 3066F NEPHROPATHY DOC TX: CPT | Mod: CPTII,,, | Performed by: PHYSICIAN ASSISTANT

## 2025-03-04 PROCEDURE — 3008F BODY MASS INDEX DOCD: CPT | Mod: CPTII,,, | Performed by: PHYSICIAN ASSISTANT

## 2025-03-04 PROCEDURE — 80305 DRUG TEST PRSMV DIR OPT OBS: CPT | Mod: PBBFAC | Performed by: PHYSICIAN ASSISTANT

## 2025-03-04 PROCEDURE — 1125F AMNT PAIN NOTED PAIN PRSNT: CPT | Mod: CPTII,,, | Performed by: PHYSICIAN ASSISTANT

## 2025-03-04 PROCEDURE — 1159F MED LIST DOCD IN RCRD: CPT | Mod: CPTII,,, | Performed by: PHYSICIAN ASSISTANT

## 2025-03-04 PROCEDURE — 1101F PT FALLS ASSESS-DOCD LE1/YR: CPT | Mod: CPTII,,, | Performed by: PHYSICIAN ASSISTANT

## 2025-03-04 PROCEDURE — 3075F SYST BP GE 130 - 139MM HG: CPT | Mod: CPTII,,, | Performed by: PHYSICIAN ASSISTANT

## 2025-03-04 PROCEDURE — 3051F HG A1C>EQUAL 7.0%<8.0%: CPT | Mod: CPTII,,, | Performed by: PHYSICIAN ASSISTANT

## 2025-03-04 PROCEDURE — 99214 OFFICE O/P EST MOD 30 MIN: CPT | Mod: S$PBB,,, | Performed by: PHYSICIAN ASSISTANT

## 2025-03-04 PROCEDURE — 3060F POS MICROALBUMINURIA REV: CPT | Mod: CPTII,,, | Performed by: PHYSICIAN ASSISTANT

## 2025-03-04 PROCEDURE — 99215 OFFICE O/P EST HI 40 MIN: CPT | Mod: PBBFAC | Performed by: PHYSICIAN ASSISTANT

## 2025-03-04 PROCEDURE — 3079F DIAST BP 80-89 MM HG: CPT | Mod: CPTII,,, | Performed by: PHYSICIAN ASSISTANT

## 2025-03-04 PROCEDURE — 3288F FALL RISK ASSESSMENT DOCD: CPT | Mod: CPTII,,, | Performed by: PHYSICIAN ASSISTANT

## 2025-03-04 PROCEDURE — 99999 PR PBB SHADOW E&M-EST. PATIENT-LVL V: CPT | Mod: PBBFAC,,, | Performed by: PHYSICIAN ASSISTANT

## 2025-03-04 PROCEDURE — 99999PBSHW POCT URINE DRUG SCREEN PRESUMP: Mod: PBBFAC,,,

## 2025-03-04 RX ORDER — HYDROCODONE BITARTRATE AND ACETAMINOPHEN 10; 325 MG/1; MG/1
1 TABLET ORAL EVERY 8 HOURS PRN
Qty: 90 TABLET | Refills: 0 | Status: SHIPPED | OUTPATIENT
Start: 2025-03-07 | End: 2025-04-06

## 2025-03-04 RX ORDER — HYDROCODONE BITARTRATE AND ACETAMINOPHEN 10; 325 MG/1; MG/1
1 TABLET ORAL EVERY 8 HOURS PRN
Qty: 90 TABLET | Refills: 0 | Status: SHIPPED | OUTPATIENT
Start: 2025-04-05 | End: 2025-05-05

## 2025-03-04 RX ORDER — DICLOFENAC SODIUM 10 MG/G
2 GEL TOPICAL 2 TIMES DAILY
Qty: 10 EACH | Refills: 2 | Status: SHIPPED | OUTPATIENT
Start: 2025-03-04

## 2025-03-04 RX ORDER — HYDROCODONE BITARTRATE AND ACETAMINOPHEN 10; 325 MG/1; MG/1
1 TABLET ORAL EVERY 8 HOURS PRN
Qty: 90 TABLET | Refills: 0 | Status: SHIPPED | OUTPATIENT
Start: 2025-05-06 | End: 2025-06-05

## 2025-03-30 DIAGNOSIS — E78.5 HYPERLIPIDEMIA, UNSPECIFIED HYPERLIPIDEMIA TYPE: ICD-10-CM

## 2025-03-31 RX ORDER — EZETIMIBE 10 MG/1
10 TABLET ORAL
Qty: 90 TABLET | Refills: 3 | Status: SHIPPED | OUTPATIENT
Start: 2025-03-31

## 2025-04-15 ENCOUNTER — OFFICE VISIT (OUTPATIENT)
Dept: PRIMARY CARE CLINIC | Facility: CLINIC | Age: 70
End: 2025-04-15
Payer: MEDICARE

## 2025-04-15 VITALS
BODY MASS INDEX: 32.43 KG/M2 | DIASTOLIC BLOOD PRESSURE: 73 MMHG | RESPIRATION RATE: 20 BRPM | TEMPERATURE: 98 F | WEIGHT: 183 LBS | HEIGHT: 63 IN | OXYGEN SATURATION: 96 % | HEART RATE: 61 BPM | SYSTOLIC BLOOD PRESSURE: 120 MMHG

## 2025-04-15 DIAGNOSIS — M89.49 OSTEOARTHROSIS MULTIPLE SITES, NOT SPECIFIED AS GENERALIZED: Chronic | ICD-10-CM

## 2025-04-15 DIAGNOSIS — I83.93 VARICOSE VEINS OF BOTH LOWER EXTREMITIES, UNSPECIFIED WHETHER COMPLICATED: ICD-10-CM

## 2025-04-15 DIAGNOSIS — E11.9 TYPE 2 DIABETES MELLITUS WITHOUT COMPLICATION, WITHOUT LONG-TERM CURRENT USE OF INSULIN: ICD-10-CM

## 2025-04-15 DIAGNOSIS — E78.5 HYPERLIPIDEMIA, UNSPECIFIED HYPERLIPIDEMIA TYPE: ICD-10-CM

## 2025-04-15 DIAGNOSIS — I10 ESSENTIAL HYPERTENSION: Primary | ICD-10-CM

## 2025-04-15 DIAGNOSIS — M79.89 BILATERAL SWELLING OF FEET: ICD-10-CM

## 2025-04-15 LAB
ALBUMIN SERPL BCP-MCNC: 3.7 G/DL (ref 3.4–4.8)
ALBUMIN/GLOB SERPL: 0.9 {RATIO}
ALP SERPL-CCNC: 58 U/L (ref 40–150)
ALT SERPL W P-5'-P-CCNC: 8 U/L
ANION GAP SERPL CALCULATED.3IONS-SCNC: 15 MMOL/L (ref 7–16)
AST SERPL W P-5'-P-CCNC: 16 U/L (ref 11–45)
BASOPHILS # BLD AUTO: 0.04 K/UL (ref 0–0.2)
BASOPHILS NFR BLD AUTO: 0.8 % (ref 0–1)
BILIRUB SERPL-MCNC: 0.6 MG/DL
BUN SERPL-MCNC: 21 MG/DL (ref 10–20)
BUN/CREAT SERPL: 18 (ref 6–20)
CALCIUM SERPL-MCNC: 9.7 MG/DL (ref 8.4–10.2)
CHLORIDE SERPL-SCNC: 107 MMOL/L (ref 98–107)
CHOLEST SERPL-MCNC: 252 MG/DL
CHOLEST/HDLC SERPL: 5.6 {RATIO}
CO2 SERPL-SCNC: 24 MMOL/L (ref 23–31)
CREAT SERPL-MCNC: 1.19 MG/DL (ref 0.55–1.02)
DIFFERENTIAL METHOD BLD: ABNORMAL
EGFR (NO RACE VARIABLE) (RUSH/TITUS): 49 ML/MIN/1.73M2
EOSINOPHIL # BLD AUTO: 0.13 K/UL (ref 0–0.5)
EOSINOPHIL NFR BLD AUTO: 2.5 % (ref 1–4)
ERYTHROCYTE [DISTWIDTH] IN BLOOD BY AUTOMATED COUNT: 15.8 % (ref 11.5–14.5)
EST. AVERAGE GLUCOSE BLD GHB EST-MCNC: 209 MG/DL
GLOBULIN SER-MCNC: 4.1 G/DL (ref 2–4)
GLUCOSE SERPL-MCNC: 158 MG/DL (ref 82–115)
GLUCOSE SERPL-MCNC: 165 MG/DL (ref 70–110)
HBA1C MFR BLD HPLC: 8.9 %
HCT VFR BLD AUTO: 46.2 % (ref 38–47)
HDLC SERPL-MCNC: 45 MG/DL (ref 35–60)
HGB BLD-MCNC: 13.8 G/DL (ref 12–16)
IMM GRANULOCYTES # BLD AUTO: 0.03 K/UL (ref 0–0.04)
IMM GRANULOCYTES NFR BLD: 0.6 % (ref 0–0.4)
LDLC SERPL CALC-MCNC: 165 MG/DL
LDLC/HDLC SERPL: 3.7 {RATIO}
LYMPHOCYTES # BLD AUTO: 1.5 K/UL (ref 1–4.8)
LYMPHOCYTES NFR BLD AUTO: 29.2 % (ref 27–41)
MCH RBC QN AUTO: 27.3 PG (ref 27–31)
MCHC RBC AUTO-ENTMCNC: 29.9 G/DL (ref 32–36)
MCV RBC AUTO: 91.5 FL (ref 80–96)
MONOCYTES # BLD AUTO: 0.6 K/UL (ref 0–0.8)
MONOCYTES NFR BLD AUTO: 11.7 % (ref 2–6)
MPC BLD CALC-MCNC: 11.2 FL (ref 9.4–12.4)
NEUTROPHILS # BLD AUTO: 2.84 K/UL (ref 1.8–7.7)
NEUTROPHILS NFR BLD AUTO: 55.2 % (ref 53–65)
NONHDLC SERPL-MCNC: 207 MG/DL
NRBC # BLD AUTO: 0 X10E3/UL
NRBC, AUTO (.00): 0 %
PLATELET # BLD AUTO: 256 K/UL (ref 150–400)
POTASSIUM SERPL-SCNC: 3.7 MMOL/L (ref 3.5–5.1)
PROT SERPL-MCNC: 7.8 G/DL (ref 5.8–7.6)
RBC # BLD AUTO: 5.05 M/UL (ref 4.2–5.4)
SODIUM SERPL-SCNC: 142 MMOL/L (ref 136–145)
TRIGL SERPL-MCNC: 208 MG/DL (ref 37–140)
VLDLC SERPL-MCNC: 42 MG/DL
WBC # BLD AUTO: 5.14 K/UL (ref 4.5–11)

## 2025-04-15 PROCEDURE — 85025 COMPLETE CBC W/AUTO DIFF WBC: CPT | Mod: ,,, | Performed by: CLINICAL MEDICAL LABORATORY

## 2025-04-15 PROCEDURE — 80061 LIPID PANEL: CPT | Mod: ,,, | Performed by: CLINICAL MEDICAL LABORATORY

## 2025-04-15 PROCEDURE — 3008F BODY MASS INDEX DOCD: CPT | Mod: ,,, | Performed by: NURSE PRACTITIONER

## 2025-04-15 PROCEDURE — 3066F NEPHROPATHY DOC TX: CPT | Mod: ,,, | Performed by: NURSE PRACTITIONER

## 2025-04-15 PROCEDURE — 80053 COMPREHEN METABOLIC PANEL: CPT | Mod: ,,, | Performed by: CLINICAL MEDICAL LABORATORY

## 2025-04-15 PROCEDURE — 3060F POS MICROALBUMINURIA REV: CPT | Mod: ,,, | Performed by: NURSE PRACTITIONER

## 2025-04-15 PROCEDURE — 1126F AMNT PAIN NOTED NONE PRSNT: CPT | Mod: ,,, | Performed by: NURSE PRACTITIONER

## 2025-04-15 PROCEDURE — 3078F DIAST BP <80 MM HG: CPT | Mod: ,,, | Performed by: NURSE PRACTITIONER

## 2025-04-15 PROCEDURE — 3074F SYST BP LT 130 MM HG: CPT | Mod: ,,, | Performed by: NURSE PRACTITIONER

## 2025-04-15 PROCEDURE — 3051F HG A1C>EQUAL 7.0%<8.0%: CPT | Mod: ,,, | Performed by: NURSE PRACTITIONER

## 2025-04-15 PROCEDURE — 1160F RVW MEDS BY RX/DR IN RCRD: CPT | Mod: ,,, | Performed by: NURSE PRACTITIONER

## 2025-04-15 PROCEDURE — 83036 HEMOGLOBIN GLYCOSYLATED A1C: CPT | Mod: ,,, | Performed by: CLINICAL MEDICAL LABORATORY

## 2025-04-15 PROCEDURE — 1159F MED LIST DOCD IN RCRD: CPT | Mod: ,,, | Performed by: NURSE PRACTITIONER

## 2025-04-15 PROCEDURE — 99213 OFFICE O/P EST LOW 20 MIN: CPT | Mod: ,,, | Performed by: NURSE PRACTITIONER

## 2025-04-15 RX ORDER — DIOSMIN COMPLEX NO.1 630 MG
1 TABLET ORAL DAILY
Qty: 90 TABLET | Refills: 2 | Status: SHIPPED | OUTPATIENT
Start: 2025-04-15

## 2025-04-15 RX ORDER — DICLOFENAC SODIUM 10 MG/G
2 GEL TOPICAL 2 TIMES DAILY
Qty: 10 EACH | Refills: 2 | Status: SHIPPED | OUTPATIENT
Start: 2025-04-15

## 2025-04-15 NOTE — PROGRESS NOTES
[facility] [place] [address] [place] [address]  Ph: [phone]   Lizzette Meadows DNP, FNP-C  Primary Care       PATIENT NAME: Selena Proctor   : 1955    AGE: 69 y.o. DATE: 04/15/2025    MRN: [id number]        Reason for Visit / Chief Complaint:  Diabetes (Blood sugar 165), Hyperlipidemia, and Hypertension     Subjective:     HPI: Patient here for routine follow up visit, labs. Has HTN, Type 2 Diabetes, Hyperlipidemia. Has Osteoarthrosis and Spondylosis of lumbar region. Patient sees Dr. Lopez for pain management. Patient states she checks her blood sugar twice a day and PRN; states her blood sugar ranges 118 to 130s.     Patient sees cardiology, Dr. Parnell, in [place], [place]. States next cardiology appointment is in . Patient states she was prescribed Vasulera per cardiologist; was given samples of the medication but states she doesn't have anymore; requesting refill.             Diabetes  Pertinent negatives for hypoglycemia include no headaches. Pertinent negatives for diabetes include no chest pain and no fatigue.   Hyperlipidemia  Pertinent negatives include no chest pain or shortness of breath.   Hypertension  Pertinent negatives include no chest pain, headaches or shortness of breath.          Review of Systems: Review of Systems   Constitutional: Negative.  Negative for chills, fatigue and fever.   HENT: Negative.     Eyes: Negative.    Respiratory: Negative.  Negative for cough, chest tightness and shortness of breath.    Cardiovascular: Negative.  Negative for chest pain.   Gastrointestinal: Negative.  Negative for abdominal pain, constipation, diarrhea, nausea and vomiting.   Endocrine: Negative.    Genitourinary: Negative.  Negative for dysuria.   Musculoskeletal: Negative.  Negative for gait problem.   Skin: Negative.  Negative for rash.   Allergic/Immunologic: Negative.    Neurological: Negative.  Negative for headaches.   Hematological: Negative.     Psychiatric/Behavioral: Negative.            Review of patient's allergies indicates:   Allergen Reactions    Betadine [povidone-iodine] Itching        Med List:  Medications Ordered Prior to Encounter[1]    Medical/Social/Family History:  Past Medical History:   Diagnosis Date    Acid reflux     Coronary arteriosclerosis     Diabetes     Diabetes mellitus, type 2     Hypertension     Skin cancer     left leg    Spondylosis without myelopathy or radiculopathy, lumbar region       Tobacco Use History[2]   Social History     Substance and Sexual Activity   Alcohol Use Not Currently       Family History   Problem Relation Name Age of Onset    Hypertension Mother      Heart disease Mother      Diabetes Sister      Hypertension Sister      Hypertension Brother      Alcohol abuse Father      Cancer Father      Hypertension Son      Mental illness Maternal Aunt        Past Surgical History:   Procedure Laterality Date    APPENDECTOMY      BIOPSY OF LESION Left 3/29/2023    Procedure: BIOPSY, LESION;  Surgeon: Andrez Norman DO;  Location: [facility];  Service: General;  Laterality: Left;  excisional biospy of left thigh with rotational flap    BIOPSY OR EXCISION, LESION  2023    Procedure: BIOPSY OR EXCISION, LESION left thigh and abdomen;  Surgeon: Andrez Norman DO;  Location: [facility] OR;  Service: General;;    BLOCK, NERVE, OBTURATOR Right 2019    Right Femoral/Obturator Nerve Block  Dr Headley     SECTION      x2    CORONARY ARTERY BYPASS GRAFT      KNEE ARTHROPLASTY Right     KNEE ARTHROSCOPY Left     LIPOMA RESECTION Right 2023    Procedure: Excision right leg fatty tumor;  Surgeon: Andrez Norman DO;  Location: [facility];  Service: General;  Laterality: Right;    SKIN GRAFT      Left Thigh    TOTAL HIP ARTHROPLASTY Bilateral     TUBAL LIGATION        Immunization History   Administered Date(s) Administered    COVID-19 MRNA, LN-S PF (MODERNA HALF 0.25 ML DOSE) 10/29/2021     "COVID-19, MRNA, LN-S, PF (MODERNA FULL 0.5 ML DOSE) 02/01/2021, 02/10/2021, 03/10/2021    Influenza - Quadrivalent - PF *Preferred* (6 months and older) 02/02/2022, 11/01/2023    Pneumococcal Conjugate - 13 Valent 12/04/2017    Pneumococcal Conjugate - 20 Valent 06/23/2022    Tdap 08/01/2022    Zoster Recombinant 08/03/2023, 11/09/2023          Objective:      Vitals:    04/15/25 0843 04/15/25 0849   BP: 120/73    BP Location: Right arm    Patient Position: Sitting    Pulse: (!) 57 61   Resp: 20    Temp: 97.6 °F (36.4 °C)    TempSrc: Oral    SpO2: 96%    Weight: 83 kg (183 lb)    Height: 5' 3" (1.6 m)      Body mass index is 32.42 kg/m².     Physical Exam: Physical Exam  Vitals and nursing note reviewed.   Constitutional:       General: She is not in acute distress.     Appearance: Normal appearance. She is not ill-appearing, toxic-appearing or diaphoretic.   HENT:      Head: Normocephalic.      Nose: Nose normal.      Mouth/Throat:      Mouth: Mucous membranes are moist.   Eyes:      Extraocular Movements: Extraocular movements intact.      Conjunctiva/sclera: Conjunctivae normal.      Pupils: Pupils are equal, round, and reactive to light.   Cardiovascular:      Rate and Rhythm: Normal rate and regular rhythm.      Heart sounds: Normal heart sounds.      Comments: Mild swelling to feet  Pulmonary:      Effort: Pulmonary effort is normal.      Breath sounds: Normal breath sounds.   Abdominal:      General: Bowel sounds are normal.      Palpations: Abdomen is soft.   Musculoskeletal:         General: Normal range of motion.      Cervical back: Normal range of motion and neck supple.   Skin:     General: Skin is warm and dry.      Capillary Refill: Capillary refill takes less than 2 seconds.   Neurological:      General: No focal deficit present.      Mental Status: She is alert and oriented to person, place, and time.      Gait: Gait normal.   Psychiatric:         Mood and Affect: Mood normal.         Behavior: " Behavior normal.          Protective Sensation (w/ 10 gram monofilament):  Right: Intact  Left: Intact    Visual Inspection:  Normal -  Bilateral and Nails Intact - without Evidence of Foot Deformity- Bilateral    Pedal Pulses:   Right: Present  Left: Present    Posterior Tibialis Pulses:   Right:Present  Left: Present       Assessment:          ICD-10-CM ICD-9-CM   1. Essential hypertension  I10 401.9   2. Type 2 diabetes mellitus without complication, without long-term current use of insulin  E11.9 250.00   3. Hyperlipidemia, unspecified hyperlipidemia type  E78.5 272.4   4. Osteoarthrosis multiple sites, not specified as generalized  M89.49 715.89   5. Varicose veins of both lower extremities, unspecified whether complicated  I83.93 454.9   6. Bilateral swelling of feet  M79.89 729.81        Plan:       Essential hypertension  -     CBC Auto Differential; Future; Expected date: 04/15/2025  -     Comprehensive Metabolic Panel; Future; Expected date: 04/15/2025        - Followed by cardiology        - Continue current plan of care    Type 2 diabetes mellitus without complication, without long-term current use of insulin  -     POCT glucose  -     CBC Auto Differential; Future; Expected date: 04/15/2025  -     Comprehensive Metabolic Panel; Future; Expected date: 04/15/2025  -     Hemoglobin A1C; Future; Expected date: 04/15/2025        - Continue to check blood sugar BID and PRN        - Recommend diabetic diet and exercise    Hyperlipidemia, unspecified hyperlipidemia type  -     Lipid Panel; Future; Expected date: 04/15/2025        - Followed by cardiology        - Continue current plan of care    Osteoarthrosis multiple sites, not specified as generalized  -     diclofenac sodium (VOLTAREN ARTHRITIS PAIN) 1 % Gel; Apply 2 g topically 2 (two) times daily. Apply 2 grams BID to knees, elbows, hips joints bilaterally as needed  Dispense: 10 each; Refill: 2    Varicose veins of both lower extremities, unspecified  whether complicated  -     diosmin complex no.1 (VASCULERA) 630 mg Tab; Take 1 tablet by mouth Daily. Samples from [facility]  Dispense: 90 tablet; Refill: 2        - Followed by cardiology        - Continue current plan of care    Bilateral swelling of feet        - Recommend to wear her compression stockings        - Elevate lower extremities        - Limit sodium intake      Component      Latest Ref Rng 4/15/2025   POC Glucose      70 - 110 MG/ !         Patient to sign a medical release to have records sent from cardiology (Dr. Parnell in [place], [place])      New & refilled meds:  Requested Prescriptions     Signed Prescriptions Disp Refills    diclofenac sodium (VOLTAREN ARTHRITIS PAIN) 1 % Gel 10 each 2     Sig: Apply 2 g topically 2 (two) times daily. Apply 2 grams BID to knees, elbows, hips joints bilaterally as needed    diosmin complex no.1 (VASCULERA) 630 mg Tab 90 tablet 2     Sig: Take 1 tablet by mouth Daily. Samples from [facility]       Follow up in about 3 months (around 7/15/2025), or if symptoms worsen or fail to improve, for Hypertension, diabetes.     There are no Patient Instructions on file for this visit.         Signature: Lizzette Meadows DNP, FNP-C         [1]   Current Outpatient Medications on File Prior to Visit   Medication Sig Dispense Refill    ACCU-CHEK GUIDE TEST STRIPS Strp USE AS DIRECTED 200 strip 3    alcohol swabs (DROPSAFE ALCOHOL PREP PADS) PadM USE AS DIRECTED 200 each 3    aspirin (ECOTRIN) 81 MG EC tablet Take 81 mg by mouth once daily.       blood-glucose meter (ACCU-CHEK GUIDE ME GLUCOSE MTR) Misc Use as directed 1 each 0    ezetimibe (ZETIA) 10 mg tablet TAKE 1 TABLET ONE TIME DAILY 90 tablet 3    furosemide (LASIX) 40 MG tablet TAKE 1 TABLET EVERY DAY AS NEEDED 90 tablet 3    glipiZIDE (GLUCOTROL) 2.5 MG TR24 Take 1 tablet (2.5 mg total) by mouth daily with breakfast. 90  tablet 3    HYDROcodone-acetaminophen (NORCO)  mg per tablet Take 1 tablet by mouth every 8 (eight) hours as needed for Pain (pain). 90 tablet 0    [START ON 5/6/2025] HYDROcodone-acetaminophen (NORCO)  mg per tablet Take 1 tablet by mouth every 8 (eight) hours as needed for Pain (pain). 90 tablet 0    JARDIANCE 10 mg tablet TAKE 1 TABLET EVERY DAY 90 tablet 3    lancets (ACCU-CHEK FASTCLIX LANCET DRUM) Misc 1 each by Misc.(Non-Drug; Combo Route) route 2 (two) times daily before meals. 200 each 2    losartan-hydrochlorothiazide 100-25 mg (HYZAAR) 100-25 mg per tablet TAKE 1 TABLET EVERY DAY 90 tablet 0    metoprolol succinate (TOPROL-XL) 100 MG 24 hr tablet TAKE 1 TABLET EVERY DAY 90 tablet 0    montelukast (SINGULAIR) 10 mg tablet Take 1 tablet (10 mg total) by mouth daily as needed (allergic rhinitis). 90 tablet 2    naloxone (NARCAN) 4 mg/actuation Spry PUT 1 SPRAY IN 1 NOSTRIL WAIT 2 MINUTES THEN REPEAT DOSE IN THE OTHER NOSTRIL      NIFEdipine (PROCARDIA-XL) 90 MG (OSM) 24 hr tablet TAKE 1 TABLET EVERY DAY 90 tablet 3    omeprazole (PRILOSEC) 40 MG capsule Take 1 capsule (40 mg total) by mouth once daily. 90 capsule 2    pitavastatin calcium (LIVALO) 4 mg Tab Take one tablet by mouth three times per week on Monday, Wednesday, Friday. 90 tablet 2    potassium chloride (MICRO-K) 10 MEQ CpSR TAKE 2 CAPSULES TWICE DAILY 360 capsule 3    SITagliptan-metformin (JANUMET XR) 50-1,000 mg TM24 Take 1 tablet by mouth once daily. 90 tablet 3    spironolactone (ALDACTONE) 25 MG tablet TAKE 1 TABLET EVERY DAY 90 tablet 1    TRUE METRIX GLUCOSE METER kit USE AS DIRECTED 180 each 2    [DISCONTINUED] diclofenac sodium (VOLTAREN ARTHRITIS PAIN) 1 % Gel Apply 2 g topically 2 (two) times daily. Apply 2 grams BID to knees, elbows, hips joints bilaterally as needed 10 each 2    [DISCONTINUED] diosmin complex no.1 (VASCULERA) 630 mg Tab Take 1 tablet by mouth Daily. Samples from [facility]      [DISCONTINUED] gabapentin (NEURONTIN) 300 MG capsule Take 1 capsule (300 mg total) by mouth 3 (three) times daily. (Patient not taking: Reported on 8/5/2024) 90 capsule 11     No current facility-administered medications on file prior to visit.   [2]   Social History  Tobacco Use   Smoking Status Never    Passive exposure: Current (.)   Smokeless Tobacco Never

## 2025-04-16 ENCOUNTER — RESULTS FOLLOW-UP (OUTPATIENT)
Dept: PRIMARY CARE CLINIC | Facility: CLINIC | Age: 70
End: 2025-04-16
Payer: MEDICARE

## 2025-04-19 DIAGNOSIS — K21.9 GASTROESOPHAGEAL REFLUX DISEASE, UNSPECIFIED WHETHER ESOPHAGITIS PRESENT: ICD-10-CM

## 2025-04-21 ENCOUNTER — TELEPHONE (OUTPATIENT)
Dept: PRIMARY CARE CLINIC | Facility: CLINIC | Age: 70
End: 2025-04-21
Payer: MEDICARE

## 2025-04-21 RX ORDER — OMEPRAZOLE 40 MG/1
40 CAPSULE, DELAYED RELEASE ORAL
Qty: 90 CAPSULE | Refills: 3 | Status: SHIPPED | OUTPATIENT
Start: 2025-04-21

## 2025-04-21 NOTE — TELEPHONE ENCOUNTER
----- Message from Lizzette Meadows DNP, MERCEDES sent at 4/16/2025  5:18 PM CDT -----  Please notify of results.     GFR has decreased; she needs to stay hydrated; needs to repeat CMP in about 4 weeks.     Ask patient if she is still taking any cholesterol medication.  Triglycerides are elevated, cholesterol is elevated; LDL is elevated. Needs to avoid fried foods, highly processed foods. I know she   sees a cardiologist in [place], [place].     If she is not taking any cholesterol med, will prescribed one; just let me know.     Hgb A1C is 8.9, which indicates blood sugar is not controlled. She needs to watch her diet and adhere to a diabetic diet.   ----- Message -----  From: Rajani Quinteros LPN  Sent: 4/15/2025   8:53 AM CDT  To: Lizzette Meadows DNP, NORMAC

## 2025-04-22 ENCOUNTER — TELEPHONE (OUTPATIENT)
Dept: PRIMARY CARE CLINIC | Facility: CLINIC | Age: 70
End: 2025-04-22
Payer: MEDICARE

## 2025-04-22 NOTE — TELEPHONE ENCOUNTER
----- Message from Lizzette Meadows DNP, MERCEDES sent at 4/16/2025  5:18 PM CDT -----  Please notify of results.     GFR has decreased; she needs to stay hydrated; needs to repeat CMP in about 4 weeks.     Ask patient if she is still taking any cholesterol medication.  Triglycerides are elevated, cholesterol is elevated; LDL is elevated. Needs to avoid fried foods, highly processed foods. I know she   sees a cardiologist in [place], [place].     If she is not taking any cholesterol med, will prescribed one; just let me know.     Hgb A1C is 8.9, which indicates blood sugar is not controlled. She needs to watch her diet and adhere to a diabetic diet.   ----- Message -----  From: Rajani Quinteros LPN  Sent: 4/15/2025   8:53 AM CDT  To: Lizzette Meadows DNP, NORMAC

## 2025-04-23 ENCOUNTER — TELEPHONE (OUTPATIENT)
Dept: PRIMARY CARE CLINIC | Facility: CLINIC | Age: 70
End: 2025-04-23
Payer: MEDICARE

## 2025-04-23 NOTE — TELEPHONE ENCOUNTER
----- Message from Lizzette Meadows DNP, MERCEDES sent at 4/16/2025  5:18 PM CDT -----  Please notify of results.     GFR has decreased; she needs to stay hydrated; needs to repeat CMP in about 4 weeks.     Ask patient if she is still taking any cholesterol medication.  Triglycerides are elevated, cholesterol is elevated; LDL is elevated. Needs to avoid fried foods, highly processed foods. I know she   sees a cardiologist in [place], [place].     If she is not taking any cholesterol med, will prescribed one; just let me know.     Hgb A1C is 8.9, which indicates blood sugar is not controlled. She needs to watch her diet and adhere to a diabetic diet.   ----- Message -----  From: Rajani Quinteros LPN  Sent: 4/15/2025   8:53 AM CDT  To: Lizzette Meadows DNP, NORMAC

## 2025-04-24 ENCOUNTER — TELEPHONE (OUTPATIENT)
Dept: PRIMARY CARE CLINIC | Facility: CLINIC | Age: 70
End: 2025-04-24
Payer: MEDICARE

## 2025-04-24 NOTE — TELEPHONE ENCOUNTER
----- Message from Lizzette Meadows DNP, MERCEDES sent at 4/16/2025  5:18 PM CDT -----  Please notify of results.     GFR has decreased; she needs to stay hydrated; needs to repeat CMP in about 4 weeks.     Ask patient if she is still taking any cholesterol medication.  Triglycerides are elevated, cholesterol is elevated; LDL is elevated. Needs to avoid fried foods, highly processed foods. I know she   sees a cardiologist in [place], [place].     If she is not taking any cholesterol med, will prescribed one; just let me know.     Hgb A1C is 8.9, which indicates blood sugar is not controlled. She needs to watch her diet and adhere to a diabetic diet.   ----- Message -----  From: Rajani Quinteros LPN  Sent: 4/15/2025   8:53 AM CDT  To: Lizzette Meadows DNP, NORMAC

## 2025-04-25 ENCOUNTER — TELEPHONE (OUTPATIENT)
Dept: PRIMARY CARE CLINIC | Facility: CLINIC | Age: 70
End: 2025-04-25
Payer: MEDICARE

## 2025-04-25 NOTE — TELEPHONE ENCOUNTER
----- Message from Lizzette Meadows DNP, MERCEDES sent at 4/16/2025  5:18 PM CDT -----  Please notify of results.     GFR has decreased; she needs to stay hydrated; needs to repeat CMP in about 4 weeks.     Ask patient if she is still taking any cholesterol medication.  Triglycerides are elevated, cholesterol is elevated; LDL is elevated. Needs to avoid fried foods, highly processed foods. I know she   sees a cardiologist in [place], [place].     If she is not taking any cholesterol med, will prescribed one; just let me know.     Hgb A1C is 8.9, which indicates blood sugar is not controlled. She needs to watch her diet and adhere to a diabetic diet.   ----- Message -----  From: Rajani Quinteros LPN  Sent: 4/15/2025   8:53 AM CDT  To: Lizzette Meadows DNP, NORMAC

## 2025-05-05 DIAGNOSIS — I10 ESSENTIAL HYPERTENSION: ICD-10-CM

## 2025-05-05 RX ORDER — LOSARTAN POTASSIUM AND HYDROCHLOROTHIAZIDE 25; 100 MG/1; MG/1
1 TABLET ORAL
Qty: 90 TABLET | Refills: 3 | Status: SHIPPED | OUTPATIENT
Start: 2025-05-05

## 2025-05-05 RX ORDER — METOPROLOL SUCCINATE 100 MG/1
100 TABLET, EXTENDED RELEASE ORAL
Qty: 90 TABLET | Refills: 3 | Status: SHIPPED | OUTPATIENT
Start: 2025-05-05

## 2025-06-04 ENCOUNTER — OFFICE VISIT (OUTPATIENT)
Dept: PAIN MEDICINE | Facility: CLINIC | Age: 70
End: 2025-06-04
Payer: MEDICARE

## 2025-06-04 VITALS
WEIGHT: 185 LBS | RESPIRATION RATE: 16 BRPM | BODY MASS INDEX: 32.78 KG/M2 | SYSTOLIC BLOOD PRESSURE: 146 MMHG | DIASTOLIC BLOOD PRESSURE: 68 MMHG | HEIGHT: 63 IN | HEART RATE: 62 BPM

## 2025-06-04 DIAGNOSIS — M47.816 SPONDYLOSIS WITHOUT MYELOPATHY OR RADICULOPATHY, LUMBAR REGION: Primary | Chronic | ICD-10-CM

## 2025-06-04 DIAGNOSIS — M89.49 OSTEOARTHROSIS MULTIPLE SITES, NOT SPECIFIED AS GENERALIZED: Chronic | ICD-10-CM

## 2025-06-04 DIAGNOSIS — Z79.899 ENCOUNTER FOR LONG-TERM (CURRENT) USE OF MEDICATIONS: ICD-10-CM

## 2025-06-04 PROCEDURE — 3060F POS MICROALBUMINURIA REV: CPT | Mod: CPTII,,, | Performed by: PHYSICIAN ASSISTANT

## 2025-06-04 PROCEDURE — 96372 THER/PROPH/DIAG INJ SC/IM: CPT | Mod: PBBFAC | Performed by: PHYSICIAN ASSISTANT

## 2025-06-04 PROCEDURE — 1159F MED LIST DOCD IN RCRD: CPT | Mod: CPTII,,, | Performed by: PHYSICIAN ASSISTANT

## 2025-06-04 PROCEDURE — 3288F FALL RISK ASSESSMENT DOCD: CPT | Mod: CPTII,,, | Performed by: PHYSICIAN ASSISTANT

## 2025-06-04 PROCEDURE — 99999PBSHW POCT URINE DRUG SCREEN PRESUMP: Mod: PBBFAC,,,

## 2025-06-04 PROCEDURE — 1101F PT FALLS ASSESS-DOCD LE1/YR: CPT | Mod: CPTII,,, | Performed by: PHYSICIAN ASSISTANT

## 2025-06-04 PROCEDURE — 3052F HG A1C>EQUAL 8.0%<EQUAL 9.0%: CPT | Mod: CPTII,,, | Performed by: PHYSICIAN ASSISTANT

## 2025-06-04 PROCEDURE — 99215 OFFICE O/P EST HI 40 MIN: CPT | Mod: PBBFAC | Performed by: PHYSICIAN ASSISTANT

## 2025-06-04 PROCEDURE — 99999 PR PBB SHADOW E&M-EST. PATIENT-LVL V: CPT | Mod: PBBFAC,,, | Performed by: PHYSICIAN ASSISTANT

## 2025-06-04 PROCEDURE — 99999PBSHW PR PBB SHADOW TECHNICAL ONLY FILED TO HB: Mod: PBBFAC,JZ,,

## 2025-06-04 PROCEDURE — 3078F DIAST BP <80 MM HG: CPT | Mod: CPTII,,, | Performed by: PHYSICIAN ASSISTANT

## 2025-06-04 PROCEDURE — 99214 OFFICE O/P EST MOD 30 MIN: CPT | Mod: S$PBB,25,, | Performed by: PHYSICIAN ASSISTANT

## 2025-06-04 PROCEDURE — 80305 DRUG TEST PRSMV DIR OPT OBS: CPT | Mod: PBBFAC | Performed by: PHYSICIAN ASSISTANT

## 2025-06-04 PROCEDURE — 3008F BODY MASS INDEX DOCD: CPT | Mod: CPTII,,, | Performed by: PHYSICIAN ASSISTANT

## 2025-06-04 PROCEDURE — 1125F AMNT PAIN NOTED PAIN PRSNT: CPT | Mod: CPTII,,, | Performed by: PHYSICIAN ASSISTANT

## 2025-06-04 PROCEDURE — 3066F NEPHROPATHY DOC TX: CPT | Mod: CPTII,,, | Performed by: PHYSICIAN ASSISTANT

## 2025-06-04 PROCEDURE — 3077F SYST BP >= 140 MM HG: CPT | Mod: CPTII,,, | Performed by: PHYSICIAN ASSISTANT

## 2025-06-04 RX ORDER — HYDROCODONE BITARTRATE AND ACETAMINOPHEN 10; 325 MG/1; MG/1
1 TABLET ORAL EVERY 8 HOURS PRN
Qty: 90 TABLET | Refills: 0 | Status: SHIPPED | OUTPATIENT
Start: 2025-08-04 | End: 2025-09-03

## 2025-06-04 RX ORDER — KETOROLAC TROMETHAMINE 30 MG/ML
30 INJECTION, SOLUTION INTRAMUSCULAR; INTRAVENOUS
Status: COMPLETED | OUTPATIENT
Start: 2025-06-04 | End: 2025-06-04

## 2025-06-04 RX ORDER — HYDROCODONE BITARTRATE AND ACETAMINOPHEN 10; 325 MG/1; MG/1
1 TABLET ORAL EVERY 8 HOURS PRN
Qty: 90 TABLET | Refills: 0 | Status: SHIPPED | OUTPATIENT
Start: 2025-06-05 | End: 2025-07-05

## 2025-06-04 RX ORDER — HYDROCODONE BITARTRATE AND ACETAMINOPHEN 10; 325 MG/1; MG/1
1 TABLET ORAL EVERY 8 HOURS PRN
Qty: 90 TABLET | Refills: 0 | Status: SHIPPED | OUTPATIENT
Start: 2025-07-05 | End: 2025-08-04

## 2025-06-04 RX ADMIN — KETOROLAC TROMETHAMINE 30 MG: 30 INJECTION, SOLUTION INTRAMUSCULAR at 08:06

## 2025-06-07 DIAGNOSIS — E87.6 HYPOKALEMIA: ICD-10-CM

## 2025-06-09 RX ORDER — POTASSIUM CHLORIDE 750 MG/1
20 CAPSULE, EXTENDED RELEASE ORAL 2 TIMES DAILY
Qty: 360 CAPSULE | Refills: 3 | Status: SHIPPED | OUTPATIENT
Start: 2025-06-09

## 2025-07-11 RX ORDER — ISOPROPYL ALCOHOL 70 ML/100ML
SWAB TOPICAL
Qty: 300 EACH | Refills: 3 | Status: SHIPPED | OUTPATIENT
Start: 2025-07-11

## 2025-07-16 DIAGNOSIS — I10 ESSENTIAL HYPERTENSION: ICD-10-CM

## 2025-07-16 RX ORDER — NIFEDIPINE 90 MG/1
90 TABLET, EXTENDED RELEASE ORAL
Qty: 90 TABLET | Refills: 0 | Status: SHIPPED | OUTPATIENT
Start: 2025-07-16

## 2025-07-18 RX ORDER — SPIRONOLACTONE 25 MG/1
25 TABLET ORAL
Qty: 90 TABLET | Refills: 0 | Status: SHIPPED | OUTPATIENT
Start: 2025-07-18

## 2025-08-12 ENCOUNTER — OFFICE VISIT (OUTPATIENT)
Dept: PRIMARY CARE CLINIC | Facility: CLINIC | Age: 70
End: 2025-08-12
Payer: MEDICARE

## 2025-08-12 ENCOUNTER — TELEPHONE (OUTPATIENT)
Dept: PHARMACY | Facility: CLINIC | Age: 70
End: 2025-08-12

## 2025-08-12 VITALS
TEMPERATURE: 98 F | HEART RATE: 59 BPM | WEIGHT: 183 LBS | OXYGEN SATURATION: 96 % | RESPIRATION RATE: 20 BRPM | HEIGHT: 63 IN | DIASTOLIC BLOOD PRESSURE: 72 MMHG | SYSTOLIC BLOOD PRESSURE: 130 MMHG | BODY MASS INDEX: 32.43 KG/M2

## 2025-08-12 DIAGNOSIS — E11.9 TYPE 2 DIABETES MELLITUS WITHOUT COMPLICATION, WITHOUT LONG-TERM CURRENT USE OF INSULIN: Primary | ICD-10-CM

## 2025-08-12 DIAGNOSIS — K21.9 GASTROESOPHAGEAL REFLUX DISEASE, UNSPECIFIED WHETHER ESOPHAGITIS PRESENT: ICD-10-CM

## 2025-08-12 DIAGNOSIS — M79.89 BILATERAL SWELLING OF FEET: ICD-10-CM

## 2025-08-12 DIAGNOSIS — Z12.11 SCREEN FOR COLON CANCER: ICD-10-CM

## 2025-08-12 DIAGNOSIS — E78.5 HYPERLIPIDEMIA, UNSPECIFIED HYPERLIPIDEMIA TYPE: ICD-10-CM

## 2025-08-12 DIAGNOSIS — I10 ESSENTIAL HYPERTENSION: ICD-10-CM

## 2025-08-12 DIAGNOSIS — E87.6 HYPOKALEMIA: ICD-10-CM

## 2025-08-12 LAB
ALBUMIN SERPL BCP-MCNC: 3.6 G/DL (ref 3.4–4.8)
ALBUMIN/GLOB SERPL: 0.9 {RATIO}
ALP SERPL-CCNC: 59 U/L (ref 40–150)
ALT SERPL W P-5'-P-CCNC: 9 U/L
ANION GAP SERPL CALCULATED.3IONS-SCNC: 15 MMOL/L (ref 7–16)
AST SERPL W P-5'-P-CCNC: 17 U/L (ref 11–45)
BASOPHILS # BLD AUTO: 0.04 K/UL (ref 0–0.2)
BASOPHILS NFR BLD AUTO: 0.8 % (ref 0–1)
BILIRUB SERPL-MCNC: 0.6 MG/DL
BUN SERPL-MCNC: 26 MG/DL (ref 10–20)
BUN/CREAT SERPL: 21 (ref 6–20)
CALCIUM SERPL-MCNC: 9.3 MG/DL (ref 8.4–10.2)
CHLORIDE SERPL-SCNC: 106 MMOL/L (ref 98–107)
CO2 SERPL-SCNC: 22 MMOL/L (ref 23–31)
CREAT SERPL-MCNC: 1.24 MG/DL (ref 0.55–1.02)
DIFFERENTIAL METHOD BLD: ABNORMAL
EGFR (NO RACE VARIABLE) (RUSH/TITUS): 47 ML/MIN/1.73M2
EOSINOPHIL # BLD AUTO: 0.14 K/UL (ref 0–0.5)
EOSINOPHIL NFR BLD AUTO: 2.7 % (ref 1–4)
ERYTHROCYTE [DISTWIDTH] IN BLOOD BY AUTOMATED COUNT: 16 % (ref 11.5–14.5)
EST. AVERAGE GLUCOSE BLD GHB EST-MCNC: 180 MG/DL
GLOBULIN SER-MCNC: 4.1 G/DL (ref 2–4)
GLUCOSE SERPL-MCNC: 132 MG/DL (ref 70–110)
GLUCOSE SERPL-MCNC: 149 MG/DL (ref 82–115)
HBA1C MFR BLD HPLC: 7.9 %
HCT VFR BLD AUTO: 46.1 % (ref 38–47)
HGB BLD-MCNC: 13.8 G/DL (ref 12–16)
IMM GRANULOCYTES # BLD AUTO: 0.03 K/UL (ref 0–0.04)
IMM GRANULOCYTES NFR BLD: 0.6 % (ref 0–0.4)
LYMPHOCYTES # BLD AUTO: 1.71 K/UL (ref 1–4.8)
LYMPHOCYTES NFR BLD AUTO: 33.2 % (ref 27–41)
MCH RBC QN AUTO: 27.4 PG (ref 27–31)
MCHC RBC AUTO-ENTMCNC: 29.9 G/DL (ref 32–36)
MCV RBC AUTO: 91.5 FL (ref 80–96)
MONOCYTES # BLD AUTO: 0.62 K/UL (ref 0–0.8)
MONOCYTES NFR BLD AUTO: 12 % (ref 2–6)
MPC BLD CALC-MCNC: 11.3 FL (ref 9.4–12.4)
NEUTROPHILS # BLD AUTO: 2.61 K/UL (ref 1.8–7.7)
NEUTROPHILS NFR BLD AUTO: 50.7 % (ref 53–65)
NRBC # BLD AUTO: 0 X10E3/UL
NRBC, AUTO (.00): 0 %
PLATELET # BLD AUTO: 263 K/UL (ref 150–400)
POTASSIUM SERPL-SCNC: 3.4 MMOL/L (ref 3.5–5.1)
PROT SERPL-MCNC: 7.7 G/DL (ref 5.8–7.6)
RBC # BLD AUTO: 5.04 M/UL (ref 4.2–5.4)
SODIUM SERPL-SCNC: 140 MMOL/L (ref 136–145)
WBC # BLD AUTO: 5.15 K/UL (ref 4.5–11)

## 2025-08-12 PROCEDURE — 3288F FALL RISK ASSESSMENT DOCD: CPT | Mod: ,,, | Performed by: NURSE PRACTITIONER

## 2025-08-12 PROCEDURE — 83036 HEMOGLOBIN GLYCOSYLATED A1C: CPT | Mod: ,,, | Performed by: CLINICAL MEDICAL LABORATORY

## 2025-08-12 PROCEDURE — 1160F RVW MEDS BY RX/DR IN RCRD: CPT | Mod: ,,, | Performed by: NURSE PRACTITIONER

## 2025-08-12 PROCEDURE — 85025 COMPLETE CBC W/AUTO DIFF WBC: CPT | Mod: ,,, | Performed by: CLINICAL MEDICAL LABORATORY

## 2025-08-12 PROCEDURE — 99214 OFFICE O/P EST MOD 30 MIN: CPT | Mod: ,,, | Performed by: NURSE PRACTITIONER

## 2025-08-12 PROCEDURE — 80053 COMPREHEN METABOLIC PANEL: CPT | Mod: ,,, | Performed by: CLINICAL MEDICAL LABORATORY

## 2025-08-12 PROCEDURE — 3052F HG A1C>EQUAL 8.0%<EQUAL 9.0%: CPT | Mod: ,,, | Performed by: NURSE PRACTITIONER

## 2025-08-12 PROCEDURE — 1125F AMNT PAIN NOTED PAIN PRSNT: CPT | Mod: ,,, | Performed by: NURSE PRACTITIONER

## 2025-08-12 PROCEDURE — 3066F NEPHROPATHY DOC TX: CPT | Mod: ,,, | Performed by: NURSE PRACTITIONER

## 2025-08-12 PROCEDURE — 3078F DIAST BP <80 MM HG: CPT | Mod: ,,, | Performed by: NURSE PRACTITIONER

## 2025-08-12 PROCEDURE — 82962 GLUCOSE BLOOD TEST: CPT | Mod: QW,,, | Performed by: NURSE PRACTITIONER

## 2025-08-12 PROCEDURE — 3008F BODY MASS INDEX DOCD: CPT | Mod: ,,, | Performed by: NURSE PRACTITIONER

## 2025-08-12 PROCEDURE — 1101F PT FALLS ASSESS-DOCD LE1/YR: CPT | Mod: ,,, | Performed by: NURSE PRACTITIONER

## 2025-08-12 PROCEDURE — 3075F SYST BP GE 130 - 139MM HG: CPT | Mod: ,,, | Performed by: NURSE PRACTITIONER

## 2025-08-12 PROCEDURE — 1159F MED LIST DOCD IN RCRD: CPT | Mod: ,,, | Performed by: NURSE PRACTITIONER

## 2025-08-12 PROCEDURE — 3060F POS MICROALBUMINURIA REV: CPT | Mod: ,,, | Performed by: NURSE PRACTITIONER

## 2025-08-12 RX ORDER — OMEPRAZOLE 40 MG/1
40 CAPSULE, DELAYED RELEASE ORAL EVERY MORNING
Qty: 90 CAPSULE | Refills: 3 | Status: SHIPPED | OUTPATIENT
Start: 2025-08-12

## 2025-08-12 RX ORDER — LOSARTAN POTASSIUM AND HYDROCHLOROTHIAZIDE 25; 100 MG/1; MG/1
1 TABLET ORAL DAILY
Qty: 90 TABLET | Refills: 3 | Status: SHIPPED | OUTPATIENT
Start: 2025-08-12

## 2025-08-12 RX ORDER — SPIRONOLACTONE 25 MG/1
25 TABLET ORAL DAILY
Qty: 90 TABLET | Refills: 2 | Status: SHIPPED | OUTPATIENT
Start: 2025-08-12

## 2025-08-12 RX ORDER — FUROSEMIDE 40 MG/1
40 TABLET ORAL
Qty: 90 TABLET | Refills: 3 | Status: SHIPPED | OUTPATIENT
Start: 2025-08-12

## 2025-08-12 RX ORDER — GLIPIZIDE 2.5 MG/1
2.5 TABLET, EXTENDED RELEASE ORAL
Qty: 90 TABLET | Refills: 3 | Status: SHIPPED | OUTPATIENT
Start: 2025-08-12 | End: 2026-08-12

## 2025-08-12 RX ORDER — EZETIMIBE 10 MG/1
10 TABLET ORAL NIGHTLY
Qty: 90 TABLET | Refills: 3 | Status: SHIPPED | OUTPATIENT
Start: 2025-08-12

## 2025-08-12 RX ORDER — SITAGLIPTIN AND METFORMIN HYDROCHLORIDE 1000; 50 MG/1; MG/1
1 TABLET, FILM COATED, EXTENDED RELEASE ORAL DAILY
Qty: 90 TABLET | Refills: 3 | Status: SHIPPED | OUTPATIENT
Start: 2025-08-12 | End: 2025-08-13

## 2025-08-12 RX ORDER — POTASSIUM CHLORIDE 750 MG/1
20 CAPSULE, EXTENDED RELEASE ORAL 2 TIMES DAILY
Qty: 360 CAPSULE | Refills: 3 | Status: SHIPPED | OUTPATIENT
Start: 2025-08-12

## 2025-08-13 ENCOUNTER — RESULTS FOLLOW-UP (OUTPATIENT)
Dept: PRIMARY CARE CLINIC | Facility: CLINIC | Age: 70
End: 2025-08-13
Payer: MEDICARE

## 2025-08-13 DIAGNOSIS — N18.31 STAGE 3A CHRONIC KIDNEY DISEASE: Primary | ICD-10-CM

## 2025-08-13 DIAGNOSIS — E11.9 TYPE 2 DIABETES MELLITUS WITHOUT COMPLICATION, WITHOUT LONG-TERM CURRENT USE OF INSULIN: ICD-10-CM

## 2025-08-25 ENCOUNTER — TELEPHONE (OUTPATIENT)
Dept: NEPHROLOGY | Facility: CLINIC | Age: 70
End: 2025-08-25
Payer: MEDICARE

## 2025-09-03 ENCOUNTER — OFFICE VISIT (OUTPATIENT)
Dept: PAIN MEDICINE | Facility: CLINIC | Age: 70
End: 2025-09-03
Payer: MEDICARE

## 2025-09-03 VITALS
HEIGHT: 63 IN | DIASTOLIC BLOOD PRESSURE: 69 MMHG | WEIGHT: 187 LBS | SYSTOLIC BLOOD PRESSURE: 133 MMHG | HEART RATE: 60 BPM | RESPIRATION RATE: 18 BRPM | BODY MASS INDEX: 33.13 KG/M2

## 2025-09-03 DIAGNOSIS — Z79.899 ENCOUNTER FOR LONG-TERM (CURRENT) USE OF HIGH-RISK MEDICATION: Primary | ICD-10-CM

## 2025-09-03 DIAGNOSIS — M89.49 OSTEOARTHROSIS MULTIPLE SITES, NOT SPECIFIED AS GENERALIZED: ICD-10-CM

## 2025-09-03 DIAGNOSIS — M25.551 HIP PAIN, RIGHT: ICD-10-CM

## 2025-09-03 PROCEDURE — 3288F FALL RISK ASSESSMENT DOCD: CPT | Mod: CPTII,,, | Performed by: PAIN MEDICINE

## 2025-09-03 PROCEDURE — 99999PBSHW POCT URINE DRUG SCREEN PRESUMP: Mod: PBBFAC,,,

## 2025-09-03 PROCEDURE — 3075F SYST BP GE 130 - 139MM HG: CPT | Mod: CPTII,,, | Performed by: PAIN MEDICINE

## 2025-09-03 PROCEDURE — 1101F PT FALLS ASSESS-DOCD LE1/YR: CPT | Mod: CPTII,,, | Performed by: PAIN MEDICINE

## 2025-09-03 PROCEDURE — 99215 OFFICE O/P EST HI 40 MIN: CPT | Mod: PBBFAC | Performed by: PAIN MEDICINE

## 2025-09-03 PROCEDURE — 99999 PR PBB SHADOW E&M-EST. PATIENT-LVL V: CPT | Mod: PBBFAC,,, | Performed by: PAIN MEDICINE

## 2025-09-03 PROCEDURE — 3060F POS MICROALBUMINURIA REV: CPT | Mod: CPTII,,, | Performed by: PAIN MEDICINE

## 2025-09-03 PROCEDURE — 3078F DIAST BP <80 MM HG: CPT | Mod: CPTII,,, | Performed by: PAIN MEDICINE

## 2025-09-03 PROCEDURE — 80305 DRUG TEST PRSMV DIR OPT OBS: CPT | Mod: PBBFAC | Performed by: PAIN MEDICINE

## 2025-09-03 PROCEDURE — 99213 OFFICE O/P EST LOW 20 MIN: CPT | Mod: S$PBB,25,, | Performed by: PAIN MEDICINE

## 2025-09-03 PROCEDURE — 3066F NEPHROPATHY DOC TX: CPT | Mod: CPTII,,, | Performed by: PAIN MEDICINE

## 2025-09-03 PROCEDURE — 1159F MED LIST DOCD IN RCRD: CPT | Mod: CPTII,,, | Performed by: PAIN MEDICINE

## 2025-09-03 PROCEDURE — 3008F BODY MASS INDEX DOCD: CPT | Mod: CPTII,,, | Performed by: PAIN MEDICINE

## 2025-09-03 PROCEDURE — 96372 THER/PROPH/DIAG INJ SC/IM: CPT | Mod: PBBFAC | Performed by: PAIN MEDICINE

## 2025-09-03 PROCEDURE — 3051F HG A1C>EQUAL 7.0%<8.0%: CPT | Mod: CPTII,,, | Performed by: PAIN MEDICINE

## 2025-09-03 PROCEDURE — 99999PBSHW PR PBB SHADOW TECHNICAL ONLY FILED TO HB: Mod: PBBFAC,JZ,,

## 2025-09-03 PROCEDURE — 1125F AMNT PAIN NOTED PAIN PRSNT: CPT | Mod: CPTII,,, | Performed by: PAIN MEDICINE

## 2025-09-03 RX ORDER — HYDROCODONE BITARTRATE AND ACETAMINOPHEN 10; 325 MG/1; MG/1
1 TABLET ORAL EVERY 8 HOURS PRN
Qty: 90 TABLET | Refills: 0 | Status: SHIPPED | OUTPATIENT
Start: 2025-11-02 | End: 2025-12-02

## 2025-09-03 RX ORDER — HYDROCODONE BITARTRATE AND ACETAMINOPHEN 10; 325 MG/1; MG/1
1 TABLET ORAL EVERY 8 HOURS PRN
Qty: 90 TABLET | Refills: 0 | Status: SHIPPED | OUTPATIENT
Start: 2025-09-03 | End: 2025-10-03

## 2025-09-03 RX ORDER — KETOROLAC TROMETHAMINE 30 MG/ML
60 INJECTION, SOLUTION INTRAMUSCULAR; INTRAVENOUS
Status: COMPLETED | OUTPATIENT
Start: 2025-09-03 | End: 2025-09-03

## 2025-09-03 RX ORDER — HYDROCODONE BITARTRATE AND ACETAMINOPHEN 10; 325 MG/1; MG/1
1 TABLET ORAL EVERY 8 HOURS PRN
Qty: 90 TABLET | Refills: 0 | Status: SHIPPED | OUTPATIENT
Start: 2025-10-03 | End: 2025-11-02

## 2025-09-03 RX ADMIN — KETOROLAC TROMETHAMINE 60 MG: 30 INJECTION, SOLUTION INTRAMUSCULAR at 10:09

## (undated) DEVICE — SPONGE COTTON WOVEN 4X4IN

## (undated) DEVICE — SOL NACL IRR 1000ML BTL

## (undated) DEVICE — SUT CTD VICRYL VIL BR SH 27

## (undated) DEVICE — SKIN STAPLER PMR35

## (undated) DEVICE — SPONGE COTTON TRAY 4X4IN

## (undated) DEVICE — DRAIN FLAT HUBLESS FULL 10MM

## (undated) DEVICE — LEGGING CLEAR POLY 2/PACK

## (undated) DEVICE — SUT CTD VICRYL 3-0 CR/SH

## (undated) DEVICE — RESERVOIR JACKSON-PRATT 100CC

## (undated) DEVICE — Device

## (undated) DEVICE — SUT MONOCYRL 4-0 PS2 UND

## (undated) DEVICE — GLOVE 8.5 PROTEXIS PI BLUE

## (undated) DEVICE — GLOVE PROTEXIS PI SYN SURG 6.5

## (undated) DEVICE — KIT BASIC RUSH

## (undated) DEVICE — PACK UNIV PROCEDURE

## (undated) DEVICE — SUT SILK 2.0 BLK 18

## (undated) DEVICE — GLOVE PROTEXIS PI SYN SURG 8.0

## (undated) DEVICE — ELECTRODE BLADE INSULATED 1 IN

## (undated) DEVICE — COVER PROXIMA MAYO STAND

## (undated) DEVICE — SUT ETHILON 2-0 FS 18IN BLK

## (undated) DEVICE — GOWN NONREINF SET-IN SLV 2XL

## (undated) DEVICE — ADHESIVE MASTISOL VIAL 48/BX

## (undated) DEVICE — SYR 10CC LUER LOCK

## (undated) DEVICE — GLOVE PROTEXIS PI SYN SURG 7

## (undated) DEVICE — SUT ETHILON 3-0 PS2 18 BLK

## (undated) DEVICE — SUT 3-0 VICRYL / SH (J416)

## (undated) DEVICE — GLOVE 8 PROTEXIS PI BLUE